# Patient Record
Sex: MALE | Race: WHITE | NOT HISPANIC OR LATINO | Employment: OTHER | URBAN - METROPOLITAN AREA
[De-identification: names, ages, dates, MRNs, and addresses within clinical notes are randomized per-mention and may not be internally consistent; named-entity substitution may affect disease eponyms.]

---

## 2017-07-27 ENCOUNTER — OFFICE VISIT (OUTPATIENT)
Dept: PODIATRY | Facility: CLINIC | Age: 82
End: 2017-07-27
Payer: MEDICARE

## 2017-07-27 VITALS — WEIGHT: 198.19 LBS | BODY MASS INDEX: 31.11 KG/M2 | HEIGHT: 67 IN

## 2017-07-27 DIAGNOSIS — B35.1 ONYCHOMYCOSIS DUE TO DERMATOPHYTE: Primary | ICD-10-CM

## 2017-07-27 DIAGNOSIS — M79.674 PAIN IN TOES OF BOTH FEET: ICD-10-CM

## 2017-07-27 DIAGNOSIS — M79.675 PAIN IN TOES OF BOTH FEET: ICD-10-CM

## 2017-07-27 PROCEDURE — 99203 OFFICE O/P NEW LOW 30 MIN: CPT | Mod: S$GLB,,, | Performed by: PODIATRIST

## 2017-07-27 PROCEDURE — 1159F MED LIST DOCD IN RCRD: CPT | Mod: S$GLB,,, | Performed by: PODIATRIST

## 2017-07-27 PROCEDURE — 1125F AMNT PAIN NOTED PAIN PRSNT: CPT | Mod: S$GLB,,, | Performed by: PODIATRIST

## 2017-07-27 PROCEDURE — 99999 PR PBB SHADOW E&M-EST. PATIENT-LVL III: CPT | Mod: PBBFAC,,, | Performed by: PODIATRIST

## 2017-07-27 PROCEDURE — 17999 UNLISTD PX SKN MUC MEMB SUBQ: CPT | Mod: CSM,,, | Performed by: PODIATRIST

## 2017-07-27 PROCEDURE — 1157F ADVNC CARE PLAN IN RCRD: CPT | Mod: S$GLB,,, | Performed by: PODIATRIST

## 2017-07-27 RX ORDER — CICLOPIROX 80 MG/ML
SOLUTION TOPICAL NIGHTLY
Qty: 6.6 ML | Refills: 11 | Status: SHIPPED | OUTPATIENT
Start: 2017-07-27 | End: 2019-08-16

## 2017-07-27 RX ORDER — ATORVASTATIN CALCIUM 40 MG/1
40 TABLET, FILM COATED ORAL NIGHTLY
COMMUNITY
Start: 2017-05-22 | End: 2021-02-01 | Stop reason: SDUPTHER

## 2017-07-27 RX ORDER — BUDESONIDE AND FORMOTEROL FUMARATE DIHYDRATE 160; 4.5 UG/1; UG/1
2 AEROSOL RESPIRATORY (INHALATION) EVERY 12 HOURS
COMMUNITY
Start: 2017-07-12

## 2017-07-27 NOTE — PROGRESS NOTES
Subjective:      Patient ID: Frank Wyatt is a 82 y.o. male.    Chief Complaint: Nail Care    Long thick discolored misshapen painful toenails all ten toes.  Gradual onset, worsening over past several weeks, aggravated by increased weight bearing, shoe gear, pressure.  No previous medical treatment.  No self care.  Denies trauma and surgery all toes.      Review of Systems   Constitution: Negative for chills, diaphoresis, fever, malaise/fatigue and night sweats.   Cardiovascular: Negative for claudication, cyanosis, leg swelling and syncope.   Skin: Positive for nail changes. Negative for color change, dry skin, rash, suspicious lesions and unusual hair distribution.   Musculoskeletal: Negative for falls, joint pain, joint swelling, muscle cramps, muscle weakness and stiffness.   Gastrointestinal: Negative for constipation, diarrhea, nausea and vomiting.   Neurological: Negative for brief paralysis, disturbances in coordination, focal weakness, numbness, paresthesias, sensory change and tremors.           Objective:      Physical Exam   Constitutional: He is oriented to person, place, and time. He appears well-developed and well-nourished. He is cooperative.   Oriented to time, place, and person.   Cardiovascular:   Pulses:       Dorsalis pedis pulses are 1+ on the right side, and 1+ on the left side.        Posterior tibial pulses are 1+ on the right side, and 1+ on the left side.   Capillary fill time 3-5 seconds.  All toes warm to touch.      Negative lower extremity edema bilateral.    Negative elevational pallor and dependent rubor bilateral.     Musculoskeletal:   Normal angle, base, station of gait. Decreased stride length, early heel off, moderately propulsive toe off bilateral.    All ten toes without clubbing, cyanosis, or signs of ischemia.      No pain to palpation bilateral lower extremities.      Range of motion, stability, muscle strength, and muscle tone are age and health appropriate normal  bilateral feet and legs.       Lymphadenopathy:   Negative lymphadenopathy bilateral popliteal fossa and tarsal tunnel.     Neurological: He is alert and oriented to person, place, and time. He has normal strength. He is not disoriented. He displays no atrophy and no tremor. No sensory deficit. He exhibits normal muscle tone.   Reflex Scores:       Patellar reflexes are 2+ on the right side and 2+ on the left side.       Achilles reflexes are 2+ on the right side and 2+ on the left side.  Negative tinel sign to percussion sural, superficial peroneal, deep peroneal, saphenous, and posterior tibial nerves right and left ankles and feet.     Skin: Skin is warm, dry and intact. No abrasion, no bruising, no burn, no ecchymosis, no laceration, no lesion, no petechiae and no rash noted. He is not diaphoretic. No cyanosis or erythema. No pallor. Nails show no clubbing.   Skin is normal age and health appropriate color, turgor, texture, and temperature bilateral lower extremities without ulceration, hyperpigmentation, discoloration, masses nodules or cords palpated.  No ecchymosis, erythema, edema, or cardinal signs of infection bilateral lower extremities.      Toenails 1st, 2nd, 3rd, 4th, 5th  bilateral are hypertrophic thickened 2-3 mm, dystrophic, discolored tanish brown with tan, gray crumbly subungual debris.  Tender to distal nail plate pressure, without periungual skin abnormality of each.               Assessment:       Encounter Diagnoses   Name Primary?    Onychomycosis due to dermatophyte Yes    Pain in toes of both feet          Plan:       Frank TOMAS was seen today for nail care.    Diagnoses and all orders for this visit:    Onychomycosis due to dermatophyte    Pain in toes of both feet    Other orders  -     ciclopirox (PENLAC) 8 % Soln; Apply topically nightly.      I counseled the patient on his conditions, their implications and medical management.        - Shoe inspection. Patient instructed on proper  foot hygeine. We discussed wearing proper shoe gear, daily foot inspections, never walking without protective shoe gear, never putting sharp instruments to feet, routine podiatric visits as needed.      Discussed conservative treatment with shoes of adequate dimensions, material, and style to alleviate symptoms and delay or prevent surgical intervention.      Non covered foot care:    - With patient's permission, nails were aggressively reduced and debrided x 10 to their soft tissue attachment mechanically and with electric , removing all offending nail and debris. Patient relates relief following the procedure. He will continue to monitor the areas daily, inspect his feet, wear protective shoe gear when ambulatory, moisturizer to maintain skin integrity and follow in this office in approximately 2-3 months, sooner p.r.n.          Return if symptoms worsen or fail to improve.

## 2019-03-06 ENCOUNTER — OFFICE VISIT (OUTPATIENT)
Dept: SURGERY | Facility: CLINIC | Age: 84
End: 2019-03-06
Payer: MEDICARE

## 2019-03-06 VITALS — HEIGHT: 67 IN | WEIGHT: 197 LBS | BODY MASS INDEX: 30.92 KG/M2

## 2019-03-06 DIAGNOSIS — K40.90 RIGHT INGUINAL HERNIA: Primary | ICD-10-CM

## 2019-03-06 DIAGNOSIS — K43.2 INCISIONAL HERNIA OF ANTERIOR ABDOMINAL WALL WITHOUT OBSTRUCTION OR GANGRENE: ICD-10-CM

## 2019-03-06 PROCEDURE — 1101F PT FALLS ASSESS-DOCD LE1/YR: CPT | Mod: ,,, | Performed by: SURGERY

## 2019-03-06 PROCEDURE — 99203 OFFICE O/P NEW LOW 30 MIN: CPT | Mod: ,,, | Performed by: SURGERY

## 2019-03-06 PROCEDURE — 1101F PR PT FALLS ASSESS DOC 0-1 FALLS W/OUT INJ PAST YR: ICD-10-PCS | Mod: ,,, | Performed by: SURGERY

## 2019-03-06 PROCEDURE — 99203 PR OFFICE/OUTPT VISIT, NEW, LEVL III, 30-44 MIN: ICD-10-PCS | Mod: ,,, | Performed by: SURGERY

## 2019-03-06 NOTE — PROGRESS NOTES
Subjective:       Patient ID: Frank Wyatt is a 84 y.o. male.    Chief Complaint: Inguinal Hernia (Rt Inguinal hernia and abdominal hernia (? incisional) - Dr. Cardenas)      HPI:  Patient is 84-year-old male referred to the office with a large right inguinal hernia and mid abdominal incisional hernia. Patient first noticed the right inguinal hernia about a year or 2 ago. It has become more uncomfortable. He presented to primary care and to his urologist. He was found to have evidence of epididymitis on testicular ultrasound. CT scan was done that revealed a large right inguinal hernia as well. Patient has no obstructive symptoms. He has no history of right inguinal hernia repair.     Patient also has had multiple abdominal surgeries. He had partial colectomy with colostomy followed by colostomy reversal several months later in 2004. He had laparoscopic cholecystectomy in 2014. He had a resultant incisional hernia at the umbilicus. The hernia was repaired laparoscopically with complication of bowel injury. Exploratory laparotomy with bowel repair and removal of the mesh was performed soon after. His incisional hernia returned about a year post procedure. He has a large bulge in the midabdomen. He states he's not had any discomfort or obstructive issues with the hernia. Patient also has history of coronary artery disease and had CABG in 1997. Reports a normal stress test one year ago.    Past Medical History:   Diagnosis Date    Arthritis     Coronary artery disease     Full dentures     [place] (hard of hearing)     left ear    Hypertension     Kidney stones     Meniere's disease     Wears glasses      Past Surgical History:   Procedure Laterality Date    APPENDECTOMY      CARDIAC SURGERY  1997    CABG 5 vessel    CHOLECYSTECTOMY  2013    CHOLECYSTECTOMY, LAPAROSCOPIC N/A 1/7/2014    [facility]    COLON SURGERY      Colon resection with colostomy; colostomy  reversal. 2004    CORONARY ARTERY BYPASS GRAFT  1996    5-bypass     EXPLORATORY-LAPAROTOMY N/A 8/20/2015    [facility]    EXPLORATORY-LAPAROTOMY N/A 1/7/2014    [facility] OR    EYE SURGERY Right 2014    Cataract    EYE SURGERY Left 2017    Cataract    HERNIA REPAIR  2014    Dr. Bailon - had to have mesh removed    LITHOTRIPSY      nephrostomy tube      REPAIR-HERNIA-INCISIONAL N/A 8/17/2015    [facility] OR    RESECTION - SMALL BOWEL N/A 8/20/2015    [facility] OR    ROTATOR CUFF REPAIR  2005    right shoulder     Review of patient's allergies indicates:   Allergen Reactions    Bactrim [sulfamethoxazole-trimethoprim] Hives     itched    Keflex [cephalexin] Rash    Morphine Rash     Patient had morphine and keflex at the same time, developed a rash, not sure which one caused it, or if it was a combination of the two meds.     Medication List with Changes/Refills   Current Medications    ALBUTEROL 90 MCG/ACTUATION INHALER    Inhale 2 puffs into the lungs every 4 (four) hours as needed for Wheezing or Shortness of Breath.    ATORVASTATIN (LIPITOR) 40 MG TABLET        BETAHISTINE HCL (BETAHISTINE, BULK, MISC)    24 mg by Misc.(Non-Drug; Combo Route) route.    CICLOPIROX (PENLAC) 8 % SOLN    Apply topically nightly.    FLUTICASONE (FLOVENT HFA) 44 MCG/ACTUATION INHALER    Inhale 1 puff into the lungs 2 (two) times daily.    FUROSEMIDE (LASIX) 40 MG TABLET    Take 1 tablet (40 mg total) by mouth once daily.    LEVOTHYROXINE (SYNTHROID) 25 MCG TABLET    Take 1 tablet (25 mcg total) by mouth before breakfast.    METOPROLOL TARTRATE (LOPRESSOR) 25 MG TABLET    25 mg 2 (two) times daily.     PREDNISONE (DELTASONE) 20 MG TABLET    Take 2 tablets (40 mg total) by mouth once daily.    SYMBICORT 160-4.5 MCG/ACTUATION HFAA        TRAMADOL (ULTRAM) 50 MG TABLET    Take 1 tablet (50 mg total) by  mouth every 6 (six) hours as needed for Pain.     Family History   Problem Relation Age of Onset    Heart disease Mother     Hypertension Mother     Hypertension Sister     Heart disease Brother     Hypertension Brother     Cancer Daughter     Diabetes Brother     Heart disease Brother     Hypertension Brother     Hypertension Sister     Heart disease Sister      Social History     Socioeconomic History    Marital status:      Spouse name: None    Number of children: None    Years of education: None    Highest education level: None   Social Needs    Financial resource strain: None    Food insecurity - worry: None    Food insecurity - inability: None    Transportation needs - medical: None    Transportation needs - non-medical: None   Occupational History    None   Tobacco Use    Smoking status: Former Smoker     Packs/day: 1.00     Years: 25.00     Pack years: 25.00     Last attempt to quit: 1972     Years since quittin.1    Smokeless tobacco: Never Used   Substance and Sexual Activity    Alcohol use: No    Drug use: No    Sexual activity: None   Other Topics Concern    None   Social History Narrative    None         Review of Systems   Constitutional: Negative for appetite change, chills, fever and unexpected weight change.   HENT: Negative for hearing loss, rhinorrhea, sore throat and voice change.    Eyes: Negative for photophobia and visual disturbance.   Respiratory: Negative for cough, choking and shortness of breath.    Cardiovascular: Negative for chest pain, palpitations and leg swelling.   Gastrointestinal: Negative for abdominal pain, blood in stool, constipation, diarrhea, nausea and vomiting.   Endocrine: Negative for cold intolerance, heat intolerance, polydipsia and polyuria.   Musculoskeletal: Negative for arthralgias, back pain, joint swelling and neck stiffness.   Skin: Negative for color change, pallor and rash.   Neurological: Negative for dizziness,  seizures, syncope and headaches.   Hematological: Negative for adenopathy. Does not bruise/bleed easily.   Psychiatric/Behavioral: Negative for agitation, behavioral problems and confusion.       Objective:      Physical Exam   Constitutional: He appears well-developed and well-nourished.  Non-toxic appearance. No distress.   HENT:   Head: Normocephalic and atraumatic. Head is without abrasion and without laceration.   Right Ear: External ear normal.   Left Ear: External ear normal.   Nose: Nose normal.   Mouth/Throat: Oropharynx is clear and moist.   Eyes: EOM are normal. Pupils are equal, round, and reactive to light.   Neck: Trachea normal. Neck supple. No tracheal deviation and normal range of motion present.   Cardiovascular: Normal rate and regular rhythm.   Pulmonary/Chest: Effort normal. No accessory muscle usage. No tachypnea. No respiratory distress.   Abdominal: Soft. Normal appearance and bowel sounds are normal. He exhibits no distension and no mass. There is no tenderness. There is no rigidity and no guarding. A hernia is present. Hernia confirmed positive in the ventral area and confirmed positive in the right inguinal area.       Patient has reducible periumbilical incisional hernia. Defect feels to be about 4 cm in diameter    Patient has large right inguinal hernia with scrotal component   Lymphadenopathy:        Right: No inguinal adenopathy present.        Left: No inguinal adenopathy present.   Neurological: He is alert. Coordination and gait normal.   Skin: Skin is warm and intact.   Psychiatric: He has a normal mood and affect. His speech is normal and behavior is normal.       Assessment/Plan:   Frank TOMAS was seen today for inguinal hernia.    Diagnoses and all orders for this visit:    Right inguinal hernia  -     Ambulatory Referral to External Surgery  -     CBC auto differential; Future  -     Basic metabolic panel; Future  -     X-Ray Chest PA And Lateral; Future  -     SCHEDULED EKG  12-LEAD (to [place]); Future    Incisional hernia of anterior abdominal wall without obstruction or gangrene      Patient has large right inguinal hernia as well as midabdominal incisional hernia. We discussed repair of both hernias as well as repairing them potentially at the same time. In my opinion repairing hernias at separate procedures is the best option. Patient is elderly, has good functional status but is somewhat frail. I worry that having both procedures done at the same time would result in a difficult recovery. He has had multiple abdominal operations. Adhesive disease may be dense. He has history of bowel injury with previous laparoscopic hernia repair. This procedure may be lengthy. For these reasons I have recommended repair of the hernias at separate times. The ventral hernia as been present since 2015 and causal discomfort. The right inguinal hernia is large and symptomatic. We will plan for right inguinal hernia repair in the upcoming weeks. After he is recovered from right inguinal hernia we will discuss repair of ventral hernia. He'll be sent for cardiac clearance. We will tentatively schedule surgery for March 25 pending cardiac clearance    Planned procedure: Right inguinal hernia repair    Clindamycin 900 mg IV on call to OR    NPO past midnight    [place] cloth scrub per protocol    SCDs Bilateral Lower Extremities    I discussed the proposed procedures the the patient including risks, benefits, indications, alternatives and special concerns.  The patient appears to understand and agrees to go ahead with surgery.  I have made no promises, warranties or verbal agreements beyond what was discussed above.

## 2019-03-21 LAB
BASOPHILS NFR BLD: 0.1 K/UL (ref 0–0.2)
BASOPHILS NFR BLD: 0.7 %
BUN SERPL-MCNC: 28 MG/DL (ref 8–20)
CALCIUM SERPL-MCNC: 9.8 MG/DL (ref 7.7–10.4)
CHLORIDE: 98 MMOL/L (ref 98–110)
CO2 SERPL-SCNC: 31.1 MMOL/L (ref 22.8–31.6)
CREATININE: 1.15 MG/DL (ref 0.6–1.4)
EOSINOPHIL NFR BLD: 0.6 K/UL (ref 0–0.7)
EOSINOPHIL NFR BLD: 6 %
ERYTHROCYTE [DISTWIDTH] IN BLOOD BY AUTOMATED COUNT: 13.4 % (ref 11.7–14.9)
GLUCOSE: 112 MG/DL (ref 70–99)
GRAN #: 7.4 K/UL (ref 1.4–6.5)
GRAN%: 70.4 %
HCT VFR BLD AUTO: 47 % (ref 39–55)
HGB BLD-MCNC: 15.5 G/DL (ref 14–16)
IMMATURE GRANS (ABS): 0.1 K/UL (ref 0–1)
IMMATURE GRANULOCYTES: 0.7 %
LYMPH #: 1.5 K/UL (ref 1.2–3.4)
LYMPH%: 14.4 %
MCH RBC QN AUTO: 30.5 PG (ref 25–35)
MCHC RBC AUTO-ENTMCNC: 33 G/DL (ref 31–36)
MCV RBC AUTO: 92.3 FL (ref 80–100)
MONO #: 0.8 K/UL (ref 0.1–0.6)
MONO%: 7.8 %
NUCLEATED RBCS: 0 %
PLATELET # BLD AUTO: 111 K/UL (ref 140–440)
PMV BLD AUTO: 10.1 FL (ref 8.8–12.7)
POTASSIUM SERPL-SCNC: 4.8 MMOL/L (ref 3.5–5)
RBC # BLD AUTO: 5.09 M/UL (ref 4.3–5.9)
SODIUM: 135 MMOL/L (ref 134–144)
WBC # BLD AUTO: 10.4 K/UL (ref 5–10)

## 2019-03-28 ENCOUNTER — TELEPHONE (OUTPATIENT)
Dept: SURGERY | Facility: CLINIC | Age: 84
End: 2019-03-28

## 2019-03-28 NOTE — TELEPHONE ENCOUNTER
Pt daughter Argelia calling stating pt is doing well post operatively, however he is having to take pain meds every 4 hours exactly. Last night was the first time he was able to make it the full 4 hours without having breakthrough pain. He will be out of pain meds on Saturday and she is concerned about what he can do for pain after that. She states incision looks great. It is not warm to touch, red or inflamed. Pt does not have fever. I notified her I will d/w Dr. Abraham and call back.

## 2019-04-01 ENCOUNTER — TELEPHONE (OUTPATIENT)
Dept: SURGERY | Facility: CLINIC | Age: 84
End: 2019-04-01

## 2019-04-09 ENCOUNTER — TELEPHONE (OUTPATIENT)
Dept: SURGERY | Facility: CLINIC | Age: 84
End: 2019-04-09

## 2019-04-09 NOTE — TELEPHONE ENCOUNTER
Kay RN at [facility] calling stating she saw pt today to eval wound. She said discharge coming from it is thick & slimy. Currently they have orders to do wet to dry dressing changes daily with saline water, but after seeing it she feels packing with silver alginate on M,W,F would work better and help tissue heal faster. After speaking with Dr. Abraham, I informed Kay that it was ok to proceed with that.

## 2019-04-12 RX ORDER — LINEZOLID 600 MG/1
600 TABLET, FILM COATED ORAL EVERY 12 HOURS
Qty: 20 TABLET | Refills: 0 | Status: SHIPPED | OUTPATIENT
Start: 2019-04-12 | End: 2019-04-22

## 2019-04-12 NOTE — PROGRESS NOTES
Culture returned MRSA resistant to Cipro, clinda, tetracycline.  It is sensitive to bactrim but he is highly allergic.  Will prescribe Linezolid

## 2019-04-17 ENCOUNTER — OFFICE VISIT (OUTPATIENT)
Dept: SURGERY | Facility: CLINIC | Age: 84
End: 2019-04-17
Payer: MEDICARE

## 2019-04-17 VITALS
TEMPERATURE: 99 F | HEART RATE: 108 BPM | HEIGHT: 67 IN | WEIGHT: 184 LBS | BODY MASS INDEX: 28.88 KG/M2 | SYSTOLIC BLOOD PRESSURE: 133 MMHG | DIASTOLIC BLOOD PRESSURE: 68 MMHG

## 2019-04-17 DIAGNOSIS — T81.41XD INFECTION OF SUPERFICIAL INCISIONAL SURGICAL SITE AFTER PROCEDURE, SUBSEQUENT ENCOUNTER: Primary | ICD-10-CM

## 2019-04-17 PROCEDURE — 99024 POSTOP FOLLOW-UP VISIT: CPT | Mod: ,,, | Performed by: SURGERY

## 2019-04-17 PROCEDURE — 99024 PR POST-OP FOLLOW-UP VISIT: ICD-10-PCS | Mod: ,,, | Performed by: SURGERY

## 2019-04-17 NOTE — LETTER
April 19, 2019      Dipak Sanchez MD  [address] [facility]  [address]  [place] [address]           [facility]  [address]  [place] [address]  Phone: [phone]  Fax: [phone]          Patient: Frank Wyatt   MR Number: [id number]   YOB: 1934   Date of Visit: 4/17/2019       Dear Dr. Dipak Sanchez:    Thank you for referring Frank Wyatt to me for evaluation. Attached you will find relevant portions of my assessment and plan of care.    If you have questions, please do not hesitate to call me. I look forward to following Frank Wyatt along with you.    Sincerely,    Samir Abraham III, MD    Enclosure  CC:  No Recipients    If you would like to receive this communication electronically, please contact [email or web address] or [phone] to request more information on [facility] Link access.    For providers and/or their staff who would like to refer a patient to [facility], please contact us through our one-stop-shop provider referral line, [facility], at [phone].    If you feel you have received this communication in error or would no longer like to receive these types of communications, please e-mail [email or web address]

## 2019-04-19 NOTE — PROGRESS NOTES
Subjective:       Patient ID: Frank Wyatt is a 84 y.o. male.    Chief Complaint: Post-op Evaluation (FU DOS 3/25/19 RIH repair w/ mesh )      HPI:  Patient was is s/p RIH repair with mesh.  H presented two weeks later with an abscess at the incision site.  It was drained.  Cultures returned MRSA resistant also to Cipro and clinda.  He is allergic to bactrim and so he was started on Linezolid.  He has been feeling better.  He has home health coming to the house for wound care.  It is being packed with a sliver impregnated product.  He has been afebrile.  He reports drainage is more serous now.      Review of Systems    Objective:      Physical Exam   Constitutional: He is oriented to person, place, and time. He is cooperative. No distress.   Abdominal: Soft. He exhibits no distension. There is no tenderness. There is no rigidity, no rebound and no guarding.       Neurological: He is oriented to person, place, and time.   Skin:   Incision open at the lateral aspect.  It is draining serous fluid. The skin surrounding the open aspect has small rim of erythema. The rest of the skin has no cellulitis.         Assessment/Plan:   Infection of superficial incisional surgical site after procedure, subsequent encounter      Continue current wound care with packing daily.  Continue Linezolid until completion.  RTC one week.    Follow up in about 1 week (around 4/24/2019).

## 2019-04-24 ENCOUNTER — OFFICE VISIT (OUTPATIENT)
Dept: SURGERY | Facility: CLINIC | Age: 84
End: 2019-04-24
Payer: MEDICARE

## 2019-04-24 VITALS
TEMPERATURE: 99 F | DIASTOLIC BLOOD PRESSURE: 63 MMHG | HEART RATE: 112 BPM | HEIGHT: 67 IN | SYSTOLIC BLOOD PRESSURE: 112 MMHG | WEIGHT: 182 LBS | BODY MASS INDEX: 28.56 KG/M2

## 2019-04-24 DIAGNOSIS — T81.41XD INFECTION OF SUPERFICIAL INCISIONAL SURGICAL SITE AFTER PROCEDURE, SUBSEQUENT ENCOUNTER: Primary | ICD-10-CM

## 2019-04-24 DIAGNOSIS — K40.90 RIGHT INGUINAL HERNIA: ICD-10-CM

## 2019-04-24 PROCEDURE — 99024 POSTOP FOLLOW-UP VISIT: CPT | Mod: ,,, | Performed by: SURGERY

## 2019-04-24 PROCEDURE — 99024 PR POST-OP FOLLOW-UP VISIT: ICD-10-PCS | Mod: ,,, | Performed by: SURGERY

## 2019-05-01 ENCOUNTER — OFFICE VISIT (OUTPATIENT)
Dept: SURGERY | Facility: CLINIC | Age: 84
End: 2019-05-01
Payer: MEDICARE

## 2019-05-01 VITALS
HEART RATE: 66 BPM | TEMPERATURE: 98 F | HEIGHT: 67 IN | WEIGHT: 185 LBS | BODY MASS INDEX: 29.03 KG/M2 | DIASTOLIC BLOOD PRESSURE: 71 MMHG | SYSTOLIC BLOOD PRESSURE: 138 MMHG

## 2019-05-01 DIAGNOSIS — T81.41XD INFECTION OF SUPERFICIAL INCISIONAL SURGICAL SITE AFTER PROCEDURE, SUBSEQUENT ENCOUNTER: Primary | ICD-10-CM

## 2019-05-01 DIAGNOSIS — K40.90 RIGHT INGUINAL HERNIA: ICD-10-CM

## 2019-05-01 PROCEDURE — 99024 POSTOP FOLLOW-UP VISIT: CPT | Mod: ,,, | Performed by: SURGERY

## 2019-05-01 PROCEDURE — 99024 PR POST-OP FOLLOW-UP VISIT: ICD-10-PCS | Mod: ,,, | Performed by: SURGERY

## 2019-05-01 RX ORDER — LEVOTHYROXINE SODIUM 25 UG/1
25 TABLET ORAL DAILY
Refills: 1 | COMMUNITY
Start: 2019-04-05

## 2019-05-01 RX ORDER — NITROFURANTOIN 25; 75 MG/1; MG/1
CAPSULE ORAL
Refills: 0 | Status: ON HOLD | COMMUNITY
Start: 2019-04-26 | End: 2019-08-15

## 2019-05-01 RX ORDER — FUROSEMIDE 20 MG/1
10 TABLET ORAL DAILY
Refills: 1 | Status: ON HOLD | COMMUNITY
Start: 2019-04-21 | End: 2023-03-29 | Stop reason: HOSPADM

## 2019-05-01 NOTE — LETTER
May 6, 2019      Dipak Sanchez MD  [address] [facility]  [address]  [place] [address]           [facility]  [address]  [place] [address]  Phone: [phone]  Fax: [phone]          Patient: Frank Wyatt   MR Number: [id number]   YOB: 1934   Date of Visit: 5/1/2019       Dear Dr. Dipak Sanchez:    Thank you for referring Frank Wyatt to me for evaluation. Attached you will find relevant portions of my assessment and plan of care.    If you have questions, please do not hesitate to call me. I look forward to following Frank Wyatt along with you.    Sincerely,    Samir Abraham III, MD    Enclosure  CC:  No Recipients    If you would like to receive this communication electronically, please contact [email or web address] or [phone] to request more information on [facility] Link access.    For providers and/or their staff who would like to refer a patient to [facility], please contact us through our one-stop-shop provider referral line, [facility], at [phone].    If you feel you have received this communication in error or would no longer like to receive these types of communications, please e-mail [email or web address]

## 2019-05-06 NOTE — PROGRESS NOTES
Subjective:       Patient ID: Frank Wyatt is a 84 y.o. male.    Chief Complaint: Post-op Evaluation (FU DOS 3/25/19 RIH repair w/ mesh )      HPI:  Patient is s/p RIH repair with mesh. He had surgery site abscess two weeks later.  I and D performed.  He has been packing the wound with a sliver impregnated product with home health.  He is currently on Linezolid with a lot of improvement in drainage and erythema.  He returns to the office for wound check  He has been feeling better. Tenderness is much improved.  He has been afebrile.  He reports drainage is serous.        Review of Systems    Objective:      Physical Exam   Constitutional: He is oriented to person, place, and time. He is cooperative. No distress.   Pulmonary/Chest: Effort normal.   Abdominal: Soft. He exhibits no distension. There is no tenderness. There is no rigidity, no rebound and no guarding.       Neurological: He is alert and oriented to person, place, and time.   Skin:   Incision open at the lateral aspect.  It is draining serous fluid. The skin surrounding the open aspect has small rim of erythema. The rest of the skin has no cellulitis.  Wound bed is beefy red        Assessment/Plan:   Infection of superficial incisional surgical site after procedure, subsequent encounter    Right inguinal hernia      Continue current wound care.  RTC one week for wound check

## 2019-05-06 NOTE — PROGRESS NOTES
Subjective:       Patient ID: Frank Wyatt is a 84 y.o. male.    Chief Complaint: Post-op Evaluation (FU DOS 3/25/19 RIH repair w/ mesh)      HPI:  Patient is s/p RIH repair with mesh. He had surgery site abscess two weeks later.  I and D performed.  He has been packing the wound with a sliver impregnated product with home health.  He has completed Linezolid. He returns to the office for wound check  He has been feeling better. Tenderness is much improved.  He has been afebrile.  He reports drainage is serous.           Review of Systems    Objective:      Physical Exam   Constitutional: He is oriented to person, place, and time. He is cooperative. No distress.   Pulmonary/Chest: Effort normal.   Abdominal: Soft. He exhibits no distension. There is no tenderness. There is no rigidity, no rebound and no guarding. No hernia.       Neurological: He is alert and oriented to person, place, and time.   Skin:   Incision open at the lateral aspect.  It is draining serous fluid. No cellulitis.  Wound bed is beefy red        Assessment/Plan:   Infection of superficial incisional surgical site after procedure, subsequent encounter    Right inguinal hernia      Healing well.  Continue current wound care.  RTC one week

## 2019-05-08 ENCOUNTER — OFFICE VISIT (OUTPATIENT)
Dept: SURGERY | Facility: CLINIC | Age: 84
End: 2019-05-08
Payer: MEDICARE

## 2019-05-08 VITALS
BODY MASS INDEX: 29.03 KG/M2 | SYSTOLIC BLOOD PRESSURE: 131 MMHG | DIASTOLIC BLOOD PRESSURE: 72 MMHG | HEIGHT: 67 IN | WEIGHT: 185 LBS

## 2019-05-08 DIAGNOSIS — T81.41XD INFECTION OF SUPERFICIAL INCISIONAL SURGICAL SITE AFTER PROCEDURE, SUBSEQUENT ENCOUNTER: Primary | ICD-10-CM

## 2019-05-08 PROCEDURE — 99024 PR POST-OP FOLLOW-UP VISIT: ICD-10-PCS | Mod: ,,, | Performed by: SURGERY

## 2019-05-08 PROCEDURE — 99024 POSTOP FOLLOW-UP VISIT: CPT | Mod: ,,, | Performed by: SURGERY

## 2019-05-14 NOTE — PROGRESS NOTES
Subjective:       Patient ID: Frank Wyatt is a 84 y.o. male.    Chief Complaint: Post-op Evaluation (FU DOS 3/25/19 RIH repair w/ mesh)      HPI:  Patient is s/p RIH repair with mesh. He had surgery site abscess two weeks later.  I and D performed.  He has been packing the wound with a sliver impregnated product with home health.  He has completed Linezolid. He returns to the office for wound check. He states he continues to feel better. Tenderness is resolved. He has been afebrile.  He reports drainage is serous      Review of Systems    Objective:      Physical Exam   Constitutional: He is oriented to person, place, and time. He is cooperative. No distress.   Pulmonary/Chest: Effort normal.   Abdominal: Soft. He exhibits no distension. There is no tenderness. There is no rigidity, no rebound and no guarding. No hernia.       Neurological: He is alert and oriented to person, place, and time.   Skin:   Incision open at the lateral aspect.  It is draining serous fluid. No cellulitis.  Wound bed is beefy red. Depth is much more shallow than last visit and is about 1cm.        Assessment/Plan:   Infection of superficial incisional surgical site after procedure, subsequent encounter      Wound is healing well.  It continues to improve.  Continue current wound care.  RTC one week   Follow up in about 1 week (around 5/15/2019).

## 2019-08-13 ENCOUNTER — HOSPITAL ENCOUNTER (OUTPATIENT)
Dept: PREADMISSION TESTING | Facility: HOSPITAL | Age: 84
Discharge: HOME | End: 2019-08-13
Attending: SPECIALIST
Payer: MEDICARE

## 2019-08-13 VITALS
TEMPERATURE: 98 F | SYSTOLIC BLOOD PRESSURE: 178 MMHG | DIASTOLIC BLOOD PRESSURE: 79 MMHG | RESPIRATION RATE: 18 BRPM | HEART RATE: 63 BPM | BODY MASS INDEX: 30.47 KG/M2 | HEIGHT: 67 IN | OXYGEN SATURATION: 95 % | WEIGHT: 194.13 LBS

## 2019-08-13 DIAGNOSIS — Z01.818 PRE-OP TESTING: Primary | ICD-10-CM

## 2019-08-13 RX ORDER — TAMSULOSIN HYDROCHLORIDE 0.4 MG/1
0.4 CAPSULE ORAL DAILY
COMMUNITY

## 2019-08-13 NOTE — DISCHARGE INSTRUCTIONS
To confirm, Your doctor has instructed you that surgery is scheduled for: Thursday 8/15/19    Pre-Op will call the afternoon prior to surgery between 4:00 and 6:00 PM with the final arrival time.      Please report to [facility] on [address] the morning of surgery.     PLEASE NOTE:  The surgery schedule has many variables which may affect the time of your surgery case.  Family members should be available if your surgery time changes.  Plan to be here the day of your procedure between 4-6 hours.    MEDICATIONS:  TAKE ONLY THESE MEDICATIONS WITH A SMALL SIP OF WATER THE MORNING OF YOUR PROCEDURE: METOPROLOL-LEVOTHYROXINE-INHALERS      DO NOT TAKE THESE MEDICATIONS 5-7 DAYS PRIOR to your procedure or per your surgeon's request: ASPIRIN, ALEVE, ADVIL, IBUPROFEN, FISH OIL VITAMIN E, HERBALS  (May take Tylenol)    ONLY if you are prescribed any types of blood thinners such as:  Aspirin, Coumadin, Plavix, Pradaxa, Xarelto, Aggrenox, Effient, Eliquis, Savasya, Brilinta, or any other, ask your surgeon whether you should stop taking them and how long before surgery you should stop.  You may also need to verify with the prescribing physician if it is ok to stop your medication.      INSTRUCTIONS IMPORTANT!!  · Do not eat or drink anything between midnight and the time of your procedure- this includes gum, mints, and candy.  · ONLY if you are diabetic, check your sugar in the morning before your procedure.  · Do not smoke, vape or drink alcoholic beverages 24 hours prior to your procedure.  · Shower the night before AND the morning of your procedure with a Chlorhexidine wash such as Hibiclens or Dial antibacterial soap from the neck down.  Do not get it on your face or in your eyes.  You may use your own shampoo and face wash. This helps your skin to be as bacteria free as possible.    · If you wear contact lenses, dentures, hearing aids or glasses, bring a container to put them in during surgery and give to a family  member for safe keeping.  Please leave all jewelry, piercing's and valuables at home.   · DO NOT remove hair from the surgery site.  Do not shave the incision site unless you are given specific instructions to do so.    · ONLY if you have been diagnosed with sleep apnea please bring your C-PAP machine.  · ONLY if you wear home oxygen please bring your portable oxygen tank the day of your procedure.   · ONLY for patients requiring bowel prep, written instructions will be given by your doctor's office.  · ONLY if you have a neuro stimulator, please bring the controller with you the morning of surgery  · ONLY if a type and screen test is needed before surgery, please return:  · If your doctor has scheduled you for an overnight stay, bring a small overnight bag with any personal items you need.  · Make arrangements in advance for transportation home by a responsible adult.  · You must make arrangements for transportation, TAXI'S, UBER'S OR LYFTS ARE NOT ALLOWED.          If you have any questions about these instructions, call Pre-Op Admit  Nursing at [phone] or the Pre-Op Day Surgery Unit at [phone].

## 2019-08-15 ENCOUNTER — HOSPITAL ENCOUNTER (OUTPATIENT)
Facility: HOSPITAL | Age: 84
Discharge: HOME | End: 2019-08-15
Attending: SPECIALIST | Admitting: SPECIALIST
Payer: MEDICARE

## 2019-08-15 ENCOUNTER — ANESTHESIA EVENT (OUTPATIENT)
Dept: SURGERY | Facility: HOSPITAL | Age: 84
End: 2019-08-15
Payer: MEDICARE

## 2019-08-15 ENCOUNTER — ANESTHESIA (OUTPATIENT)
Dept: SURGERY | Facility: HOSPITAL | Age: 84
End: 2019-08-15
Payer: MEDICARE

## 2019-08-15 VITALS
RESPIRATION RATE: 16 BRPM | OXYGEN SATURATION: 94 % | HEART RATE: 86 BPM | DIASTOLIC BLOOD PRESSURE: 81 MMHG | TEMPERATURE: 98 F | SYSTOLIC BLOOD PRESSURE: 170 MMHG

## 2019-08-15 DIAGNOSIS — N20.0 RENAL STONE: Primary | ICD-10-CM

## 2019-08-15 DIAGNOSIS — Z01.818 PRE-OP TESTING: ICD-10-CM

## 2019-08-15 PROCEDURE — 27202105 HC BIS BILATERAL SENSOR: Performed by: ANESTHESIOLOGY

## 2019-08-15 PROCEDURE — 27000671 HC TUBING MICROBORE EXT: Performed by: ANESTHESIOLOGY

## 2019-08-15 PROCEDURE — 27000654 HC CATH IV JELCO: Performed by: ANESTHESIOLOGY

## 2019-08-15 PROCEDURE — C2617 STENT, NON-COR, TEM W/O DEL: HCPCS | Performed by: SPECIALIST

## 2019-08-15 PROCEDURE — 63600175 PHARM REV CODE 636 W HCPCS: Performed by: SPECIALIST

## 2019-08-15 PROCEDURE — 27201107 HC STYLET, STANDARD: Performed by: ANESTHESIOLOGY

## 2019-08-15 PROCEDURE — 37000009 HC ANESTHESIA EA ADD 15 MINS: Performed by: SPECIALIST

## 2019-08-15 PROCEDURE — C1769 GUIDE WIRE: HCPCS | Performed by: SPECIALIST

## 2019-08-15 PROCEDURE — 27201423 OPTIME MED/SURG SUP & DEVICES STERILE SUPPLY: Performed by: SPECIALIST

## 2019-08-15 PROCEDURE — 63600175 PHARM REV CODE 636 W HCPCS: Performed by: NURSE ANESTHETIST, CERTIFIED REGISTERED

## 2019-08-15 PROCEDURE — 25000003 PHARM REV CODE 250: Performed by: SPECIALIST

## 2019-08-15 PROCEDURE — 27000673 HC TUBING BLOOD Y: Performed by: ANESTHESIOLOGY

## 2019-08-15 PROCEDURE — 37000008 HC ANESTHESIA 1ST 15 MINUTES: Performed by: SPECIALIST

## 2019-08-15 PROCEDURE — 36000707: Performed by: SPECIALIST

## 2019-08-15 PROCEDURE — 36000706: Performed by: SPECIALIST

## 2019-08-15 PROCEDURE — 25000003 PHARM REV CODE 250: Performed by: NURSE ANESTHETIST, CERTIFIED REGISTERED

## 2019-08-15 PROCEDURE — 71000033 HC RECOVERY, INTIAL HOUR: Performed by: SPECIALIST

## 2019-08-15 PROCEDURE — 71000015 HC POSTOP RECOV 1ST HR: Performed by: SPECIALIST

## 2019-08-15 DEVICE — STENT URETERAL FIRM 4.8FX26 ASCERTA: Type: IMPLANTABLE DEVICE | Site: URETER | Status: FUNCTIONAL

## 2019-08-15 RX ORDER — ONDANSETRON 2 MG/ML
INJECTION INTRAMUSCULAR; INTRAVENOUS
Status: DISCONTINUED | OUTPATIENT
Start: 2019-08-15 | End: 2019-08-15

## 2019-08-15 RX ORDER — CIPROFLOXACIN 500 MG/1
500 TABLET ORAL 2 TIMES DAILY
Qty: 18 TABLET | Refills: 0
Start: 2019-08-15 | End: 2019-08-16

## 2019-08-15 RX ORDER — HYDROCODONE BITARTRATE AND ACETAMINOPHEN 5; 325 MG/1; MG/1
1 TABLET ORAL EVERY 4 HOURS PRN
Qty: 12 TABLET | Refills: 0
Start: 2019-08-15 | End: 2019-10-09 | Stop reason: ALTCHOICE

## 2019-08-15 RX ORDER — ROCURONIUM BROMIDE 10 MG/ML
INJECTION, SOLUTION INTRAVENOUS
Status: DISCONTINUED | OUTPATIENT
Start: 2019-08-15 | End: 2019-08-15

## 2019-08-15 RX ORDER — MEPERIDINE HYDROCHLORIDE 50 MG/ML
12.5 INJECTION INTRAMUSCULAR; INTRAVENOUS; SUBCUTANEOUS EVERY 10 MIN PRN
Status: DISCONTINUED | OUTPATIENT
Start: 2019-08-15 | End: 2019-08-15 | Stop reason: HOSPADM

## 2019-08-15 RX ORDER — HYDROMORPHONE HYDROCHLORIDE 1 MG/ML
0.2 INJECTION, SOLUTION INTRAMUSCULAR; INTRAVENOUS; SUBCUTANEOUS
Status: DISCONTINUED | OUTPATIENT
Start: 2019-08-15 | End: 2019-08-15 | Stop reason: HOSPADM

## 2019-08-15 RX ORDER — DEXAMETHASONE SODIUM PHOSPHATE 4 MG/ML
INJECTION, SOLUTION INTRA-ARTICULAR; INTRALESIONAL; INTRAMUSCULAR; INTRAVENOUS; SOFT TISSUE
Status: DISCONTINUED | OUTPATIENT
Start: 2019-08-15 | End: 2019-08-15

## 2019-08-15 RX ORDER — FENTANYL CITRATE 50 UG/ML
INJECTION, SOLUTION INTRAMUSCULAR; INTRAVENOUS
Status: DISCONTINUED | OUTPATIENT
Start: 2019-08-15 | End: 2019-08-15

## 2019-08-15 RX ORDER — LIDOCAINE HYDROCHLORIDE 20 MG/ML
JELLY TOPICAL
Status: DISCONTINUED | OUTPATIENT
Start: 2019-08-15 | End: 2019-08-15 | Stop reason: HOSPADM

## 2019-08-15 RX ORDER — OXYCODONE HYDROCHLORIDE 5 MG/1
5 TABLET ORAL
Status: DISCONTINUED | OUTPATIENT
Start: 2019-08-15 | End: 2019-08-15 | Stop reason: HOSPADM

## 2019-08-15 RX ORDER — SODIUM CHLORIDE 0.9 % (FLUSH) 0.9 %
10 SYRINGE (ML) INJECTION
Status: DISCONTINUED | OUTPATIENT
Start: 2019-08-15 | End: 2019-08-15 | Stop reason: HOSPADM

## 2019-08-15 RX ORDER — LIDOCAINE HYDROCHLORIDE 10 MG/ML
INJECTION, SOLUTION EPIDURAL; INFILTRATION; INTRACAUDAL; PERINEURAL
Status: DISCONTINUED | OUTPATIENT
Start: 2019-08-15 | End: 2019-08-15

## 2019-08-15 RX ORDER — EPHEDRINE SULFATE 50 MG/ML
INJECTION, SOLUTION INTRAVENOUS
Status: DISCONTINUED | OUTPATIENT
Start: 2019-08-15 | End: 2019-08-15

## 2019-08-15 RX ORDER — LEVOFLOXACIN 5 MG/ML
500 INJECTION, SOLUTION INTRAVENOUS ONCE
Status: COMPLETED | OUTPATIENT
Start: 2019-08-15 | End: 2019-08-15

## 2019-08-15 RX ORDER — DIPHENHYDRAMINE HYDROCHLORIDE 50 MG/ML
25 INJECTION INTRAMUSCULAR; INTRAVENOUS EVERY 6 HOURS PRN
Status: DISCONTINUED | OUTPATIENT
Start: 2019-08-15 | End: 2019-08-15 | Stop reason: HOSPADM

## 2019-08-15 RX ORDER — SUCCINYLCHOLINE CHLORIDE 20 MG/ML
INJECTION INTRAMUSCULAR; INTRAVENOUS
Status: DISCONTINUED | OUTPATIENT
Start: 2019-08-15 | End: 2019-08-15

## 2019-08-15 RX ORDER — PROPOFOL 10 MG/ML
VIAL (ML) INTRAVENOUS
Status: DISCONTINUED | OUTPATIENT
Start: 2019-08-15 | End: 2019-08-15

## 2019-08-15 RX ORDER — LIDOCAINE HYDROCHLORIDE 40 MG/ML
SOLUTION TOPICAL
Status: DISCONTINUED | OUTPATIENT
Start: 2019-08-15 | End: 2019-08-15

## 2019-08-15 RX ORDER — SODIUM CHLORIDE, SODIUM LACTATE, POTASSIUM CHLORIDE, CALCIUM CHLORIDE 600; 310; 30; 20 MG/100ML; MG/100ML; MG/100ML; MG/100ML
INJECTION, SOLUTION INTRAVENOUS CONTINUOUS PRN
Status: DISCONTINUED | OUTPATIENT
Start: 2019-08-15 | End: 2019-08-15

## 2019-08-15 RX ORDER — ONDANSETRON 2 MG/ML
4 INJECTION INTRAMUSCULAR; INTRAVENOUS DAILY PRN
Status: DISCONTINUED | OUTPATIENT
Start: 2019-08-15 | End: 2019-08-15 | Stop reason: HOSPADM

## 2019-08-15 RX ADMIN — LIDOCAINE HYDROCHLORIDE 3 MG: 40 SOLUTION TOPICAL at 09:08

## 2019-08-15 RX ADMIN — EPHEDRINE SULFATE 10 MG: 50 INJECTION, SOLUTION INTRAVENOUS at 10:08

## 2019-08-15 RX ADMIN — DEXAMETHASONE SODIUM PHOSPHATE 4 MG: 4 INJECTION, SOLUTION INTRAMUSCULAR; INTRAVENOUS at 09:08

## 2019-08-15 RX ADMIN — FENTANYL CITRATE 50 MCG: 50 INJECTION INTRAMUSCULAR; INTRAVENOUS at 09:08

## 2019-08-15 RX ADMIN — LIDOCAINE HYDROCHLORIDE 50 MG: 10 INJECTION, SOLUTION EPIDURAL; INFILTRATION; INTRACAUDAL; PERINEURAL at 09:08

## 2019-08-15 RX ADMIN — SODIUM CHLORIDE, SODIUM LACTATE, POTASSIUM CHLORIDE, AND CALCIUM CHLORIDE: .6; .31; .03; .02 INJECTION, SOLUTION INTRAVENOUS at 10:08

## 2019-08-15 RX ADMIN — EPHEDRINE SULFATE 10 MG: 50 INJECTION, SOLUTION INTRAVENOUS at 09:08

## 2019-08-15 RX ADMIN — LEVOFLOXACIN 500 MG: 500 INJECTION, SOLUTION INTRAVENOUS at 09:08

## 2019-08-15 RX ADMIN — PROPOFOL 100 MG: 10 INJECTION, EMULSION INTRAVENOUS at 09:08

## 2019-08-15 RX ADMIN — SUCCINYLCHOLINE CHLORIDE 140 MG: 20 INJECTION, SOLUTION INTRAMUSCULAR; INTRAVENOUS at 09:08

## 2019-08-15 RX ADMIN — ONDANSETRON 4 MG: 2 INJECTION INTRAMUSCULAR; INTRAVENOUS at 09:08

## 2019-08-15 RX ADMIN — SODIUM CHLORIDE, SODIUM LACTATE, POTASSIUM CHLORIDE, AND CALCIUM CHLORIDE: .6; .31; .03; .02 INJECTION, SOLUTION INTRAVENOUS at 09:08

## 2019-08-15 NOTE — DISCHARGE INSTRUCTIONS
For the next 24 hours, no alcohol, no driving, no signing legal documents, no shower because of the sedation that you've had.

## 2019-08-15 NOTE — ANESTHESIA POSTPROCEDURE EVALUATION
Anesthesia Post Evaluation    Patient: Frank Wyatt    Procedure(s) Performed: Procedure(s) (LRB):  LITHOTRIPSY, ESWL (Left)  CYSTOSCOPY (N/A)  STENT, URETERAL (Left)    Final Anesthesia Type: general  Patient location during evaluation: PACU  Patient participation: Yes- Able to Participate  Level of consciousness: awake and alert and awake  Post-procedure vital signs: reviewed and stable  Pain management: adequate  Airway patency: patent  PONV status at discharge: No PONV  Anesthetic complications: no      Cardiovascular status: blood pressure returned to baseline and hemodynamically stable  Respiratory status: unassisted, spontaneous ventilation and room air  Hydration status: euvolemic  Follow-up not needed.          Vitals Value Taken Time   /73 8/15/2019 11:00 AM   Temp 36.6 °C (97.8 °F) 8/15/2019 11:00 AM   Pulse 74 8/15/2019 11:05 AM   Resp 18 8/15/2019 11:05 AM   SpO2 95 % 8/15/2019 11:05 AM   Vitals shown include unvalidated device data.      Event Time     Out of Recovery 10:25:00          Pain/Rufino Score: Rufino Score: 10 (8/15/2019 11:00 AM)

## 2019-08-15 NOTE — OP NOTE
Operative Note         SUMMARY     Surgery Date:  8/15/2019     Pre-op Diagnosis:   Left renal stone    Post-op Diagnosis:  Same    Procedure:  Left ESWL with cystoscopic stent placement    Anesthesia: General    Description of Procedure:   After consents were obtained the patient was taken to the operating room  Placed in a supine position  Anesthesia is given    Patient is placed in a frog-leg position and the penis was prepped and draped  We introduced a cystoscope into the bladder  With identify the left ureteral orifice  I wires placed into the left ureter  We float a stent into the left kidney  Telescope was removed  Dornier lithotripter is brought into the room  Patient is properly positioned on the lithotripsy probe  The stone is  left staghorn 28       mm  We began at a KV of 16 and gradually increased to 25    3000       Shocks were delivered      The procedure was done without any complication  After the procedure the patient is brought to the recovery room in satisfactory condition      Findings/Key Components:    Case went well with no complication      Estimated Blood Loss:      none

## 2019-08-15 NOTE — TRANSFER OF CARE
Anesthesia Transfer of Care Note    Patient: Frank Wyatt    Procedure(s) Performed: Procedure(s) (LRB):  LITHOTRIPSY, ESWL (Left)  CYSTOSCOPY (N/A)  STENT, URETERAL (Left)    Patient location: PACU    Anesthesia Type: general    Transport from OR: Transported from OR on room air with adequate spontaneous ventilation    Post pain: adequate analgesia    Post assessment: no apparent anesthetic complications    Post vital signs: stable    Level of consciousness: awake and alert    Nausea/Vomiting: no nausea/vomiting    Complications: none    Transfer of care protocol was followed      Last vitals:   Visit Vitals  BP (!) 167/77 (BP Location: Left arm, Patient Position: Lying)   Pulse 67   Temp 36.8 °C (98.2 °F) (Oral)   Resp 16   SpO2 97%

## 2019-08-15 NOTE — H&P
Pending sale to [facility]  History & Physical    SUBJECTIVE:     Chief Complaint/Reason for Admission:     History of Present Illness:  Patient is a 84 y.o. male presents with a left staghorn calculus and recurrent urinary tract infections  This stone is 28 mm  After discussing different options with family, plan to do lithotripsy today  Ill put in a stent in as well    PTA Medications   Medication Sig    atorvastatin (LIPITOR) 40 MG tablet     BETAHISTINE HCL (BETAHISTINE, BULK, MISC) 24 mg by Misc.(Non-Drug; Combo Route) route.    ciclopirox (PENLAC) 8 % Soln Apply topically nightly.    furosemide (LASIX) 20 MG tablet     levothyroxine (SYNTHROID) 25 MCG tablet     metoprolol tartrate (LOPRESSOR) 25 MG tablet 25 mg once daily.     SYMBICORT 160-4.5 mcg/actuation HFAA     tamsulosin (FLOMAX) 0.4 mg Cap Take 0.4 mg by mouth once daily.    albuterol 90 mcg/actuation inhaler Inhale 2 puffs into the lungs every 4 (four) hours as needed for Wheezing or Shortness of Breath.    fluticasone (FLOVENT HFA) 44 mcg/actuation inhaler Inhale 1 puff into the lungs 2 (two) times daily.    predniSONE (DELTASONE) 20 MG tablet Take 2 tablets (40 mg total) by mouth once daily.    tramadol (ULTRAM) 50 mg tablet Take 1 tablet (50 mg total) by mouth every 6 (six) hours as needed for Pain.       Review of patient's allergies indicates:   Allergen Reactions    Bactrim [sulfamethoxazole-trimethoprim] Hives     itched    Keflex [cephalexin] Rash    Morphine Rash     Patient had morphine and keflex at the same time, developed a rash, not sure which one caused it, or if it was a combination of the two meds.       Past Medical History:   Diagnosis Date    Arthritis     Coronary artery disease     Full dentures     Kashia (hard of hearing)     left ear    Hypertension     Kidney stones     Meniere's disease     Skin writing     dermatiographism    Wears glasses      Past Surgical History:   Procedure Laterality Date     APPENDECTOMY      CARDIAC SURGERY      CABG 5 vessel    CHOLECYSTECTOMY      CHOLECYSTECTOMY, LAPAROSCOPIC N/A 2014    [facility] OR    COLON SURGERY      Colon resection with colostomy; colostomy reversal.     CORONARY ARTERY BYPASS GRAFT      5-bypass     EXPLORATORY-LAPAROTOMY N/A 2015    [facility] at [facility] OR    EXPLORATORY-LAPAROTOMY N/A 2014    [facility] at [facility] OR    EYE SURGERY Right     Cataract    EYE SURGERY Left     Cataract    HERNIA REPAIR      Dr. Bailon - had to have mesh removed    INGUINAL HERNIA REPAIR Right 2019        LITHOTRIPSY      nephrostomy tube      PERCUTANEOUS NEPHROSTOMY      REPAIR-HERNIA-INCISIONAL N/A 2015    [facility] OR    RESECTION - SMALL BOWEL N/A 2015    [facility] at [facility] OR    ROTATOR CUFF REPAIR      right shoulder     Family History   Problem Relation Age of Onset    Heart disease Mother     Hypertension Mother     Hypertension Sister     Heart disease Brother     Hypertension Brother     Cancer Daughter     Diabetes Brother     Heart disease Brother     Hypertension Brother     Hypertension Sister     Heart disease Sister      Social History     Tobacco Use    Smoking status: Former Smoker     Packs/day: 1.00     Years: 25.00     Pack years: 25.00     Last attempt to quit: 1972     Years since quittin.6    Smokeless tobacco: Former User     Quit date: 8/15/1972   Substance Use Topics    Alcohol use: No    Drug use: No        Review of Systems:      OBJECTIVE:     Vital Signs (Most Recent):  Temp: 98.2 °F (36.8 °C) (08/15/19 0733)  Pulse: 67 (08/15/19 0733)  Resp: 16 (08/15/19 0733)  BP: (!) 167/77 (08/15/19 0733)  SpO2: 97 % (08/15/19 0733)    Inpatient Medications:  Current Facility-Administered Medications   Medication Dose Route  Frequency Provider Last Rate Last Dose    diphenhydrAMINE injection 25 mg  25 mg Intravenous Q6H PRN Mario Bruce MD        HYDROmorphone injection 0.2 mg  0.2 mg Intravenous Q3 Min PRN Mario Bruce MD        lidocaine HCl 2% urojet    PRN Dipak Sanchez MD   20 mL at 08/15/19 0949    meperidine injection 12.5 mg  12.5 mg Intravenous Q10 Min PRN Mario Bruce MD        ondansetron injection 4 mg  4 mg Intravenous Daily PRN Mario Bruce MD        oxyCODONE immediate release tablet 5 mg  5 mg Oral Q3H PRN Mario Bruce MD        promethazine (PHENERGAN) 6.25 mg in dextrose 5 % 50 mL IVPB  6.25 mg Intravenous Q10 Min PRN Mario Brcue MD        sodium chloride 0.9% flush 10 mL  10 mL Intravenous PRN Mario Bruce MD         Facility-Administered Medications Ordered in Other Encounters   Medication Dose Route Frequency Provider Last Rate Last Dose    dexamethasone injection   Intravenous PRN Gera Childers, CRNA   4 mg at 08/15/19 0945    ePHEDrine sulfate    PRN Gera Childers, CRNA   10 mg at 08/15/19 0958    fentaNYL injection   Intravenous PRN Gera Childers, CRNA   50 mcg at 08/15/19 0935    lactated ringers infusion   Intravenous Continuous PRN Gera Childers CRNA        lidocaine (PF) 10 mg/ml (1%) injection   Intravenous PRN Gera Childers, CRNA   50 mg at 08/15/19 0935    lidocaine HCL 4% topical solution   Endotracheal PRN Gera Childers, CRNA   3 mg at 08/15/19 0937    ondansetron injection   Intravenous PRN Gera Lowe, CRNA   4 mg at 08/15/19 0936    propofol (DIPRIVAN) 10 mg/mL infusion   Intravenous PRN Gera Lowe, CRNA   100 mg at 08/15/19 0935    succinylcholine injection   Intravenous PRN Gera Childers, CRNA   140 mg at 08/15/19 0935          ASSESSMENT/PLAN:       Left renal stone/ lithotripsy with cysto stent

## 2019-08-15 NOTE — DISCHARGE SUMMARY
Patient comes in for outpatient lithotripsy  Procedures done today    Procedure is done w no complication    After a brief stay in recovery, patient is discharged in good condition    Meds given:  Cipro and Lortab    F/u office:  2 weeks with jesu ALFONSO

## 2019-08-15 NOTE — ANESTHESIA PREPROCEDURE EVALUATION
08/15/2019  Frank Wyatt is a 84 y.o., male.    Patient Active Problem List   Diagnosis    Incisional hernia, without obstruction or gangrene    Protein calorie malnutrition    Acute respiratory failure    Dyspnea    Renal insufficiency     Lab Results   Component Value Date    WBC 9.41 08/13/2019    HGB 15.8 08/13/2019    HCT 46.5 08/13/2019    MCV 91 08/13/2019     (L) 08/13/2019     BMP  Lab Results   Component Value Date     08/13/2019    K 4.4 08/13/2019     08/13/2019    CO2 28 08/13/2019    BUN 25 (H) 08/13/2019    CREATININE 1.0 08/13/2019    CALCIUM 9.5 08/13/2019    ANIONGAP 9 08/13/2019    ESTGFRAFRICA >60.0 08/13/2019    EGFRNONAA >60.0 08/13/2019     CXR 3/21/19     IMPRESSION:  No acute cardiac or pulmonary process.      Pre-op Assessment    I have reviewed the Patient Summary Reports.    I have reviewed the Nursing Notes.   I have reviewed the Medications.     Review of Systems  Anesthesia Hx:  History of prior surgery of interest to airway management or planning: (most recent - hernia repair 3/25/19) Denies Family Hx of Anesthesia complications.   Denies Personal Hx of Anesthesia complications.   Social:  Former Smoker    EENT/Dental:   Ears General/Symptom(s) Hx of Meniere's disease   Cardiovascular:   Hypertension CAD asymptomatic CABG/stent     Pulmonary:   Shortness of breath (reported, pt denies)    Renal/:   Chronic Renal Disease, CRI renal calculi    Musculoskeletal:   Arthritis     Endocrine:   Hypothyroidism (on synthroid, taken today)        Physical Exam  General:  Well nourished    Airway/Jaw/Neck:  Airway Findings: Mouth Opening: Normal Tongue: Normal  General Airway Assessment: Adult  Mallampati: II  TM Distance: Normal, at least 6 cm  Jaw/Neck Findings:  Neck ROM: Normal ROM      Dental:  Dental Findings:   Chest/Lungs:  Chest/Lungs Findings:  Clear to auscultation, Normal Respiratory Rate     Heart/Vascular:  Heart Findings: Rate: Normal  Rhythm: Regular Rhythm  Sounds: Normal        Mental Status:  Mental Status Findings:  Alert and Oriented         Anesthesia Plan  Type of Anesthesia, risks & benefits discussed:  Anesthesia Type:  general  Patient's Preference:   Intra-op Monitoring Plan: standard ASA monitors  Intra-op Monitoring Plan Comments:   Post Op Pain Control Plan:   Post Op Pain Control Plan Comments:   Induction:   IV  Beta Blocker:  Patient is on a Beta-Blocker and has received one dose within the past 24 hours (No further documentation required).       Informed Consent: Patient understands risks and agrees with Anesthesia plan.  Questions answered. Anesthesia consent signed with patient.  ASA Score: 3     Day of Surgery Review of History & Physical:  There are no significant changes.      Anesthesia Plan Notes: General, LMA okay.

## 2019-08-15 NOTE — PLAN OF CARE
Awake brooks eq strength no co pain states penis is burning slightly but refuses pain meds, voided 250 bloody urine

## 2019-08-16 ENCOUNTER — HOSPITAL ENCOUNTER (EMERGENCY)
Facility: HOSPITAL | Age: 84
Discharge: HOME | End: 2019-08-16
Attending: EMERGENCY MEDICINE | Admitting: INTERNAL MEDICINE
Payer: MEDICARE

## 2019-08-16 VITALS
RESPIRATION RATE: 16 BRPM | TEMPERATURE: 99 F | DIASTOLIC BLOOD PRESSURE: 69 MMHG | WEIGHT: 194 LBS | OXYGEN SATURATION: 94 % | HEIGHT: 67 IN | SYSTOLIC BLOOD PRESSURE: 148 MMHG | HEART RATE: 84 BPM | BODY MASS INDEX: 30.45 KG/M2

## 2019-08-16 DIAGNOSIS — I44.7 LEFT BUNDLE BRANCH BLOCK: ICD-10-CM

## 2019-08-16 DIAGNOSIS — G89.18 OTHER ACUTE POSTPROCEDURAL PAIN: Primary | ICD-10-CM

## 2019-08-16 DIAGNOSIS — R06.2 WHEEZING: ICD-10-CM

## 2019-08-16 DIAGNOSIS — R50.9 FEVER: ICD-10-CM

## 2019-08-16 LAB
ALBUMIN SERPL BCP-MCNC: 3.5 G/DL (ref 3.5–5.2)
ALP SERPL-CCNC: 90 U/L (ref 55–135)
ALT SERPL W/O P-5'-P-CCNC: 34 U/L (ref 10–44)
ANION GAP SERPL CALC-SCNC: 10 MMOL/L (ref 8–16)
AST SERPL-CCNC: 29 U/L (ref 10–40)
BACTERIA #/AREA URNS HPF: NEGATIVE /HPF
BASOPHILS # BLD AUTO: 0.03 K/UL (ref 0–0.2)
BASOPHILS NFR BLD: 0.4 % (ref 0–1.9)
BILIRUB SERPL-MCNC: 1.3 MG/DL (ref 0.1–1)
BILIRUB UR QL STRIP: NEGATIVE
BILIRUB UR QL STRIP: NEGATIVE
BUN SERPL-MCNC: 25 MG/DL (ref 8–23)
CALCIUM SERPL-MCNC: 9.2 MG/DL (ref 8.7–10.5)
CHLORIDE SERPL-SCNC: 98 MMOL/L (ref 95–110)
CLARITY UR: ABNORMAL
CLARITY UR: CLEAR
CO2 SERPL-SCNC: 28 MMOL/L (ref 23–29)
COLOR UR: YELLOW
COLOR UR: YELLOW
CREAT SERPL-MCNC: 1.1 MG/DL (ref 0.5–1.4)
DIFFERENTIAL METHOD: ABNORMAL
EOSINOPHIL # BLD AUTO: 0.1 K/UL (ref 0–0.5)
EOSINOPHIL NFR BLD: 1.8 % (ref 0–8)
ERYTHROCYTE [DISTWIDTH] IN BLOOD BY AUTOMATED COUNT: 14 % (ref 11.5–14.5)
EST. GFR  (AFRICAN AMERICAN): >60 ML/MIN/1.73 M^2
EST. GFR  (NON AFRICAN AMERICAN): >60 ML/MIN/1.73 M^2
GLUCOSE SERPL-MCNC: 161 MG/DL (ref 70–110)
GLUCOSE UR QL STRIP: NEGATIVE
GLUCOSE UR QL STRIP: NEGATIVE
HCT VFR BLD AUTO: 41.2 % (ref 40–54)
HGB BLD-MCNC: 13.5 G/DL (ref 14–18)
HGB UR QL STRIP: ABNORMAL
HGB UR QL STRIP: NEGATIVE
HYALINE CASTS #/AREA URNS LPF: 1 /LPF
IMM GRANULOCYTES # BLD AUTO: 0.03 K/UL (ref 0–0.04)
IMM GRANULOCYTES NFR BLD AUTO: 0.4 % (ref 0–0.5)
INFLUENZA A, MOLECULAR: NEGATIVE
INFLUENZA B, MOLECULAR: NEGATIVE
INR PPP: 1.1
KETONES UR QL STRIP: NEGATIVE
KETONES UR QL STRIP: NEGATIVE
LACTATE SERPL-SCNC: 1.1 MMOL/L (ref 0.5–1.9)
LEUKOCYTE ESTERASE UR QL STRIP: ABNORMAL
LEUKOCYTE ESTERASE UR QL STRIP: NEGATIVE
LYMPHOCYTES # BLD AUTO: 0.8 K/UL (ref 1–4.8)
LYMPHOCYTES NFR BLD: 11.3 % (ref 18–48)
MCH RBC QN AUTO: 29.7 PG (ref 27–31)
MCHC RBC AUTO-ENTMCNC: 32.8 G/DL (ref 32–36)
MCV RBC AUTO: 91 FL (ref 82–98)
MICROSCOPIC COMMENT: ABNORMAL
MONOCYTES # BLD AUTO: 0.7 K/UL (ref 0.3–1)
MONOCYTES NFR BLD: 9.3 % (ref 4–15)
NEUTROPHILS # BLD AUTO: 5.7 K/UL (ref 1.8–7.7)
NEUTROPHILS NFR BLD: 76.8 % (ref 38–73)
NITRITE UR QL STRIP: NEGATIVE
NITRITE UR QL STRIP: NEGATIVE
NRBC BLD-RTO: 0 /100 WBC
PH UR STRIP: 6 [PH] (ref 5–8)
PH UR STRIP: 6 [PH] (ref 5–8)
PLATELET # BLD AUTO: 78 K/UL (ref 150–350)
PMV BLD AUTO: 10.2 FL (ref 9.2–12.9)
POTASSIUM SERPL-SCNC: 4 MMOL/L (ref 3.5–5.1)
PROT SERPL-MCNC: 6.7 G/DL (ref 6–8.4)
PROT UR QL STRIP: ABNORMAL
PROT UR QL STRIP: ABNORMAL
PROTHROMBIN TIME: 14.1 SEC (ref 11.7–14)
RBC # BLD AUTO: 4.54 M/UL (ref 4.6–6.2)
RBC #/AREA URNS HPF: >100 /HPF (ref 0–4)
SODIUM SERPL-SCNC: 136 MMOL/L (ref 136–145)
SP GR UR STRIP: 1 (ref 1–1.03)
SP GR UR STRIP: >1.03 (ref 1–1.03)
SPECIMEN SOURCE: NORMAL
SQUAMOUS #/AREA URNS HPF: 1 /HPF
URN SPEC COLLECT METH UR: ABNORMAL
URN SPEC COLLECT METH UR: ABNORMAL
UROBILINOGEN UR STRIP-ACNC: ABNORMAL EU/DL
UROBILINOGEN UR STRIP-ACNC: NEGATIVE EU/DL
WBC # BLD AUTO: 7.42 K/UL (ref 3.9–12.7)
WBC #/AREA URNS HPF: >100 /HPF (ref 0–5)

## 2019-08-16 PROCEDURE — 87040 BLOOD CULTURE FOR BACTERIA: CPT | Mod: 59

## 2019-08-16 PROCEDURE — 87502 INFLUENZA DNA AMP PROBE: CPT

## 2019-08-16 PROCEDURE — 96366 THER/PROPH/DIAG IV INF ADDON: CPT

## 2019-08-16 PROCEDURE — 63600175 PHARM REV CODE 636 W HCPCS: Performed by: EMERGENCY MEDICINE

## 2019-08-16 PROCEDURE — 94640 AIRWAY INHALATION TREATMENT: CPT

## 2019-08-16 PROCEDURE — 81001 URINALYSIS AUTO W/SCOPE: CPT

## 2019-08-16 PROCEDURE — 96361 HYDRATE IV INFUSION ADD-ON: CPT

## 2019-08-16 PROCEDURE — 85025 COMPLETE CBC W/AUTO DIFF WBC: CPT

## 2019-08-16 PROCEDURE — 25000242 PHARM REV CODE 250 ALT 637 W/ HCPCS: Mod: GZ | Performed by: EMERGENCY MEDICINE

## 2019-08-16 PROCEDURE — 96365 THER/PROPH/DIAG IV INF INIT: CPT

## 2019-08-16 PROCEDURE — 85610 PROTHROMBIN TIME: CPT

## 2019-08-16 PROCEDURE — 99285 EMERGENCY DEPT VISIT HI MDM: CPT | Mod: 25

## 2019-08-16 PROCEDURE — 81003 URINALYSIS AUTO W/O SCOPE: CPT | Mod: 59

## 2019-08-16 PROCEDURE — 83605 ASSAY OF LACTIC ACID: CPT

## 2019-08-16 PROCEDURE — 93005 ELECTROCARDIOGRAM TRACING: CPT

## 2019-08-16 PROCEDURE — 80053 COMPREHEN METABOLIC PANEL: CPT

## 2019-08-16 RX ORDER — IPRATROPIUM BROMIDE AND ALBUTEROL SULFATE 2.5; .5 MG/3ML; MG/3ML
3 SOLUTION RESPIRATORY (INHALATION)
Status: COMPLETED | OUTPATIENT
Start: 2019-08-16 | End: 2019-08-16

## 2019-08-16 RX ORDER — LEVOFLOXACIN 5 MG/ML
750 INJECTION, SOLUTION INTRAVENOUS
Status: COMPLETED | OUTPATIENT
Start: 2019-08-16 | End: 2019-08-16

## 2019-08-16 RX ORDER — LEVOFLOXACIN 750 MG/1
750 TABLET ORAL DAILY
Qty: 7 TABLET | Refills: 0 | Status: SHIPPED | OUTPATIENT
Start: 2019-08-16 | End: 2019-08-23

## 2019-08-16 RX ORDER — SODIUM CHLORIDE, SODIUM LACTATE, POTASSIUM CHLORIDE, CALCIUM CHLORIDE 600; 310; 30; 20 MG/100ML; MG/100ML; MG/100ML; MG/100ML
1000 INJECTION, SOLUTION INTRAVENOUS
Status: COMPLETED | OUTPATIENT
Start: 2019-08-16 | End: 2019-08-16

## 2019-08-16 RX ADMIN — LEVOFLOXACIN 750 MG: 5 INJECTION, SOLUTION INTRAVENOUS at 08:08

## 2019-08-16 RX ADMIN — IPRATROPIUM BROMIDE AND ALBUTEROL SULFATE 3 ML: .5; 3 SOLUTION RESPIRATORY (INHALATION) at 08:08

## 2019-08-16 RX ADMIN — SODIUM CHLORIDE, SODIUM LACTATE, POTASSIUM CHLORIDE, AND CALCIUM CHLORIDE 1000 ML: .6; .31; .03; .02 INJECTION, SOLUTION INTRAVENOUS at 06:08

## 2019-08-17 NOTE — DISCHARGE INSTRUCTIONS
Stop taking ciprofloxacin and start taking levofloxacin. Follow up closely with Dr Sanchez for urine culture results and for re-evaluation. Additionally, a left bundle branch block was identified on your ECG tonight. Prior ECG's at this facility do not show this finding. Call Dr Kim for follow up of this new finding.

## 2019-08-17 NOTE — ED PROVIDER NOTES
Encounter Date: 8/16/2019       History     Chief Complaint   Patient presents with    Fever     Liptotripsy of 30 MM Stone yesterday     85 yo man with h/o nephrolithiasis, HTN, Meniere's disease, CAD presents to the ED with left flank pain and fever and chills. The patient had lithotripsy yesterday for left staghorn calculus with ureteral stent placement. This morning, the patient developed left flank pain with subjective fevers and chills. He took a pain pill with minimal relief of symptoms, so came to the ED. No vomiting or diarrhea. Tolerating PO well. No chest pain or SOB. No other infectious sx.        Review of patient's allergies indicates:   Allergen Reactions    Bactrim [sulfamethoxazole-trimethoprim] Hives     itched    Keflex [cephalexin] Rash    Morphine Rash     Patient had morphine and keflex at the same time, developed a rash, not sure which one caused it, or if it was a combination of the two meds.     Past Medical History:   Diagnosis Date    Arthritis     Coronary artery disease     Full dentures     [place] (hard of hearing)     left ear    Hypertension     Kidney stones     Meniere's disease     Skin writing     dermatiographism    Wears glasses      Past Surgical History:   Procedure Laterality Date    APPENDECTOMY      CARDIAC SURGERY  1997    CABG 5 vessel    CHOLECYSTECTOMY  2013    CHOLECYSTECTOMY, LAPAROSCOPIC N/A 1/7/2014    [facility] at [facility] OR    COLON SURGERY      Colon resection with colostomy; colostomy reversal. 2004    CORONARY ARTERY BYPASS GRAFT  1996    5-bypass     CYSTOSCOPY N/A 8/15/2019    [facility]    EXPLORATORY-LAPAROTOMY N/A 8/20/2015    [facility] at [facility]    EXPLORATORY-LAPAROTOMY N/A 1/7/2014    [facility] at [facility]    EYE SURGERY Right 2014    Cataract    EYE SURGERY Left 2017    Cataract    HERNIA REPAIR  2014    Dr. Bailon - had to have  mesh removed    INGUINAL HERNIA REPAIR Right 2019        LITHOTRIPSY      LITHOTRIPSY, ESWL Left 8/15/2019    [facility]    nephrostomy tube      PERCUTANEOUS NEPHROSTOMY      REPAIR-HERNIA-INCISIONAL N/A 2015    [facility]    RESECTION - SMALL BOWEL N/A 2015    [facility]    ROTATOR CUFF REPAIR  2005    right shoulder    STENT, URETERAL Left 8/15/2019    [facility] OR     Family History   Problem Relation Age of Onset    Heart disease Mother     Hypertension Mother     Hypertension Sister     Heart disease Brother     Hypertension Brother     Cancer Daughter     Diabetes Brother     Heart disease Brother     Hypertension Brother     Hypertension Sister     Heart disease Sister      Social History     Tobacco Use    Smoking status: Former Smoker     Packs/day: 1.00     Years: 25.00     Pack years: 25.00     Last attempt to quit: 1972     Years since quittin.6    Smokeless tobacco: Former User     Quit date: 8/15/1972   Substance Use Topics    Alcohol use: No    Drug use: No     Review of Systems   Constitutional: Positive for chills and fever. Negative for diaphoresis and fatigue.   HENT: Negative for congestion.    Eyes: Negative for visual disturbance.   Respiratory: Negative for cough, shortness of breath, wheezing and stridor.    Cardiovascular: Negative for chest pain.   Gastrointestinal: Negative for abdominal pain, diarrhea, nausea and vomiting.   Genitourinary: Positive for flank pain. Negative for decreased urine volume, dysuria and hematuria.   Musculoskeletal: Negative for back pain.   Skin: Negative for pallor.   Neurological: Negative for weakness, light-headedness, numbness and headaches.   Psychiatric/Behavioral: Negative for confusion.   All other systems reviewed and are negative.      Physical Exam      Initial Vitals [08/16/19 1711]   BP Pulse Resp Temp SpO2   (!) 144/67 102 18 99.9 °F (37.7 °C) (!) 93 %      MAP       --         Physical Exam    Nursing note and vitals reviewed.  Constitutional: He appears well-developed and well-nourished. No distress.   HENT:   Head: Normocephalic and atraumatic.   Eyes: EOM are normal.   Neck: Normal range of motion. Neck supple.   Cardiovascular: Normal rate, regular rhythm, normal heart sounds and intact distal pulses.   Pulmonary/Chest: No respiratory distress. He has wheezes (mild exp wheezes). He has no rhonchi. He has no rales.   Abdominal: Soft. He exhibits no distension. There is no tenderness. There is no rebound and no guarding.   Genitourinary:   Genitourinary Comments: Mild L cva tend   Musculoskeletal: Normal range of motion. He exhibits no edema.   Neurological: He is alert and oriented to person, place, and time. No cranial nerve deficit. GCS score is 15. GCS eye subscore is 4. GCS verbal subscore is 5. GCS motor subscore is 6.   Skin: Skin is warm and dry. Capillary refill takes less than 2 seconds.   Psychiatric: He has a normal mood and affect.         ED Course   Procedures  Labs Reviewed   URINALYSIS - Abnormal; Notable for the following components:       Result Value    Specific Gravity, UA >1.030 (*)     Protein, UA Trace (*)     Urobilinogen, UA 2.0-3.0 (*)     All other components within normal limits    Narrative:     Collection Type->Urine, Clean Catch   URINALYSIS - Abnormal; Notable for the following components:    Appearance, UA Hazy (*)     Protein, UA 1+ (*)     Occult Blood UA 3+ (*)     Leukocytes, UA 3+ (*)     All other components within normal limits    Narrative:     Collection Type->Urine, Clean Catch   CBC W/ AUTO DIFFERENTIAL - Abnormal; Notable for the following components:    RBC 4.54 (*)     Hemoglobin 13.5 (*)     Platelets 78 (*)     Lymph # 0.8 (*)     Gran% 76.8 (*)     Lymph% 11.3 (*)     All other components within normal  limits   COMPREHENSIVE METABOLIC PANEL - Abnormal; Notable for the following components:    Glucose 161 (*)     BUN, Bld 25 (*)     Total Bilirubin 1.3 (*)     All other components within normal limits   PROTIME-INR - Abnormal; Notable for the following components:    PT 14.1 (*)     All other components within normal limits   URINALYSIS MICROSCOPIC - Abnormal; Notable for the following components:    RBC, UA >100 (*)     WBC, UA >100 (*)     All other components within normal limits    Narrative:     Collection Type->Urine, Clean Catch   CULTURE, URINE   CULTURE, BLOOD   CULTURE, BLOOD   LACTIC ACID, PLASMA   INFLUENZA A AND B ANTIGEN          Imaging Results          X-Ray Chest AP Portable (Final result)  Result time 08/16/19 19:26:06    Final result by Constance Perez MD (08/16/19 19:26:06)                 Narrative:    PROCEDURE:   XR CHEST AP PORTABLE  dated  8/16/2019 7:11 PM    CLINICAL HISTORY:   Male 84 years of age.   Sepsis    TECHNIQUE: AP view of the chest obtained portably at 7:11 PM.    PREVIOUS STUDIES:  None Available    FINDINGS:    There are median sternotomy wires. The heart is not enlarged. There is no pulmonary edema, pneumonia, pleural effusion or pneumothorax. Lung volumes are moderate. There is mild left basilar atelectasis.    IMPRESSION:    No acute findings.    Electronically Signed by Constance Perez on 8/16/2019 8:13 PM                            X-Rays:   Independently Interpreted Readings:   Chest X-Ray: No acute abnormalities.   Abdomen:   Abdomen and Pelvis without Contrast - Left terminal ureteric calc with mild hydroureteroneph with J stent ini place     Medical Decision Making:   ED Management:  84M with L flank pain and subj fever s/p lithotripsy and stent placement. Ddx includes hydro/obstructive uropathy, UTI, sepsis, obstruction, biliary disease, colitis, diverticular disease, PNA. Patient's workup remarkable for normal WBC, thrombocytopenia (slightly worse than baseline),  "normal renal function/lytes, normal lactic. UA with blood, leuks, but no bacteria. CXR clear. ECG nonischemic. CT with multiple chronic findings (hernia/adhesions/DJD), but alos with terminal ureteral stone with double J stent iin place as well as mild hydro. I discussed the case with Dr Sanchez who performed the procedure. He states the patietn is having normal postoperative course and can follow up in clinic. Patient feels much better after duoneb, abx and LR bolus. Did not give any analgesics or antipyretcs and vitals have remained normal. I did offer admission given age and comorbidities, but patient woud like to go home. Family member at bedside agrees with discharge. Will discharge with abx in abundance of caution. Ucs sent. Close f/u with Uro. Detailed return prcautions discussed.                   ED Course as of Aug 16 2017   Fri Aug 16, 2019   1810 84-year-old male presents to the emergency department status post outpatient lithotripsy yesterday with  family member reports patient had a subjective low-grade fever this morning he was given a Norco want to sleep she reports that upon awakening noted to be flushed took his temperature at 4:15 p.m. and was noted to be 101.2 and was instructed to come to the emergency department patient's family member reports patient was given another Norco for his "fever"( 325 mg )     [MP]      ED Course User Index  [MP] CHIVO Chaves     Clinical Impression:       ICD-10-CM ICD-9-CM   1. Other acute postprocedural pain G89.18 338.18   2. Fever R50.9 780.60   3. Wheezing R06.2 786.07   4. Left bundle branch block I44.7 426.3                                Caroline Amaya MD  08/20/19 0608    "

## 2019-08-21 LAB
BACTERIA BLD CULT: NORMAL
BACTERIA BLD CULT: NORMAL

## 2019-08-22 DIAGNOSIS — N20.0 URIC ACID NEPHROLITHIASIS: Primary | ICD-10-CM

## 2019-08-26 ENCOUNTER — HOSPITAL ENCOUNTER (OUTPATIENT)
Dept: RADIOLOGY | Facility: HOSPITAL | Age: 84
Discharge: HOME | End: 2019-08-26
Attending: SPECIALIST
Payer: MEDICARE

## 2019-08-26 DIAGNOSIS — N20.0 URIC ACID NEPHROLITHIASIS: ICD-10-CM

## 2019-08-26 PROCEDURE — 74018 RADEX ABDOMEN 1 VIEW: CPT | Mod: TC,PO

## 2019-09-12 ENCOUNTER — HOSPITAL ENCOUNTER (OUTPATIENT)
Dept: PREADMISSION TESTING | Facility: HOSPITAL | Age: 84
Discharge: HOME | End: 2019-09-12
Attending: SPECIALIST
Payer: MEDICARE

## 2019-09-12 VITALS
DIASTOLIC BLOOD PRESSURE: 77 MMHG | TEMPERATURE: 98 F | HEART RATE: 68 BPM | RESPIRATION RATE: 18 BRPM | OXYGEN SATURATION: 95 % | BODY MASS INDEX: 30.85 KG/M2 | SYSTOLIC BLOOD PRESSURE: 166 MMHG | WEIGHT: 196.56 LBS | HEIGHT: 67 IN

## 2019-09-12 DIAGNOSIS — N20.0 RENAL STONE: Primary | ICD-10-CM

## 2019-09-12 RX ORDER — LEVOFLOXACIN 5 MG/ML
500 INJECTION, SOLUTION INTRAVENOUS
Status: CANCELLED | OUTPATIENT
Start: 2019-09-19

## 2019-09-12 NOTE — DISCHARGE INSTRUCTIONS
To confirm, Your doctor has instructed you that surgery is scheduled for: September 19, 2019    Please report to [facility] on [address] the morning of surgery. Will call September 18, 2019    Pre-Op will call the afternoon prior to surgery between 4:00 and 6:00 PM with the final arrival time.      PLEASE NOTE:  The surgery schedule has many variables which may affect the time of your surgery case.  Family members should be available if your surgery time changes.  Plan to be here the day of your procedure between 4-6 hours.    MEDICATIONS:  TAKE ONLY THESE MEDICATIONS WITH A SMALL SIP OF WATER THE MORNING OF YOUR PROCEDURE:  METOPROLOL/LEVOTHYROXINE/EYE DROPS      DO NOT TAKE THESE MEDICATIONS 5-7 DAYS PRIOR to your procedure or per your surgeon's request: ASPIRIN, ALEVE, ADVIL, IBUPROFEN, FISH OIL VITAMIN E, HERBALS  (May take Tylenol)    ONLY if you are prescribed any types of blood thinners such as:  Aspirin, Coumadin, Plavix, Pradaxa, Xarelto, Aggrenox, Effient, Eliquis, Savasya, Brilinta, or any other, ask your surgeon whether you should stop taking them and how long before surgery you should stop.  You may also need to verify with the prescribing physician if it is ok to stop your medication.      INSTRUCTIONS IMPORTANT!!  · Do not eat or drink anything between midnight and the time of your procedure- this includes gum, mints, and candy.  · Shower the night before AND the morning of your procedure with a Chlorhexidine wash such as Hibiclens or Dial antibacterial soap from the neck down.  Do not get it on your face or in your eyes.  You may use your own shampoo and face wash. This helps your skin to be as bacteria free as possible.    · If you wear contact lenses, dentures, hearing aids or glasses, bring a container to put them in during surgery and give to a family member for safe keeping.  Please leave all jewelry, piercing's and valuables at home.   · DO NOT remove hair from the surgery site.  Do  not shave the incision site unless you are given specific instructions to do so.    · Make arrangements in advance for transportation home by a responsible adult.  · You must make arrangements for transportation, TAXI'S, UBER'S OR LYFTS ARE NOT ALLOWED.          If you have any questions about these instructions, call Pre-Op Admit  Nursing at [phone] or the Pre-Op Day Surgery Unit at [phone].

## 2019-09-19 ENCOUNTER — ANESTHESIA EVENT (OUTPATIENT)
Dept: SURGERY | Facility: HOSPITAL | Age: 84
End: 2019-09-19
Payer: MEDICARE

## 2019-09-19 ENCOUNTER — ANESTHESIA (OUTPATIENT)
Dept: SURGERY | Facility: HOSPITAL | Age: 84
End: 2019-09-19
Payer: MEDICARE

## 2019-09-19 ENCOUNTER — HOSPITAL ENCOUNTER (OUTPATIENT)
Facility: HOSPITAL | Age: 84
Discharge: HOME | End: 2019-09-19
Attending: SPECIALIST | Admitting: SPECIALIST
Payer: MEDICARE

## 2019-09-19 VITALS
HEIGHT: 67 IN | SYSTOLIC BLOOD PRESSURE: 157 MMHG | TEMPERATURE: 98 F | WEIGHT: 196.56 LBS | OXYGEN SATURATION: 98 % | DIASTOLIC BLOOD PRESSURE: 71 MMHG | RESPIRATION RATE: 18 BRPM | BODY MASS INDEX: 30.85 KG/M2 | HEART RATE: 69 BPM

## 2019-09-19 DIAGNOSIS — N20.0 RENAL STONE: Primary | ICD-10-CM

## 2019-09-19 PROCEDURE — 63600175 PHARM REV CODE 636 W HCPCS: Performed by: NURSE ANESTHETIST, CERTIFIED REGISTERED

## 2019-09-19 PROCEDURE — 25000003 PHARM REV CODE 250: Performed by: NURSE ANESTHETIST, CERTIFIED REGISTERED

## 2019-09-19 PROCEDURE — 71000039 HC RECOVERY, EACH ADD'L HOUR: Performed by: SPECIALIST

## 2019-09-19 PROCEDURE — 37000009 HC ANESTHESIA EA ADD 15 MINS: Performed by: SPECIALIST

## 2019-09-19 PROCEDURE — 71000016 HC POSTOP RECOV ADDL HR: Performed by: SPECIALIST

## 2019-09-19 PROCEDURE — 27000673 HC TUBING BLOOD Y: Performed by: ANESTHESIOLOGY

## 2019-09-19 PROCEDURE — 36000705 HC OR TIME LEV I EA ADD 15 MIN: Performed by: SPECIALIST

## 2019-09-19 PROCEDURE — 27201107 HC STYLET, STANDARD: Performed by: ANESTHESIOLOGY

## 2019-09-19 PROCEDURE — 71000015 HC POSTOP RECOV 1ST HR: Performed by: SPECIALIST

## 2019-09-19 PROCEDURE — 37000008 HC ANESTHESIA 1ST 15 MINUTES: Performed by: SPECIALIST

## 2019-09-19 PROCEDURE — 71000033 HC RECOVERY, INTIAL HOUR: Performed by: SPECIALIST

## 2019-09-19 PROCEDURE — 36000704 HC OR TIME LEV I 1ST 15 MIN: Performed by: SPECIALIST

## 2019-09-19 PROCEDURE — 63600175 PHARM REV CODE 636 W HCPCS: Performed by: STUDENT IN AN ORGANIZED HEALTH CARE EDUCATION/TRAINING PROGRAM

## 2019-09-19 RX ORDER — ONDANSETRON 2 MG/ML
4 INJECTION INTRAMUSCULAR; INTRAVENOUS DAILY PRN
Status: DISCONTINUED | OUTPATIENT
Start: 2019-09-19 | End: 2019-09-19 | Stop reason: HOSPADM

## 2019-09-19 RX ORDER — LEVOFLOXACIN 5 MG/ML
500 INJECTION, SOLUTION INTRAVENOUS
Status: DISCONTINUED | OUTPATIENT
Start: 2019-09-19 | End: 2019-09-19 | Stop reason: HOSPADM

## 2019-09-19 RX ORDER — HYDROMORPHONE HYDROCHLORIDE 1 MG/ML
0.2 INJECTION, SOLUTION INTRAMUSCULAR; INTRAVENOUS; SUBCUTANEOUS EVERY 5 MIN PRN
Status: DISCONTINUED | OUTPATIENT
Start: 2019-09-19 | End: 2019-09-19 | Stop reason: HOSPADM

## 2019-09-19 RX ORDER — LIDOCAINE HYDROCHLORIDE 20 MG/ML
INJECTION, SOLUTION EPIDURAL; INFILTRATION; INTRACAUDAL; PERINEURAL
Status: DISCONTINUED | OUTPATIENT
Start: 2019-09-19 | End: 2019-09-19

## 2019-09-19 RX ORDER — SUCCINYLCHOLINE CHLORIDE 20 MG/ML
INJECTION INTRAMUSCULAR; INTRAVENOUS
Status: DISCONTINUED | OUTPATIENT
Start: 2019-09-19 | End: 2019-09-19

## 2019-09-19 RX ORDER — EPHEDRINE SULFATE 50 MG/ML
INJECTION, SOLUTION INTRAVENOUS
Status: DISCONTINUED | OUTPATIENT
Start: 2019-09-19 | End: 2019-09-19

## 2019-09-19 RX ORDER — CIPROFLOXACIN 500 MG/1
500 TABLET ORAL 2 TIMES DAILY
Qty: 12 TABLET | Refills: 0
Start: 2019-09-19 | End: 2019-10-09 | Stop reason: ALTCHOICE

## 2019-09-19 RX ORDER — HYDROCODONE BITARTRATE AND ACETAMINOPHEN 5; 325 MG/1; MG/1
1 TABLET ORAL EVERY 6 HOURS PRN
Qty: 12 TABLET | Refills: 0
Start: 2019-09-19 | End: 2019-10-09 | Stop reason: ALTCHOICE

## 2019-09-19 RX ORDER — FENTANYL CITRATE 50 UG/ML
INJECTION, SOLUTION INTRAMUSCULAR; INTRAVENOUS
Status: DISCONTINUED | OUTPATIENT
Start: 2019-09-19 | End: 2019-09-19

## 2019-09-19 RX ORDER — OXYCODONE HYDROCHLORIDE 5 MG/1
5 TABLET ORAL
Status: DISCONTINUED | OUTPATIENT
Start: 2019-09-19 | End: 2019-09-19 | Stop reason: HOSPADM

## 2019-09-19 RX ORDER — ROCURONIUM BROMIDE 10 MG/ML
INJECTION, SOLUTION INTRAVENOUS
Status: DISCONTINUED | OUTPATIENT
Start: 2019-09-19 | End: 2019-09-19

## 2019-09-19 RX ORDER — HYDROCODONE BITARTRATE AND ACETAMINOPHEN 5; 325 MG/1; MG/1
1 TABLET ORAL EVERY 4 HOURS PRN
Status: DISCONTINUED | OUTPATIENT
Start: 2019-09-19 | End: 2019-09-19 | Stop reason: HOSPADM

## 2019-09-19 RX ORDER — PROPOFOL 10 MG/ML
VIAL (ML) INTRAVENOUS
Status: DISCONTINUED | OUTPATIENT
Start: 2019-09-19 | End: 2019-09-19

## 2019-09-19 RX ORDER — SODIUM CHLORIDE, SODIUM LACTATE, POTASSIUM CHLORIDE, CALCIUM CHLORIDE 600; 310; 30; 20 MG/100ML; MG/100ML; MG/100ML; MG/100ML
INJECTION, SOLUTION INTRAVENOUS CONTINUOUS PRN
Status: DISCONTINUED | OUTPATIENT
Start: 2019-09-19 | End: 2019-09-19

## 2019-09-19 RX ORDER — DIPHENHYDRAMINE HYDROCHLORIDE 50 MG/ML
25 INJECTION INTRAMUSCULAR; INTRAVENOUS EVERY 6 HOURS PRN
Status: DISCONTINUED | OUTPATIENT
Start: 2019-09-19 | End: 2019-09-19 | Stop reason: HOSPADM

## 2019-09-19 RX ADMIN — SODIUM CHLORIDE, SODIUM LACTATE, POTASSIUM CHLORIDE, AND CALCIUM CHLORIDE: .6; .31; .03; .02 INJECTION, SOLUTION INTRAVENOUS at 10:09

## 2019-09-19 RX ADMIN — SODIUM CHLORIDE, SODIUM LACTATE, POTASSIUM CHLORIDE, AND CALCIUM CHLORIDE: .6; .31; .03; .02 INJECTION, SOLUTION INTRAVENOUS at 09:09

## 2019-09-19 RX ADMIN — PROPOFOL 70 MG: 10 INJECTION, EMULSION INTRAVENOUS at 09:09

## 2019-09-19 RX ADMIN — ROCURONIUM BROMIDE 5 MG: 10 INJECTION, SOLUTION INTRAVENOUS at 09:09

## 2019-09-19 RX ADMIN — EPHEDRINE SULFATE 10 MG: 50 INJECTION, SOLUTION INTRAVENOUS at 10:09

## 2019-09-19 RX ADMIN — ONDANSETRON 4 MG: 2 INJECTION INTRAMUSCULAR; INTRAVENOUS at 09:09

## 2019-09-19 RX ADMIN — SUCCINYLCHOLINE CHLORIDE 100 MG: 20 INJECTION, SOLUTION INTRAMUSCULAR; INTRAVENOUS at 09:09

## 2019-09-19 RX ADMIN — LIDOCAINE HYDROCHLORIDE 100 MG: 20 INJECTION, SOLUTION EPIDURAL; INFILTRATION; INTRACAUDAL; PERINEURAL at 09:09

## 2019-09-19 RX ADMIN — PROPOFOL 80 MG: 10 INJECTION, EMULSION INTRAVENOUS at 09:09

## 2019-09-19 RX ADMIN — FENTANYL CITRATE 50 MCG: 50 INJECTION INTRAMUSCULAR; INTRAVENOUS at 09:09

## 2019-09-19 NOTE — H&P
[facility]  History & Physical    SUBJECTIVE:     Chief Complaint/Reason for Admission:     History of Present Illness:  Patient is a 84 y.o. male presents with left renal staghorn calculus  Was treated with lithotripsy once already  Planned for repeat treatment today    PTA Medications   Medication Sig    atorvastatin (LIPITOR) 40 MG tablet Take 40 mg by mouth every evening.     dextran 70-hypromellose (ARTIFICIAL TEARS,NPHE92-EKHCE,) 0.1-0.3 % Drop Apply 1 drop to eye 2 (two) times daily.    furosemide (LASIX) 20 MG tablet Take 20 mg by mouth every morning.     levothyroxine (SYNTHROID) 25 MCG tablet Take 25 mcg by mouth before breakfast.     metoprolol tartrate (LOPRESSOR) 25 MG tablet Take 25 mg by mouth 2 (two) times daily.     SYMBICORT 160-4.5 mcg/actuation HFAA Inhale 2 puffs into the lungs every 12 (twelve) hours.     tamsulosin (FLOMAX) 0.4 mg Cap Take 0.4 mg by mouth every morning.     HYDROcodone-acetaminophen (NORCO) 5-325 mg per tablet Take 1 tablet by mouth every 4 (four) hours as needed for Pain.       Review of patient's allergies indicates:   Allergen Reactions    Bactrim [sulfamethoxazole-trimethoprim] Hives     itched    Keflex [cephalexin] Rash    Morphine Rash     Patient had morphine and keflex at the same time, developed a rash, not sure which one caused it, or if it was a combination of the two meds.       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     CORONARY ARTERY BYPASS GRAFT      5-bypass     CYSTOSCOPY N/A 8/15/2019    [facility]    EXPLORATORY-LAPAROTOMY N/A 2015    [facility]    EXPLORATORY-LAPAROTOMY N/A 2014    [facility]    EYE SURGERY Right     Cataract    EYE SURGERY Left     Cataract    HERNIA REPAIR      Dr. Bailon - had to have mesh removed    INGUINAL HERNIA REPAIR Right 2019        LITHOTRIPSY      LITHOTRIPSY, ESWL Left 8/15/2019    [facility] at [facility] OR    nephrostomy tube      PERCUTANEOUS NEPHROSTOMY      REPAIR-HERNIA-INCISIONAL N/A 2015    [facility]    RESECTION - SMALL BOWEL N/A 2015    [facility] OR    ROTATOR CUFF REPAIR      right shoulder    STENT, URETERAL Left 8/15/2019    [facility] by [facility] at [facility] OR     Family History   Problem Relation Age of Onset    Heart disease Mother     Hypertension Mother     Hypertension Sister     Heart disease Brother     Hypertension Brother     Cancer Daughter     Diabetes Brother     Heart disease Brother     Hypertension Brother     Hypertension Sister     Heart disease Sister      Social History     Tobacco Use    Smoking status: Former Smoker     Packs/day: 1.00     Years: 25.00     Pack years: 25.00     Last attempt to quit: 1972     Years since quittin.7    Smokeless tobacco: Former User     Quit date: 8/15/1972   Substance Use Topics    Alcohol use: No    Drug use: No        Review of Systems:      OBJECTIVE:     Vital Signs (Most Recent):  Temp: 98.5 °F (36.9 °C) (19)  Pulse: 70 (19)  Resp: 18 (19)  BP: (!) 171/80 (19)  SpO2: 97 % (19)    Inpatient Medications:  Current  Facility-Administered Medications   Medication Dose Route Frequency Provider Last Rate Last Dose    diphenhydrAMINE injection 25 mg  25 mg Intravenous Q6H PRN Mario Bruce MD        HYDROmorphone injection 0.2 mg  0.2 mg Intravenous Q5 Min PRN Mario Bruce MD        HYDROmorphone injection 0.2 mg  0.2 mg Intravenous Q5 Min PRN Mario Bruce MD        levoFLOXacin 500 mg/100 mL IVPB 500 mg  500 mg Intravenous Q24H Dipak Sanchez MD        ondansetron injection 4 mg  4 mg Intravenous Daily PRN Mario Bruce MD        oxyCODONE immediate release tablet 5 mg  5 mg Oral Q3H PRN Mario Bruce MD        promethazine (PHENERGAN) 6.25 mg in dextrose 5 % 50 mL IVPB  6.25 mg Intravenous Q10 Min PRN Mario Bruce MD              ASSESSMENT/PLAN:       Left renal stone/ staghorn      Plan for repeat outpatient lithotripsy

## 2019-09-19 NOTE — DISCHARGE SUMMARY
Patient comes in for outpatient lithotripsy for a staghorn calculus    Procedure is done w no complication    After a brief stay in recovery, patient is discharged in good condition    Meds given:  Lortab and Cipro    F/u office:  2 weeks with jesu ALFONSO

## 2019-09-19 NOTE — PLAN OF CARE
Transported to ASU room 1556 in stable condition.  Denies pain.  Voided 200ml prior to transport of bloody urine. Report to THI Smith

## 2019-09-19 NOTE — ANESTHESIA PREPROCEDURE EVALUATION
09/19/2019  Frank Wyatt is a 84 y.o., male.    Patient Active Problem List   Diagnosis    Incisional hernia, without obstruction or gangrene    Protein calorie malnutrition    Acute respiratory failure    Dyspnea    Renal insufficiency    Renal stone     Past Surgical History:   Procedure Laterality Date    APPENDECTOMY      CARDIAC SURGERY  1997    CABG 5 vessel    CHOLECYSTECTOMY  2013    CHOLECYSTECTOMY, LAPAROSCOPIC N/A 1/7/2014    [facility] OR    COLON SURGERY      Colon resection with colostomy; colostomy reversal. 2004    CORONARY ARTERY BYPASS GRAFT  1996    5-bypass     CYSTOSCOPY N/A 8/15/2019    [facility]    EXPLORATORY-LAPAROTOMY N/A 8/20/2015    [facility]    EXPLORATORY-LAPAROTOMY N/A 1/7/2014    [facility] at [facility] OR    EYE SURGERY Right 2014    Cataract    EYE SURGERY Left 2017    Cataract    HERNIA REPAIR  2014    Dr. Bailon - had to have mesh removed    INGUINAL HERNIA REPAIR Right 03/25/2019        LITHOTRIPSY      LITHOTRIPSY, ESWL Left 8/15/2019    [facility] at [facility] [place]    nephrostomy tube      PERCUTANEOUS NEPHROSTOMY      REPAIR-HERNIA-INCISIONAL N/A 8/17/2015    [facility] at [facility]    RESECTION - SMALL BOWEL N/A 8/20/2015    [facility] at [facility] OR    ROTATOR CUFF REPAIR  2005    right shoulder    STENT, URETERAL Left 8/15/2019    [facility] by [facility] at [facility] [place]       Social History     Socioeconomic History    Marital status:      Spouse name: Not on file    Number of children: Not on file    Years of education: Not on file    Highest education level: Not on file   Occupational History    Not on file   Social Needs     Financial resource strain: Not on file    Food insecurity:     Worry: Not on file     Inability: Not on file    Transportation needs:     Medical: Not on file     Non-medical: Not on file   Tobacco Use    Smoking status: Former Smoker     Packs/day: 1.00     Years: 25.00     Pack years: 25.00     Last attempt to quit: 1972     Years since quittin.7    Smokeless tobacco: Former User     Quit date: 8/15/1972   Substance and Sexual Activity    Alcohol use: No    Drug use: No    Sexual activity: Not on file   Lifestyle    Physical activity:     Days per week: Not on file     Minutes per session: Not on file    Stress: Not on file   Relationships    Social connections:     Talks on phone: Not on file     Gets together: Not on file     Attends Spiritism service: Not on file     Active member of club or organization: Not on file     Attends meetings of clubs or organizations: Not on file     Relationship status: Not on file   Other Topics Concern    Not on file   Social History Narrative    Not on file       Lab Results   Component Value Date    WBC 8.97 2019    HGB 15.3 2019    HCT 47.0 2019    MCV 92 2019     (L) 2019     BMP  Lab Results   Component Value Date     2019    K 4.6 2019     2019    CO2 29 2019    BUN 28 (H) 2019    CREATININE 1.0 2019    CALCIUM 9.7 2019    ANIONGAP 6 (L) 2019    ESTGFRAFRICA >60.0 2019    EGFRNONAA >60.0 2019         IMPRESSION:  No acute cardiac or pulmonary process.      Pre-op Assessment    I have reviewed the Patient Summary Reports.     I have reviewed the Nursing Notes.   I have reviewed the Medications.     Review of Systems  Anesthesia Hx:  History of prior surgery of interest to airway management or planning: (most recent - hernia repair 3/25/19) Denies Family Hx of Anesthesia complications.   Denies Personal Hx of Anesthesia complications.    Social:  Former Smoker    EENT/Dental:   Ears General/Symptom(s) Hx of Meniere's disease   Cardiovascular:   Hypertension CAD asymptomatic CABG/stent (5v 1996)     Pulmonary:   Shortness of breath (reported, pt denies)    Renal/:   Chronic Renal Disease, CRI renal calculi    Musculoskeletal:   Arthritis     Endocrine:   Hypothyroidism (on synthroid, taken today)        Physical Exam  General:  Well nourished    Airway/Jaw/Neck:  Airway Findings: Mouth Opening: Normal Tongue: Normal  General Airway Assessment: Adult  Mallampati: II  TM Distance: Normal, at least 6 cm  Jaw/Neck Findings:  Neck ROM: Normal ROM      Dental:  Dental Findings: Lower Dentures, Upper Dentures   Chest/Lungs:  Chest/Lungs Findings: Clear to auscultation, Normal Respiratory Rate     Heart/Vascular:  Heart Findings: Rate: Normal  Rhythm: Regular Rhythm  Sounds: Normal        Mental Status:  Mental Status Findings:  Alert and Oriented         Anesthesia Plan  Type of Anesthesia, risks & benefits discussed:  Anesthesia Type:  general  Patient's Preference:   Intra-op Monitoring Plan: standard ASA monitors  Intra-op Monitoring Plan Comments:   Post Op Pain Control Plan:   Post Op Pain Control Plan Comments:   Induction:   IV  Beta Blocker:  Patient is on a Beta-Blocker and has received one dose within the past 24 hours (No further documentation required).       Informed Consent: Patient understands risks and agrees with Anesthesia plan.  Questions answered. Anesthesia consent signed with patient.  ASA Score: 3     Day of Surgery Review of History & Physical:  There are no significant changes.  H&P update referred to the surgeon.     Anesthesia Plan Notes: General, LMA okay.

## 2019-09-19 NOTE — TRANSFER OF CARE
"Anesthesia Transfer of Care Note    Patient: Frank Wyatt    Procedure(s) Performed: Procedure(s) (LRB):  LITHOTRIPSY, ESWL (Left)    Patient location: PACU    Anesthesia Type: general    Transport from OR: Transported from OR on room air with adequate spontaneous ventilation    Post pain: adequate analgesia    Post assessment: no apparent anesthetic complications    Post vital signs: stable    Level of consciousness: awake, alert and oriented    Nausea/Vomiting: no nausea/vomiting    Complications: none    Transfer of care protocol was followed      Last vitals:   Visit Vitals  BP (!) 145/66 (BP Location: Left arm, Patient Position: Lying)   Pulse 72   Temp 36.2 °C (97.2 °F) (Skin)   Resp 14   Ht 5' 7" (1.702 m)   Wt 89.1 kg (196 lb 8.6 oz)   SpO2 98%   BMI 30.78 kg/m²     "

## 2019-09-19 NOTE — ANESTHESIA POSTPROCEDURE EVALUATION
Anesthesia Post Evaluation    Patient: Frank Wyatt    Procedure(s) Performed: Procedure(s) (LRB):  LITHOTRIPSY, ESWL (Left)    Final Anesthesia Type: general  Patient location during evaluation: PACU  Patient participation: Yes- Able to Participate  Level of consciousness: awake and alert and oriented  Post-procedure vital signs: reviewed and stable  Pain management: adequate  Airway patency: patent  PONV status at discharge: No PONV  Anesthetic complications: no      Cardiovascular status: blood pressure returned to baseline and hemodynamically stable  Respiratory status: spontaneous ventilation, unassisted and room air  Hydration status: euvolemic  Follow-up not needed.          Vitals Value Taken Time   /74 9/19/2019 11:31 AM   Temp 36.4 °C (97.5 °F) 9/19/2019 11:30 AM   Pulse 67 9/19/2019 11:37 AM   Resp 18 9/19/2019 11:37 AM   SpO2 96 % 9/19/2019 11:37 AM   Vitals shown include unvalidated device data.      No case tracking events are documented in the log.      Pain/Rufino Score: Rufino Score: 10 (9/19/2019 11:30 AM)

## 2019-09-19 NOTE — DISCHARGE INSTRUCTIONS
POST SURGICAL INSTRUCTIONS:    NEXT 24 HOURS:      NO DRIVING OR OPERATING MACHINERY  NO SIGNING OF LEGAL DOCUMENTS  NO SHOWER

## 2019-09-19 NOTE — OP NOTE
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  We began at a KV of 16 and gradually increased to 25      3000     Shocks were delivered      The procedure was done without any complication  After the procedure the patient is brought to the recovery room in satisfactory condition      Findings/Key Components:          Estimated Blood Loss:      0

## 2019-09-19 NOTE — PLAN OF CARE
Arrived to PACU s/p left ESWL.  Has redness to left flank from procedure.  Pt Ramona, denies pain.

## 2019-09-24 ENCOUNTER — HOSPITAL ENCOUNTER (OUTPATIENT)
Dept: RADIOLOGY | Facility: HOSPITAL | Age: 84
Discharge: HOME | End: 2019-09-24
Attending: SPECIALIST
Payer: MEDICARE

## 2019-09-24 DIAGNOSIS — N20.1 CALCULUS OF URETER: Primary | ICD-10-CM

## 2019-09-24 DIAGNOSIS — N20.1 CALCULUS OF URETER: ICD-10-CM

## 2019-09-24 PROCEDURE — 74018 RADEX ABDOMEN 1 VIEW: CPT | Mod: TC,PO

## 2019-10-09 ENCOUNTER — HOSPITAL ENCOUNTER (INPATIENT)
Facility: HOSPITAL | Age: 84
LOS: 4 days | Discharge: HOME | DRG: 389 | End: 2019-10-13
Attending: EMERGENCY MEDICINE | Admitting: FAMILY MEDICINE
Payer: MEDICARE

## 2019-10-09 DIAGNOSIS — R07.9 CHEST PAIN: ICD-10-CM

## 2019-10-09 DIAGNOSIS — K56.609 SMALL BOWEL OBSTRUCTION: ICD-10-CM

## 2019-10-09 DIAGNOSIS — R00.0 TACHYCARDIA: ICD-10-CM

## 2019-10-09 DIAGNOSIS — R11.2 NAUSEA AND VOMITING, INTRACTABILITY OF VOMITING NOT SPECIFIED, UNSPECIFIED VOMITING TYPE: Primary | ICD-10-CM

## 2019-10-09 DIAGNOSIS — R10.9 ABDOMINAL PAIN: ICD-10-CM

## 2019-10-09 DIAGNOSIS — R10.9 ABDOMINAL PAIN, UNSPECIFIED ABDOMINAL LOCATION: ICD-10-CM

## 2019-10-09 PROBLEM — N39.0 UTI (URINARY TRACT INFECTION): Status: ACTIVE | Noted: 2019-10-09

## 2019-10-09 LAB
ALBUMIN SERPL BCP-MCNC: 4.3 G/DL (ref 3.5–5.2)
ALP SERPL-CCNC: 102 U/L (ref 55–135)
ALT SERPL W/O P-5'-P-CCNC: 27 U/L (ref 10–44)
ANION GAP SERPL CALC-SCNC: 14 MMOL/L (ref 8–16)
AST SERPL-CCNC: 26 U/L (ref 10–40)
BACTERIA #/AREA URNS HPF: ABNORMAL /HPF
BASOPHILS # BLD AUTO: 0.02 K/UL (ref 0–0.2)
BASOPHILS NFR BLD: 0.2 % (ref 0–1.9)
BILIRUB SERPL-MCNC: 1.3 MG/DL (ref 0.1–1)
BILIRUB UR QL STRIP: NEGATIVE
BUN SERPL-MCNC: 26 MG/DL (ref 8–23)
CALCIUM SERPL-MCNC: 10.2 MG/DL (ref 8.7–10.5)
CAOX CRY URNS QL MICRO: ABNORMAL
CHLORIDE SERPL-SCNC: 91 MMOL/L (ref 95–110)
CLARITY UR: ABNORMAL
CO2 SERPL-SCNC: 30 MMOL/L (ref 23–29)
COLOR UR: YELLOW
CREAT SERPL-MCNC: 1.1 MG/DL (ref 0.5–1.4)
DIFFERENTIAL METHOD: ABNORMAL
EOSINOPHIL # BLD AUTO: 0 K/UL (ref 0–0.5)
EOSINOPHIL NFR BLD: 0 % (ref 0–8)
ERYTHROCYTE [DISTWIDTH] IN BLOOD BY AUTOMATED COUNT: 13.4 % (ref 11.5–14.5)
EST. GFR  (AFRICAN AMERICAN): >60 ML/MIN/1.73 M^2
EST. GFR  (NON AFRICAN AMERICAN): >60 ML/MIN/1.73 M^2
GLUCOSE SERPL-MCNC: 261 MG/DL (ref 70–110)
GLUCOSE UR QL STRIP: ABNORMAL
HCT VFR BLD AUTO: 47.1 % (ref 40–54)
HGB BLD-MCNC: 16.1 G/DL (ref 14–18)
HGB UR QL STRIP: ABNORMAL
HYALINE CASTS #/AREA URNS LPF: 6 /LPF
IMM GRANULOCYTES # BLD AUTO: 0.06 K/UL (ref 0–0.04)
IMM GRANULOCYTES NFR BLD AUTO: 0.5 % (ref 0–0.5)
KETONES UR QL STRIP: NEGATIVE
LEUKOCYTE ESTERASE UR QL STRIP: ABNORMAL
LIPASE SERPL-CCNC: 34 U/L (ref 4–60)
LYMPHOCYTES # BLD AUTO: 0.4 K/UL (ref 1–4.8)
LYMPHOCYTES NFR BLD: 2.9 % (ref 18–48)
MAGNESIUM SERPL-MCNC: 1.7 MG/DL (ref 1.6–2.6)
MCH RBC QN AUTO: 30.1 PG (ref 27–31)
MCHC RBC AUTO-ENTMCNC: 34.2 G/DL (ref 32–36)
MCV RBC AUTO: 88 FL (ref 82–98)
MICROSCOPIC COMMENT: ABNORMAL
MONOCYTES # BLD AUTO: 0.2 K/UL (ref 0.3–1)
MONOCYTES NFR BLD: 1.8 % (ref 4–15)
NEUTROPHILS # BLD AUTO: 11.6 K/UL (ref 1.8–7.7)
NEUTROPHILS NFR BLD: 94.6 % (ref 38–73)
NITRITE UR QL STRIP: POSITIVE
NRBC BLD-RTO: 0 /100 WBC
PH UR STRIP: 8 [PH] (ref 5–8)
PLATELET # BLD AUTO: 132 K/UL (ref 150–350)
PMV BLD AUTO: 9.6 FL (ref 9.2–12.9)
POTASSIUM SERPL-SCNC: 4.1 MMOL/L (ref 3.5–5.1)
PROT SERPL-MCNC: 7.9 G/DL (ref 6–8.4)
PROT UR QL STRIP: ABNORMAL
RBC # BLD AUTO: 5.35 M/UL (ref 4.6–6.2)
RBC #/AREA URNS HPF: 1 /HPF (ref 0–4)
SODIUM SERPL-SCNC: 135 MMOL/L (ref 136–145)
SP GR UR STRIP: 1.01 (ref 1–1.03)
SQUAMOUS #/AREA URNS HPF: 16 /HPF
T4 FREE SERPL-MCNC: 1.2 NG/DL (ref 0.71–1.51)
TRI-PHOS CRY URNS QL MICRO: ABNORMAL
TSH SERPL DL<=0.005 MIU/L-ACNC: 3.49 UIU/ML (ref 0.34–5.6)
URN SPEC COLLECT METH UR: ABNORMAL
UROBILINOGEN UR STRIP-ACNC: NEGATIVE EU/DL
WBC # BLD AUTO: 12.22 K/UL (ref 3.9–12.7)
WBC #/AREA URNS HPF: >100 /HPF (ref 0–5)
YEAST URNS QL MICRO: ABNORMAL

## 2019-10-09 PROCEDURE — 90471 IMMUNIZATION ADMIN: CPT | Performed by: FAMILY MEDICINE

## 2019-10-09 PROCEDURE — 87077 CULTURE AEROBIC IDENTIFY: CPT

## 2019-10-09 PROCEDURE — 96375 TX/PRO/DX INJ NEW DRUG ADDON: CPT

## 2019-10-09 PROCEDURE — 87186 SC STD MICRODIL/AGAR DIL: CPT

## 2019-10-09 PROCEDURE — 63600175 PHARM REV CODE 636 W HCPCS

## 2019-10-09 PROCEDURE — 43752 NASAL/OROGASTRIC W/TUBE PLMT: CPT

## 2019-10-09 PROCEDURE — 96376 TX/PRO/DX INJ SAME DRUG ADON: CPT

## 2019-10-09 PROCEDURE — 80053 COMPREHEN METABOLIC PANEL: CPT

## 2019-10-09 PROCEDURE — 84443 ASSAY THYROID STIM HORMONE: CPT

## 2019-10-09 PROCEDURE — 63600175 PHARM REV CODE 636 W HCPCS: Performed by: NURSE PRACTITIONER

## 2019-10-09 PROCEDURE — 36415 COLL VENOUS BLD VENIPUNCTURE: CPT

## 2019-10-09 PROCEDURE — 81001 URINALYSIS AUTO W/SCOPE: CPT

## 2019-10-09 PROCEDURE — 87086 URINE CULTURE/COLONY COUNT: CPT

## 2019-10-09 PROCEDURE — 63600175 PHARM REV CODE 636 W HCPCS: Performed by: EMERGENCY MEDICINE

## 2019-10-09 PROCEDURE — 84439 ASSAY OF FREE THYROXINE: CPT

## 2019-10-09 PROCEDURE — 90662 IIV NO PRSV INCREASED AG IM: CPT | Performed by: FAMILY MEDICINE

## 2019-10-09 PROCEDURE — 96374 THER/PROPH/DIAG INJ IV PUSH: CPT

## 2019-10-09 PROCEDURE — 93005 ELECTROCARDIOGRAM TRACING: CPT

## 2019-10-09 PROCEDURE — 99285 EMERGENCY DEPT VISIT HI MDM: CPT | Mod: 25

## 2019-10-09 PROCEDURE — 85025 COMPLETE CBC W/AUTO DIFF WBC: CPT

## 2019-10-09 PROCEDURE — 83036 HEMOGLOBIN GLYCOSYLATED A1C: CPT

## 2019-10-09 PROCEDURE — 99900035 HC TECH TIME PER 15 MIN (STAT)

## 2019-10-09 PROCEDURE — 87040 BLOOD CULTURE FOR BACTERIA: CPT

## 2019-10-09 PROCEDURE — 83735 ASSAY OF MAGNESIUM: CPT

## 2019-10-09 PROCEDURE — 83690 ASSAY OF LIPASE: CPT

## 2019-10-09 PROCEDURE — G0008 ADMIN INFLUENZA VIRUS VAC: HCPCS | Performed by: FAMILY MEDICINE

## 2019-10-09 PROCEDURE — 21400001 HC TELEMETRY ROOM

## 2019-10-09 PROCEDURE — 63600175 PHARM REV CODE 636 W HCPCS: Performed by: FAMILY MEDICINE

## 2019-10-09 RX ORDER — ONDANSETRON 2 MG/ML
4 INJECTION INTRAMUSCULAR; INTRAVENOUS
Status: DISCONTINUED | OUTPATIENT
Start: 2019-10-09 | End: 2019-10-09

## 2019-10-09 RX ORDER — HYDROMORPHONE HYDROCHLORIDE 1 MG/ML
0.5 INJECTION, SOLUTION INTRAMUSCULAR; INTRAVENOUS; SUBCUTANEOUS EVERY 6 HOURS PRN
Status: DISCONTINUED | OUTPATIENT
Start: 2019-10-09 | End: 2019-10-13 | Stop reason: HOSPADM

## 2019-10-09 RX ORDER — HYDROMORPHONE HYDROCHLORIDE 1 MG/ML
0.5 INJECTION, SOLUTION INTRAMUSCULAR; INTRAVENOUS; SUBCUTANEOUS
Status: COMPLETED | OUTPATIENT
Start: 2019-10-09 | End: 2019-10-09

## 2019-10-09 RX ORDER — ONDANSETRON 2 MG/ML
4 INJECTION INTRAMUSCULAR; INTRAVENOUS
Status: COMPLETED | OUTPATIENT
Start: 2019-10-09 | End: 2019-10-09

## 2019-10-09 RX ORDER — ALBUTEROL SULFATE 0.83 MG/ML
2.5 SOLUTION RESPIRATORY (INHALATION) EVERY 4 HOURS PRN
Status: DISCONTINUED | OUTPATIENT
Start: 2019-10-09 | End: 2019-10-13 | Stop reason: HOSPADM

## 2019-10-09 RX ORDER — INSULIN ASPART 100 [IU]/ML
0-5 INJECTION, SOLUTION INTRAVENOUS; SUBCUTANEOUS EVERY 6 HOURS PRN
Status: DISCONTINUED | OUTPATIENT
Start: 2019-10-09 | End: 2019-10-13 | Stop reason: HOSPADM

## 2019-10-09 RX ORDER — SODIUM CHLORIDE, SODIUM LACTATE, POTASSIUM CHLORIDE, CALCIUM CHLORIDE 600; 310; 30; 20 MG/100ML; MG/100ML; MG/100ML; MG/100ML
1000 INJECTION, SOLUTION INTRAVENOUS
Status: COMPLETED | OUTPATIENT
Start: 2019-10-09 | End: 2019-10-09

## 2019-10-09 RX ORDER — FLUTICASONE FUROATE AND VILANTEROL 100; 25 UG/1; UG/1
1 POWDER RESPIRATORY (INHALATION) DAILY
Status: DISCONTINUED | OUTPATIENT
Start: 2019-10-10 | End: 2019-10-13 | Stop reason: HOSPADM

## 2019-10-09 RX ORDER — LEVOFLOXACIN 5 MG/ML
500 INJECTION, SOLUTION INTRAVENOUS
Status: COMPLETED | OUTPATIENT
Start: 2019-10-09 | End: 2019-10-09

## 2019-10-09 RX ORDER — ONDANSETRON 2 MG/ML
8 INJECTION INTRAMUSCULAR; INTRAVENOUS
Status: DISCONTINUED | OUTPATIENT
Start: 2019-10-09 | End: 2019-10-09

## 2019-10-09 RX ORDER — SODIUM CHLORIDE 0.9 % (FLUSH) 0.9 %
10 SYRINGE (ML) INJECTION
Status: DISCONTINUED | OUTPATIENT
Start: 2019-10-09 | End: 2019-10-13 | Stop reason: HOSPADM

## 2019-10-09 RX ORDER — MAGNESIUM SULFATE HEPTAHYDRATE 40 MG/ML
2 INJECTION, SOLUTION INTRAVENOUS ONCE
Status: COMPLETED | OUTPATIENT
Start: 2019-10-09 | End: 2019-10-09

## 2019-10-09 RX ORDER — SODIUM CHLORIDE, SODIUM LACTATE, POTASSIUM CHLORIDE, CALCIUM CHLORIDE 600; 310; 30; 20 MG/100ML; MG/100ML; MG/100ML; MG/100ML
INJECTION, SOLUTION INTRAVENOUS CONTINUOUS
Status: DISCONTINUED | OUTPATIENT
Start: 2019-10-09 | End: 2019-10-13

## 2019-10-09 RX ORDER — METOPROLOL TARTRATE 1 MG/ML
5 INJECTION, SOLUTION INTRAVENOUS EVERY 8 HOURS
Status: DISCONTINUED | OUTPATIENT
Start: 2019-10-09 | End: 2019-10-13

## 2019-10-09 RX ORDER — GLUCAGON 1 MG
1 KIT INJECTION
Status: DISCONTINUED | OUTPATIENT
Start: 2019-10-09 | End: 2019-10-13 | Stop reason: HOSPADM

## 2019-10-09 RX ORDER — ONDANSETRON 2 MG/ML
4 INJECTION INTRAMUSCULAR; INTRAVENOUS EVERY 8 HOURS PRN
Status: DISCONTINUED | OUTPATIENT
Start: 2019-10-09 | End: 2019-10-13 | Stop reason: HOSPADM

## 2019-10-09 RX ADMIN — INFLUENZA A VIRUS A/MICHIGAN/45/2015 X-275 (H1N1) ANTIGEN (FORMALDEHYDE INACTIVATED), INFLUENZA A VIRUS A/SINGAPORE/INFIMH-16-0019/2016 IVR-186 (H3N2) ANTIGEN (FORMALDEHYDE INACTIVATED), AND INFLUENZA B VIRUS B/MARYLAND/15/2016 BX-69A (A B/COLORADO/6/2017-LIKE VIRUS) ANTIGEN (FORMALDEHYDE INACTIVATED) 0.5 ML: 60; 60; 60 INJECTION, SUSPENSION INTRAMUSCULAR at 07:10

## 2019-10-09 RX ADMIN — ONDANSETRON 4 MG: 2 INJECTION INTRAMUSCULAR; INTRAVENOUS at 11:10

## 2019-10-09 RX ADMIN — HYDROMORPHONE HYDROCHLORIDE 0.5 MG: 1 INJECTION, SOLUTION INTRAMUSCULAR; INTRAVENOUS; SUBCUTANEOUS at 01:10

## 2019-10-09 RX ADMIN — SODIUM CHLORIDE, SODIUM LACTATE, POTASSIUM CHLORIDE, AND CALCIUM CHLORIDE 1000 ML: .6; .31; .03; .02 INJECTION, SOLUTION INTRAVENOUS at 03:10

## 2019-10-09 RX ADMIN — SODIUM CHLORIDE, SODIUM LACTATE, POTASSIUM CHLORIDE, AND CALCIUM CHLORIDE: .6; .31; .03; .02 INJECTION, SOLUTION INTRAVENOUS at 08:10

## 2019-10-09 RX ADMIN — LEVOFLOXACIN 500 MG: 500 INJECTION, SOLUTION INTRAVENOUS at 03:10

## 2019-10-09 RX ADMIN — MAGNESIUM SULFATE 2 G: 2 INJECTION INTRAVENOUS at 08:10

## 2019-10-09 RX ADMIN — HYDROMORPHONE HYDROCHLORIDE 0.5 MG: 1 INJECTION, SOLUTION INTRAMUSCULAR; INTRAVENOUS; SUBCUTANEOUS at 11:10

## 2019-10-09 RX ADMIN — PIPERACILLIN SODIUM AND TAZOBACTAM SODIUM 3.38 G: 3; .375 INJECTION, POWDER, LYOPHILIZED, FOR SOLUTION INTRAVENOUS at 08:10

## 2019-10-09 NOTE — SUBJECTIVE & OBJECTIVE
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 2004    CORONARY ARTERY BYPASS GRAFT  1996    5-bypass     CYSTOSCOPY N/A 8/15/2019    Procedure: CYSTOSCOPY;  Surgeon: Dipak Sanchez MD;  Location: [facility] OR;  Service: Urology;  Laterality: N/A;    EXTRACORPOREAL SHOCK WAVE LITHOTRIPSY Left 8/15/2019    Procedure: LITHOTRIPSY, ESWL;  Surgeon: Dipak Sanchez MD;  Location: [facility] OR;  Service: Urology;  Laterality: Left;    EXTRACORPOREAL SHOCK WAVE LITHOTRIPSY Left 9/19/2019    Procedure: LITHOTRIPSY, ESWL;  Surgeon: Dipak Sanchez MD;  Location: [facility] OR;  Service: Urology;  Laterality: Left;    EYE SURGERY Right 2014    Cataract    EYE SURGERY Left 2017    Cataract    HERNIA REPAIR  2014    Dr. Bailon - had to have mesh removed    INGUINAL HERNIA REPAIR Right 03/25/2019        LITHOTRIPSY      nephrostomy tube      PERCUTANEOUS NEPHROSTOMY      ROTATOR CUFF REPAIR  2005    right shoulder       Review of patient's allergies indicates:   Allergen Reactions    Bactrim [sulfamethoxazole-trimethoprim] Hives     itched    Keflex [cephalexin] Rash    Morphine Rash     Patient had morphine and keflex at the same time, developed a rash, not sure which one caused it, or if it was a combination of the two meds.       No current facility-administered medications on file prior to encounter.      Current Outpatient Medications on File Prior to Encounter   Medication Sig    atorvastatin (LIPITOR) 40 MG tablet Take  40 mg by mouth every evening.     dextran 70-hypromellose (ARTIFICIAL TEARS,CEEF44-TPERH,) 0.1-0.3 % Drop Apply 1 drop to eye 2 (two) times daily.    furosemide (LASIX) 20 MG tablet Take 10 mg by mouth every morning.     levothyroxine (SYNTHROID) 25 MCG tablet Take 25 mcg by mouth once daily.     metoprolol tartrate (LOPRESSOR) 25 MG tablet Take 25 mg by mouth 2 (two) times daily.     SYMBICORT 160-4.5 mcg/actuation HFAA Inhale 2 puffs into the lungs every 12 (twelve) hours.     tamsulosin (FLOMAX) 0.4 mg Cap Take 0.4 mg by mouth once daily.     [DISCONTINUED] ciprofloxacin HCl (CIPRO) 500 MG tablet Take 1 tablet (500 mg total) by mouth 2 (two) times daily.    [DISCONTINUED] HYDROcodone-acetaminophen (NORCO) 5-325 mg per tablet Take 1 tablet by mouth every 4 (four) hours as needed for Pain.    [DISCONTINUED] HYDROcodone-acetaminophen (NORCO) 5-325 mg per tablet Take 1 tablet by mouth every 6 (six) hours as needed for Pain.     Family History     Problem Relation (Age of Onset)    Cancer Daughter    Diabetes Brother    Heart disease Mother, Brother, Brother, Sister    Hypertension Mother, Sister, Brother, Brother, Sister        Tobacco Use    Smoking status: Former Smoker     Packs/day: 1.00     Years: 25.00     Pack years: 25.00     Last attempt to quit: 1972     Years since quittin.7    Smokeless tobacco: Former User     Quit date: 8/15/1972   Substance and Sexual Activity    Alcohol use: No    Drug use: No    Sexual activity: Not on file     Review of Systems   Constitutional: Negative for chills, diaphoresis, fatigue and fever.   HENT: Negative for congestion, ear pain, sore throat and trouble swallowing.    Eyes: Negative for pain, discharge and visual disturbance.   Respiratory: Negative for cough, chest tightness, shortness of breath and wheezing.    Cardiovascular: Negative for chest pain, palpitations and leg swelling.   Gastrointestinal: Positive for abdominal distention, abdominal  pain, nausea and vomiting. Negative for blood in stool, constipation and diarrhea.   Endocrine: Negative for polydipsia, polyphagia and polyuria.   Genitourinary: Positive for frequency and urgency. Negative for dysuria and flank pain.   Musculoskeletal: Negative for back pain, joint swelling, neck pain and neck stiffness.   Skin: Negative for rash and wound.   Allergic/Immunologic: Negative for immunocompromised state.   Neurological: Negative for dizziness, syncope, speech difficulty, weakness, light-headedness, numbness and headaches.   Hematological: Negative for adenopathy.   Psychiatric/Behavioral: Negative for confusion and suicidal ideas. The patient is not nervous/anxious.    All other systems reviewed and are negative.    Objective:     Vital Signs (Most Recent):  Temp: 98.4 °F (36.9 °C) (10/09/19 1041)  Pulse: (!) 128 (10/09/19 1430)  Resp: 16 (10/09/19 1430)  BP: 119/69 (10/09/19 1430)  SpO2: (!) 92 % (10/09/19 1430) Vital Signs (24h Range):  Temp:  [98.4 °F (36.9 °C)] 98.4 °F (36.9 °C)  Pulse:  [113-128] 128  Resp:  [16-24] 16  SpO2:  [89 %-96 %] 92 %  BP: (119-177)/(61-84) 119/69     Weight: 88.9 kg (196 lb)  Body mass index is 30.7 kg/m².    Physical Exam   Constitutional: He is oriented to person, place, and time. He appears well-developed and well-nourished.   HENT:   Head: Normocephalic and atraumatic.   Eyes: Pupils are equal, round, and reactive to light. Conjunctivae and EOM are normal.   Neck: Normal range of motion. Neck supple.   Cardiovascular: Normal rate, regular rhythm, normal heart sounds and intact distal pulses.   Pulmonary/Chest: Effort normal and breath sounds normal.   Abdominal: Soft.   Hypoactive BS in bilat upper quadrants, no bowel sounds in bilat lower quadrants   Musculoskeletal: Normal range of motion.   Neurological: He is alert and oriented to person, place, and time.   Skin: Skin is warm and dry. Capillary refill takes less than 2 seconds.   Psychiatric: He has a normal  mood and affect. His behavior is normal. Judgment and thought content normal.   Nursing note and vitals reviewed.        CRANIAL NERVES     CN III, IV, VI   Pupils are equal, round, and reactive to light.  Extraocular motions are normal.        Significant Labs:   CBC:   Recent Labs   Lab 10/09/19  1131   WBC 12.22   HGB 16.1   HCT 47.1   *     CMP:   Recent Labs   Lab 10/09/19  1131   *   K 4.1   CL 91*   CO2 30*   *   BUN 26*   CREATININE 1.1   CALCIUM 10.2   PROT 7.9   ALBUMIN 4.3   BILITOT 1.3*   ALKPHOS 102   AST 26   ALT 27   ANIONGAP 14   EGFRNONAA >60.0       Significant Imaging: I have reviewed all pertinent imaging results/findings within the past 24 hours.     X-ray Abdomen Ap 1 View (kub)    Result Date: 10/9/2019  EXAMINATION: XR ABDOMEN AP 1 VIEW CLINICAL HISTORY: Abdominal pain COMPARISON: September 2019 FINDINGS: Supine view the abdomen is compared to September 24, 2019. There is abnormal mild gaseous distention of small-bowel loops in the left mid abdomen.  Please see CT abdomen report.  No mass, suspicious calcification, or free air is identified. Left ureteral stent is present.  The proximal pigtail is slightly coiled, overlying the left renal hilum.  The distal pigtail is un uncoiled and positioned near the left ureterovesicular junction. Multilevel lumbar degenerative disc disease is noted.     1. Abnormal bowel gas pattern suspicious for partial mechanical small bowel obstruction.  Please see CT abdomen report. 2. Left ureteral stent with positioning as noted above. Electronically signed by: Shahzad Garzon MD Date:    10/09/2019 Time:    13:11        Ct Renal Stone Study Abd Pelvis Wo    Result Date: 10/9/2019  EXAMINATION: CT RENAL STONE STUDY ABD PELVIS WO CLINICAL HISTORY: Abdominal pain, unspecified;. TECHNIQUE: CMS MANDATED QUALITY DATA - CT RADIATION - 436 All CT scans at this facility utilize dose modulation, iterative reconstruction, and/or weight based dosing  when appropriate to reduce radiation dose to as low as reasonably achievable. Thin section axial images were obtained, without intravenous contrast. The lack of intravenous contrast limits assessment of solid organs and vascular structures. COMPARISON: CT abdomen and pelvis, August 2019 FINDINGS: The lung bases are unremarkable. Subphrenic hypodensity adjacent to the dome of the liver is unchanged when compared to multiple studies dating back to 2016, therefore considered benign.  The liver is otherwise normal in appearance.  The gallbladder is surgically absent.  The spleen, pancreas, and adrenal glands are within normal limits.  There is a 12 mm nonobstructing lower pole right renal calculus, and midpole right renal cysts, unchanged.  On the left, ureteral stent is noted.  The distal pigtail is positioned at the level of the distal ureter, similar to the previous CT from August 16.  There is no significant left-sided hydronephrosis. The abdominal aorta is densely calcified without aneurysm.  There is moderate distention and fluid filling of the stomach.  There is abnormal mild distention of small-bowel loops in the left upper quadrant and left mid abdomen, with decompressed distal small bowel loops.  Findings are compatible with partial mechanical small bowel obstruction.  While the transition point is difficult to identify, it is probably at or to the left of midline in the mid lower abdomen.  There is a right paramidline abdominal wall hernia containing a short segment of small bowel, however this does not appear to reflect to the point of obstruction. Images of the pelvis demonstrate prostate enlargement (maximal transverse diameter 5.8 cm).  The urinary bladder is unremarkable.  There is no pelvic free fluid or lymphadenopathy.     1. CT findings compatible with partial mechanical small bowel obstruction.  While the point of transition is difficult to define, it is likely at or to the left of midline in the  mid to lower abdomen. 2. While there is a right paramidline abdominal wall hernia containing a short segment of small bowel, this does not appear to reflect the point of obstruction. 3. Left ureteral stent is positioned with its proximal pigtail at the renal pelvis, and its distal pigtail at the distal left ureter just above the ureterovesicular junction.  This positioning is similar to a prior study from August 2019. 4. Additional stable findings as above. Electronically signed by: Shahzad Garzon MD Date:    10/09/2019 Time:    13:03    EKG: Sinus tachycardia  Left bundle branch block  Abnormal ECG  When compared with ECG of 16-AUG-2019 19:13,  No significant change was found  Confirmed by Frank Alvarado MD ([id number]) on 10/9/2019 11:17:11 AM

## 2019-10-09 NOTE — ED PROVIDER NOTES
Encounter Date: 10/9/2019       History     Chief Complaint   Patient presents with    Nausea     onset last pm.     Vomiting    Abdominal Pain     84-year-old male presents emergency room complaining of sharp abdominal pain that began at 2:00 a.m. this morning.  The patient states he had nausea and this subsequently had vomiting x4. He does complain of primarily midline abdominal pain.  His last bowel movement was presumably yesterday.  The patient has had a history of kidney stones and has a stent currently in his left ureter.  He has had several abdominal surgeries and has a large midline scar.  He has additionally had a ventral hernia repairs well as a right inguinal hernia repair.  No history any fever or chills. No hematuria dysuria.  No complaint of any flank pain. No dietary changes.        Review of patient's allergies indicates:   Allergen Reactions    Bactrim [sulfamethoxazole-trimethoprim] Hives     itched    Keflex [cephalexin] Rash    Morphine Rash     Patient had morphine and keflex at the same time, developed a rash, not sure which one caused it, or if it was a combination of the two meds.     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 2004    CORONARY ARTERY BYPASS GRAFT  1996    5-bypass     CYSTOSCOPY N/A 8/15/2019    Procedure: CYSTOSCOPY;  Surgeon: Dipak Sanchez MD;  Location: [facility];  Service: Urology;  Laterality: N/A;    EXTRACORPOREAL SHOCK WAVE LITHOTRIPSY Left 8/15/2019    Procedure:  LITHOTRIPSY, ESWL;  Surgeon: Dipak Sanchez MD;  Location: [facility] OR;  Service: Urology;  Laterality: Left;    EXTRACORPOREAL SHOCK WAVE LITHOTRIPSY Left 2019    Procedure: LITHOTRIPSY, ESWL;  Surgeon: Dipak Sanchez MD;  Location: [facility] OR;  Service: Urology;  Laterality: Left;    EYE SURGERY Right     Cataract    EYE SURGERY Left     Cataract    HERNIA REPAIR      Dr. Bailon - had to have mesh removed    INGUINAL HERNIA REPAIR Right 2019        LITHOTRIPSY      nephrostomy tube      PERCUTANEOUS NEPHROSTOMY      ROTATOR CUFF REPAIR  2005    right shoulder     Family History   Problem Relation Age of Onset    Heart disease Mother     Hypertension Mother     Hypertension Sister     Heart disease Brother     Hypertension Brother     Cancer Daughter     Diabetes Brother     Heart disease Brother     Hypertension Brother     Hypertension Sister     Heart disease Sister      Social History     Tobacco Use    Smoking status: Former Smoker     Packs/day: 1.00     Years: 25.00     Pack years: 25.00     Last attempt to quit: 1972     Years since quittin.7    Smokeless tobacco: Former User     Quit date: 8/15/1972   Substance Use Topics    Alcohol use: No    Drug use: No     Review of Systems   Constitutional: Negative for activity change, chills, diaphoresis and fever.   HENT: Negative.  Negative for congestion, ear pain, rhinorrhea, sinus pain and sore throat.    Eyes: Negative.  Negative for pain.   Respiratory: Negative.  Negative for cough and shortness of breath.    Cardiovascular: Negative.  Negative for chest pain.   Gastrointestinal: Positive for abdominal pain, nausea and vomiting. Negative for constipation and diarrhea.   Genitourinary: Negative for dysuria, flank pain, frequency and hematuria.   Musculoskeletal: Negative for back pain.   Skin: Negative for pallor, rash and wound.   Neurological: Negative for syncope, weakness and headaches.    All other systems reviewed and are negative.      Physical Exam     Initial Vitals [10/09/19 1041]   BP Pulse Resp Temp SpO2   (!) 177/84 (!) 115 20 98.4 °F (36.9 °C) 96 %      MAP       --         Physical Exam    Constitutional: He appears well-developed and well-nourished. He is not diaphoretic. No distress.   HENT:   Head: Normocephalic and atraumatic.   Right Ear: External ear normal.   Left Ear: External ear normal.   Nose: Nose normal.   Mouth/Throat: Oropharynx is clear and moist.   Eyes: Conjunctivae and EOM are normal. Pupils are equal, round, and reactive to light. Right eye exhibits no discharge. Left eye exhibits no discharge. No scleral icterus.   Neck: Normal range of motion. Neck supple. No tracheal deviation present. No JVD present.   Cardiovascular: Normal rate, regular rhythm, normal heart sounds and intact distal pulses. Exam reveals no gallop.    No murmur heard.  Pulmonary/Chest: Breath sounds normal. No respiratory distress. He has no wheezes. He has no rhonchi. He has no rales. He exhibits no tenderness.   Abdominal: Soft. Bowel sounds are normal. He exhibits no distension and no mass. There is tenderness. There is no rebound and no guarding.   Ventral scar.  Midline tenderness to direct palpation.   Musculoskeletal: Normal range of motion. He exhibits no edema or tenderness.   Lymphadenopathy:     He has no cervical adenopathy.   Neurological: He is alert and oriented to person, place, and time. He has normal strength and normal reflexes. No cranial nerve deficit or sensory deficit. GCS score is 15. GCS eye subscore is 4. GCS verbal subscore is 5. GCS motor subscore is 6.   Skin: Skin is warm and dry. Capillary refill takes less than 2 seconds. No rash noted. No erythema. No pallor.   Psychiatric: He has a normal mood and affect. His behavior is normal. Judgment and thought content normal.         ED Course   Procedures  Labs Reviewed   CBC W/ AUTO DIFFERENTIAL - Abnormal; Notable for the  following components:       Result Value    Platelets 132 (*)     Gran # (ANC) 11.6 (*)     Immature Grans (Abs) 0.06 (*)     Lymph # 0.4 (*)     Mono # 0.2 (*)     Gran% 94.6 (*)     Lymph% 2.9 (*)     Mono% 1.8 (*)     All other components within normal limits    Narrative:     For upper or mid abdominal pain.   COMPREHENSIVE METABOLIC PANEL - Abnormal; Notable for the following components:    Sodium 135 (*)     Chloride 91 (*)     CO2 30 (*)     Glucose 261 (*)     All other components within normal limits    Narrative:     For upper or mid abdominal pain.   URINALYSIS, REFLEX TO URINE CULTURE   LIPASE        ECG Results          EKG 12-lead (Final result)  Result time 10/09/19 11:17:19    Final result by Interface, Lab In [facility] (10/09/19 11:17:19)                 Narrative:    Test Reason : R10.9,    Vent. Rate : 112 BPM     Atrial Rate : 112 BPM     P-R Int : 120 ms          QRS Dur : 132 ms      QT Int : 364 ms       P-R-T Axes : 000 056 171 degrees     QTc Int : 496 ms    Sinus tachycardia  Left bundle branch block  Abnormal ECG  When compared with ECG of 16-AUG-2019 19:13,  No significant change was found  Confirmed by Frank Alvarado MD ([id number]) on 10/9/2019 11:17:11 AM    Referred By: [email or web address]   SELF           Confirmed By:Frank Alvarado MD                            Imaging Results    None                       Attending Attestation:             Attending ED Notes:   Is a 4-year-old male who had presented with complaints of abdominal pain with nausea and vomiting, had a CT scan which showed evidence of a partial mechanical small bowel obstruction with a transition zone suspected to be just left of midline in the mid to lower abdomen.  Labs showed a white count of 12.2.  Urinalysis suggested urinary tract infection with greater than 100 WBCs per high-power field is nitrite positive with moderate bacteria.  A urine culture.  The patient be started on Levaquin in light of a cephalosporin allergy.  The  chemistries are remarkable for sodium 135 and a blood sugar of 261 with a BUN of 26.  Hospital Medicine is being consulted as well as Urology, Dr. Sanchez and the patient will be admitted.  An NG tube is also being placed.             Clinical Impression:       ICD-10-CM ICD-9-CM   1. Nausea and vomiting, intractability of vomiting not specified, unspecified vomiting type R11.2 787.01   2. Abdominal pain R10.9 789.00   3. Abdominal pain, unspecified abdominal location R10.9 789.00   4. Small bowel obstruction K56.609 560.9                                Michael Lacy Jr., MD  10/09/19 [id number]

## 2019-10-09 NOTE — HPI
Mr. Wyatt presents today with complaints of abd pain. It is severe. It is associated with N/V, urinary frequency, and urinary urgency. He denies measured fever, chills, cough, dysuria, dizziness, or LOC. He lives at home alone and called his daughter last night and told her he felt bad. He had eaten some pork that was sitting out, so his daughter initially thought this was the culprit and made him an appt with his PCP, but the pain got progressively worse, so they came to the ED instead. He cannot remember his last BM, but knows it wasn't yesterday. He cannot remember the last time he passed flatus. He had a recent ureteral stent placed for kidney stones and had plans to have it out in the office today, but came to the ED and cancelled that appt. He has a history of CAD s/p 5v CAB, HTN, arthritis, multiple abd surgeries, and kidney stones.

## 2019-10-10 LAB
ANION GAP SERPL CALC-SCNC: 14 MMOL/L (ref 8–16)
BASOPHILS # BLD AUTO: 0.03 K/UL (ref 0–0.2)
BASOPHILS NFR BLD: 0.5 % (ref 0–1.9)
BUN SERPL-MCNC: 31 MG/DL (ref 8–23)
CALCIUM SERPL-MCNC: 9.8 MG/DL (ref 8.7–10.5)
CHLORIDE SERPL-SCNC: 91 MMOL/L (ref 95–110)
CO2 SERPL-SCNC: 34 MMOL/L (ref 23–29)
CREAT SERPL-MCNC: 1.3 MG/DL (ref 0.5–1.4)
DIFFERENTIAL METHOD: ABNORMAL
EOSINOPHIL # BLD AUTO: 0.2 K/UL (ref 0–0.5)
EOSINOPHIL NFR BLD: 3.9 % (ref 0–8)
ERYTHROCYTE [DISTWIDTH] IN BLOOD BY AUTOMATED COUNT: 14 % (ref 11.5–14.5)
EST. GFR  (AFRICAN AMERICAN): 57.9 ML/MIN/1.73 M^2
EST. GFR  (NON AFRICAN AMERICAN): 50.1 ML/MIN/1.73 M^2
ESTIMATED AVG GLUCOSE: 146 MG/DL (ref 68–131)
GLUCOSE SERPL-MCNC: 115 MG/DL (ref 70–110)
GLUCOSE SERPL-MCNC: 131 MG/DL (ref 70–110)
GLUCOSE SERPL-MCNC: 137 MG/DL (ref 70–110)
GLUCOSE SERPL-MCNC: 154 MG/DL (ref 70–110)
GLUCOSE SERPL-MCNC: 157 MG/DL (ref 70–110)
HBA1C MFR BLD HPLC: 6.7 % (ref 4.5–6.2)
HCT VFR BLD AUTO: 45 % (ref 40–54)
HGB BLD-MCNC: 15.2 G/DL (ref 14–18)
IMM GRANULOCYTES # BLD AUTO: 0.01 K/UL (ref 0–0.04)
IMM GRANULOCYTES NFR BLD AUTO: 0.2 % (ref 0–0.5)
LYMPHOCYTES # BLD AUTO: 0.9 K/UL (ref 1–4.8)
LYMPHOCYTES NFR BLD: 15.4 % (ref 18–48)
MAGNESIUM SERPL-MCNC: 2.2 MG/DL (ref 1.6–2.6)
MCH RBC QN AUTO: 30.3 PG (ref 27–31)
MCHC RBC AUTO-ENTMCNC: 33.8 G/DL (ref 32–36)
MCV RBC AUTO: 90 FL (ref 82–98)
MONOCYTES # BLD AUTO: 0.9 K/UL (ref 0.3–1)
MONOCYTES NFR BLD: 16.6 % (ref 4–15)
NEUTROPHILS # BLD AUTO: 3.6 K/UL (ref 1.8–7.7)
NEUTROPHILS NFR BLD: 63.4 % (ref 38–73)
NRBC BLD-RTO: 0 /100 WBC
PLATELET # BLD AUTO: 107 K/UL (ref 150–350)
PMV BLD AUTO: 9.9 FL (ref 9.2–12.9)
POTASSIUM SERPL-SCNC: 3.9 MMOL/L (ref 3.5–5.1)
RBC # BLD AUTO: 5.02 M/UL (ref 4.6–6.2)
SODIUM SERPL-SCNC: 139 MMOL/L (ref 136–145)
WBC # BLD AUTO: 5.6 K/UL (ref 3.9–12.7)

## 2019-10-10 PROCEDURE — 83735 ASSAY OF MAGNESIUM: CPT

## 2019-10-10 PROCEDURE — 25000003 PHARM REV CODE 250: Performed by: NURSE PRACTITIONER

## 2019-10-10 PROCEDURE — 21400001 HC TELEMETRY ROOM

## 2019-10-10 PROCEDURE — 25000242 PHARM REV CODE 250 ALT 637 W/ HCPCS: Performed by: NURSE PRACTITIONER

## 2019-10-10 PROCEDURE — 94761 N-INVAS EAR/PLS OXIMETRY MLT: CPT

## 2019-10-10 PROCEDURE — 82962 GLUCOSE BLOOD TEST: CPT

## 2019-10-10 PROCEDURE — 63600175 PHARM REV CODE 636 W HCPCS: Performed by: NURSE PRACTITIONER

## 2019-10-10 PROCEDURE — 85025 COMPLETE CBC W/AUTO DIFF WBC: CPT

## 2019-10-10 PROCEDURE — 80048 BASIC METABOLIC PNL TOTAL CA: CPT

## 2019-10-10 PROCEDURE — 94640 AIRWAY INHALATION TREATMENT: CPT

## 2019-10-10 PROCEDURE — 99900035 HC TECH TIME PER 15 MIN (STAT)

## 2019-10-10 PROCEDURE — 36415 COLL VENOUS BLD VENIPUNCTURE: CPT

## 2019-10-10 RX ADMIN — PIPERACILLIN SODIUM AND TAZOBACTAM SODIUM 3.38 G: 3; .375 INJECTION, POWDER, LYOPHILIZED, FOR SOLUTION INTRAVENOUS at 07:10

## 2019-10-10 RX ADMIN — METOPROLOL TARTRATE 5 MG: 1 INJECTION, SOLUTION INTRAVENOUS at 12:10

## 2019-10-10 RX ADMIN — PIPERACILLIN SODIUM AND TAZOBACTAM SODIUM 3.38 G: 3; .375 INJECTION, POWDER, LYOPHILIZED, FOR SOLUTION INTRAVENOUS at 10:10

## 2019-10-10 RX ADMIN — METOPROLOL TARTRATE 5 MG: 1 INJECTION, SOLUTION INTRAVENOUS at 03:10

## 2019-10-10 RX ADMIN — FLUTICASONE FUROATE AND VILANTEROL TRIFENATATE 1 PUFF: 100; 25 POWDER RESPIRATORY (INHALATION) at 11:10

## 2019-10-10 RX ADMIN — SODIUM CHLORIDE, SODIUM LACTATE, POTASSIUM CHLORIDE, AND CALCIUM CHLORIDE: .6; .31; .03; .02 INJECTION, SOLUTION INTRAVENOUS at 10:10

## 2019-10-10 NOTE — CARE UPDATE
10/10/19 1101   Patient Assessment/Suction   Level of Consciousness (AVPU) alert   Respiratory Effort Normal;Unlabored   Expansion/Accessory Muscles/Retractions no use of accessory muscles   All Lung Fields Breath Sounds clear   PRE-TX-O2   O2 Device (Oxygen Therapy) room air   SpO2 (!) 94 %   Pulse Oximetry Type Intermittent   $ Pulse Oximetry - Multiple Charge Pulse Oximetry - Multiple   Pulse 85   Resp 16   Inhaler   $ Inhaler Charges MDI (Metered Dose [place]ahler) Treatment   Daily Review of Necessity (Inhaler) completed   Respiratory Treatment Status (Inhaler) given;mouth rinsed post treatment   Treatment Route (Inhaler) mouthpiece   Patient Position (Inhaler) semi-Rivera's   Post Treatment Assessment (Inhaler) breath sounds unchanged   Signs of Intolerance (Inhaler) none

## 2019-10-10 NOTE — PLAN OF CARE
10/09/19 2029   Patient Assessment/Suction   Level of Consciousness (AVPU) alert   Respiratory Effort Normal;Unlabored   Expansion/Accessory Muscles/Retractions no use of accessory muscles   All Lung Fields Breath Sounds clear   PRE-TX-O2   SpO2 (!) 92 %   Pulse Oximetry Type Intermittent   Pulse (!) 111   Resp 16   Inhaler   $ Inhaler Charges PRN treatment not required   Daily Review of Necessity (Inhaler) completed

## 2019-10-10 NOTE — PROGRESS NOTES
[facility] Medicine  Progress Note    DOS:10/10/2019  Time: 9:45am    Patient Name: Frank Wyatt  MRN: [id number]  Patient Class: IP- Inpatient   Admission Date: 10/9/2019  Length of Stay: 1 days  Attending Physician: Edda Curry MD  Primary Care Provider: Td Begum MD        Subjective:     Principal Problem:Small bowel obstruction        HPI:  Mr. Wyatt presents today with complaints of abd pain. It is severe. It is associated with N/V, urinary frequency, and urinary urgency. He denies measured fever, chills, cough, dysuria, dizziness, or LOC. He lives at home alone and called his daughter last night and told her he felt bad. He had eaten some pork that was sitting out, so his daughter initially thought this was the culprit and made him an appt with his PCP, but the pain got progressively worse, so they came to the ED instead. He cannot remember his last BM, but knows it wasn't yesterday. He cannot remember the last time he passed flatus. He had a recent ureteral stent placed for kidney stones and had plans to have it out in the office today, but came to the ED and cancelled that appt. He has a history of CAD s/p 5v CAB, HTN, arthritis, multiple abd surgeries, and kidney stones.    Overview/Hospital Course:  10/10:  Patient responded very well with NG tube and has significant NG output.  Feels better.  Denies any new symptoms.     Interval History:  Feels better.  NG has lot of output.  No new complaints.  NPO    Review of Systems   Constitutional: Negative for chills, diaphoresis, fatigue and fever.   HENT: Negative for congestion, ear pain, sore throat and trouble swallowing.    Eyes: Negative for pain, discharge and visual disturbance.   Respiratory: Negative for cough, chest tightness, shortness of breath and wheezing.    Cardiovascular: Negative for chest pain, palpitations and leg swelling.   Gastrointestinal: Positive for abdominal distention, abdominal pain,  nausea and vomiting. Negative for blood in stool, constipation and diarrhea.   Endocrine: Negative for polydipsia, polyphagia and polyuria.   Genitourinary: Positive for frequency and urgency. Negative for dysuria and flank pain.   Musculoskeletal: Negative for back pain, joint swelling, neck pain and neck stiffness.   Skin: Negative for rash and wound.   Allergic/Immunologic: Negative for immunocompromised state.   Neurological: Negative for dizziness, syncope, speech difficulty, weakness, light-headedness, numbness and headaches.   Hematological: Negative for adenopathy.   Psychiatric/Behavioral: Negative for confusion and suicidal ideas. The patient is not nervous/anxious.    All other systems reviewed and are negative.    Objective:     Vital Signs (Most Recent):  Temp: 98.9 °F (37.2 °C) (10/10/19 1210)  Pulse: 87 (10/10/19 1210)  Resp: 20 (10/10/19 1210)  BP: 131/65 (10/10/19 1210)  SpO2: (!) 93 % (10/10/19 1210) Vital Signs (24h Range):  Temp:  [98.2 °F (36.8 °C)-98.9 °F (37.2 °C)] 98.9 °F (37.2 °C)  Pulse:  [] 87  Resp:  [15-33] 20  SpO2:  [91 %-96 %] 93 %  BP: (115-141)/(60-72) 131/65     Weight: 84.1 kg (185 lb 6.5 oz)  Body mass index is 29.04 kg/m².    Intake/Output Summary (Last 24 hours) at 10/10/2019 1456  Last data filed at 10/10/2019 1218  Gross per 24 hour   Intake 475 ml   Output 1650 ml   Net -1175 ml      Physical Exam   Constitutional: He is oriented to person, place, and time. He appears well-developed and well-nourished.   HENT:   Head: Normocephalic and atraumatic.   Mouth/Throat: Oropharynx is clear and moist.   Eyes: Pupils are equal, round, and reactive to light. Conjunctivae and EOM are normal.   Neck: Normal range of motion. Neck supple. No JVD present.   Cardiovascular: Normal rate, regular rhythm, normal heart sounds and intact distal pulses. Exam reveals no gallop.   No murmur heard.  Pulmonary/Chest: Effort normal and breath sounds normal. No respiratory distress. He has no  wheezes.   Abdominal: Soft. Bowel sounds are normal. He exhibits no distension. There is no rebound and no guarding.   Hypoactive BS in bilat upper quadrants, no bowel sounds in bilat lower quadrants   Musculoskeletal: Normal range of motion. He exhibits no edema.   Lymphadenopathy:     He has no cervical adenopathy.   Neurological: He is alert and oriented to person, place, and time. No cranial nerve deficit.   Skin: Skin is warm and dry. Capillary refill takes less than 2 seconds. No rash noted.   Psychiatric: He has a normal mood and affect. His behavior is normal. Judgment and thought content normal.   Nursing note and vitals reviewed.      Significant Labs: All pertinent labs within the past 24 hours have been reviewed.    Significant Imaging: I have reviewed all pertinent imaging results/findings within the past 24 hours.      Assessment/Plan:      * Small bowel obstruction  Consult Dr. Negron-has numerous GI surgeries in the past   NGT to LIWS  IV fluids  KUB in AM   Strict NPO  Responding well with conservative measures        UTI (urinary tract infection)  Culture urine/blood  Continue antibiotics  Consult Dr. Sanchez for possible stent removal-of note patient has positive UA    Sinus tachycardia  Has not been able to hold down metorprolol  Mildly dehydrated with UTI, and in pain   Metoprolol IV q8h hold for HR <50 or SBP <100  Continuous IV fluids      VTE Risk Mitigation (From admission, onward)         Ordered     IP VTE HIGH RISK PATIENT  Once      10/09/19 1753     Place sequential compression device  Until discontinued      10/09/19 1753                      Edda Curry MD  Department of Hospital Medicine   [facility]

## 2019-10-10 NOTE — HOSPITAL COURSE
10/10:  Patient responded very well with NG tube and has significant NG output.  Feels better.  Denies any new symptoms.   10/11:  Feeling better, passed gas at least once.  Walked around nursing station with PT, copious amount of output from NG  10/12:  NG was clamped early morning.  Patient is tolerating clear liquids.  Abdominal x-ray looks much better.  I was told by nursing staff the patient is passing gas and had 1 bowel movement.  No new complaints.   10/13:  Patient improved remarkably last 24 hr with multiple bowel movements, passing gas.  NG tube was removed yesterday.  Denies any with nausea, vomiting or abdominal pain. Tolerated regular cardiac diet in the morning.     To briefly summarize his stay patient was admitted with small-bowel obstruction which responded well with conservative measures including NG tube.  He was also found to have urinary tract infection in the background of left ureteric stent.  He was discharged home on Augmentin based on culture report.  He was advised to follow up with Urology outpatient who can remove the stent sometime next week.  Patient is very high risk for recurrence of SBO considering his prior surgical history.      Review of Systems   Constitutional: Negative for chills, diaphoresis, fatigue and fever.   HENT: Negative for congestion, ear pain, sore throat and trouble swallowing.    Eyes: Negative for pain, discharge and visual disturbance.   Respiratory: Negative for cough, chest tightness, shortness of breath and wheezing.    Cardiovascular: Negative for chest pain, palpitations and leg swelling.   Gastrointestinal: Negative for blood in stool, constipation and diarrhea.   Endocrine: Negative for polydipsia, polyphagia and polyuria.   Genitourinary: Negative for dysuria and flank pain.   Musculoskeletal: Negative for back pain, joint swelling, neck pain and neck stiffness.   Skin: Negative for rash and wound.   Allergic/Immunologic: Negative for immunocompromised  state.   Neurological: Negative for dizziness, syncope, speech difficulty, weakness, light-headedness, numbness and headaches.   Hematological: Negative for adenopathy.   Psychiatric/Behavioral: Negative for confusion and suicidal ideas. The patient is not nervous/anxious.    All other systems reviewed and are negative.      Physical Exam   Constitutional: He is oriented to person, place, and time. He appears well-developed and well-nourished.   HENT:   Head: Normocephalic and atraumatic.   Mouth/Throat: Oropharynx is clear and moist.   Eyes: Pupils are equal, round, and reactive to light. Conjunctivae and EOM are normal.   Neck: Normal range of motion. Neck supple. No JVD present.   Cardiovascular: Normal rate, regular rhythm, normal heart sounds and intact distal pulses. Exam reveals no gallop.   No murmur heard.  Pulmonary/Chest: Effort normal and breath sounds normal. No respiratory distress. He has no wheezes.   Abdominal: Soft. Bowel sounds are normal. He exhibits no distension. There is no rebound and no guarding.   Improving bowel sounds in all quadrants   Musculoskeletal: Normal range of motion. He exhibits no edema.   Lymphadenopathy:  He has no cervical adenopathy.   Neurological: He is alert and oriented to person, place, and time. No cranial nerve deficit.   Skin: Skin is warm and dry. Capillary refill takes less than 2 seconds. No rash noted.   Psychiatric: He has a normal mood and affect. His behavior is normal. Judgment and thought content normal.   Nursing note and vitals reviewed.

## 2019-10-10 NOTE — SUBJECTIVE & OBJECTIVE
Interval History:  Feels better.  NG has lot of output.  No new complaints.  NPO    Review of Systems   Constitutional: Negative for chills, diaphoresis, fatigue and fever.   HENT: Negative for congestion, ear pain, sore throat and trouble swallowing.    Eyes: Negative for pain, discharge and visual disturbance.   Respiratory: Negative for cough, chest tightness, shortness of breath and wheezing.    Cardiovascular: Negative for chest pain, palpitations and leg swelling.   Gastrointestinal: Positive for abdominal distention, abdominal pain, nausea and vomiting. Negative for blood in stool, constipation and diarrhea.   Endocrine: Negative for polydipsia, polyphagia and polyuria.   Genitourinary: Positive for frequency and urgency. Negative for dysuria and flank pain.   Musculoskeletal: Negative for back pain, joint swelling, neck pain and neck stiffness.   Skin: Negative for rash and wound.   Allergic/Immunologic: Negative for immunocompromised state.   Neurological: Negative for dizziness, syncope, speech difficulty, weakness, light-headedness, numbness and headaches.   Hematological: Negative for adenopathy.   Psychiatric/Behavioral: Negative for confusion and suicidal ideas. The patient is not nervous/anxious.    All other systems reviewed and are negative.    Objective:     Vital Signs (Most Recent):  Temp: 98.9 °F (37.2 °C) (10/10/19 1210)  Pulse: 87 (10/10/19 1210)  Resp: 20 (10/10/19 1210)  BP: 131/65 (10/10/19 1210)  SpO2: (!) 93 % (10/10/19 1210) Vital Signs (24h Range):  Temp:  [98.2 °F (36.8 °C)-98.9 °F (37.2 °C)] 98.9 °F (37.2 °C)  Pulse:  [] 87  Resp:  [15-33] 20  SpO2:  [91 %-96 %] 93 %  BP: (115-141)/(60-72) 131/65     Weight: 84.1 kg (185 lb 6.5 oz)  Body mass index is 29.04 kg/m².    Intake/Output Summary (Last 24 hours) at 10/10/2019 1456  Last data filed at 10/10/2019 1218  Gross per 24 hour   Intake 475 ml   Output 1650 ml   Net -1175 ml      Physical Exam   Constitutional: He is oriented to  person, place, and time. He appears well-developed and well-nourished.   HENT:   Head: Normocephalic and atraumatic.   Mouth/Throat: Oropharynx is clear and moist.   Eyes: Pupils are equal, round, and reactive to light. Conjunctivae and EOM are normal.   Neck: Normal range of motion. Neck supple. No JVD present.   Cardiovascular: Normal rate, regular rhythm, normal heart sounds and intact distal pulses. Exam reveals no gallop.   No murmur heard.  Pulmonary/Chest: Effort normal and breath sounds normal. No respiratory distress. He has no wheezes.   Abdominal: Soft. Bowel sounds are normal. He exhibits no distension. There is no rebound and no guarding.   Hypoactive BS in bilat upper quadrants, no bowel sounds in bilat lower quadrants   Musculoskeletal: Normal range of motion. He exhibits no edema.   Lymphadenopathy:     He has no cervical adenopathy.   Neurological: He is alert and oriented to person, place, and time. No cranial nerve deficit.   Skin: Skin is warm and dry. Capillary refill takes less than 2 seconds. No rash noted.   Psychiatric: He has a normal mood and affect. His behavior is normal. Judgment and thought content normal.   Nursing note and vitals reviewed.      Significant Labs: All pertinent labs within the past 24 hours have been reviewed.    Significant Imaging: I have reviewed all pertinent imaging results/findings within the past 24 hours.

## 2019-10-10 NOTE — CONSULTS
[facility]  General Surgery  Consult Note    Inpatient consult to General Surgery  Consult performed by: Samir Abraham III, MD  Consult ordered by: Caryl Guerrier NP        Subjective:     Chief Complaint/Reason for Admission:  Small bowel obstruction    History of Present Illness:  Patient is 84-year-old male admitted with a small-bowel obstruction. He presented with 2 day history of abdominal pain, distension, nausea and vomiting.  CT scan was performed that showed moderate distension of the stomach with abnormal mild distention of the small bowel loops in the left upper quadrant and left mid abdomen with decompressed distal small bowel loops consistent with a partial mechanical small bowel obstruction.  Transition point was not definitively identified but appeared to be just left of midline in the mid to lower abdomen. The ventral abdominal wall hernia was seen on CT and that was containing a short segment of small bowel but did not appear to reflect the point of obstruction. Patient was admitted and NG tube was placed. Patient experienced a lot of relief with NG tube placement.  He states he is currently pain and nausea free.  He has not passed flatus today.  He has never been admitted with a small-bowel obstruction in the past.  He has history of multiple abdominal surgeries. He had partial colectomy with colostomy followed by colostomy reversal several months later in 2004. He had laparoscopic cholecystectomy in 2014. He had a resultant incisional hernia at the umbilicus. The hernia was repaired laparoscopically with complication of bowel injury. Exploratory laparotomy with bowel repair and removal of the mesh was performed soon after. His incisional hernia returned about a year post procedure. He states he's not had any discomfort or obstructive issues with the hernia. Patient also has history of coronary artery disease and had CABG in 1997.  I performed right inguinal hernia earlier  this year.  There was complications of superficial skin infection that resolved with wound care and antibiotics.    No current facility-administered medications on file prior to encounter.      Current Outpatient Medications on File Prior to Encounter   Medication Sig    atorvastatin (LIPITOR) 40 MG tablet Take 40 mg by mouth every evening.     dextran 70-hypromellose (ARTIFICIAL TEARS,[id number],) 0.1-0.3 % Drop Apply 1 drop to eye 2 (two) times daily.    furosemide (LASIX) 20 MG tablet Take 10 mg by mouth every morning.     levothyroxine (SYNTHROID) 25 MCG tablet Take 25 mcg by mouth once daily.     metoprolol tartrate (LOPRESSOR) 25 MG tablet Take 25 mg by mouth 2 (two) times daily.     SYMBICORT 160-4.5 mcg/actuation HFAA Inhale 2 puffs into the lungs every 12 (twelve) hours.     tamsulosin (FLOMAX) 0.4 mg Cap Take 0.4 mg by mouth once daily.     [DISCONTINUED] ciprofloxacin HCl (CIPRO) 500 MG tablet Take 1 tablet (500 mg total) by mouth 2 (two) times daily.    [DISCONTINUED] HYDROcodone-acetaminophen (NORCO) 5-325 mg per tablet Take 1 tablet by mouth every 4 (four) hours as needed for Pain.    [DISCONTINUED] HYDROcodone-acetaminophen (NORCO) 5-325 mg per tablet Take 1 tablet by mouth every 6 (six) hours as needed for Pain.       Review of patient's allergies indicates:   Allergen Reactions    Bactrim [sulfamethoxazole-trimethoprim] Hives     itched    Keflex [cephalexin] Rash    Morphine Rash     Patient had morphine and keflex at the same time, developed a rash, not sure which one caused it, or if it was a combination of the two meds.       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     CORONARY ARTERY BYPASS GRAFT      5-bypass     CYSTOSCOPY N/A 8/15/2019    Procedure: CYSTOSCOPY;  Surgeon: Dipak Sanchez MD;  Location: [facility] OR;  Service: Urology;  Laterality: N/A;    EXTRACORPOREAL SHOCK WAVE LITHOTRIPSY Left 8/15/2019    Procedure: LITHOTRIPSY, ESWL;  Surgeon: Dipak Sanchez MD;  Location: [facility] OR;  Service: Urology;  Laterality: Left;    EXTRACORPOREAL SHOCK WAVE LITHOTRIPSY Left 2019    Procedure: LITHOTRIPSY, ESWL;  Surgeon: Dipak Sanchez MD;  Location: [facility] OR;  Service: Urology;  Laterality: Left;    EYE SURGERY Right     Cataract    EYE SURGERY Left     Cataract    HERNIA REPAIR      Dr. Bailon - had to have mesh removed    INGUINAL HERNIA REPAIR Right 2019        LITHOTRIPSY      nephrostomy tube      PERCUTANEOUS NEPHROSTOMY      ROTATOR CUFF REPAIR      right shoulder     Family History     Problem Relation (Age of Onset)    Cancer Daughter    Diabetes Brother    Heart disease Mother, Brother, Brother, Sister    Hypertension Mother, Sister, Brother, Brother, Sister        Tobacco Use    Smoking status: Former Smoker     Packs/day: 1.00     Years: 25.00     Pack years: 25.00     Last attempt to quit: 1972     Years since quittin.7    Smokeless tobacco: Former User     Quit date: 8/15/1972   Substance and Sexual Activity    Alcohol use: No    Drug use: No    Sexual activity: Not on file     Review of Systems   Constitutional: Negative for appetite change, chills, fever and unexpected weight change.   HENT: Negative for hearing loss, rhinorrhea, sore throat and voice change.    Eyes: Negative for photophobia and visual disturbance.   Respiratory: Negative for cough, choking and shortness of breath.    Cardiovascular:  Negative for chest pain, palpitations and leg swelling.   Gastrointestinal: Positive for abdominal distention and abdominal pain. Negative for blood in stool, constipation, diarrhea, nausea and vomiting.   Endocrine: Negative for cold intolerance, heat intolerance, polydipsia and polyuria.   Musculoskeletal: Negative for arthralgias, back pain, joint swelling and neck stiffness.   Skin: Negative for color change, pallor and rash.   Neurological: Negative for dizziness, seizures, syncope and headaches.   Hematological: Negative for adenopathy. Does not bruise/bleed easily.   Psychiatric/Behavioral: Negative for agitation, behavioral problems and confusion.     Objective:     Vital Signs (Most Recent):  Temp: 98.5 °F (36.9 °C) (10/09/19 1741)  Pulse: (!) 111 (10/09/19 2029)  Resp: 16 (10/09/19 2029)  BP: 139/67 (10/09/19 1741)  SpO2: (!) 92 % (10/09/19 2029) Vital Signs (24h Range):  Temp:  [98.4 °F (36.9 °C)-98.5 °F (36.9 °C)] 98.5 °F (36.9 °C)  Pulse:  [111-129] 111  Resp:  [15-33] 16  SpO2:  [89 %-96 %] 92 %  BP: (115-177)/(60-84) 139/67     Weight: 91.9 kg (202 lb 11.2 oz)  Body mass index is 31.75 kg/m².    No intake or output data in the 24 hours ending 10/09/19 2141    Physical Exam   Constitutional: He is oriented to person, place, and time. He appears well-developed and well-nourished.  Non-toxic appearance. No distress.   HENT:   Head: Normocephalic and atraumatic. Head is without abrasion and without laceration.   Right Ear: External ear normal.   Left Ear: External ear normal.   Nose: Nose normal.   Mouth/Throat: Oropharynx is clear and moist.   Eyes: Pupils are equal, round, and reactive to light. EOM are normal.   Neck: Trachea normal and phonation normal. Neck supple. No tracheal deviation and normal range of motion present.   Cardiovascular: Normal rate and regular rhythm.   Pulmonary/Chest: Effort normal. No accessory muscle usage. No tachypnea. No respiratory distress.   Abdominal: Soft. Normal  appearance and bowel sounds are normal. He exhibits distension. He exhibits no mass. There is no tenderness. There is no rigidity, no rebound and no guarding. A hernia is present.       Mild distention  Soft, Nontender   Mid abdominal ventral hernia is soft and reducible    Lymphadenopathy:        Right: No inguinal adenopathy present.        Left: No inguinal adenopathy present.   Neurological: He is alert and oriented to person, place, and time. Coordination and gait normal.   Skin: Skin is warm and intact.   Psychiatric: He has a normal mood and affect. His speech is normal and behavior is normal.       Significant Labs:  CBC:   Recent Labs   Lab 10/09/19  1131   WBC 12.22   RBC 5.35   HGB 16.1   HCT 47.1   *   MCV 88   MCH 30.1   MCHC 34.2     CMP:   Recent Labs   Lab 10/09/19  1131   *   CALCIUM 10.2   ALBUMIN 4.3   PROT 7.9   *   K 4.1   CO2 30*   CL 91*   BUN 26*   CREATININE 1.1   ALKPHOS 102   ALT 27   AST 26   BILITOT 1.3*       Significant Diagnostics:  I have reviewed all pertinent imaging results/findings within the past 24 hours.    Assessment/Plan:     Active Diagnoses:    Diagnosis Date Noted POA    PRINCIPAL PROBLEM:  Small bowel obstruction [K56.609] 10/09/2019 Yes    Sinus tachycardia [R00.0] 10/09/2019 Yes    UTI (urinary tract infection) [N39.0] 10/09/2019 Yes      Problems Resolved During this Admission:    Patient has been admitted.  NG tube has been placed and is working.  No indication for emergent surgery. Continue NG tube, NPO and IV fluids.  Will continue to follow clinically.    Thank you for your consult.     Samir Abraham III, MD  General Surgery  [facility]

## 2019-10-11 LAB
ANION GAP SERPL CALC-SCNC: 12 MMOL/L (ref 8–16)
BACTERIA UR CULT: ABNORMAL
BASOPHILS # BLD AUTO: 0.04 K/UL (ref 0–0.2)
BASOPHILS NFR BLD: 0.6 % (ref 0–1.9)
BUN SERPL-MCNC: 34 MG/DL (ref 8–23)
CALCIUM SERPL-MCNC: 9 MG/DL (ref 8.7–10.5)
CHLORIDE SERPL-SCNC: 95 MMOL/L (ref 95–110)
CO2 SERPL-SCNC: 35 MMOL/L (ref 23–29)
CREAT SERPL-MCNC: 1.4 MG/DL (ref 0.5–1.4)
DIFFERENTIAL METHOD: ABNORMAL
EOSINOPHIL # BLD AUTO: 0.4 K/UL (ref 0–0.5)
EOSINOPHIL NFR BLD: 5.8 % (ref 0–8)
ERYTHROCYTE [DISTWIDTH] IN BLOOD BY AUTOMATED COUNT: 14.2 % (ref 11.5–14.5)
EST. GFR  (AFRICAN AMERICAN): 53 ML/MIN/1.73 M^2
EST. GFR  (NON AFRICAN AMERICAN): 45.8 ML/MIN/1.73 M^2
GLUCOSE SERPL-MCNC: 103 MG/DL (ref 70–110)
GLUCOSE SERPL-MCNC: 104 MG/DL (ref 70–110)
GLUCOSE SERPL-MCNC: 118 MG/DL (ref 70–110)
GLUCOSE SERPL-MCNC: 132 MG/DL (ref 70–110)
GLUCOSE SERPL-MCNC: 145 MG/DL (ref 70–110)
GLUCOSE SERPL-MCNC: 150 MG/DL (ref 70–110)
HCT VFR BLD AUTO: 46 % (ref 40–54)
HGB BLD-MCNC: 15.3 G/DL (ref 14–18)
IMM GRANULOCYTES # BLD AUTO: 0.03 K/UL (ref 0–0.04)
IMM GRANULOCYTES NFR BLD AUTO: 0.5 % (ref 0–0.5)
LYMPHOCYTES # BLD AUTO: 1 K/UL (ref 1–4.8)
LYMPHOCYTES NFR BLD: 15.7 % (ref 18–48)
MAGNESIUM SERPL-MCNC: 2 MG/DL (ref 1.6–2.6)
MCH RBC QN AUTO: 30.4 PG (ref 27–31)
MCHC RBC AUTO-ENTMCNC: 33.3 G/DL (ref 32–36)
MCV RBC AUTO: 91 FL (ref 82–98)
MONOCYTES # BLD AUTO: 0.8 K/UL (ref 0.3–1)
MONOCYTES NFR BLD: 11.7 % (ref 4–15)
NEUTROPHILS # BLD AUTO: 4.3 K/UL (ref 1.8–7.7)
NEUTROPHILS NFR BLD: 65.7 % (ref 38–73)
NRBC BLD-RTO: 0 /100 WBC
PHOSPHATE SERPL-MCNC: 3.4 MG/DL (ref 2.7–4.5)
PLATELET # BLD AUTO: 100 K/UL (ref 150–350)
PMV BLD AUTO: 9.9 FL (ref 9.2–12.9)
POTASSIUM SERPL-SCNC: 3.2 MMOL/L (ref 3.5–5.1)
POTASSIUM SERPL-SCNC: 3.3 MMOL/L (ref 3.5–5.1)
RBC # BLD AUTO: 5.04 M/UL (ref 4.6–6.2)
SODIUM SERPL-SCNC: 142 MMOL/L (ref 136–145)
WBC # BLD AUTO: 6.56 K/UL (ref 3.9–12.7)

## 2019-10-11 PROCEDURE — 80048 BASIC METABOLIC PNL TOTAL CA: CPT

## 2019-10-11 PROCEDURE — 84132 ASSAY OF SERUM POTASSIUM: CPT

## 2019-10-11 PROCEDURE — 83735 ASSAY OF MAGNESIUM: CPT

## 2019-10-11 PROCEDURE — 94761 N-INVAS EAR/PLS OXIMETRY MLT: CPT

## 2019-10-11 PROCEDURE — 94640 AIRWAY INHALATION TREATMENT: CPT

## 2019-10-11 PROCEDURE — 63600175 PHARM REV CODE 636 W HCPCS

## 2019-10-11 PROCEDURE — 85025 COMPLETE CBC W/AUTO DIFF WBC: CPT

## 2019-10-11 PROCEDURE — 84100 ASSAY OF PHOSPHORUS: CPT

## 2019-10-11 PROCEDURE — 82962 GLUCOSE BLOOD TEST: CPT

## 2019-10-11 PROCEDURE — 25000003 PHARM REV CODE 250: Performed by: NURSE PRACTITIONER

## 2019-10-11 PROCEDURE — 36415 COLL VENOUS BLD VENIPUNCTURE: CPT

## 2019-10-11 PROCEDURE — 21400001 HC TELEMETRY ROOM

## 2019-10-11 PROCEDURE — 99900035 HC TECH TIME PER 15 MIN (STAT)

## 2019-10-11 PROCEDURE — 97116 GAIT TRAINING THERAPY: CPT

## 2019-10-11 PROCEDURE — 97161 PT EVAL LOW COMPLEX 20 MIN: CPT

## 2019-10-11 PROCEDURE — 63600175 PHARM REV CODE 636 W HCPCS: Performed by: NURSE PRACTITIONER

## 2019-10-11 RX ORDER — POTASSIUM CHLORIDE 7.45 MG/ML
40 INJECTION INTRAVENOUS ONCE
Status: COMPLETED | OUTPATIENT
Start: 2019-10-11 | End: 2019-10-11

## 2019-10-11 RX ORDER — POTASSIUM CHLORIDE 7.45 MG/ML
40 INJECTION INTRAVENOUS ONCE
Status: COMPLETED | OUTPATIENT
Start: 2019-10-11 | End: 2019-10-12

## 2019-10-11 RX ADMIN — PIPERACILLIN SODIUM AND TAZOBACTAM SODIUM 3.38 G: 3; .375 INJECTION, POWDER, LYOPHILIZED, FOR SOLUTION INTRAVENOUS at 11:10

## 2019-10-11 RX ADMIN — METOPROLOL TARTRATE 5 MG: 1 INJECTION, SOLUTION INTRAVENOUS at 09:10

## 2019-10-11 RX ADMIN — POTASSIUM CHLORIDE 40 MEQ: 7.46 INJECTION, SOLUTION INTRAVENOUS at 09:10

## 2019-10-11 RX ADMIN — PIPERACILLIN SODIUM AND TAZOBACTAM SODIUM 3.38 G: 3; .375 INJECTION, POWDER, LYOPHILIZED, FOR SOLUTION INTRAVENOUS at 03:10

## 2019-10-11 RX ADMIN — PIPERACILLIN SODIUM AND TAZOBACTAM SODIUM 3.38 G: 3; .375 INJECTION, POWDER, LYOPHILIZED, FOR SOLUTION INTRAVENOUS at 07:10

## 2019-10-11 RX ADMIN — METOPROLOL TARTRATE 5 MG: 1 INJECTION, SOLUTION INTRAVENOUS at 02:10

## 2019-10-11 RX ADMIN — FLUTICASONE FUROATE AND VILANTEROL TRIFENATATE 1 PUFF: 100; 25 POWDER RESPIRATORY (INHALATION) at 11:10

## 2019-10-11 RX ADMIN — METOPROLOL TARTRATE 5 MG: 1 INJECTION, SOLUTION INTRAVENOUS at 05:10

## 2019-10-11 NOTE — PT/OT/SLP EVAL
Physical Therapy Evaluation    Patient Name:  Frank Wyatt   MRN:  [id number]    Recommendations:     Discharge Recommendations:  home with home health   Discharge Equipment Recommendations: none   Barriers to discharge: None    Assessment:     Frank Wyatt is a 84 y.o. male admitted with a medical diagnosis of Small bowel obstruction.  He presents with the following impairments/functional limitations:  weakness, gait instability, impaired endurance, impaired balance, impaired functional mobilty. Pt ambulated 200' with RW and CGA. No loss of balance or dizziness with ambulation.    Rehab Prognosis: Good; patient would benefit from acute skilled PT services to address these deficits and reach maximum level of function.    Recent Surgery: * No surgery found *      Plan:     During this hospitalization, patient to be seen 6 x/week to address the identified rehab impairments via gait training, therapeutic activities, therapeutic exercises and progress toward the following goals:    · Plan of Care Expires:  11/11/19    Subjective     Chief Complaint: none  Patient/Family Comments/goals: home  Pain/Comfort:  · Pain Rating 1: 0/10    Patients cultural, spiritual, Christian conflicts given the current situation:      Living Environment:  Pt lives at home alone with no stairs.  Prior to admission, patients level of function was independent. He uses a RW after he has an abdominal surgery or when he walks long distances. Equipment used at home: walker, rolling, rollator.  DME owned (not currently used): none.  Upon discharge, patient will have assistance from daughter.    Objective:     Communicated with RN prior to session.  Patient found supine with NG tube, peripheral IV  upon PT entry to room.    General Precautions: Standard, NPO, fall   Orthopedic Precautions:N/A   Braces: N/A     Exams:  · Cognitive Exam:  Patient is oriented to Person, Place, Time and Situation  · RUE ROM: WFL except decreased shoulder  flexion  · LUE ROM: WFL except decreased shoulder flexion  · RLE Strength: WFL  · LLE Strength: WFL    Functional Mobility:  · Bed Mobility:     · Supine to Sit: contact guard assistance  · Transfers:     · Sit to Stand:  contact guard assistance with rolling walker  · Gait: 200' RW CGA  · Balance: good      Therapeutic Activities and Exercises:   pt educated on importance of OOB activity     AM-PAC 6 CLICK MOBILITY  Total Score:23     Patient left up in chair with call button in reach and RN notified to resume NG suction and IV.    GOALS:   Multidisciplinary Problems     Physical Therapy Goals        Problem: Physical Therapy Goal    Goal Priority Disciplines Outcome Goal Variances Interventions   Physical Therapy Goal     PT, PT/OT      Description:  Goals to be met by: D/C     Patient will increase functional independence with mobility by performin. Supine to sit with Stand-by Assistance  2. Sit to stand transfer with Supervision  3. Bed to chair transfer with Stand-by Assistance using Rolling Walker  4. Gait  x 300 feet with Supervision using Rolling Walker.                       History:     Past Medical History:   Diagnosis Date    Arthritis     Coronary artery disease     Dermatographism 2019    Full dentures     [place] (hard of hearing)     left ear    Hypertension     Kidney stones     Meniere's disease     MRSA infection 2019    Skin writing     dermatiographism    Wears glasses        Past Surgical History:   Procedure Laterality Date    APPENDECTOMY      CARDIAC SURGERY      CABG 5 vessel    CHOLECYSTECTOMY      COLON SURGERY      Colon resection with colostomy; colostomy reversal.     CORONARY ARTERY BYPASS GRAFT      5-bypass     CYSTOSCOPY N/A 8/15/2019    Procedure: CYSTOSCOPY;  Surgeon: Dipak Sanchez MD;  [facility];  Service: Urology;  Laterality: N/A;    EXTRACORPOREAL SHOCK WAVE LITHOTRIPSY Left 8/15/2019    Procedure: LITHOTRIPSY, ESWL;   Surgeon: Dipak Sanchez MD;  Location: [facility];  Service: Urology;  Laterality: Left;    EXTRACORPOREAL SHOCK WAVE LITHOTRIPSY Left 9/19/2019    Procedure: LITHOTRIPSY, ESWL;  Surgeon: Dipak Sanchez MD;  Location: [facility];  Service: Urology;  Laterality: Left;    EYE SURGERY Right 2014    Cataract    EYE SURGERY Left 2017    Cataract    HERNIA REPAIR  2014    Dr. Bailon - had to have mesh removed    INGUINAL HERNIA REPAIR Right 03/25/2019        LITHOTRIPSY      nephrostomy tube      PERCUTANEOUS NEPHROSTOMY      ROTATOR CUFF REPAIR  2005    right shoulder       Time Tracking:     PT Received On: 10/11/19  PT Start Time: 0932     PT Stop Time: 0950  PT Total Time (min): 18 min     Billable Minutes: Evaluation 8 and Gait Training 10      Alesha Kat, PT  10/11/2019

## 2019-10-11 NOTE — SUBJECTIVE & OBJECTIVE
Interval History:   Feels better, hemodynamically stable, passing some gas.  No new complaints    Review of Systems   Constitutional: Negative for chills, diaphoresis, fatigue and fever.   HENT: Negative for congestion, ear pain, sore throat and trouble swallowing.    Eyes: Negative for pain, discharge and visual disturbance.   Respiratory: Negative for cough, chest tightness, shortness of breath and wheezing.    Cardiovascular: Negative for chest pain, palpitations and leg swelling.   Gastrointestinal: Positive for abdominal distention and abdominal pain. Negative for blood in stool, constipation and diarrhea.   Endocrine: Negative for polydipsia, polyphagia and polyuria.   Genitourinary: Positive for frequency. Negative for dysuria and flank pain.   Musculoskeletal: Negative for back pain, joint swelling, neck pain and neck stiffness.   Skin: Negative for rash and wound.   Allergic/Immunologic: Negative for immunocompromised state.   Neurological: Negative for dizziness, syncope, speech difficulty, weakness, light-headedness, numbness and headaches.   Hematological: Negative for adenopathy.   Psychiatric/Behavioral: Negative for confusion and suicidal ideas. The patient is not nervous/anxious.    All other systems reviewed and are negative.    Objective:     Vital Signs (Most Recent):  Temp: 97.7 °F (36.5 °C) (10/11/19 1114)  Pulse: 93 (10/11/19 1134)  Resp: 18 (10/11/19 1134)  BP: (!) 140/67 (10/11/19 1114)  SpO2: (!) 94 % (10/11/19 1134) Vital Signs (24h Range):  Temp:  [97.6 °F (36.4 °C)-98.6 °F (37 °C)] 97.7 °F (36.5 °C)  Pulse:  [85-94] 93  Resp:  [18-20] 18  SpO2:  [90 %-96 %] 94 %  BP: (121-153)/(64-83) 140/67     Weight: 84.4 kg (186 lb)  Body mass index is 29.13 kg/m².    Intake/Output Summary (Last 24 hours) at 10/11/2019 1439  Last data filed at 10/11/2019 0800  Gross per 24 hour   Intake 1000 ml   Output 2380 ml   Net -1380 ml      Physical Exam   Constitutional: He is oriented to person, place, and  time. He appears well-developed and well-nourished.   HENT:   Head: Normocephalic and atraumatic.   Mouth/Throat: Oropharynx is clear and moist.   Eyes: Pupils are equal, round, and reactive to light. Conjunctivae and EOM are normal.   Neck: Normal range of motion. Neck supple. No JVD present.   Cardiovascular: Normal rate, regular rhythm, normal heart sounds and intact distal pulses. Exam reveals no gallop.   No murmur heard.  Pulmonary/Chest: Effort normal and breath sounds normal. No respiratory distress. He has no wheezes.   Abdominal: Soft. Bowel sounds are normal. He exhibits no distension. There is no rebound and no guarding.   Hypoactive BS in bilat upper quadrants, no bowel sounds in bilat lower quadrants  NG tube in place with copious output   Musculoskeletal: Normal range of motion. He exhibits no edema.   Lymphadenopathy:     He has no cervical adenopathy.   Neurological: He is alert and oriented to person, place, and time. No cranial nerve deficit.   Skin: Skin is warm and dry. Capillary refill takes less than 2 seconds. No rash noted.   Psychiatric: He has a normal mood and affect. His behavior is normal. Judgment and thought content normal.   Nursing note and vitals reviewed.      Significant Labs: All pertinent labs within the past 24 hours have been reviewed.    Significant Imaging: I have reviewed all pertinent imaging results/findings within the past 24 hours.

## 2019-10-11 NOTE — PLAN OF CARE
10/10/19 4644   Patient Assessment/Suction   Respiratory Effort Unlabored   All Lung Fields Breath Sounds clear   Rhythm/Pattern, Respiratory pattern regular   PRE-TX-O2   O2 Device (Oxygen Therapy) room air   SpO2 (!) 90 %   Pulse 94   Resp 20   Aerosol Therapy   $ Aerosol Therapy Charges PRN treatment not required

## 2019-10-11 NOTE — PROGRESS NOTES
[facility] hospital  General Surgery  Progress Note    Subjective:     Interval History:  Patient states he is feeling well today.  He states he still has some mild left-sided abdominal pain. He has no nausea.  He reports he has not passed flatus.  NG tube is in place and working.  Repeat abdominal x-ray shows continued mild dilatation of small bowel consistent with obstructive or ileus pattern.  Post-Op Info:  * No surgery found *          Medications:  Continuous Infusions:   lactated ringers 125 mL/hr at 10/10/19 1006     Scheduled Meds:   fluticasone furoate-vilanterol  1 puff Inhalation Daily    metoprolol  5 mg Intravenous Q8H    piperacillin-tazobactam (ZOSYN) IVPB  3.375 g Intravenous Q8H    sodium chloride 0.9%  500 mL Intravenous Once     PRN Meds:albuterol sulfate, dextrose 50%, glucagon (human recombinant), HYDROmorphone, insulin aspart U-100, ondansetron, promethazine (PHENERGAN) IVPB, sodium chloride 0.9%     Objective:     Vital Signs (Most Recent):  Temp: 98.1 °F (36.7 °C) (10/10/19 1859)  Pulse: 89 (10/10/19 1859)  Resp: 20 (10/10/19 1859)  BP: 133/64 (10/10/19 1859)  SpO2: (!) 91 % (10/10/19 1859) Vital Signs (24h Range):  Temp:  [98.1 °F (36.7 °C)-98.9 °F (37.2 °C)] 98.1 °F (36.7 °C)  Pulse:  [] 89  Resp:  [16-20] 20  SpO2:  [91 %-96 %] 91 %  BP: (131-141)/(64-72) 133/64       Intake/Output Summary (Last 24 hours) at 10/10/2019 2244  Last data filed at 10/10/2019 1826  Gross per 24 hour   Intake 1475 ml   Output 3550 ml   Net -2075 ml       Physical Exam   Constitutional: He is oriented to person, place, and time. Vital signs are normal. He is cooperative.  Non-toxic appearance. No distress.   Pulmonary/Chest: Effort normal. No tachypnea. No respiratory distress.   Abdominal: Soft. He exhibits no distension. There is tenderness in the left lower quadrant. There is no rigidity, no rebound and no guarding. A hernia is present. Hernia confirmed positive in the ventral area.   Mild  left lower quadrant abdominal pain without guarding or rebound.  Abdomen feels to be less distended than previous exam.  No peritoneal signs.   Neurological: He is alert and oriented to person, place, and time.       Significant Labs:  CBC:   Recent Labs   Lab 10/10/19  0429   WBC 5.60   RBC 5.02   HGB 15.2   HCT 45.0   *   MCV 90   MCH 30.3   MCHC 33.8     CMP:   Recent Labs   Lab 10/09/19  1131 10/10/19  0428   * 154*   CALCIUM 10.2 9.8   ALBUMIN 4.3  --    PROT 7.9  --    * 139   K 4.1 3.9   CO2 30* 34*   CL 91* 91*   BUN 26* 31*   CREATININE 1.1 1.3   ALKPHOS 102  --    ALT 27  --    AST 26  --    BILITOT 1.3*  --        Significant Diagnostics:  I have reviewed all pertinent imaging results/findings within the past 24 hours.    Assessment/Plan:     Active Diagnoses:    Diagnosis Date Noted POA    PRINCIPAL PROBLEM:  Small bowel obstruction [K56.609] 10/09/2019 Yes    Sinus tachycardia [R00.0] 10/09/2019 Yes    UTI (urinary tract infection) [N39.0] 10/09/2019 Yes      Problems Resolved During this Admission:     -patient feeling better but still not clinically resolved.  X-ray still shows evidence of small bowel dilatation.  Continue NG tube, NPO and IV fluids.  No indication for emergent surgery today.  I will continue to follow clinically.  Patient also found to have a UTI and is on antibiotics.  Preliminary cultures growing Proteus    Samir Abraham III, MD  General Surgery  [facility]

## 2019-10-11 NOTE — PROGRESS NOTES
[facility] Medicine  Progress Note  DOS:10/11/2019  Time: 08:49am    Patient Name: Frank Wyatt  MRN: [id number]  Patient Class: IP- Inpatient   Admission Date: 10/9/2019  Length of Stay: 2 days  Attending Physician: Edda Curry MD  Primary Care Provider: Td Begum MD        Subjective:     Principal Problem:Small bowel obstruction        HPI:  Mr. Wyatt presents today with complaints of abd pain. It is severe. It is associated with N/V, urinary frequency, and urinary urgency. He denies measured fever, chills, cough, dysuria, dizziness, or LOC. He lives at home alone and called his daughter last night and told her he felt bad. He had eaten some pork that was sitting out, so his daughter initially thought this was the culprit and made him an appt with his PCP, but the pain got progressively worse, so they came to the ED instead. He cannot remember his last BM, but knows it wasn't yesterday. He cannot remember the last time he passed flatus. He had a recent ureteral stent placed for kidney stones and had plans to have it out in the office today, but came to the ED and cancelled that appt. He has a history of CAD s/p 5v CAB, HTN, arthritis, multiple abd surgeries, and kidney stones.    Overview/Hospital Course:  10/10:  Patient responded very well with NG tube and has significant NG output.  Feels better.  Denies any new symptoms.   10/11:  Feeling better, passed gas at least once.  Walked around nursing station with PT, copious amount of output from NG    Interval History:   Feels better, hemodynamically stable, passing some gas.  No new complaints    Review of Systems   Constitutional: Negative for chills, diaphoresis, fatigue and fever.   HENT: Negative for congestion, ear pain, sore throat and trouble swallowing.    Eyes: Negative for pain, discharge and visual disturbance.   Respiratory: Negative for cough, chest tightness, shortness of breath and wheezing.     Cardiovascular: Negative for chest pain, palpitations and leg swelling.   Gastrointestinal: Positive for abdominal distention and abdominal pain. Negative for blood in stool, constipation and diarrhea.   Endocrine: Negative for polydipsia, polyphagia and polyuria.   Genitourinary: Positive for frequency. Negative for dysuria and flank pain.   Musculoskeletal: Negative for back pain, joint swelling, neck pain and neck stiffness.   Skin: Negative for rash and wound.   Allergic/Immunologic: Negative for immunocompromised state.   Neurological: Negative for dizziness, syncope, speech difficulty, weakness, light-headedness, numbness and headaches.   Hematological: Negative for adenopathy.   Psychiatric/Behavioral: Negative for confusion and suicidal ideas. The patient is not nervous/anxious.    All other systems reviewed and are negative.    Objective:     Vital Signs (Most Recent):  Temp: 97.7 °F (36.5 °C) (10/11/19 1114)  Pulse: 93 (10/11/19 1134)  Resp: 18 (10/11/19 1134)  BP: (!) 140/67 (10/11/19 1114)  SpO2: (!) 94 % (10/11/19 1134) Vital Signs (24h Range):  Temp:  [97.6 °F (36.4 °C)-98.6 °F (37 °C)] 97.7 °F (36.5 °C)  Pulse:  [85-94] 93  Resp:  [18-20] 18  SpO2:  [90 %-96 %] 94 %  BP: (121-153)/(64-83) 140/67     Weight: 84.4 kg (186 lb)  Body mass index is 29.13 kg/m².    Intake/Output Summary (Last 24 hours) at 10/11/2019 1439  Last data filed at 10/11/2019 0800  Gross per 24 hour   Intake 1000 ml   Output 2380 ml   Net -1380 ml      Physical Exam   Constitutional: He is oriented to person, place, and time. He appears well-developed and well-nourished.   HENT:   Head: Normocephalic and atraumatic.   Mouth/Throat: Oropharynx is clear and moist.   Eyes: Pupils are equal, round, and reactive to light. Conjunctivae and EOM are normal.   Neck: Normal range of motion. Neck supple. No JVD present.   Cardiovascular: Normal rate, regular rhythm, normal heart sounds and intact distal pulses. Exam reveals no gallop.   No  murmur heard.  Pulmonary/Chest: Effort normal and breath sounds normal. No respiratory distress. He has no wheezes.   Abdominal: Soft. Bowel sounds are normal. He exhibits no distension. There is no rebound and no guarding.   Hypoactive BS in bilat upper quadrants, no bowel sounds in bilat lower quadrants  NG tube in place with copious output   Musculoskeletal: Normal range of motion. He exhibits no edema.   Lymphadenopathy:     He has no cervical adenopathy.   Neurological: He is alert and oriented to person, place, and time. No cranial nerve deficit.   Skin: Skin is warm and dry. Capillary refill takes less than 2 seconds. No rash noted.   Psychiatric: He has a normal mood and affect. His behavior is normal. Judgment and thought content normal.   Nursing note and vitals reviewed.      Significant Labs: All pertinent labs within the past 24 hours have been reviewed.    Significant Imaging: I have reviewed all pertinent imaging results/findings within the past 24 hours.      Assessment/Plan:      * Small bowel obstruction  Consult Dr. Negron-has numerous GI surgeries in the past   NGT to LIWS-copious amount of output  IV fluids  KUB in AM   Strict NPO  Responding well with conservative measures        UTI (urinary tract infection)  Culture urine/blood  Continue antibiotics  Consult Dr. Sanchez for possible stent removal-as per my discussion no intervention is planned until current SBO is resolved  Continue antibiotics    Sinus tachycardia  Has not been able to hold down metorprolol  Mildly dehydrated with UTI, and in pain   Metoprolol IV q8h hold for HR <50 or SBP <100  Continuous IV fluids      VTE Risk Mitigation (From admission, onward)         Ordered     IP VTE HIGH RISK PATIENT  Once      10/09/19 1753     Place sequential compression device  Until discontinued      10/09/19 1753                      Edda Curry MD  Department of Hospital Medicine   [facility]

## 2019-10-11 NOTE — CONSULTS
ELECTROLYTE MANAGEMENT PROGRESS NOTE    Objective:  84 y.o., male, Actual Body Weight = 84.4 kg (186 lb)    Recent Labs     10/09/19  1131 10/10/19  0428 10/11/19  0423   K 4.1 3.9 3.2*   MG 1.7 2.2 2.0   CALCIUM 10.2 9.8 9.0   ALBUMIN 4.3  --   --    CREATININE 1.1 1.3 1.4     Diet NPO    IV Access:  Peripheral    Assessment:  Electrolyte Deficiency:  Hypokalemia (K+ < 4.0)    Patient type:  Acute Care (not in an ICU)    Plan:  Replace potassium with Potassium chloride 10 mEq IVPB x 4 doses.     Will recheck serum potassium one hour after infusing 40 mEq potassium to determine need for further supplementation.  Will continue to monitor electrolytes daily.    Thank you for allowing us to participate in this patient's care.     Cely Caba, PharmD 10/11/2019 9:15 AM  ID Clinical Pharmacist, Ext [phone] or [phone]

## 2019-10-12 LAB
ANION GAP SERPL CALC-SCNC: 14 MMOL/L (ref 8–16)
BASOPHILS # BLD AUTO: 0.04 K/UL (ref 0–0.2)
BASOPHILS NFR BLD: 0.5 % (ref 0–1.9)
BUN SERPL-MCNC: 30 MG/DL (ref 8–23)
CALCIUM SERPL-MCNC: 8.9 MG/DL (ref 8.7–10.5)
CHLORIDE SERPL-SCNC: 102 MMOL/L (ref 95–110)
CO2 SERPL-SCNC: 28 MMOL/L (ref 23–29)
CREAT SERPL-MCNC: 1.2 MG/DL (ref 0.5–1.4)
DIFFERENTIAL METHOD: ABNORMAL
EOSINOPHIL # BLD AUTO: 0.4 K/UL (ref 0–0.5)
EOSINOPHIL NFR BLD: 4.7 % (ref 0–8)
ERYTHROCYTE [DISTWIDTH] IN BLOOD BY AUTOMATED COUNT: 14 % (ref 11.5–14.5)
EST. GFR  (AFRICAN AMERICAN): >60 ML/MIN/1.73 M^2
EST. GFR  (NON AFRICAN AMERICAN): 55.2 ML/MIN/1.73 M^2
GLUCOSE SERPL-MCNC: 104 MG/DL (ref 70–110)
GLUCOSE SERPL-MCNC: 106 MG/DL (ref 70–110)
GLUCOSE SERPL-MCNC: 114 MG/DL (ref 70–110)
GLUCOSE SERPL-MCNC: 115 MG/DL (ref 70–110)
GLUCOSE SERPL-MCNC: 139 MG/DL (ref 70–110)
HCT VFR BLD AUTO: 47.8 % (ref 40–54)
HGB BLD-MCNC: 15.5 G/DL (ref 14–18)
IMM GRANULOCYTES # BLD AUTO: 0.05 K/UL (ref 0–0.04)
IMM GRANULOCYTES NFR BLD AUTO: 0.6 % (ref 0–0.5)
LYMPHOCYTES # BLD AUTO: 1.4 K/UL (ref 1–4.8)
LYMPHOCYTES NFR BLD: 15.9 % (ref 18–48)
MAGNESIUM SERPL-MCNC: 1.9 MG/DL (ref 1.6–2.6)
MCH RBC QN AUTO: 30.4 PG (ref 27–31)
MCHC RBC AUTO-ENTMCNC: 32.4 G/DL (ref 32–36)
MCV RBC AUTO: 94 FL (ref 82–98)
MONOCYTES # BLD AUTO: 0.6 K/UL (ref 0.3–1)
MONOCYTES NFR BLD: 7.3 % (ref 4–15)
NEUTROPHILS # BLD AUTO: 6 K/UL (ref 1.8–7.7)
NEUTROPHILS NFR BLD: 71 % (ref 38–73)
NRBC BLD-RTO: 0 /100 WBC
PLATELET # BLD AUTO: 107 K/UL (ref 150–350)
PMV BLD AUTO: 9.8 FL (ref 9.2–12.9)
POTASSIUM SERPL-SCNC: 3.7 MMOL/L (ref 3.5–5.1)
RBC # BLD AUTO: 5.1 M/UL (ref 4.6–6.2)
SODIUM SERPL-SCNC: 144 MMOL/L (ref 136–145)
WBC # BLD AUTO: 8.5 K/UL (ref 3.9–12.7)

## 2019-10-12 PROCEDURE — 63600175 PHARM REV CODE 636 W HCPCS

## 2019-10-12 PROCEDURE — 85025 COMPLETE CBC W/AUTO DIFF WBC: CPT

## 2019-10-12 PROCEDURE — 97116 GAIT TRAINING THERAPY: CPT

## 2019-10-12 PROCEDURE — 83735 ASSAY OF MAGNESIUM: CPT

## 2019-10-12 PROCEDURE — 21400001 HC TELEMETRY ROOM

## 2019-10-12 PROCEDURE — 25000003 PHARM REV CODE 250: Performed by: NURSE PRACTITIONER

## 2019-10-12 PROCEDURE — 36415 COLL VENOUS BLD VENIPUNCTURE: CPT

## 2019-10-12 PROCEDURE — 94640 AIRWAY INHALATION TREATMENT: CPT

## 2019-10-12 PROCEDURE — 25000003 PHARM REV CODE 250: Performed by: HOSPITALIST

## 2019-10-12 PROCEDURE — 99900035 HC TECH TIME PER 15 MIN (STAT)

## 2019-10-12 PROCEDURE — 80048 BASIC METABOLIC PNL TOTAL CA: CPT

## 2019-10-12 PROCEDURE — 63600175 PHARM REV CODE 636 W HCPCS: Performed by: NURSE PRACTITIONER

## 2019-10-12 PROCEDURE — 63600175 PHARM REV CODE 636 W HCPCS: Performed by: HOSPITALIST

## 2019-10-12 RX ORDER — POTASSIUM CHLORIDE 7.45 MG/ML
10 INJECTION INTRAVENOUS ONCE
Status: DISCONTINUED | OUTPATIENT
Start: 2019-10-12 | End: 2019-10-12

## 2019-10-12 RX ORDER — POTASSIUM CHLORIDE 20 MEQ/1
40 TABLET, EXTENDED RELEASE ORAL ONCE
Status: COMPLETED | OUTPATIENT
Start: 2019-10-12 | End: 2019-10-12

## 2019-10-12 RX ADMIN — PIPERACILLIN SODIUM AND TAZOBACTAM SODIUM 3.38 G: 3; .375 INJECTION, POWDER, LYOPHILIZED, FOR SOLUTION INTRAVENOUS at 10:10

## 2019-10-12 RX ADMIN — METOPROLOL TARTRATE 5 MG: 1 INJECTION, SOLUTION INTRAVENOUS at 06:10

## 2019-10-12 RX ADMIN — PIPERACILLIN SODIUM AND TAZOBACTAM SODIUM 3.38 G: 3; .375 INJECTION, POWDER, LYOPHILIZED, FOR SOLUTION INTRAVENOUS at 05:10

## 2019-10-12 RX ADMIN — SODIUM CHLORIDE, SODIUM LACTATE, POTASSIUM CHLORIDE, AND CALCIUM CHLORIDE: .6; .31; .03; .02 INJECTION, SOLUTION INTRAVENOUS at 11:10

## 2019-10-12 RX ADMIN — PIPERACILLIN SODIUM AND TAZOBACTAM SODIUM 3.38 G: 3; .375 INJECTION, POWDER, LYOPHILIZED, FOR SOLUTION INTRAVENOUS at 02:10

## 2019-10-12 RX ADMIN — METOPROLOL TARTRATE 5 MG: 1 INJECTION, SOLUTION INTRAVENOUS at 01:10

## 2019-10-12 RX ADMIN — POTASSIUM CHLORIDE 40 MEQ: 1500 TABLET, EXTENDED RELEASE ORAL at 01:10

## 2019-10-12 RX ADMIN — POTASSIUM CHLORIDE 40 MEQ: 7.46 INJECTION, SOLUTION INTRAVENOUS at 12:10

## 2019-10-12 RX ADMIN — FLUTICASONE FUROATE AND VILANTEROL TRIFENATATE 1 PUFF: 100; 25 POWDER RESPIRATORY (INHALATION) at 08:10

## 2019-10-12 RX ADMIN — METOPROLOL TARTRATE 5 MG: 1 INJECTION, SOLUTION INTRAVENOUS at 09:10

## 2019-10-12 NOTE — PROGRESS NOTES
Progress Note  Gen Surg    Admit Date: 10/9/2019  Attending: [facility] Day: 4    SUBJECTIVE:     Doing well having flatus, no n/v, tolerating cl already     OBJECTIVE:     Vital Signs (Most Recent)  Temp: 98.4 °F (36.9 °C) (10/12/19 1103)  Pulse: 73 (10/12/19 1103)  Resp: 18 (10/12/19 1103)  BP: (!) 152/70 (10/12/19 1103)  SpO2: (!) 94 % (10/12/19 1103)    Vital Signs Range (Last 24H):  Temp:  [97.6 °F (36.4 °C)-98.9 °F (37.2 °C)]   Pulse:  [61-90]   Resp:  [18-22]   BP: (150-175)/(68-77)   SpO2:  [93 %-96 %]     I & O (Last 24H):    Intake/Output Summary (Last 24 hours) at 10/12/2019 1219  Last data filed at 10/12/2019 1103  Gross per 24 hour   Intake 1342 ml   Output 1001 ml   Net 341 ml       Scheduled medications:   fluticasone furoate-vilanterol  1 puff Inhalation Daily    metoprolol  5 mg Intravenous Q8H    piperacillin-tazobactam (ZOSYN) IVPB  3.375 g Intravenous Q8H    potassium chloride  40 mEq Oral Once    sodium chloride 0.9%  500 mL Intravenous Once       Physical Exam:  General: no distress  Lungs:  clear to auscultation bilaterally and normal respiratory effort  Heart: regular rate and rhythm, S1, S2 normal, no murmur, rub or gallop  Abdomen: soft, non-tender non-distented; bowel sounds normal; no masses,  no organomegaly    Wound/Incision:  clean, dry, intact    Laboratory:  Labs within the past 24 hours have been reviewed.    ASSESSMENT/PLAN:     Ok to dc ngt as he is having gi function  sbo resolving,  Xray lagging but clinically resolving    Cl diet advance as tolerated    Delta Shaver MD

## 2019-10-12 NOTE — PLAN OF CARE
10/11/19 2030   Patient Assessment/Suction   Level of Consciousness (AVPU) alert   Respiratory Effort Unlabored   Expansion/Accessory Muscles/Retractions no use of accessory muscles   All Lung Fields Breath Sounds clear   Rhythm/Pattern, Respiratory unlabored   Cough Frequency no cough   PRE-TX-O2   O2 Device (Oxygen Therapy) room air   SpO2 (!) 93 %   Pulse 61   Resp 18   Aerosol Therapy   $ Aerosol Therapy Charges PRN treatment not required   Respiratory Treatment Status (SVN) PRN treatment not required

## 2019-10-12 NOTE — SUBJECTIVE & OBJECTIVE
Interval History:  NG tube is clamped.  Passing gas.  Had 1 bowel movement.  Tolerating clear liquids so far.     Review of Systems   Constitutional: Negative for chills, diaphoresis, fatigue and fever.   HENT: Negative for congestion, ear pain, sore throat and trouble swallowing.    Eyes: Negative for pain, discharge and visual disturbance.   Respiratory: Negative for cough, chest tightness, shortness of breath and wheezing.    Cardiovascular: Negative for chest pain, palpitations and leg swelling.   Gastrointestinal: Negative for blood in stool, constipation and diarrhea.   Endocrine: Negative for polydipsia, polyphagia and polyuria.   Genitourinary: Negative for dysuria and flank pain.   Musculoskeletal: Negative for back pain, joint swelling, neck pain and neck stiffness.   Skin: Negative for rash and wound.   Allergic/Immunologic: Negative for immunocompromised state.   Neurological: Negative for dizziness, syncope, speech difficulty, weakness, light-headedness, numbness and headaches.   Hematological: Negative for adenopathy.   Psychiatric/Behavioral: Negative for confusion and suicidal ideas. The patient is not nervous/anxious.    All other systems reviewed and are negative.    Objective:     Vital Signs (Most Recent):  Temp: 98.4 °F (36.9 °C) (10/12/19 1103)  Pulse: 73 (10/12/19 1103)  Resp: 18 (10/12/19 1103)  BP: (!) 152/70 (10/12/19 1103)  SpO2: (!) 94 % (10/12/19 1103) Vital Signs (24h Range):  Temp:  [97.6 °F (36.4 °C)-98.9 °F (37.2 °C)] 98.4 °F (36.9 °C)  Pulse:  [61-90] 73  Resp:  [18-22] 18  SpO2:  [93 %-96 %] 94 %  BP: (150-175)/(68-77) 152/70     Weight: 83.5 kg (184 lb 1.6 oz)  Body mass index is 28.83 kg/m².    Intake/Output Summary (Last 24 hours) at 10/12/2019 1209  Last data filed at 10/12/2019 1103  Gross per 24 hour   Intake 1342 ml   Output 1001 ml   Net 341 ml      Physical Exam   Constitutional: He is oriented to person, place, and time. He appears well-developed and well-nourished.    HENT:   Head: Normocephalic and atraumatic.   Mouth/Throat: Oropharynx is clear and moist.   Eyes: Pupils are equal, round, and reactive to light. Conjunctivae and EOM are normal.   Neck: Normal range of motion. Neck supple. No JVD present.   Cardiovascular: Normal rate, regular rhythm, normal heart sounds and intact distal pulses. Exam reveals no gallop.   No murmur heard.  Pulmonary/Chest: Effort normal and breath sounds normal. No respiratory distress. He has no wheezes.   Abdominal: Soft. Bowel sounds are normal. He exhibits no distension. There is no rebound and no guarding.   NG tube clamped  Improving bowel sounds in all quadrants   Musculoskeletal: Normal range of motion. He exhibits no edema.   Lymphadenopathy:     He has no cervical adenopathy.   Neurological: He is alert and oriented to person, place, and time. No cranial nerve deficit.   Skin: Skin is warm and dry. Capillary refill takes less than 2 seconds. No rash noted.   Psychiatric: He has a normal mood and affect. His behavior is normal. Judgment and thought content normal.   Nursing note and vitals reviewed.      Significant Labs: All pertinent labs within the past 24 hours have been reviewed.    Significant Imaging: I have reviewed all pertinent imaging results/findings within the past 24 hours.

## 2019-10-12 NOTE — PROGRESS NOTES
[facility] Medicine  Progress Note    DOS:10/12/2019  Time: 08:30am    Patient Name: Frank Wyatt  MRN: [id number]  Patient Class: IP- Inpatient   Admission Date: 10/9/2019  Length of Stay: 3 days  Attending Physician: Edda Curry MD  Primary Care Provider: Td Begum MD        Subjective:     Principal Problem:Small bowel obstruction        HPI:  Mr. Wyatt presents today with complaints of abd pain. It is severe. It is associated with N/V, urinary frequency, and urinary urgency. He denies measured fever, chills, cough, dysuria, dizziness, or LOC. He lives at home alone and called his daughter last night and told her he felt bad. He had eaten some pork that was sitting out, so his daughter initially thought this was the culprit and made him an appt with his PCP, but the pain got progressively worse, so they came to the ED instead. He cannot remember his last BM, but knows it wasn't yesterday. He cannot remember the last time he passed flatus. He had a recent ureteral stent placed for kidney stones and had plans to have it out in the office today, but came to the ED and cancelled that appt. He has a history of CAD s/p 5v CAB, HTN, arthritis, multiple abd surgeries, and kidney stones.    Overview/Hospital Course:  10/10:  Patient responded very well with NG tube and has significant NG output.  Feels better.  Denies any new symptoms.   10/11:  Feeling better, passed gas at least once.  Walked around nursing station with PT, copious amount of output from NG  10/12:  NG was clamped early morning.  Patient is tolerating clear liquids.  Abdominal x-ray looks much better.  I was told by nursing staff the patient is passing gas and had 1 bowel movement.  No new complaints.     Interval History:  NG tube is clamped.  Passing gas.  Had 1 bowel movement.  Tolerating clear liquids so far.     Review of Systems   Constitutional: Negative for chills, diaphoresis, fatigue and fever.    HENT: Negative for congestion, ear pain, sore throat and trouble swallowing.    Eyes: Negative for pain, discharge and visual disturbance.   Respiratory: Negative for cough, chest tightness, shortness of breath and wheezing.    Cardiovascular: Negative for chest pain, palpitations and leg swelling.   Gastrointestinal: Negative for blood in stool, constipation and diarrhea.   Endocrine: Negative for polydipsia, polyphagia and polyuria.   Genitourinary: Negative for dysuria and flank pain.   Musculoskeletal: Negative for back pain, joint swelling, neck pain and neck stiffness.   Skin: Negative for rash and wound.   Allergic/Immunologic: Negative for immunocompromised state.   Neurological: Negative for dizziness, syncope, speech difficulty, weakness, light-headedness, numbness and headaches.   Hematological: Negative for adenopathy.   Psychiatric/Behavioral: Negative for confusion and suicidal ideas. The patient is not nervous/anxious.    All other systems reviewed and are negative.    Objective:     Vital Signs (Most Recent):  Temp: 98.4 °F (36.9 °C) (10/12/19 1103)  Pulse: 73 (10/12/19 1103)  Resp: 18 (10/12/19 1103)  BP: (!) 152/70 (10/12/19 1103)  SpO2: (!) 94 % (10/12/19 1103) Vital Signs (24h Range):  Temp:  [97.6 °F (36.4 °C)-98.9 °F (37.2 °C)] 98.4 °F (36.9 °C)  Pulse:  [61-90] 73  Resp:  [18-22] 18  SpO2:  [93 %-96 %] 94 %  BP: (150-175)/(68-77) 152/70     Weight: 83.5 kg (184 lb 1.6 oz)  Body mass index is 28.83 kg/m².    Intake/Output Summary (Last 24 hours) at 10/12/2019 1209  Last data filed at 10/12/2019 1103  Gross per 24 hour   Intake 1342 ml   Output 1001 ml   Net 341 ml      Physical Exam   Constitutional: He is oriented to person, place, and time. He appears well-developed and well-nourished.   HENT:   Head: Normocephalic and atraumatic.   Mouth/Throat: Oropharynx is clear and moist.   Eyes: Pupils are equal, round, and reactive to light. Conjunctivae and EOM are normal.   Neck: Normal range of  motion. Neck supple. No JVD present.   Cardiovascular: Normal rate, regular rhythm, normal heart sounds and intact distal pulses. Exam reveals no gallop.   No murmur heard.  Pulmonary/Chest: Effort normal and breath sounds normal. No respiratory distress. He has no wheezes.   Abdominal: Soft. Bowel sounds are normal. He exhibits no distension. There is no rebound and no guarding.   NG tube clamped  Improving bowel sounds in all quadrants   Musculoskeletal: Normal range of motion. He exhibits no edema.   Lymphadenopathy:     He has no cervical adenopathy.   Neurological: He is alert and oriented to person, place, and time. No cranial nerve deficit.   Skin: Skin is warm and dry. Capillary refill takes less than 2 seconds. No rash noted.   Psychiatric: He has a normal mood and affect. His behavior is normal. Judgment and thought content normal.   Nursing note and vitals reviewed.      Significant Labs: All pertinent labs within the past 24 hours have been reviewed.    Significant Imaging: I have reviewed all pertinent imaging results/findings within the past 24 hours.      Assessment/Plan:      * Small bowel obstruction  Consult Dr. Negron-has numerous GI surgeries in the past   NGT to LIWS-significant improvement  NG is clamped  Passing gas and had 1 bowel movement  KUB has improved  Tolerating clear liquids  Diet can be advanced if surgery team approves    UTI (urinary tract infection)  Culture urine/blood  Continue antibiotics  Consult Dr. Sanchez for possible stent removal-as per my discussion no intervention is planned until current SBO is resolved  Continue antibiotics    Sinus tachycardia  Has not been able to hold down metorprolol  Mildly dehydrated with UTI, and in pain   Metoprolol IV q8h hold for HR <50 or SBP <100  Continuous IV fluids      VTE Risk Mitigation (From admission, onward)         Ordered     IP VTE HIGH RISK PATIENT  Once      10/09/19 1753     Place sequential compression device  Until  discontinued      10/09/19 1753                      Edda Curry MD  Department of Hospital Medicine   [facility]

## 2019-10-12 NOTE — NURSING
Pt w/ NG tube clamped, tolerated walking w/ PT in hallway well.  Pt then stated he had a BM. Pt w/out c/o at this time.

## 2019-10-12 NOTE — CARE UPDATE
10/12/19 0829   Patient Assessment/Suction   Level of Consciousness (AVPU) alert   Respiratory Effort Normal;Unlabored   Expansion/Accessory Muscles/Retractions no retractions;no use of accessory muscles   All Lung Fields Breath Sounds diminished   PRE-TX-O2   O2 Device (Oxygen Therapy) room air   SpO2 95 %   Pulse 85   Resp 20   Inhaler   $ Inhaler Charges MDI (Metered Dose Inahler) Treatment;Independent;Mouth rinsed post treatment   Daily Review of Necessity (Inhaler) completed   Respiratory Treatment Status (Inhaler) given;mouth rinsed post treatment;independent   Treatment Route (Inhaler) mouthpiece   Patient Position (Inhaler) HOB elevated   Post Treatment Assessment (Inhaler) breath sounds unchanged   Signs of Intolerance (Inhaler) none

## 2019-10-12 NOTE — PT/OT/SLP PROGRESS
Physical Therapy Treatment    Patient Name:  Frank Wyatt   MRN:  [id number]    Recommendations:     Discharge Recommendations:  home with home health   Discharge Equipment Recommendations: none   Barriers to discharge: None    Assessment:     Frank Wyatt is a 84 y.o. male admitted with a medical diagnosis of Small bowel obstruction.  He presents with the following impairments/functional limitations:  weakness, impaired endurance, impaired functional mobilty, gait instability, impaired balance . Patient readily agreeable to PT treatment this morning.  Gait distance improved to 500 feet today and patient tolerated it well.    Rehab Prognosis: Good; patient would benefit from acute skilled PT services to address these deficits and reach maximum level of function.    Recent Surgery: * No surgery found *      Plan:     During this hospitalization, patient to be seen 6 x/week to address the identified rehab impairments via gait training, therapeutic activities, therapeutic exercises and progress toward the following goals:    · Plan of Care Expires:  11/11/19    Subjective     Chief Complaint: none  Patient/Family Comments/goals: go home  Pain/Comfort:  ·        Objective:     Communicated with nurse Regi prior to session.  Patient found supine with peripheral IV upon PT entry to room.     General Precautions: Standard, fall   Orthopedic Precautions:N/A   Braces:       Functional Mobility:  · Bed Mobility:     · Supine to Sit: supervision  · Transfers:     · Sit to Stand:  contact guard assistance with rolling walker  · Gait: x 500 feet RW with CGA      AM-PAC 6 CLICK MOBILITY  Turning over in bed (including adjusting bedclothes, sheets and blankets)?: 3  Sitting down on and standing up from a chair with arms (e.g., wheelchair, bedside commode, etc.): 3  Moving from lying on back to sitting on the side of the bed?: 3  Moving to and from a bed to a chair (including a wheelchair)?: 3  Need to walk in  hospital room?: 3  Climbing 3-5 steps with a railing?: 3  Basic Mobility Total Score: 18       Therapeutic Activities and Exercises:   gait training x 500 feet with RW with CGA    Patient left in bathroom on commode  with call button in reach and daughter present..    GOALS:   Multidisciplinary Problems     Physical Therapy Goals        Problem: Physical Therapy Goal    Goal Priority Disciplines Outcome Goal Variances Interventions   Physical Therapy Goal     PT, PT/OT      Description:  Goals to be met by: D/C     Patient will increase functional independence with mobility by performin. Supine to sit with Stand-by Assistance  2. Sit to stand transfer with Supervision  3. Bed to chair transfer with Stand-by Assistance using Rolling Walker  4. Gait  x 300 feet with Supervision using Rolling Walker.                       Time Tracking:     PT Received On: 10/12/19  PT Start Time: 918     PT Stop Time: 930  PT Total Time (min): 12 min     Billable Minutes: Gait Training 12    Treatment Type: Treatment  PT/PTA: PT     PTA Visit Number: 0     Chris MeGilligan, PT  10/12/2019

## 2019-10-12 NOTE — NURSING
Spoke to Dr Curry regarding pt's current K+ level and current K+ orders.  New orders to D/C IV K+ orders and to give po K+

## 2019-10-12 NOTE — PROGRESS NOTES
Replaced by [facility]  General Surgery  Progress Note    Subjective:     Interval History: Patient states he started passing small amount of flatus today. He has no nausea. He reports no bloating. Xray showed gas throughout the small and large bowel. NGT out put has been about 800 cc today.     Post-Op Info:  * No surgery found *          Medications:  Continuous Infusions:   lactated ringers 125 mL/hr at 10/10/19 1006     Scheduled Meds:   fluticasone furoate-vilanterol  1 puff Inhalation Daily    metoprolol  5 mg Intravenous Q8H    piperacillin-tazobactam (ZOSYN) IVPB  3.375 g Intravenous Q8H    sodium chloride 0.9%  500 mL Intravenous Once     PRN Meds:albuterol sulfate, dextrose 50%, glucagon (human recombinant), HYDROmorphone, insulin aspart U-100, ondansetron, promethazine (PHENERGAN) IVPB, sodium chloride 0.9%     Objective:     Vital Signs (Most Recent):  Temp: 98.9 °F (37.2 °C) (10/11/19 1927)  Pulse: 72 (10/11/19 1927)  Resp: 18 (10/11/19 1927)  BP: (!) 169/68 (10/11/19 1927)  SpO2: 95 % (10/11/19 1927) Vital Signs (24h Range):  Temp:  [97.6 °F (36.4 °C)-98.9 °F (37.2 °C)] 98.9 °F (37.2 °C)  Pulse:  [72-94] 72  Resp:  [18-20] 18  SpO2:  [90 %-96 %] 95 %  BP: (121-169)/(65-83) 169/68       Intake/Output Summary (Last 24 hours) at 10/11/2019 1950  Last data filed at 10/11/2019 0800  Gross per 24 hour   Intake --   Output 480 ml   Net -480 ml       Physical Exam   Constitutional: He is oriented to person, place, and time. Vital signs are normal. He is cooperative.  Non-toxic appearance. No distress.   Pulmonary/Chest: Effort normal. No tachypnea. No respiratory distress.   Abdominal: Soft. He exhibits no distension. There is tenderness in the left lower quadrant. There is no rigidity, no rebound and no guarding. A hernia is present. Hernia confirmed positive in the ventral area.   Mild left lower quadrant abdominal pain without guarding or rebound.  Abdomen is non distended.  No peritoneal signs.    Neurological: He is alert and oriented to person, place, and time.       Significant Labs:  CBC:   Recent Labs   Lab 10/11/19  0423   WBC 6.56   RBC 5.04   HGB 15.3   HCT 46.0   *   MCV 91   MCH 30.4   MCHC 33.3     CMP:   Recent Labs   Lab 10/11/19  0423   *   CALCIUM 9.0      K 3.2*   CO2 35*   CL 95   BUN 34*   CREATININE 1.4       Significant Diagnostics:  I have reviewed all pertinent imaging results/findings within the past 24 hours.    Assessment/Plan:     Active Diagnoses:    Diagnosis Date Noted POA    PRINCIPAL PROBLEM:  Small bowel obstruction [K56.609] 10/09/2019 Yes    Sinus tachycardia [R00.0] 10/09/2019 Yes    UTI (urinary tract infection) [N39.0] 10/09/2019 Yes      Problems Resolved During this Admission:     Patient appears to be improved. Will plan to clamp tube in the AM and start clears. Encouraged ambulation. K replaced.     Samir Abraham III, MD  General Surgery  [facility]

## 2019-10-12 NOTE — PLAN OF CARE
Vital signs, cardiac and lab monitoring. IV hydration and IV antibiotics. CXR in am. Possible discharge in am . MD consult in am. Increase activity as tolerated. Bed alarm on. Watch for falls. Watch for sign and symptoms of bleeding. Npo.  Accuchecks per order.Breathing treatments and adjust oxygen as needed. Strict I & O. Daily weights.

## 2019-10-12 NOTE — PROGRESS NOTES
ELECTROLYTE MANAGEMENT PROGRESS NOTE    Objective:  84 y.o., male, Actual Body Weight = 83.5 kg (184 lb 1.6 oz)    Recent Labs     10/09/19  1131 10/10/19  0428 10/11/19  0420 10/11/19  0423 10/11/19  1957 10/12/19  0444   K 4.1 3.9  --  3.2* 3.3* 3.7   MG 1.7 2.2  --  2.0  --  1.9   PHOS  --   --  3.4  --   --   --    CALCIUM 10.2 9.8  --  9.0  --  8.9   ALBUMIN 4.3  --   --   --   --   --    CREATININE 1.1 1.3  --  1.4  --  1.2     Patient received Potassium chloride 40 mEq IVPB on 10/11/19 starting at 09:24.  Patient also received Potassium chloride 40 mEq IVPB on 10/11/19 starting at 22:30.    Diet clear liquid [facility]    IV Access:  Peripheral    Assessment:  Electrolyte Deficiency:  Hypokalemia (K+ < 4.0), improved    Patient type:  Acute Care (not in an ICU)    Plan:  Replace potassium with Potassium chloride 10 mEq IVPB x 1 dose.     Will continue to monitor electrolytes daily.    Cely Caba PharmD 10/12/2019 8:52 AM  ID Clinical Pharmacist, Ext [phone] or [phone]

## 2019-10-13 VITALS
HEART RATE: 74 BPM | SYSTOLIC BLOOD PRESSURE: 173 MMHG | BODY MASS INDEX: 29.1 KG/M2 | OXYGEN SATURATION: 98 % | HEIGHT: 67 IN | DIASTOLIC BLOOD PRESSURE: 77 MMHG | TEMPERATURE: 98 F | WEIGHT: 185.38 LBS | RESPIRATION RATE: 20 BRPM

## 2019-10-13 PROBLEM — K56.609 SMALL BOWEL OBSTRUCTION: Status: RESOLVED | Noted: 2019-10-09 | Resolved: 2019-10-13

## 2019-10-13 LAB
ANION GAP SERPL CALC-SCNC: 9 MMOL/L (ref 8–16)
BASOPHILS # BLD AUTO: 0.05 K/UL (ref 0–0.2)
BASOPHILS NFR BLD: 0.6 % (ref 0–1.9)
BUN SERPL-MCNC: 24 MG/DL (ref 8–23)
CALCIUM SERPL-MCNC: 8.4 MG/DL (ref 8.7–10.5)
CHLORIDE SERPL-SCNC: 108 MMOL/L (ref 95–110)
CO2 SERPL-SCNC: 25 MMOL/L (ref 23–29)
CREAT SERPL-MCNC: 1 MG/DL (ref 0.5–1.4)
DIFFERENTIAL METHOD: ABNORMAL
EOSINOPHIL # BLD AUTO: 0.8 K/UL (ref 0–0.5)
EOSINOPHIL NFR BLD: 8.4 % (ref 0–8)
ERYTHROCYTE [DISTWIDTH] IN BLOOD BY AUTOMATED COUNT: 13.9 % (ref 11.5–14.5)
EST. GFR  (AFRICAN AMERICAN): >60 ML/MIN/1.73 M^2
EST. GFR  (NON AFRICAN AMERICAN): >60 ML/MIN/1.73 M^2
GLUCOSE SERPL-MCNC: 112 MG/DL (ref 70–110)
GLUCOSE SERPL-MCNC: 130 MG/DL (ref 70–110)
HCT VFR BLD AUTO: 45.2 % (ref 40–54)
HGB BLD-MCNC: 14.5 G/DL (ref 14–18)
IMM GRANULOCYTES # BLD AUTO: 0.04 K/UL (ref 0–0.04)
IMM GRANULOCYTES NFR BLD AUTO: 0.4 % (ref 0–0.5)
LYMPHOCYTES # BLD AUTO: 1.6 K/UL (ref 1–4.8)
LYMPHOCYTES NFR BLD: 17.5 % (ref 18–48)
MAGNESIUM SERPL-MCNC: 1.8 MG/DL (ref 1.6–2.6)
MCH RBC QN AUTO: 30.3 PG (ref 27–31)
MCHC RBC AUTO-ENTMCNC: 32.1 G/DL (ref 32–36)
MCV RBC AUTO: 94 FL (ref 82–98)
MONOCYTES # BLD AUTO: 0.7 K/UL (ref 0.3–1)
MONOCYTES NFR BLD: 7.7 % (ref 4–15)
NEUTROPHILS # BLD AUTO: 5.9 K/UL (ref 1.8–7.7)
NEUTROPHILS NFR BLD: 65.4 % (ref 38–73)
NRBC BLD-RTO: 0 /100 WBC
PLATELET # BLD AUTO: 103 K/UL (ref 150–350)
PMV BLD AUTO: 9.8 FL (ref 9.2–12.9)
POTASSIUM SERPL-SCNC: 4.5 MMOL/L (ref 3.5–5.1)
RBC # BLD AUTO: 4.79 M/UL (ref 4.6–6.2)
SODIUM SERPL-SCNC: 142 MMOL/L (ref 136–145)
WBC # BLD AUTO: 9.04 K/UL (ref 3.9–12.7)

## 2019-10-13 PROCEDURE — 94640 AIRWAY INHALATION TREATMENT: CPT

## 2019-10-13 PROCEDURE — 85025 COMPLETE CBC W/AUTO DIFF WBC: CPT

## 2019-10-13 PROCEDURE — 83735 ASSAY OF MAGNESIUM: CPT

## 2019-10-13 PROCEDURE — 25000003 PHARM REV CODE 250: Performed by: NURSE PRACTITIONER

## 2019-10-13 PROCEDURE — 63600175 PHARM REV CODE 636 W HCPCS: Performed by: NURSE PRACTITIONER

## 2019-10-13 PROCEDURE — 99900035 HC TECH TIME PER 15 MIN (STAT)

## 2019-10-13 PROCEDURE — 94761 N-INVAS EAR/PLS OXIMETRY MLT: CPT

## 2019-10-13 PROCEDURE — 36415 COLL VENOUS BLD VENIPUNCTURE: CPT

## 2019-10-13 PROCEDURE — 80048 BASIC METABOLIC PNL TOTAL CA: CPT

## 2019-10-13 PROCEDURE — 25000003 PHARM REV CODE 250: Performed by: HOSPITALIST

## 2019-10-13 RX ORDER — TAMSULOSIN HYDROCHLORIDE 0.4 MG/1
0.4 CAPSULE ORAL DAILY
Status: DISCONTINUED | OUTPATIENT
Start: 2019-10-13 | End: 2019-10-13 | Stop reason: HOSPADM

## 2019-10-13 RX ORDER — CARBOXYMETHYLCELLULOSE SODIUM 5 MG/ML
1 SOLUTION/ DROPS OPHTHALMIC 2 TIMES DAILY
Status: DISCONTINUED | OUTPATIENT
Start: 2019-10-13 | End: 2019-10-13 | Stop reason: HOSPADM

## 2019-10-13 RX ORDER — LEVOTHYROXINE SODIUM 25 UG/1
25 TABLET ORAL
Status: DISCONTINUED | OUTPATIENT
Start: 2019-10-14 | End: 2019-10-13 | Stop reason: HOSPADM

## 2019-10-13 RX ORDER — AMOXICILLIN AND CLAVULANATE POTASSIUM 875; 125 MG/1; MG/1
1 TABLET, FILM COATED ORAL EVERY 12 HOURS
Qty: 12 TABLET | Refills: 0 | Status: SHIPPED | OUTPATIENT
Start: 2019-10-13 | End: 2019-10-19

## 2019-10-13 RX ORDER — ATORVASTATIN CALCIUM 40 MG/1
40 TABLET, FILM COATED ORAL NIGHTLY
Status: DISCONTINUED | OUTPATIENT
Start: 2019-10-13 | End: 2019-10-13 | Stop reason: HOSPADM

## 2019-10-13 RX ORDER — AMOXICILLIN AND CLAVULANATE POTASSIUM 875; 125 MG/1; MG/1
1 TABLET, FILM COATED ORAL EVERY 12 HOURS
Status: DISCONTINUED | OUTPATIENT
Start: 2019-10-13 | End: 2019-10-13 | Stop reason: HOSPADM

## 2019-10-13 RX ORDER — LANOLIN ALCOHOL/MO/W.PET/CERES
400 CREAM (GRAM) TOPICAL EVERY 4 HOURS
Status: DISCONTINUED | OUTPATIENT
Start: 2019-10-13 | End: 2019-10-13 | Stop reason: HOSPADM

## 2019-10-13 RX ORDER — METOPROLOL TARTRATE 25 MG/1
25 TABLET, FILM COATED ORAL 2 TIMES DAILY
Status: DISCONTINUED | OUTPATIENT
Start: 2019-10-13 | End: 2019-10-13 | Stop reason: HOSPADM

## 2019-10-13 RX ADMIN — CARBOXYMETHYLCELLULOSE SODIUM 1 DROP: 5 SOLUTION/ DROPS OPHTHALMIC at 09:10

## 2019-10-13 RX ADMIN — METOPROLOL TARTRATE 5 MG: 1 INJECTION, SOLUTION INTRAVENOUS at 06:10

## 2019-10-13 RX ADMIN — TAMSULOSIN HYDROCHLORIDE 0.4 MG: 0.4 CAPSULE ORAL at 09:10

## 2019-10-13 RX ADMIN — METOPROLOL TARTRATE 25 MG: 25 TABLET ORAL at 09:10

## 2019-10-13 RX ADMIN — PIPERACILLIN SODIUM AND TAZOBACTAM SODIUM 3.38 G: 3; .375 INJECTION, POWDER, LYOPHILIZED, FOR SOLUTION INTRAVENOUS at 03:10

## 2019-10-13 RX ADMIN — AMOXICILLIN AND CLAVULANATE POTASSIUM 1 TABLET: 875; 125 TABLET, FILM COATED ORAL at 09:10

## 2019-10-13 NOTE — PLAN OF CARE
10/12/19 2030   Patient Assessment/Suction   Level of Consciousness (AVPU) alert   Respiratory Effort Unlabored   Expansion/Accessory Muscles/Retractions no use of accessory muscles   All Lung Fields Breath Sounds clear   Rhythm/Pattern, Respiratory unlabored   Cough Frequency infrequent   Cough Type dry   PRE-TX-O2   O2 Device (Oxygen Therapy) room air   SpO2 (!) 93 %   Pulse 77   Resp 18   Aerosol Therapy   $ Aerosol Therapy Charges PRN treatment not required   Respiratory Treatment Status (SVN) PRN treatment not required

## 2019-10-13 NOTE — CARE UPDATE
10/13/19 0739   Patient Assessment/Suction   Level of Consciousness (AVPU) alert   Respiratory Effort Normal;Unlabored   Expansion/Accessory Muscles/Retractions no use of accessory muscles   All Lung Fields Breath Sounds diminished   PRE-TX-O2   O2 Device (Oxygen Therapy) room air   SpO2 96 %   $ Pulse Oximetry - Multiple Charge Pulse Oximetry - Multiple   Pulse 65   Resp 20   Inhaler   $ Inhaler Charges MDI (Metered Dose Inahler) Treatment;Independent;Mouth rinsed post treatment   Daily Review of Necessity (Inhaler) completed   Respiratory Treatment Status (Inhaler) given;mouth rinsed post treatment   Treatment Route (Inhaler) mouthpiece   Patient Position (Inhaler) HOB elevated   Post Treatment Assessment (Inhaler) breath sounds unchanged   Signs of Intolerance (Inhaler) none

## 2019-10-13 NOTE — PLAN OF CARE
10/13/19 1157   Discharge Reassessment   Assessment Type Discharge Planning Reassessment   Anticipated Discharge Disposition Home   Pt to be discharged to home with no needs.

## 2019-10-13 NOTE — DISCHARGE SUMMARY
[facility] Medicine  Discharge Summary    DOS:10/13/2019  Time: 0917am      Patient Name: Frank Wyatt  MRN: [id number]  Admission Date: 10/9/2019  Hospital Length of Stay: 4 days  Discharge Date and Time:  10/13/2019 1:34 PM  Attending Physician: No att. providers found   Discharging Provider: Edda Curry MD  Primary Care Provider: Td Begum MD      HPI:   Mr. Wyatt presents today with complaints of abd pain. It is severe. It is associated with N/V, urinary frequency, and urinary urgency. He denies measured fever, chills, cough, dysuria, dizziness, or LOC. He lives at home alone and called his daughter last night and told her he felt bad. He had eaten some pork that was sitting out, so his daughter initially thought this was the culprit and made him an appt with his PCP, but the pain got progressively worse, so they came to the ED instead. He cannot remember his last BM, but knows it wasn't yesterday. He cannot remember the last time he passed flatus. He had a recent ureteral stent placed for kidney stones and had plans to have it out in the office today, but came to the ED and cancelled that appt. He has a history of CAD s/p 5v CAB, HTN, arthritis, multiple abd surgeries, and kidney stones.    * No surgery found *      Hospital Course:   10/10:  Patient responded very well with NG tube and has significant NG output.  Feels better.  Denies any new symptoms.   10/11:  Feeling better, passed gas at least once.  Walked around nursing station with PT, copious amount of output from NG  10/12:  NG was clamped early morning.  Patient is tolerating clear liquids.  Abdominal x-ray looks much better.  I was told by nursing staff the patient is passing gas and had 1 bowel movement.  No new complaints.   10/13:  Patient improved remarkably last 24 hr with multiple bowel movements, passing gas.  NG tube was removed yesterday.  Denies any with nausea, vomiting or abdominal pain.  Tolerated regular cardiac diet in the morning.     To briefly summarize his stay patient was admitted with small-bowel obstruction which responded well with conservative measures including NG tube.  He was also found to have urinary tract infection in the background of left ureteric stent.  He was discharged home on Augmentin based on culture report.  He was advised to follow up with Urology outpatient who can remove the stent sometime next week.  Patient is very high risk for recurrence of SBO considering his prior surgical history.      Review of Systems   Constitutional: Negative for chills, diaphoresis, fatigue and fever.   HENT: Negative for congestion, ear pain, sore throat and trouble swallowing.    Eyes: Negative for pain, discharge and visual disturbance.   Respiratory: Negative for cough, chest tightness, shortness of breath and wheezing.    Cardiovascular: Negative for chest pain, palpitations and leg swelling.   Gastrointestinal: Negative for blood in stool, constipation and diarrhea.   Endocrine: Negative for polydipsia, polyphagia and polyuria.   Genitourinary: Negative for dysuria and flank pain.   Musculoskeletal: Negative for back pain, joint swelling, neck pain and neck stiffness.   Skin: Negative for rash and wound.   Allergic/Immunologic: Negative for immunocompromised state.   Neurological: Negative for dizziness, syncope, speech difficulty, weakness, light-headedness, numbness and headaches.   Hematological: Negative for adenopathy.   Psychiatric/Behavioral: Negative for confusion and suicidal ideas. The patient is not nervous/anxious.    All other systems reviewed and are negative.      Physical Exam   Constitutional: He is oriented to person, place, and time. He appears well-developed and well-nourished.   HENT:   Head: Normocephalic and atraumatic.   Mouth/Throat: Oropharynx is clear and moist.   Eyes: Pupils are equal, round, and reactive to light. Conjunctivae and EOM are normal.   Neck:  Normal range of motion. Neck supple. No JVD present.   Cardiovascular: Normal rate, regular rhythm, normal heart sounds and intact distal pulses. Exam reveals no gallop.   No murmur heard.  Pulmonary/Chest: Effort normal and breath sounds normal. No respiratory distress. He has no wheezes.   Abdominal: Soft. Bowel sounds are normal. He exhibits no distension. There is no rebound and no guarding.   Improving bowel sounds in all quadrants   Musculoskeletal: Normal range of motion. He exhibits no edema.   Lymphadenopathy:  He has no cervical adenopathy.   Neurological: He is alert and oriented to person, place, and time. No cranial nerve deficit.   Skin: Skin is warm and dry. Capillary refill takes less than 2 seconds. No rash noted.   Psychiatric: He has a normal mood and affect. His behavior is normal. Judgment and thought content normal.   Nursing note and vitals reviewed.     Consults:   Consults (From admission, onward)        Status Ordering Provider     Inpatient consult to General Surgery  Once     Provider:  Samir Abraham III, MD    Completed JUAN SARMIENTO     Inpatient consult to Hospitalist  Once     Provider:  Edda Curry MD    Acknowledged EDDA CURRY     Inpatient consult to Urology  Once     Provider:  Dipak Sanchez MD    Acknowledged JUAN SARMIENTO          No new Assessment & Plan notes have been filed under this hospital service since the last note was generated.  Service: Hospital Medicine    Final Active Diagnoses:    Diagnosis Date Noted POA    UTI (urinary tract infection) [N39.0] 10/09/2019 Yes    Sinus tachycardia [R00.0] 10/09/2019 Yes      Problems Resolved During this Admission:    Diagnosis Date Noted Date Resolved POA    PRINCIPAL PROBLEM:  Small bowel obstruction [K56.609] 10/09/2019 10/13/2019 Yes       Discharged Condition: good    Disposition: Home or Self Care    Follow Up:  Follow-up Information     Samir Abraham III, MD In 1 week.    Specialties:   General Surgery, Surgery  Contact information:  [address]  [address]  [place] [address]  [phone]             Dipak Sanchez MD In 1 week.    Specialty:  Urology  Contact information:  [address]  [address]  [place] [address]  [phone]             Td Begum MD In 2 weeks.    Specialty:  Internal Medicine  Contact information:  [address]  [place] [address]  [phone]             Samir Abraham III, MD In 1 week.    Specialties:  General Surgery, Surgery  Contact information:  [address]  [address]  [place] [address]  [phone]                 Patient Instructions:      Diet Cardiac   Order Comments: Advanced diet slowly over the next 2-3 days     Activity as tolerated       Significant Diagnostic Studies: Microbiology:   Urine Culture    Lab Results   Component Value Date    LABURIN PROTEUS MIRABILIS  > 100,000 cfu/ml   (A) 10/09/2019       Pending Diagnostic Studies:     None         Medications:  Reconciled Home Medications:      Medication List      START taking these medications    amoxicillin-clavulanate 875-125mg 875-125 mg per tablet  Commonly known as:  AUGMENTIN  Take 1 tablet by mouth every 12 (twelve) hours. for 6 days        CONTINUE taking these medications    ARTIFICIAL TEARS(VIMR58-RVDKZ) 0.1-0.3 % Drop  Generic drug:  dextran 70-hypromellose  Apply 1 drop to eye 2 (two) times daily.     atorvastatin 40 MG tablet  Commonly known as:  LIPITOR  Take 40 mg by mouth every evening.     furosemide 20 MG tablet  Commonly known as:  LASIX  Take 10 mg by mouth every morning.     levothyroxine 25 MCG tablet  Commonly known as:  SYNTHROID  Take 25 mcg by mouth once daily.     metoprolol tartrate 25 MG tablet  Commonly known as:  LOPRESSOR  Take 25 mg by mouth 2 (two) times daily.     SYMBICORT 160-4.5 mcg/actuation Hfaa  Generic drug:  budesonide-formoterol 160-4.5 mcg  Inhale 2 puffs into the lungs every 12 (twelve) hours.     tamsulosin 0.4 mg  Cap  Commonly known as:  FLOMAX  Take 0.4 mg by mouth once daily.            Indwelling Lines/Drains at time of discharge:   Lines/Drains/Airways     None                 Time spent on the discharge of patient: 29 minutes  Patient was seen and examined on the date of discharge and determined to be suitable for discharge.         Edda Curry MD  Department of Hospital Medicine  [facility]

## 2019-10-13 NOTE — PROGRESS NOTES
ELECTROLYTE MANAGEMENT PROGRESS NOTE    Objective:  84 y.o., male, Actual Body Weight = 84.1 kg (185 lb 6.1 oz)    Lab Results   Component Value Date    K 4.5 10/13/2019    MG 1.8 10/13/2019    PHOS 3.4 10/11/2019    CREATININE 1.0 10/13/2019     Diet:  Cardiac    Assessment:  Electrolyte Deficiency:  Hypomagnesemia (Mg < 1.9)    Patient type:  Acute Care (not in an ICU)    Plan:  Replace magnesium with Magnesium oxide 400mg PO every 4 hours x 2 doses.    Will continue to monitor electrolytes daily.    Cely Caba PharmD 10/13/2019 10:54 AM  ID Clinical Pharmacist, Ext [phone] or [phone]

## 2019-10-13 NOTE — PLAN OF CARE
10/13/19 1157   Final Note   Assessment Type Final Discharge Note   Anticipated Discharge Disposition Home

## 2019-10-14 LAB — BACTERIA BLD CULT: NORMAL

## 2019-10-18 ENCOUNTER — OFFICE VISIT (OUTPATIENT)
Dept: SURGERY | Facility: CLINIC | Age: 84
End: 2019-10-18
Payer: MEDICARE

## 2019-10-18 VITALS
HEIGHT: 67 IN | BODY MASS INDEX: 29.03 KG/M2 | TEMPERATURE: 98 F | DIASTOLIC BLOOD PRESSURE: 75 MMHG | SYSTOLIC BLOOD PRESSURE: 150 MMHG | WEIGHT: 185 LBS | HEART RATE: 77 BPM

## 2019-10-18 DIAGNOSIS — K56.609 SBO (SMALL BOWEL OBSTRUCTION): Primary | ICD-10-CM

## 2019-10-18 PROCEDURE — 99213 OFFICE O/P EST LOW 20 MIN: CPT | Mod: S$GLB,,, | Performed by: SURGERY

## 2019-10-18 PROCEDURE — 99213 PR OFFICE/OUTPT VISIT, EST, LEVL III, 20-29 MIN: ICD-10-PCS | Mod: S$GLB,,, | Performed by: SURGERY

## 2019-10-18 NOTE — PROGRESS NOTES
Subjective:       Patient ID: Frank Wyatt is a 85 y.o. male.    Chief Complaint: Follow-up (Hospital f/u visit )      HPI:  Patient is 85-year-old male who had recent admission for small-bowel obstruction. He was admitted on October 9th discharged on October 12.  On admission he had NG tube placed. He had resolution of the bowel obstruction with non operative management.  He was discharged on October 12.  Since that time he has been feeling well. He has been tolerating diet and passing flatus.  He has no new complaints today.  He reports no abdominal pain, nausea or vomiting.    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 2004    CORONARY ARTERY BYPASS GRAFT  1996    5-bypass     CYSTOSCOPY N/A 8/15/2019    Procedure: CYSTOSCOPY;  Surgeon: Dipak Sanchez MD;  Location: [facility] OR;  Service: Urology;  Laterality: N/A;    EXTRACORPOREAL SHOCK WAVE LITHOTRIPSY Left 8/15/2019    Procedure: LITHOTRIPSY, ESWL;  Surgeon: Dipak Sanchez MD;  Location: [facility] OR;  Service: Urology;  Laterality: Left;    EXTRACORPOREAL SHOCK WAVE LITHOTRIPSY Left 9/19/2019    Procedure: LITHOTRIPSY, ESWL;  Surgeon: Dipak Sanchez MD;  Location: [facility] OR;  Service: Urology;  Laterality: Left;    EYE SURGERY Right 2014    Cataract    EYE SURGERY Left 2017    Cataract    HERNIA REPAIR  2014    Dr. Bailon - had to have mesh removed    INGUINAL HERNIA REPAIR Right 03/25/2019        LITHOTRIPSY      nephrostomy tube       PERCUTANEOUS NEPHROSTOMY      ROTATOR CUFF REPAIR  2005    right shoulder     Review of patient's allergies indicates:   Allergen Reactions    Bactrim [sulfamethoxazole-trimethoprim] Hives     itched    Keflex [cephalexin] Rash    Morphine Rash     Patient had morphine and keflex at the same time, developed a rash, not sure which one caused it, or if it was a combination of the two meds.     Medication List with Changes/Refills   Current Medications    AMOXICILLIN-CLAVULANATE 875-125MG (AUGMENTIN) 875-125 MG PER TABLET    Take 1 tablet by mouth every 12 (twelve) hours. for 6 days    ATORVASTATIN (LIPITOR) 40 MG TABLET    Take 40 mg by mouth every evening.     DEXTRAN 70-HYPROMELLOSE (ARTIFICIAL TEARS,[id number],) 0.1-0.3 % DROP    Apply 1 drop to eye 2 (two) times daily.    FUROSEMIDE (LASIX) 20 MG TABLET    Take 10 mg by mouth every morning.     LEVOTHYROXINE (SYNTHROID) 25 MCG TABLET    Take 25 mcg by mouth once daily.     METOPROLOL TARTRATE (LOPRESSOR) 25 MG TABLET    Take 25 mg by mouth 2 (two) times daily.     SYMBICORT 160-4.5 MCG/ACTUATION HFAA    Inhale 2 puffs into the lungs every 12 (twelve) hours.     TAMSULOSIN (FLOMAX) 0.4 MG CAP    Take 0.4 mg by mouth once daily.      Family History   Problem Relation Age of Onset    Heart disease Mother     Hypertension Mother     Hypertension Sister     Heart disease Brother     Hypertension Brother     Cancer Daughter     Diabetes Brother     Heart disease Brother     Hypertension Brother     Hypertension Sister     Heart disease Sister      Social History     Socioeconomic History    Marital status:      Spouse name: Not on file    Number of children: Not on file    Years of education: Not on file    Highest education level: Not on file   Occupational History    Not on file   Social Needs    Financial resource strain: Not on file    Food insecurity:     Worry: Not on file     Inability: Not on file    Transportation needs:      Medical: Not on file     Non-medical: Not on file   Tobacco Use    Smoking status: Former Smoker     Packs/day: 1.00     Years: 25.00     Pack years: 25.00     Last attempt to quit: 1972     Years since quittin.8    Smokeless tobacco: Former User     Quit date: 8/15/1972   Substance and Sexual Activity    Alcohol use: No    Drug use: No    Sexual activity: Not on file   Lifestyle    Physical activity:     Days per week: Not on file     Minutes per session: Not on file    Stress: Not on file   Relationships    Social connections:     Talks on phone: Not on file     Gets together: Not on file     Attends Congregation service: Not on file     Active member of club or organization: Not on file     Attends meetings of clubs or organizations: Not on file     Relationship status: Not on file   Other Topics Concern    Not on file   Social History Narrative    Not on file         Review of Systems    Objective:      Physical Exam   Constitutional: He is oriented to person, place, and time. He appears well-developed and well-nourished.  Non-toxic appearance. No distress.   HENT:   Head: Normocephalic and atraumatic. Head is without abrasion and without laceration.   Right Ear: External ear normal.   Left Ear: External ear normal.   Nose: Nose normal.   Mouth/Throat: Oropharynx is clear and moist.   Eyes: Pupils are equal, round, and reactive to light. EOM are normal.   Neck: Trachea normal. Neck supple. No tracheal deviation and normal range of motion present.   Cardiovascular: Normal rate and regular rhythm.   Pulmonary/Chest: Effort normal. No accessory muscle usage. No tachypnea. No respiratory distress.   Abdominal: Soft. Normal appearance and bowel sounds are normal. He exhibits no distension and no mass. There is no tenderness. There is no rigidity and no guarding. A hernia is present. Hernia confirmed positive in the ventral area. Hernia confirmed negative in the right inguinal area.       Patient has  reducible periumbilical incisional hernia. Defect feels to be about 4 cm in diameter.  It is soft and reducible  The right inguinal hernia incision is well-healed without evidence of infection.   Lymphadenopathy: No inguinal adenopathy noted on the right or left side.   Neurological: He is alert and oriented to person, place, and time. Coordination and gait normal.   Skin: Skin is warm and intact.   Psychiatric: He has a normal mood and affect. His speech is normal and behavior is normal.       Assessment/Plan:   SBO (small bowel obstruction)      Patient doing well after discharge.  That was his 1st admission with bowel obstruction.  He has had multiple abdominal surgeries.  He will follow up p.r.n..  He was instructed to return to the ER if experiencing similar symptoms.    Follow up if symptoms worsen or fail to improve.

## 2019-10-30 ENCOUNTER — HOSPITAL ENCOUNTER (OUTPATIENT)
Dept: PREADMISSION TESTING | Facility: HOSPITAL | Age: 84
Discharge: HOME | End: 2019-10-30
Attending: SPECIALIST
Payer: MEDICARE

## 2019-10-30 VITALS
DIASTOLIC BLOOD PRESSURE: 72 MMHG | WEIGHT: 195.75 LBS | OXYGEN SATURATION: 96 % | HEIGHT: 68 IN | HEART RATE: 77 BPM | RESPIRATION RATE: 20 BRPM | BODY MASS INDEX: 29.67 KG/M2 | TEMPERATURE: 99 F | SYSTOLIC BLOOD PRESSURE: 174 MMHG

## 2019-10-30 DIAGNOSIS — N28.89 OTHER SPECIFIED DISORDERS OF KIDNEY AND URETER: ICD-10-CM

## 2019-10-30 DIAGNOSIS — Z01.818 PREOP TESTING: Primary | ICD-10-CM

## 2019-10-30 RX ORDER — LEVOFLOXACIN 5 MG/ML
500 INJECTION, SOLUTION INTRAVENOUS
Status: CANCELLED | OUTPATIENT
Start: 2019-10-31

## 2019-10-30 NOTE — DISCHARGE INSTRUCTIONS
To confirm, Your doctor has instructed you that surgery is scheduled for:     Please report to [facility] on [address] the morning of surgery.     Pre-Op will call the afternoon prior to surgery between 4:00 and 6:00 PM with the final arrival time.      PLEASE NOTE:  The surgery schedule has many variables which may affect the time of your surgery case.  Family members should be available if your surgery time changes.  Plan to be here the day of your procedure between 4-6 hours.    MEDICATIONS:  TAKE ONLY THESE MEDICATIONS WITH A SMALL SIP OF WATER THE MORNING OF YOUR PROCEDURE:    SYNTHROID, LOPRESSOR, TAMSULOSIN, SYMBICORT      DO NOT TAKE THESE MEDICATIONS 5-7 DAYS PRIOR to your procedure or per your surgeon's request: ASPIRIN, ALEVE, ADVIL, IBUPROFEN, FISH OIL VITAMIN E, HERBALS  (May take Tylenol)    ONLY if you are prescribed any types of blood thinners such as:  Aspirin, Coumadin, Plavix, Pradaxa, Xarelto, Aggrenox, Effient, Eliquis, Savasya, Brilinta, or any other, ask your surgeon whether you should stop taking them and how long before surgery you should stop.  You may also need to verify with the prescribing physician if it is ok to stop your medication.      INSTRUCTIONS IMPORTANT!!  · Do not eat or drink anything between midnight and the time of your procedure- this includes gum, mints, and candy.  · ONLY if you are diabetic, check your sugar in the morning before your procedure.  · Do not smoke, vape or drink alcoholic beverages 24 hours prior to your procedure.  · Shower the night before AND the morning of your procedure with a Chlorhexidine wash such as Hibiclens or Dial antibacterial soap from the neck down.  Do not get it on your face or in your eyes.  You may use your own shampoo and face wash. This helps your skin to be as bacteria free as possible.    · If you wear contact lenses, dentures, hearing aids or glasses, bring a container to put them in during surgery and give to a family  member for safe keeping.  Please leave all jewelry, piercing's and valuables at home.   · DO NOT remove hair from the surgery site.  Do not shave the incision site unless you are given specific instructions to do so.    · ONLY if you have been diagnosed with sleep apnea please bring your C-PAP machine.  · ONLY if you wear home oxygen please bring your portable oxygen tank the day of your procedure.   · ONLY for patients requiring bowel prep, written instructions will be given by your doctor's office.  · ONLY if you have a neuro stimulator, please bring the controller with you the morning of surgery  · ONLY if a type and screen test is needed before surgery, please return:  · If your doctor has scheduled you for an overnight stay, bring a small overnight bag with any personal items you need.  · Make arrangements in advance for transportation home by a responsible adult.  · You must make arrangements for transportation, TAXI'S, UBER'S OR LYFTS ARE NOT ALLOWED.          If you have any questions about these instructions, call Pre-Op Admit  Nursing at [phone] or the Pre-Op Day Surgery Unit at [phone].

## 2019-10-31 ENCOUNTER — HOSPITAL ENCOUNTER (OUTPATIENT)
Dept: RADIOLOGY | Facility: HOSPITAL | Age: 84
Discharge: HOME | End: 2019-10-31
Attending: SPECIALIST
Payer: MEDICARE

## 2019-10-31 ENCOUNTER — ANESTHESIA EVENT (OUTPATIENT)
Dept: SURGERY | Facility: HOSPITAL | Age: 84
End: 2019-10-31
Payer: MEDICARE

## 2019-10-31 ENCOUNTER — ANESTHESIA (OUTPATIENT)
Dept: SURGERY | Facility: HOSPITAL | Age: 84
End: 2019-10-31
Payer: MEDICARE

## 2019-10-31 ENCOUNTER — HOSPITAL ENCOUNTER (OUTPATIENT)
Facility: HOSPITAL | Age: 84
Discharge: HOME | End: 2019-10-31
Attending: SPECIALIST | Admitting: SPECIALIST
Payer: MEDICARE

## 2019-10-31 VITALS
HEART RATE: 67 BPM | BODY MASS INDEX: 29.67 KG/M2 | OXYGEN SATURATION: 99 % | RESPIRATION RATE: 15 BRPM | HEIGHT: 68 IN | TEMPERATURE: 97 F | WEIGHT: 195.75 LBS | DIASTOLIC BLOOD PRESSURE: 70 MMHG | SYSTOLIC BLOOD PRESSURE: 154 MMHG

## 2019-10-31 DIAGNOSIS — Z01.818 PREOP TESTING: ICD-10-CM

## 2019-10-31 DIAGNOSIS — N28.89 OTHER SPECIFIED DISORDERS OF KIDNEY AND URETER: ICD-10-CM

## 2019-10-31 DIAGNOSIS — N20.0 KIDNEY STONE: ICD-10-CM

## 2019-10-31 DIAGNOSIS — N20.0 RENAL STONE: Primary | ICD-10-CM

## 2019-10-31 PROCEDURE — 27000654 HC CATH IV JELCO: Performed by: ANESTHESIOLOGY

## 2019-10-31 PROCEDURE — 37000008 HC ANESTHESIA 1ST 15 MINUTES: Performed by: SPECIALIST

## 2019-10-31 PROCEDURE — 25000003 PHARM REV CODE 250: Performed by: SPECIALIST

## 2019-10-31 PROCEDURE — 27000673 HC TUBING BLOOD Y: Performed by: ANESTHESIOLOGY

## 2019-10-31 PROCEDURE — 71000015 HC POSTOP RECOV 1ST HR: Performed by: SPECIALIST

## 2019-10-31 PROCEDURE — 27200651 HC AIRWAY, LMA: Performed by: ANESTHESIOLOGY

## 2019-10-31 PROCEDURE — 36000707: Performed by: SPECIALIST

## 2019-10-31 PROCEDURE — 37000009 HC ANESTHESIA EA ADD 15 MINS: Performed by: SPECIALIST

## 2019-10-31 PROCEDURE — C1769 GUIDE WIRE: HCPCS | Performed by: SPECIALIST

## 2019-10-31 PROCEDURE — 71000033 HC RECOVERY, INTIAL HOUR: Performed by: SPECIALIST

## 2019-10-31 PROCEDURE — 25000003 PHARM REV CODE 250: Performed by: STUDENT IN AN ORGANIZED HEALTH CARE EDUCATION/TRAINING PROGRAM

## 2019-10-31 PROCEDURE — 63600175 PHARM REV CODE 636 W HCPCS: Performed by: STUDENT IN AN ORGANIZED HEALTH CARE EDUCATION/TRAINING PROGRAM

## 2019-10-31 PROCEDURE — 25000003 PHARM REV CODE 250: Performed by: NURSE ANESTHETIST, CERTIFIED REGISTERED

## 2019-10-31 PROCEDURE — 36000706: Performed by: SPECIALIST

## 2019-10-31 PROCEDURE — 76000 FLUOROSCOPY <1 HR PHYS/QHP: CPT | Mod: XB

## 2019-10-31 PROCEDURE — 27201423 OPTIME MED/SURG SUP & DEVICES STERILE SUPPLY: Performed by: SPECIALIST

## 2019-10-31 PROCEDURE — 63600175 PHARM REV CODE 636 W HCPCS: Performed by: SPECIALIST

## 2019-10-31 PROCEDURE — 63600175 PHARM REV CODE 636 W HCPCS: Performed by: NURSE ANESTHETIST, CERTIFIED REGISTERED

## 2019-10-31 PROCEDURE — 27000284 HC CANNULA NASAL: Performed by: ANESTHESIOLOGY

## 2019-10-31 RX ORDER — SODIUM CHLORIDE, SODIUM LACTATE, POTASSIUM CHLORIDE, CALCIUM CHLORIDE 600; 310; 30; 20 MG/100ML; MG/100ML; MG/100ML; MG/100ML
INJECTION, SOLUTION INTRAVENOUS CONTINUOUS PRN
Status: DISCONTINUED | OUTPATIENT
Start: 2019-10-31 | End: 2019-10-31

## 2019-10-31 RX ORDER — SODIUM CHLORIDE 0.9 % (FLUSH) 0.9 %
10 SYRINGE (ML) INJECTION
Status: DISCONTINUED | OUTPATIENT
Start: 2019-10-31 | End: 2019-10-31 | Stop reason: HOSPADM

## 2019-10-31 RX ORDER — HYDROCODONE BITARTRATE AND ACETAMINOPHEN 5; 325 MG/1; MG/1
1 TABLET ORAL EVERY 4 HOURS PRN
Status: DISCONTINUED | OUTPATIENT
Start: 2019-10-31 | End: 2019-10-31 | Stop reason: HOSPADM

## 2019-10-31 RX ORDER — LEVOFLOXACIN 5 MG/ML
500 INJECTION, SOLUTION INTRAVENOUS
Status: DISCONTINUED | OUTPATIENT
Start: 2019-10-31 | End: 2019-10-31 | Stop reason: HOSPADM

## 2019-10-31 RX ORDER — DIPHENHYDRAMINE HYDROCHLORIDE 50 MG/ML
12.5 INJECTION INTRAMUSCULAR; INTRAVENOUS
Status: DISCONTINUED | OUTPATIENT
Start: 2019-10-31 | End: 2019-10-31 | Stop reason: HOSPADM

## 2019-10-31 RX ORDER — HYDROMORPHONE HYDROCHLORIDE 1 MG/ML
0.2 INJECTION, SOLUTION INTRAMUSCULAR; INTRAVENOUS; SUBCUTANEOUS
Status: DISCONTINUED | OUTPATIENT
Start: 2019-10-31 | End: 2019-10-31 | Stop reason: HOSPADM

## 2019-10-31 RX ORDER — LIDOCAINE HYDROCHLORIDE 20 MG/ML
INJECTION, SOLUTION EPIDURAL; INFILTRATION; INTRACAUDAL; PERINEURAL
Status: DISCONTINUED | OUTPATIENT
Start: 2019-10-31 | End: 2019-10-31

## 2019-10-31 RX ORDER — FENTANYL CITRATE 50 UG/ML
INJECTION, SOLUTION INTRAMUSCULAR; INTRAVENOUS
Status: DISCONTINUED | OUTPATIENT
Start: 2019-10-31 | End: 2019-10-31

## 2019-10-31 RX ORDER — ONDANSETRON 2 MG/ML
4 INJECTION INTRAMUSCULAR; INTRAVENOUS DAILY PRN
Status: DISCONTINUED | OUTPATIENT
Start: 2019-10-31 | End: 2019-10-31 | Stop reason: HOSPADM

## 2019-10-31 RX ORDER — CIPROFLOXACIN 250 MG/1
250 TABLET, FILM COATED ORAL 2 TIMES DAILY
Qty: 12 TABLET | Refills: 0 | Status: ON HOLD
Start: 2019-10-31 | End: 2023-03-29 | Stop reason: HOSPADM

## 2019-10-31 RX ORDER — ONDANSETRON 2 MG/ML
INJECTION INTRAMUSCULAR; INTRAVENOUS
Status: DISCONTINUED | OUTPATIENT
Start: 2019-10-31 | End: 2019-10-31

## 2019-10-31 RX ORDER — OXYCODONE HYDROCHLORIDE 5 MG/1
5 TABLET ORAL
Status: DISCONTINUED | OUTPATIENT
Start: 2019-10-31 | End: 2019-10-31 | Stop reason: HOSPADM

## 2019-10-31 RX ORDER — PROPOFOL 10 MG/ML
VIAL (ML) INTRAVENOUS
Status: DISCONTINUED | OUTPATIENT
Start: 2019-10-31 | End: 2019-10-31

## 2019-10-31 RX ORDER — LIDOCAINE HYDROCHLORIDE 20 MG/ML
JELLY TOPICAL
Status: DISCONTINUED | OUTPATIENT
Start: 2019-10-31 | End: 2019-10-31 | Stop reason: HOSPADM

## 2019-10-31 RX ORDER — TRAMADOL HYDROCHLORIDE 50 MG/1
50 TABLET ORAL EVERY 8 HOURS PRN
Qty: 12 TABLET | Refills: 0 | Status: ON HOLD
Start: 2019-10-31 | End: 2023-03-29 | Stop reason: HOSPADM

## 2019-10-31 RX ORDER — MEPERIDINE HYDROCHLORIDE 50 MG/ML
12.5 INJECTION INTRAMUSCULAR; INTRAVENOUS; SUBCUTANEOUS EVERY 10 MIN PRN
Status: DISCONTINUED | OUTPATIENT
Start: 2019-10-31 | End: 2019-10-31 | Stop reason: HOSPADM

## 2019-10-31 RX ADMIN — HYDROMORPHONE HYDROCHLORIDE 0.2 MG: 1 INJECTION, SOLUTION INTRAMUSCULAR; INTRAVENOUS; SUBCUTANEOUS at 09:10

## 2019-10-31 RX ADMIN — FENTANYL CITRATE 50 MCG: 50 INJECTION INTRAMUSCULAR; INTRAVENOUS at 08:10

## 2019-10-31 RX ADMIN — LEVOFLOXACIN 500 MG: 500 INJECTION, SOLUTION INTRAVENOUS at 08:10

## 2019-10-31 RX ADMIN — SODIUM CHLORIDE, SODIUM LACTATE, POTASSIUM CHLORIDE, AND CALCIUM CHLORIDE: .6; .31; .03; .02 INJECTION, SOLUTION INTRAVENOUS at 08:10

## 2019-10-31 RX ADMIN — OXYCODONE HYDROCHLORIDE 5 MG: 5 TABLET ORAL at 09:10

## 2019-10-31 RX ADMIN — PROPOFOL 140 MG: 10 INJECTION, EMULSION INTRAVENOUS at 08:10

## 2019-10-31 RX ADMIN — LIDOCAINE HYDROCHLORIDE 50 MG: 20 INJECTION, SOLUTION EPIDURAL; INFILTRATION; INTRACAUDAL; PERINEURAL at 08:10

## 2019-10-31 RX ADMIN — ONDANSETRON 4 MG: 2 INJECTION INTRAMUSCULAR; INTRAVENOUS at 08:10

## 2019-10-31 NOTE — ANESTHESIA PREPROCEDURE EVALUATION
10/31/2019  Frank Wyatt is a 85 y.o., male.    Patient Active Problem List   Diagnosis    Incisional hernia, without obstruction or gangrene    Protein calorie malnutrition    Dyspnea    Renal insufficiency    Renal stone    Sinus tachycardia    UTI (urinary tract infection)     Past Surgical History:   Procedure Laterality Date    APPENDECTOMY      CARDIAC SURGERY  1997    CABG 5 vessel    CHOLECYSTECTOMY  2013    COLON SURGERY      Colon resection with colostomy; colostomy reversal. 2004    CORONARY ARTERY BYPASS GRAFT  1996    5-bypass     CYSTOSCOPY N/A 8/15/2019    Procedure: CYSTOSCOPY;  Surgeon: Dipak Sanchez MD;  Location: [facility];  Service: Urology;  Laterality: N/A;    EXTRACORPOREAL SHOCK WAVE LITHOTRIPSY Left 8/15/2019    Procedure: LITHOTRIPSY, ESWL;  Surgeon: Dipak Sanchez MD;  Location: [facility] OR;  Service: Urology;  Laterality: Left;    EXTRACORPOREAL SHOCK WAVE LITHOTRIPSY Left 9/19/2019    Procedure: LITHOTRIPSY, ESWL;  Surgeon: Dipak Sanchez MD;  Location: [facility] OR;  Service: Urology;  Laterality: Left;    EYE SURGERY Right 2014    Cataract    EYE SURGERY Left 2017    Cataract    HERNIA REPAIR  2014    Dr. Bailon - had to have mesh removed    INGUINAL HERNIA REPAIR Right 03/25/2019        LITHOTRIPSY      nephrostomy tube      PERCUTANEOUS NEPHROSTOMY      ROTATOR CUFF REPAIR  2005    right shoulder       Social History     Socioeconomic History    Marital status:      Spouse name: Not on file    Number of children: Not on file    Years of education: Not on file    Highest education level: Not on file   Occupational History    Not on file   Social Needs    Financial resource strain: Not on file    Food insecurity:     Worry: Not on file     Inability: Not on file    Transportation needs:     Medical: Not on file      Non-medical: Not on file   Tobacco Use    Smoking status: Former Smoker     Packs/day: 1.00     Years: 25.00     Pack years: 25.00     Last attempt to quit: 1972     Years since quittin.8    Smokeless tobacco: Former User     Quit date: 8/15/1972   Substance and Sexual Activity    Alcohol use: No    Drug use: No    Sexual activity: Not on file   Lifestyle    Physical activity:     Days per week: Not on file     Minutes per session: Not on file    Stress: Not on file   Relationships    Social connections:     Talks on phone: Not on file     Gets together: Not on file     Attends Baptism service: Not on file     Active member of club or organization: Not on file     Attends meetings of clubs or organizations: Not on file     Relationship status: Not on file   Other Topics Concern    Not on file   Social History Narrative    Not on file       Lab Results   Component Value Date    WBC 9.04 10/13/2019    HGB 14.5 10/13/2019    HCT 45.2 10/13/2019    MCV 94 10/13/2019     (L) 10/13/2019     BMP  Lab Results   Component Value Date     10/13/2019    K 4.5 10/13/2019     10/13/2019    CO2 25 10/13/2019    BUN 24 (H) 10/13/2019    CREATININE 1.0 10/13/2019    CALCIUM 8.4 (L) 10/13/2019    ANIONGAP 9 10/13/2019    ESTGFRAFRICA >60.0 10/13/2019    EGFRNONAA >60.0 10/13/2019         IMPRESSION:  No acute cardiac or pulmonary process.      Anesthesia Evaluation    I have reviewed the Patient Summary Reports.    I have reviewed the Nursing Notes.   I have reviewed the Medications.     Review of Systems  Anesthesia Hx:  History of prior surgery of interest to airway management or planning: (most recent - hernia repair 3/25/19) Denies Family Hx of Anesthesia complications.   Denies Personal Hx of Anesthesia complications.   Social:  Former Smoker    EENT/Dental:   Ears General/Symptom(s) Hx of Meniere's disease   Cardiovascular:   Hypertension CAD asymptomatic CABG/stent (5v )      Pulmonary:   Shortness of breath (reported, pt denies)    Renal/:   Chronic Renal Disease, CRI renal calculi    Musculoskeletal:   Arthritis     Endocrine:   Hypothyroidism (on synthroid, taken today)        Physical Exam  General:  Well nourished    Airway/Jaw/Neck:  Airway Findings: Mouth Opening: Normal Tongue: Normal  General Airway Assessment: Adult  Mallampati: II  TM Distance: Normal, at least 6 cm  Jaw/Neck Findings:  Neck ROM: Normal ROM      Dental:  Dental Findings: Lower Dentures, Upper Dentures   Chest/Lungs:  Chest/Lungs Findings: Clear to auscultation, Normal Respiratory Rate     Heart/Vascular:  Heart Findings: Rate: Normal  Rhythm: Regular Rhythm  Sounds: Normal        Mental Status:  Mental Status Findings:  Alert and Oriented         Anesthesia Plan  Type of Anesthesia, risks & benefits discussed:  Anesthesia Type:  general  Patient's Preference:   Intra-op Monitoring Plan: standard ASA monitors  Intra-op Monitoring Plan Comments:   Post Op Pain Control Plan:   Post Op Pain Control Plan Comments:   Induction:   IV  Beta Blocker:  Patient is on a Beta-Blocker and has received one dose within the past 24 hours (No further documentation required).       Informed Consent: Patient understands risks and agrees with Anesthesia plan.  Questions answered. Anesthesia consent signed with patient.  ASA Score: 3     Day of Surgery Review of History & Physical:  There are no significant changes.  H&P update referred to the surgeon.     Anesthesia Plan Notes: General, LMA okay.        Ready For Surgery From Anesthesia Perspective.

## 2019-10-31 NOTE — OP NOTE
Operative Note         SUMMARY     Surgery Date:  10/31/2019     Assistant:     Indications:   85-year-old male with a ureteral stent  Here for removal      Pre-op Diagnosis:   Ureteral stent    Post-op Diagnosis:  Same    Procedure:   ureteroscopy with stent removed    Anesthesia: General    Description of Procedure:   Patient brought to cystoscopy suite.  Placed in the cystoscopy position.  Patient is prepped and draped.  Anesthesia is given.  We enter the urinary bladder with the 21 fr cystoscope.  Once inside the bladder we identified the ureter  We placed a wire  Into the left ureter  With inflow the ureteral scope to the stent  It is grasped with an alligator forceps  We then removed the stent  Telescope was removed and he is brought to recovery    Findings/Key Components:          Estimated Blood Loss:        None

## 2019-10-31 NOTE — H&P
[facility]  History & Physical    SUBJECTIVE:     Chief Complaint/Reason for Admission:     History of Present Illness:  Patient is a 85 y.o. male presents with history of lithotripsy  He has got a stent that his migrated proximally into the ureter  We going to need to do ureteroscopy    PTA Medications   Medication Sig    atorvastatin (LIPITOR) 40 MG tablet Take 40 mg by mouth every evening.     dextran 70-hypromellose (ARTIFICIAL TEARS,[id number],) 0.1-0.3 % Drop Apply 1 drop to eye 2 (two) times daily.    furosemide (LASIX) 20 MG tablet Take 10 mg by mouth daily as needed. Based on weight gain and edema    levothyroxine (SYNTHROID) 25 MCG tablet Take 25 mcg by mouth once daily.     metoprolol tartrate (LOPRESSOR) 25 MG tablet Take 25 mg by mouth 2 (two) times daily.     SYMBICORT 160-4.5 mcg/actuation HFAA Inhale 2 puffs into the lungs every 12 (twelve) hours.     tamsulosin (FLOMAX) 0.4 mg Cap Take 0.4 mg by mouth once daily.        Review of patient's allergies indicates:   Allergen Reactions    Bactrim [sulfamethoxazole-trimethoprim] Hives     itched    Keflex [cephalexin] Rash    Morphine Rash     Patient had morphine and keflex at the same time, developed a rash, not sure which one caused it, or if it was a combination of the two meds.       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     CORONARY ARTERY BYPASS GRAFT      5-bypass     CYSTOSCOPY N/A 8/15/2019    Procedure: CYSTOSCOPY;  Surgeon: Dipak Sanchez MD;  Location: [facility] OR;  Service: Urology;  Laterality: N/A;    EXTRACORPOREAL SHOCK WAVE LITHOTRIPSY Left 8/15/2019    Procedure: LITHOTRIPSY, ESWL;  Surgeon: Dipak Sanchez MD;  Location: [facility] OR;  Service: Urology;  Laterality: Left;    EXTRACORPOREAL SHOCK WAVE LITHOTRIPSY Left 2019    Procedure: LITHOTRIPSY, ESWL;  Surgeon: Dipak Sanchez MD;  Location: [facility] OR;  Service: Urology;  Laterality: Left;    EYE SURGERY Right     Cataract    EYE SURGERY Left     Cataract    HERNIA REPAIR      Dr. Bailon - had to have mesh removed    INGUINAL HERNIA REPAIR Right 2019        LITHOTRIPSY      nephrostomy tube      PERCUTANEOUS NEPHROSTOMY      ROTATOR CUFF REPAIR      right shoulder     Family History   Problem Relation Age of Onset    Heart disease Mother     Hypertension Mother     Hypertension Sister     Heart disease Brother     Hypertension Brother     Cancer Daughter     Diabetes Brother     Heart disease Brother     Hypertension Brother     Hypertension Sister     Heart disease Sister      Social History     Tobacco Use    Smoking status: Former Smoker     Packs/day: 1.00     Years: 25.00     Pack years: 25.00     Last attempt to quit: 1972     Years since quittin.8    Smokeless tobacco: Former User     Quit date: 8/15/1972   Substance Use Topics    Alcohol use: No    Drug use: No        Review of Systems:      OBJECTIVE:     Vital Signs (Most Recent):  Temp: 98.1 °F (36.7 °C) (10/31/19 0722)  Pulse: 71 (10/31/19 0722)  Resp: 18 (10/31/19 0722)  BP: (!) 158/80 (10/31/19 0722)  SpO2: 96 % (10/31/19 0722)    Inpatient Medications:  Current Facility-Administered Medications   Medication Dose Route Frequency Provider Last Rate Last Dose     diphenhydrAMINE injection 12.5 mg  12.5 mg Intravenous Q15 Min PRN Mario Bruce MD        HYDROmorphone injection 0.2 mg  0.2 mg Intravenous Q3 Min PRN Mario Bruce MD        levoFLOXacin 500 mg/100 mL IVPB 500 mg  500 mg Intravenous Q24H Dipak Sanchez MD        meperidine injection 12.5 mg  12.5 mg Intravenous Q10 Min PRN Mario Bruce MD        ondansetron injection 4 mg  4 mg Intravenous Daily PRN Mario Bruce MD        oxyCODONE immediate release tablet 5 mg  5 mg Oral Q3H PRN Mario Bruce MD        promethazine (PHENERGAN) 6.25 mg in dextrose 5 % 50 mL IVPB  6.25 mg Intravenous Q10 Min PRN Mario Bruce MD        sodium chloride 0.9% flush 10 mL  10 mL Intravenous PRN Mario Bruce MD              ASSESSMENT/PLAN:       Retained ureteral stent after lithotripsy      Ureteroscopy with stent removal

## 2019-10-31 NOTE — DISCHARGE SUMMARY
Patient comes in for ureteroscopic stent removal which is done  No problems    Procedure is done w no complication    After a brief stay in recovery, patient is discharged in good condition    Meds given:  Ultram    F/u office:  2 weeks

## 2019-10-31 NOTE — ANESTHESIA POSTPROCEDURE EVALUATION
Anesthesia Post Evaluation    Patient: Frank Wyatt    Procedure(s) Performed: Procedure(s) (LRB):  CYSTOSCOPY (N/A)  CYSTOSCOPY, WITH URETERAL STENT REMOVAL  URETEROSCOPY (Left)  URETEROSCOPY (Left)    Final Anesthesia Type: general  Patient location during evaluation: PACU  Patient participation: Yes- Able to Participate  Level of consciousness: awake and alert and oriented  Post-procedure vital signs: reviewed and stable  Pain management: adequate  Airway patency: patent  PONV status at discharge: No PONV  Anesthetic complications: no      Cardiovascular status: blood pressure returned to baseline and hemodynamically stable  Respiratory status: unassisted, spontaneous ventilation and room air  Hydration status: euvolemic  Follow-up not needed.          Vitals Value Taken Time   /75 10/31/2019 10:00 AM   Temp 36.4 °C (97.6 °F) 10/31/2019  9:45 AM   Pulse 62 10/31/2019 10:13 AM   Resp 12 10/31/2019 10:13 AM   SpO2 96 % 10/31/2019 10:13 AM   Vitals shown include unvalidated device data.      No case tracking events are documented in the log.      Pain/Rufino Score: Pain Rating Prior to Med Admin: 7 (10/31/2019  9:56 AM)  Rufino Score: 9 (10/31/2019  9:45 AM)

## 2019-10-31 NOTE — DISCHARGE INSTRUCTIONS
Cystoscopy    Cystoscopy is a procedure that lets your doctor look directly inside your urethra and bladder. It can be used to:  · Help diagnose a problem with your urethra, bladder, or kidneys.  · Take a sample (biopsy) of bladder or urethral tissue.  · Treat certain problems (such as removing kidney stones).  · Place a stent to bypass an obstruction.  · Take special X-rays of the kidneys.  Based on the findings, your doctor may recommend other tests or treatments.  What is a cystoscope?  A cystoscope is a telescope-like instrument that contains lenses and fiberoptics (small glass wires that make bright light). The cystoscope may be straight and rigid, or flexible to bend around curves in the urethra. The doctor may look directly into the cystoscope, or project the image onto a monitor.  Getting ready  · Ask your doctor if you should stop taking any medicines before the procedure.  · Ask whether you should avoid eating or drinking anything after midnight before the procedure.  · Follow any other instructions your doctor gives you.  Tell your doctor before the exam if you:  · Take any medicines, such as aspirin or blood thinners  · Have allergies to any medicines  · Are pregnant   The procedure  Cystoscopy is done in the doctors office, surgery center, or hospital. The doctor and a nurse are present during the procedure. It takes only a few minutes, longer if a biopsy, X-ray, or treatment needs to be done.  During the procedure:  · You lie on an exam table on your back, knees bent and legs apart. You are covered with a drape.  · Your urethra and the area around it are washed. Anesthetic jelly may be applied to numb the urethra. Other pain medicine is usually not needed. In some cases, you may be offered a mild sedative to help you relax. If a more extensive procedure is to be done, such as a biopsy or kidney stone removal, general anesthesia may be needed.  · The cystoscope is inserted. A sterile fluid is put  into the bladder to expand it. You may feel pressure from this fluid.  · When the procedure is done, the cystoscope is removed.  After the procedure  If you had a sedative, general anesthesia, or spinal anesthesia, you must have someone drive you home. Once youre home:  · Drink plenty of fluids.  · You may have burning or light bleeding when you urinate--this is normal.  · Medicines may be prescribed to ease any discomfort or prevent infection. Take these as directed.  · Call your doctor if you have heavy bleeding or blood clots, burning that lasts more than a day, a fever over 100°F  (38° C), or trouble urinating.  Date Last Reviewed: 1/1/2017 © 2000-2017 [employer]. [address] [place] [address], [place] [address]. All rights reserved. This information is not intended as a substitute for professional medical care. Always follow your healthcare professional's instructions.        Discharge Instructions: After Your Surgery  Youve just had surgery. During surgery, you were given medicine called anesthesia to keep you relaxed and free of pain. After surgery, you may have some pain or nausea. This is common. Here are some tips for feeling better and getting well after surgery.     Stay on schedule with your medicine.   Going home  Your healthcare provider will show you how to take care of yourself when you go home. He or she will also answer your questions. Have an adult family member or friend drive you home. For the first 24 hours after your surgery:  · Do not drive or use heavy equipment.  · Do not make important decisions or sign legal papers.  · Do not drink alcohol.  · Have someone stay with you, if needed. He or she can watch for problems and help keep you safe.  Be sure to go to all follow-up visits with your healthcare provider. And rest after your surgery for as long as your healthcare provider tells you to.  Coping with pain  If you have pain after surgery, pain medicine will help you feel better.  Take it as told, before pain becomes severe. Also, ask your healthcare provider or pharmacist about other ways to control pain. This might be with heat, ice, or relaxation. And follow any other instructions your surgeon or nurse gives you.  Tips for taking pain medicine  To get the best relief possible, remember these points:  · Pain medicines can upset your stomach. Taking them with a little food may help.  · Most pain relievers taken by mouth need at least 20 to 30 minutes to start to work.  · Taking medicine on a schedule can help you remember to take it. Try to time your medicine so that you can take it before starting an activity. This might be before you get dressed, go for a walk, or sit down for dinner.  · Constipation is a common side effect of pain medicines. Call your healthcare provider before taking any medicines such as laxatives or stool softeners to help ease constipation. Also ask if you should skip any foods. Drinking lots of fluids and eating foods such as fruits and vegetables that are high in fiber can also help. Remember, do not take laxatives unless your surgeon has prescribed them.  · Drinking alcohol and taking pain medicine can cause dizziness and slow your breathing. It can even be deadly. Do not drink alcohol while taking pain medicine.  · Pain medicine can make you react more slowly to things. Do not drive or run machinery while taking pain medicine.  Your healthcare provider may tell you to take acetaminophen to help ease your pain. Ask him or her how much you are supposed to take each day. Acetaminophen or other pain relievers may interact with your prescription medicines or other over-the-counter (OTC) medicines. Some prescription medicines have acetaminophen and other ingredients. Using both prescription and OTC acetaminophen for pain can cause you to overdose. Read the labels on your OTC medicines with care. This will help you to clearly know the list of ingredients, how much to  take, and any warnings. It may also help you not take too much acetaminophen. If you have questions or do not understand the information, ask your pharmacist or healthcare provider to explain it to you before you take the OTC medicine.  Managing nausea  Some people have an upset stomach after surgery. This is often because of anesthesia, pain, or pain medicine, or the stress of surgery. These tips will help you handle nausea and eat healthy foods as you get better. If you were on a special food plan before surgery, ask your healthcare provider if you should follow it while you get better. These tips may help:  · Do not push yourself to eat. Your body will tell you when to eat and how much.  · Start off with clear liquids and soup. They are easier to digest.  · Next try semi-solid foods, such as mashed potatoes, applesauce, and gelatin, as you feel ready.  · Slowly move to solid foods. Dont eat fatty, rich, or spicy foods at first.  · Do not force yourself to have 3 large meals a day. Instead eat smaller amounts more often.  · Take pain medicines with a small amount of solid food, such as crackers or toast, to avoid nausea.     Call your surgeon if  · You still have pain an hour after taking medicine. The medicine may not be strong enough.  · You feel too sleepy, dizzy, or groggy. The medicine may be too strong.  · You have side effects like nausea, vomiting, or skin changes, such as rash, itching, or hives.       If you have obstructive sleep apnea  You were given anesthesia medicine during surgery to keep you comfortable and free of pain. After surgery, you may have more apnea spells because of this medicine and other medicines you were given. The spells may last longer than usual.   At home:  · Keep using the continuous positive airway pressure (CPAP) device when you sleep. Unless your healthcare provider tells you not to, use it when you sleep, day or night. CPAP is a common device used to treat obstructive  sleep apnea.  · Talk with your provider before taking any pain medicine, muscle relaxants, or sedatives. Your provider will tell you about the possible dangers of taking these medicines.  Date Last Reviewed: 12/1/2016  © 0834-7039 [employer]. [address] [place] [address]. All rights reserved. This information is not intended as a substitute for professional medical care. Always follow your healthcare professional's instructions.

## 2019-10-31 NOTE — TRANSFER OF CARE
"Anesthesia Transfer of Care Note    Patient: Frank Wyatt    Procedure(s) Performed: Procedure(s) (LRB):  CYSTOSCOPY (N/A)  CYSTOSCOPY, WITH URETERAL STENT REMOVAL  URETEROSCOPY (Left)    Patient location: PACU    Anesthesia Type: general    Transport from OR: Transported from OR on room air with adequate spontaneous ventilation    Post pain: adequate analgesia    Post assessment: no apparent anesthetic complications    Post vital signs: stable    Level of consciousness: awake    Nausea/Vomiting: no nausea/vomiting    Complications: none    Transfer of care protocol was followed      Last vitals:   Visit Vitals  BP (!) 158/80 (BP Location: Left arm)   Pulse 71   Temp 36.7 °C (98.1 °F) (Oral)   Resp 18   Ht 5' 8" (1.727 m)   Wt 88.8 kg (195 lb 12.3 oz)   SpO2 96%   BMI 29.77 kg/m²     "

## 2019-11-12 DIAGNOSIS — N20.0 URIC ACID NEPHROLITHIASIS: Primary | ICD-10-CM

## 2019-12-12 ENCOUNTER — HOSPITAL ENCOUNTER (OUTPATIENT)
Dept: RADIOLOGY | Facility: HOSPITAL | Age: 84
Discharge: HOME | End: 2019-12-12
Attending: NURSE PRACTITIONER
Payer: MEDICARE

## 2019-12-12 DIAGNOSIS — R05.9 COUGH: ICD-10-CM

## 2019-12-12 DIAGNOSIS — R05.9 COUGH: Primary | ICD-10-CM

## 2019-12-12 PROCEDURE — 71046 X-RAY EXAM CHEST 2 VIEWS: CPT | Mod: TC,PO

## 2019-12-28 ENCOUNTER — CLINICAL SUPPORT (OUTPATIENT)
Dept: URGENT CARE | Facility: CLINIC | Age: 84
End: 2019-12-28
Payer: MEDICARE

## 2019-12-28 VITALS
WEIGHT: 196 LBS | RESPIRATION RATE: 18 BRPM | HEART RATE: 85 BPM | BODY MASS INDEX: 29.7 KG/M2 | DIASTOLIC BLOOD PRESSURE: 74 MMHG | TEMPERATURE: 98 F | OXYGEN SATURATION: 95 % | SYSTOLIC BLOOD PRESSURE: 128 MMHG | HEIGHT: 68 IN

## 2019-12-28 DIAGNOSIS — J40 BRONCHITIS: Primary | ICD-10-CM

## 2019-12-28 PROCEDURE — 99214 OFFICE O/P EST MOD 30 MIN: CPT | Mod: S$GLB,,, | Performed by: NURSE PRACTITIONER

## 2019-12-28 PROCEDURE — 99214 PR OFFICE/OUTPT VISIT, EST, LEVL IV, 30-39 MIN: ICD-10-PCS | Mod: S$GLB,,, | Performed by: NURSE PRACTITIONER

## 2019-12-28 RX ORDER — PREDNISONE 20 MG/1
40 TABLET ORAL DAILY
Qty: 10 TABLET | Refills: 0 | Status: SHIPPED | OUTPATIENT
Start: 2019-12-28 | End: 2020-01-02

## 2019-12-28 RX ORDER — ALBUTEROL SULFATE 90 UG/1
2 AEROSOL, METERED RESPIRATORY (INHALATION) EVERY 4 HOURS PRN
Qty: 1 INHALER | Refills: 0 | Status: SHIPPED | OUTPATIENT
Start: 2019-12-28

## 2019-12-28 RX ORDER — DOXYCYCLINE 100 MG/1
100 CAPSULE ORAL 2 TIMES DAILY
Qty: 14 CAPSULE | Refills: 0 | Status: SHIPPED | OUTPATIENT
Start: 2019-12-28 | End: 2020-01-04

## 2019-12-28 NOTE — PATIENT INSTRUCTIONS
Bronchitis with Wheezing (Viral or Bacterial: Adult)    Bronchitis is an infection of the air passages. It often occurs during a cold and is usually caused by a virus. Symptoms include cough with mucus (phlegm) and low-grade fever. This illness is contagious during the first few days and is spread through the air by coughing and sneezing, or by direct contact (touching the sick person and then touching your own eyes, nose, or mouth).  If there is a lot of inflammation, air flow is restricted. The air passages may also go into spasm, especially if you have asthma. This causes wheezing and difficulty breathing even in people who do not have asthma.  Bronchitis usually lasts 7 to 14 days. The wheezing should improve with treatment during the first week. An inhaler is often prescribed to relax the air passages and stop wheezing. Antibiotics will be prescribed if your doctor thinks there is also a secondary bacterial infection.  Home care  · If symptoms are severe, rest at home for the first 2 to 3 days. When you go back to your usual activities, don't let yourself get too tired.  · Do not smoke. Also avoid being exposed to secondhand smoke.  · You may use over-the-counter medicine to control fever or pain, unless another medicine was prescribed. Note: If you have chronic liver or kidney disease or have ever had a stomach ulcer or gastrointestinal bleeding, talk with your healthcare provider before using these medicines. Also talk to your provider if you are taking medicine to prevent blood clots.) Aspirin should never be given to anyone younger than 18 years of age who is ill with a viral infection or fever. It may cause severe liver or brain damage.  · Your appetite may be poor, so a light diet is fine. Avoid dehydration by drinking 6 to 8 glasses of fluids per day (such as water, soft drinks, sports drinks, juices, tea, or soup). Extra fluids will help loosen secretions in the nose and lungs.  · Over-the-counter  cough, cold, and sore-throat medicines will not shorten the length of the illness, but they may be helpful to reduce symptoms. (Note: Do not use decongestants if you have high blood pressure.)  · If you were given an inhaler, use it exactly as directed. If you need to use it more often than prescribed, your condition may be worsening. If this happens, contact your healthcare provider.  · If prescribed, finish all antibiotic medicine, even if you are feeling better after only a few days.  Follow-up care  Follow up with your healthcare provider, or as advised. If you had an X-ray or ECG (electrocardiogram), a specialist will review it. You will be notified of any new findings that may affect your care.  Note: If you are age 65 or older, or if you have a chronic lung disease or condition that affects your immune system, or you smoke, talk to your healthcare provider about having a pneumococcal vaccinations and a yearly influenza vaccination (flu shot).  When to seek medical advice  Call your healthcare provider right away if any of these occur:  · Fever of 100.4°F (38°C) or higher  · Coughing up increasing amounts of colored sputum  · Weakness, drowsiness, headache, facial pain, ear pain, or a stiff neck  Call 911, or get immediate medical care  Contact emergency services right away if any of these occur.  · Coughing up blood  · Worsening weakness, drowsiness, headache, or stiff neck  · Increased wheezing not helped with medication, shortness of breath, or pain with breathing  Date Last Reviewed: 9/13/2015  © 0355-4465 [employer]. [address] [place] [address], [place] [address]. All rights reserved. This information is not intended as a substitute for professional medical care. Always follow your healthcare professional's instructions.

## 2019-12-28 NOTE — PROGRESS NOTES
"Subjective:       Patient ID: Frank Wyatt is a 85 y.o. male.    Vitals:  height is 5' 8" (1.727 m) and weight is 88.9 kg (196 lb). His temperature is 98.2 °F (36.8 °C). His blood pressure is 128/74 and his pulse is 85. His respiration is 18.     Chief Complaint: Sinus Problem    On Tuesday woke up and lip was swollen on top and had low grade fever     Sinus Problem   The current episode started 1 to 4 weeks ago. Associated symptoms include congestion and coughing. Treatments tried: robitussin, mucinex        Constitution: Positive for fever.   HENT: Positive for congestion.    Respiratory: Positive for cough and sputum production. Negative for bloody sputum.    Gastrointestinal: Negative for nausea, vomiting and diarrhea.       Objective:      Physical Exam   Constitutional: He is oriented to person, place, and time. He appears well-developed and well-nourished.   HENT:   Mouth/Throat: Oropharynx is clear and moist.   Eyes: Conjunctivae are normal.   Neck: Normal range of motion.   Cardiovascular: Normal rate and regular rhythm.   Pulmonary/Chest:   R posterior lower with mild end expiratory wheezing    Musculoskeletal: Normal range of motion.   Neurological: He is alert and oriented to person, place, and time.   Psychiatric: He has a normal mood and affect. His behavior is normal.   Nursing note and vitals reviewed.        Assessment:       1. Bronchitis        Plan:         Bronchitis    Other orders  -     predniSONE (DELTASONE) 20 MG tablet; Take 2 tablets (40 mg total) by mouth once daily. for 5 days  Dispense: 10 tablet; Refill: 0  -     doxycycline (VIBRAMYCIN) 100 MG Cap; Take 1 capsule (100 mg total) by mouth 2 (two) times daily. for 7 days  Dispense: 14 capsule; Refill: 0  -     albuterol (PROVENTIL/VENTOLIN HFA) 90 mcg/actuation inhaler; Inhale 2 puffs into the lungs every 4 (four) hours as needed for Wheezing. Rescue  Dispense: 1 Inhaler; Refill: 0    approx 3 weeks of productive cough, prior " records reviewed and received xray without evidence of pneumonia. Pt has productive cough and + end expiratory wheezing. Will treat for bronchitis. PCP follow up advised.

## 2020-02-18 ENCOUNTER — HOSPITAL ENCOUNTER (OUTPATIENT)
Dept: RADIOLOGY | Facility: HOSPITAL | Age: 85
Discharge: HOME | End: 2020-02-18
Attending: SPECIALIST
Payer: MEDICARE

## 2020-02-18 DIAGNOSIS — N20.0 URIC ACID NEPHROLITHIASIS: ICD-10-CM

## 2020-02-18 PROCEDURE — 74018 RADEX ABDOMEN 1 VIEW: CPT | Mod: TC,PO

## 2020-02-18 PROCEDURE — 74176 CT ABD & PELVIS W/O CONTRAST: CPT | Mod: TC,PO

## 2020-03-10 DIAGNOSIS — N20.0 URIC ACID NEPHROLITHIASIS: Primary | ICD-10-CM

## 2020-03-10 DIAGNOSIS — N20.1 CALCULUS OF URETER: ICD-10-CM

## 2020-08-11 ENCOUNTER — CLINICAL SUPPORT (OUTPATIENT)
Dept: CARDIOLOGY | Facility: HOSPITAL | Age: 85
DRG: 871 | End: 2020-08-11
Attending: INTERNAL MEDICINE
Payer: MEDICARE

## 2020-08-11 ENCOUNTER — HOSPITAL ENCOUNTER (INPATIENT)
Facility: HOSPITAL | Age: 85
LOS: 1 days | Discharge: HOME | DRG: 871 | End: 2020-08-12
Attending: EMERGENCY MEDICINE | Admitting: INTERNAL MEDICINE
Payer: MEDICARE

## 2020-08-11 DIAGNOSIS — J44.1 COPD EXACERBATION: ICD-10-CM

## 2020-08-11 DIAGNOSIS — A41.9 SEVERE SEPSIS: ICD-10-CM

## 2020-08-11 DIAGNOSIS — I50.9 CHF (CONGESTIVE HEART FAILURE): ICD-10-CM

## 2020-08-11 DIAGNOSIS — J96.01 ACUTE RESPIRATORY FAILURE WITH HYPOXIA: ICD-10-CM

## 2020-08-11 DIAGNOSIS — R65.20 SEVERE SEPSIS: ICD-10-CM

## 2020-08-11 DIAGNOSIS — J18.9 ATYPICAL PNEUMONIA: ICD-10-CM

## 2020-08-11 DIAGNOSIS — R06.02 SOB (SHORTNESS OF BREATH): ICD-10-CM

## 2020-08-11 DIAGNOSIS — J18.9 PNEUMONIA OF BOTH LOWER LOBES DUE TO INFECTIOUS ORGANISM: Primary | ICD-10-CM

## 2020-08-11 DIAGNOSIS — N30.00 ACUTE CYSTITIS WITHOUT HEMATURIA: ICD-10-CM

## 2020-08-11 PROBLEM — R79.89 TROPONIN LEVEL ELEVATED: Status: ACTIVE | Noted: 2020-08-11

## 2020-08-11 LAB
ABO + RH BLD: NORMAL
ALBUMIN SERPL BCP-MCNC: 4.1 G/DL (ref 3.5–5.2)
ALLENS TEST: ABNORMAL
ALP SERPL-CCNC: 142 U/L (ref 55–135)
ALT SERPL W/O P-5'-P-CCNC: 80 U/L (ref 10–44)
ANION GAP SERPL CALC-SCNC: 10 MMOL/L (ref 8–16)
AORTIC ROOT ANNULUS: 3.28 CM
AORTIC VALVE CUSP SEPERATION: 0.78 CM
APTT PPP: 33 SEC (ref 23.6–33.3)
AST SERPL-CCNC: 49 U/L (ref 10–40)
AV INDEX (PROSTH): 0.46
AV MEAN GRADIENT: 15 MMHG
AV PEAK GRADIENT: 26 MMHG
AV VALVE AREA: 1.46 CM2
AV VELOCITY RATIO: 49.66
BACTERIA #/AREA URNS HPF: ABNORMAL /HPF
BASOPHILS # BLD AUTO: 0.04 K/UL (ref 0–0.2)
BASOPHILS NFR BLD: 0.4 % (ref 0–1.9)
BILIRUB SERPL-MCNC: 1.6 MG/DL (ref 0.1–1)
BILIRUB UR QL STRIP: NEGATIVE
BLD GP AB SCN CELLS X3 SERPL QL: NORMAL
BNP SERPL-MCNC: 345 PG/ML (ref 0–99)
BSA FOR ECHO PROCEDURE: 2.14 M2
BUN SERPL-MCNC: 28 MG/DL (ref 8–23)
CALCIUM SERPL-MCNC: 9 MG/DL (ref 8.7–10.5)
CHLORIDE SERPL-SCNC: 98 MMOL/L (ref 95–110)
CLARITY UR: CLEAR
CO2 SERPL-SCNC: 27 MMOL/L (ref 23–29)
COLOR UR: YELLOW
CREAT SERPL-MCNC: 1.4 MG/DL (ref 0.5–1.4)
CRP SERPL-MCNC: 8.58 MG/DL
CV ECHO LV RWT: 0.44 CM
D DIMER PPP IA.FEU-MCNC: 1.16 UG/ML FEU
DELSYS: ABNORMAL
DIFFERENTIAL METHOD: ABNORMAL
DOP CALC AO PEAK VEL: 2.54 M/S
DOP CALC AO VTI: 65.87 CM
DOP CALC LVOT AREA: 3.2 CM2
DOP CALC LVOT DIAMETER: 2.01 CM
DOP CALC LVOT PEAK VEL: 126.13 M/S
DOP CALC LVOT STROKE VOLUME: 96.22 CM3
DOP CALCLVOT PEAK VEL VTI: 30.34 CM
E WAVE DECELERATION TIME: 336.76 MSEC
E/A RATIO: 1.06
E/E' RATIO: 24.83 M/S
ECHO LV POSTERIOR WALL: 0.89 CM (ref 0.6–1.1)
EOSINOPHIL # BLD AUTO: 0.3 K/UL (ref 0–0.5)
EOSINOPHIL NFR BLD: 3.1 % (ref 0–8)
ERYTHROCYTE [DISTWIDTH] IN BLOOD BY AUTOMATED COUNT: 14.5 % (ref 11.5–14.5)
EST. GFR  (AFRICAN AMERICAN): 52.6 ML/MIN/1.73 M^2
EST. GFR  (NON AFRICAN AMERICAN): 45.5 ML/MIN/1.73 M^2
FERRITIN SERPL-MCNC: 83 NG/ML (ref 20–300)
FRACTIONAL SHORTENING: 29 % (ref 28–44)
GLUCOSE SERPL-MCNC: 221 MG/DL (ref 70–110)
GLUCOSE SERPL-MCNC: 281 MG/DL (ref 70–110)
GLUCOSE UR QL STRIP: ABNORMAL
HCO3 UR-SCNC: 24.7 MMOL/L (ref 24–28)
HCT VFR BLD AUTO: 50.5 % (ref 40–54)
HGB BLD-MCNC: 16.3 G/DL (ref 14–18)
HGB UR QL STRIP: ABNORMAL
HYALINE CASTS #/AREA URNS LPF: 35 /LPF
IMM GRANULOCYTES # BLD AUTO: 0.07 K/UL (ref 0–0.04)
IMM GRANULOCYTES NFR BLD AUTO: 0.6 % (ref 0–0.5)
INFLUENZA A, MOLECULAR: NEGATIVE
INFLUENZA B, MOLECULAR: NEGATIVE
INR PPP: 1.1
INTERVENTRICULAR SEPTUM: 0.88 CM (ref 0.6–1.1)
KETONES UR QL STRIP: NEGATIVE
LACTATE SERPL-SCNC: 2.1 MMOL/L (ref 0.5–1.9)
LACTATE SERPL-SCNC: 2.3 MMOL/L (ref 0.5–1.9)
LACTATE SERPL-SCNC: 3.3 MMOL/L (ref 0.5–1.9)
LACTATE SERPL-SCNC: 3.3 MMOL/L (ref 0.5–1.9)
LACTATE SERPL-SCNC: 3.5 MMOL/L (ref 0.5–1.9)
LDH SERPL L TO P-CCNC: 139 U/L (ref 110–260)
LEFT ATRIUM SIZE: 4.16 CM
LEFT INTERNAL DIMENSION IN SYSTOLE: 2.91 CM (ref 2.1–4)
LEFT VENTRICLE DIASTOLIC VOLUME INDEX: 56.23 ML/M2
LEFT VENTRICLE DIASTOLIC VOLUME: 117.46 ML
LEFT VENTRICLE MASS INDEX: 53 G/M2
LEFT VENTRICLE SYSTOLIC VOLUME INDEX: 33.7 ML/M2
LEFT VENTRICLE SYSTOLIC VOLUME: 70.32 ML
LEFT VENTRICULAR INTERNAL DIMENSION IN DIASTOLE: 4.07 CM (ref 3.5–6)
LEFT VENTRICULAR MASS: 110.22 G
LEUKOCYTE ESTERASE UR QL STRIP: ABNORMAL
LV LATERAL E/E' RATIO: 24.83 M/S
LV SEPTAL E/E' RATIO: 24.83 M/S
LYMPHOCYTES # BLD AUTO: 1.7 K/UL (ref 1–4.8)
LYMPHOCYTES NFR BLD: 15.7 % (ref 18–48)
MAGNESIUM SERPL-MCNC: 1.6 MG/DL (ref 1.6–2.6)
MCH RBC QN AUTO: 30.2 PG (ref 27–31)
MCHC RBC AUTO-ENTMCNC: 32.3 G/DL (ref 32–36)
MCV RBC AUTO: 94 FL (ref 82–98)
MICROSCOPIC COMMENT: ABNORMAL
MONOCYTES # BLD AUTO: 0.8 K/UL (ref 0.3–1)
MONOCYTES NFR BLD: 7 % (ref 4–15)
MV PEAK A VEL: 1.41 M/S
MV PEAK E VEL: 1.49 M/S
NEUTROPHILS # BLD AUTO: 8 K/UL (ref 1.8–7.7)
NEUTROPHILS NFR BLD: 73.2 % (ref 38–73)
NITRITE UR QL STRIP: NEGATIVE
NRBC BLD-RTO: 0 /100 WBC
PCO2 BLDA: 46 MMHG (ref 35–45)
PH SMN: 7.34 [PH] (ref 7.35–7.45)
PH UR STRIP: 6 [PH] (ref 5–8)
PHOSPHATE SERPL-MCNC: 4.6 MG/DL (ref 2.7–4.5)
PISA TR MAX VEL: 3.05 M/S
PLATELET # BLD AUTO: 93 K/UL (ref 150–350)
PMV BLD AUTO: 10.3 FL (ref 9.2–12.9)
PO2 BLDA: 118 MMHG (ref 80–100)
POC BE: -1 MMOL/L
POC SATURATED O2: 98 % (ref 95–100)
POC TCO2: 26 MMOL/L (ref 23–27)
POTASSIUM SERPL-SCNC: 4 MMOL/L (ref 3.5–5.1)
PROCALCITONIN SERPL IA-MCNC: 3.66 NG/ML (ref 0–0.5)
PROT SERPL-MCNC: 7.7 G/DL (ref 6–8.4)
PROT UR QL STRIP: ABNORMAL
PROTHROMBIN TIME: 14.1 SEC (ref 10.6–14.8)
PV PEAK VELOCITY: 97.78 CM/S
RA PRESSURE: 15 MMHG
RBC # BLD AUTO: 5.39 M/UL (ref 4.6–6.2)
RBC #/AREA URNS HPF: 2 /HPF (ref 0–4)
RIGHT VENTRICULAR END-DIASTOLIC DIMENSION: 309 CM
SAMPLE: ABNORMAL
SARS-COV-2 IGG SERPLBLD QL IA.RAPID: NEGATIVE
SARS-COV-2 RDRP RESP QL NAA+PROBE: NEGATIVE
SITE: ABNORMAL
SODIUM SERPL-SCNC: 135 MMOL/L (ref 136–145)
SP GR UR STRIP: 1.02 (ref 1–1.03)
SPECIMEN SOURCE: NORMAL
SQUAMOUS #/AREA URNS HPF: 0 /HPF
TDI LATERAL: 0.06 M/S
TDI SEPTAL: 0.06 M/S
TDI: 0.06 M/S
TR MAX PG: 37 MMHG
TROPONIN I SERPL DL<=0.01 NG/ML-MCNC: 0.06 NG/ML
TV REST PULMONARY ARTERY PRESSURE: 52 MMHG
URN SPEC COLLECT METH UR: ABNORMAL
UROBILINOGEN UR STRIP-ACNC: NEGATIVE EU/DL
WBC # BLD AUTO: 10.99 K/UL (ref 3.9–12.7)
WBC #/AREA URNS HPF: 84 /HPF (ref 0–5)

## 2020-08-11 PROCEDURE — 99900035 HC TECH TIME PER 15 MIN (STAT)

## 2020-08-11 PROCEDURE — 96365 THER/PROPH/DIAG IV INF INIT: CPT

## 2020-08-11 PROCEDURE — 99291 CRITICAL CARE FIRST HOUR: CPT | Mod: ,,, | Performed by: INTERNAL MEDICINE

## 2020-08-11 PROCEDURE — 99291 PR CRITICAL CARE, E/M 30-74 MINUTES: ICD-10-PCS | Mod: ,,, | Performed by: INTERNAL MEDICINE

## 2020-08-11 PROCEDURE — 99291 CRITICAL CARE FIRST HOUR: CPT

## 2020-08-11 PROCEDURE — 82803 BLOOD GASES ANY COMBINATION: CPT

## 2020-08-11 PROCEDURE — 83615 LACTATE (LD) (LDH) ENZYME: CPT

## 2020-08-11 PROCEDURE — 94640 AIRWAY INHALATION TREATMENT: CPT

## 2020-08-11 PROCEDURE — 63600175 PHARM REV CODE 636 W HCPCS: Performed by: NURSE PRACTITIONER

## 2020-08-11 PROCEDURE — 99900031 HC PATIENT EDUCATION (STAT)

## 2020-08-11 PROCEDURE — 94644 CONT INHLJ TX 1ST HOUR: CPT

## 2020-08-11 PROCEDURE — 87502 INFLUENZA DNA AMP PROBE: CPT

## 2020-08-11 PROCEDURE — 25000003 PHARM REV CODE 250

## 2020-08-11 PROCEDURE — 96374 THER/PROPH/DIAG INJ IV PUSH: CPT | Mod: 59

## 2020-08-11 PROCEDURE — 84145 PROCALCITONIN (PCT): CPT

## 2020-08-11 PROCEDURE — 85025 COMPLETE CBC W/AUTO DIFF WBC: CPT

## 2020-08-11 PROCEDURE — 85610 PROTHROMBIN TIME: CPT

## 2020-08-11 PROCEDURE — 99223 1ST HOSP IP/OBS HIGH 75: CPT | Mod: ,,, | Performed by: INTERNAL MEDICINE

## 2020-08-11 PROCEDURE — 87040 BLOOD CULTURE FOR BACTERIA: CPT | Mod: 59

## 2020-08-11 PROCEDURE — 83735 ASSAY OF MAGNESIUM: CPT

## 2020-08-11 PROCEDURE — 85730 THROMBOPLASTIN TIME PARTIAL: CPT

## 2020-08-11 PROCEDURE — 81001 URINALYSIS AUTO W/SCOPE: CPT

## 2020-08-11 PROCEDURE — 93306 TTE W/DOPPLER COMPLETE: CPT

## 2020-08-11 PROCEDURE — 12000002 HC ACUTE/MED SURGE SEMI-PRIVATE ROOM

## 2020-08-11 PROCEDURE — 80053 COMPREHEN METABOLIC PANEL: CPT

## 2020-08-11 PROCEDURE — 25000003 PHARM REV CODE 250: Performed by: EMERGENCY MEDICINE

## 2020-08-11 PROCEDURE — 25000242 PHARM REV CODE 250 ALT 637 W/ HCPCS: Performed by: INTERNAL MEDICINE

## 2020-08-11 PROCEDURE — 86769 SARS-COV-2 COVID-19 ANTIBODY: CPT

## 2020-08-11 PROCEDURE — 84484 ASSAY OF TROPONIN QUANT: CPT

## 2020-08-11 PROCEDURE — 25000003 PHARM REV CODE 250: Performed by: NURSE PRACTITIONER

## 2020-08-11 PROCEDURE — 82728 ASSAY OF FERRITIN: CPT

## 2020-08-11 PROCEDURE — 81003 URINALYSIS AUTO W/O SCOPE: CPT

## 2020-08-11 PROCEDURE — 84145 PROCALCITONIN (PCT): CPT | Mod: 91

## 2020-08-11 PROCEDURE — 83880 ASSAY OF NATRIURETIC PEPTIDE: CPT

## 2020-08-11 PROCEDURE — 86140 C-REACTIVE PROTEIN: CPT

## 2020-08-11 PROCEDURE — 25000242 PHARM REV CODE 250 ALT 637 W/ HCPCS: Performed by: EMERGENCY MEDICINE

## 2020-08-11 PROCEDURE — 83605 ASSAY OF LACTIC ACID: CPT | Mod: 91

## 2020-08-11 PROCEDURE — 93005 ELECTROCARDIOGRAM TRACING: CPT | Performed by: INTERNAL MEDICINE

## 2020-08-11 PROCEDURE — 87086 URINE CULTURE/COLONY COUNT: CPT

## 2020-08-11 PROCEDURE — 99223 PR INITIAL HOSPITAL CARE,LEVL III: ICD-10-PCS | Mod: ,,, | Performed by: INTERNAL MEDICINE

## 2020-08-11 PROCEDURE — 36415 COLL VENOUS BLD VENIPUNCTURE: CPT

## 2020-08-11 PROCEDURE — U0002 COVID-19 LAB TEST NON-CDC: HCPCS

## 2020-08-11 PROCEDURE — 63600175 PHARM REV CODE 636 W HCPCS: Performed by: EMERGENCY MEDICINE

## 2020-08-11 PROCEDURE — 83605 ASSAY OF LACTIC ACID: CPT

## 2020-08-11 PROCEDURE — 36600 WITHDRAWAL OF ARTERIAL BLOOD: CPT

## 2020-08-11 PROCEDURE — 86850 RBC ANTIBODY SCREEN: CPT

## 2020-08-11 PROCEDURE — 27000221 HC OXYGEN, UP TO 24 HOURS

## 2020-08-11 PROCEDURE — 96375 TX/PRO/DX INJ NEW DRUG ADDON: CPT

## 2020-08-11 PROCEDURE — 84100 ASSAY OF PHOSPHORUS: CPT

## 2020-08-11 PROCEDURE — 63600175 PHARM REV CODE 636 W HCPCS: Performed by: INTERNAL MEDICINE

## 2020-08-11 PROCEDURE — 94761 N-INVAS EAR/PLS OXIMETRY MLT: CPT

## 2020-08-11 PROCEDURE — 85379 FIBRIN DEGRADATION QUANT: CPT

## 2020-08-11 RX ORDER — IPRATROPIUM BROMIDE 0.5 MG/2.5ML
1 SOLUTION RESPIRATORY (INHALATION)
Status: COMPLETED | OUTPATIENT
Start: 2020-08-11 | End: 2020-08-11

## 2020-08-11 RX ORDER — SODIUM CHLORIDE 0.9 % (FLUSH) 0.9 %
10 SYRINGE (ML) INJECTION
Status: DISCONTINUED | OUTPATIENT
Start: 2020-08-11 | End: 2020-08-12 | Stop reason: HOSPADM

## 2020-08-11 RX ORDER — METOPROLOL TARTRATE 25 MG/1
25 TABLET, FILM COATED ORAL 2 TIMES DAILY
Status: DISCONTINUED | OUTPATIENT
Start: 2020-08-11 | End: 2020-08-12 | Stop reason: HOSPADM

## 2020-08-11 RX ORDER — ALBUTEROL SULFATE 2.5 MG/.5ML
5 SOLUTION RESPIRATORY (INHALATION) ONCE
Status: DISCONTINUED | OUTPATIENT
Start: 2020-08-11 | End: 2020-08-11

## 2020-08-11 RX ORDER — ATORVASTATIN CALCIUM 40 MG/1
40 TABLET, FILM COATED ORAL NIGHTLY
Status: DISCONTINUED | OUTPATIENT
Start: 2020-08-11 | End: 2020-08-12 | Stop reason: HOSPADM

## 2020-08-11 RX ORDER — FLUTICASONE FUROATE AND VILANTEROL 100; 25 UG/1; UG/1
1 POWDER RESPIRATORY (INHALATION) DAILY
Status: DISCONTINUED | OUTPATIENT
Start: 2020-08-11 | End: 2020-08-12 | Stop reason: HOSPADM

## 2020-08-11 RX ORDER — MAGNESIUM SULFATE HEPTAHYDRATE 40 MG/ML
2 INJECTION, SOLUTION INTRAVENOUS ONCE
Status: COMPLETED | OUTPATIENT
Start: 2020-08-11 | End: 2020-08-11

## 2020-08-11 RX ORDER — INSULIN ASPART 100 [IU]/ML
0-5 INJECTION, SOLUTION INTRAVENOUS; SUBCUTANEOUS
Status: DISCONTINUED | OUTPATIENT
Start: 2020-08-11 | End: 2020-08-12 | Stop reason: HOSPADM

## 2020-08-11 RX ORDER — ALBUTEROL SULFATE 90 UG/1
2 AEROSOL, METERED RESPIRATORY (INHALATION) EVERY 8 HOURS
Status: DISCONTINUED | OUTPATIENT
Start: 2020-08-11 | End: 2020-08-11

## 2020-08-11 RX ORDER — ONDANSETRON 2 MG/ML
8 INJECTION INTRAMUSCULAR; INTRAVENOUS
Status: COMPLETED | OUTPATIENT
Start: 2020-08-11 | End: 2020-08-11

## 2020-08-11 RX ORDER — ALBUTEROL SULFATE 0.83 MG/ML
10 SOLUTION RESPIRATORY (INHALATION) CONTINUOUS
Status: DISCONTINUED | OUTPATIENT
Start: 2020-08-11 | End: 2020-08-11

## 2020-08-11 RX ORDER — ALBUTEROL SULFATE 90 UG/1
2 AEROSOL, METERED RESPIRATORY (INHALATION) EVERY 4 HOURS PRN
Status: DISCONTINUED | OUTPATIENT
Start: 2020-08-11 | End: 2020-08-11

## 2020-08-11 RX ORDER — ALBUTEROL SULFATE 90 UG/1
2 AEROSOL, METERED RESPIRATORY (INHALATION) EVERY 6 HOURS PRN
Status: DISCONTINUED | OUTPATIENT
Start: 2020-08-11 | End: 2020-08-12 | Stop reason: HOSPADM

## 2020-08-11 RX ORDER — GLUCAGON 1 MG
1 KIT INJECTION
Status: DISCONTINUED | OUTPATIENT
Start: 2020-08-11 | End: 2020-08-12 | Stop reason: HOSPADM

## 2020-08-11 RX ORDER — IBUPROFEN 200 MG
16 TABLET ORAL
Status: DISCONTINUED | OUTPATIENT
Start: 2020-08-11 | End: 2020-08-12 | Stop reason: HOSPADM

## 2020-08-11 RX ORDER — LEVOFLOXACIN 5 MG/ML
750 INJECTION, SOLUTION INTRAVENOUS
Status: DISCONTINUED | OUTPATIENT
Start: 2020-08-13 | End: 2020-08-11

## 2020-08-11 RX ORDER — LEVOTHYROXINE SODIUM 25 UG/1
25 TABLET ORAL DAILY
Status: DISCONTINUED | OUTPATIENT
Start: 2020-08-11 | End: 2020-08-12 | Stop reason: HOSPADM

## 2020-08-11 RX ORDER — LEVOFLOXACIN 5 MG/ML
750 INJECTION, SOLUTION INTRAVENOUS
Status: DISCONTINUED | OUTPATIENT
Start: 2020-08-11 | End: 2020-08-11

## 2020-08-11 RX ORDER — IPRATROPIUM BROMIDE AND ALBUTEROL SULFATE 2.5; .5 MG/3ML; MG/3ML
3 SOLUTION RESPIRATORY (INHALATION)
Status: DISCONTINUED | OUTPATIENT
Start: 2020-08-11 | End: 2020-08-11

## 2020-08-11 RX ORDER — ENOXAPARIN SODIUM 100 MG/ML
30 INJECTION SUBCUTANEOUS EVERY 24 HOURS
Status: DISCONTINUED | OUTPATIENT
Start: 2020-08-11 | End: 2020-08-12 | Stop reason: HOSPADM

## 2020-08-11 RX ORDER — TAMSULOSIN HYDROCHLORIDE 0.4 MG/1
0.4 CAPSULE ORAL DAILY
Status: DISCONTINUED | OUTPATIENT
Start: 2020-08-11 | End: 2020-08-12 | Stop reason: HOSPADM

## 2020-08-11 RX ORDER — MUPIROCIN 20 MG/G
OINTMENT TOPICAL 2 TIMES DAILY
Status: DISCONTINUED | OUTPATIENT
Start: 2020-08-11 | End: 2020-08-12 | Stop reason: HOSPADM

## 2020-08-11 RX ORDER — IBUPROFEN 200 MG
24 TABLET ORAL
Status: DISCONTINUED | OUTPATIENT
Start: 2020-08-11 | End: 2020-08-12 | Stop reason: HOSPADM

## 2020-08-11 RX ORDER — CHLORHEXIDINE GLUCONATE ORAL RINSE 1.2 MG/ML
15 SOLUTION DENTAL 2 TIMES DAILY
Status: DISCONTINUED | OUTPATIENT
Start: 2020-08-11 | End: 2020-08-12 | Stop reason: HOSPADM

## 2020-08-11 RX ORDER — METHYLPREDNISOLONE SOD SUCC 125 MG
125 VIAL (EA) INJECTION
Status: COMPLETED | OUTPATIENT
Start: 2020-08-11 | End: 2020-08-11

## 2020-08-11 RX ORDER — LEVOFLOXACIN 5 MG/ML
750 INJECTION, SOLUTION INTRAVENOUS
Status: COMPLETED | OUTPATIENT
Start: 2020-08-11 | End: 2020-08-11

## 2020-08-11 RX ORDER — ALBUTEROL SULFATE 0.83 MG/ML
10 SOLUTION RESPIRATORY (INHALATION) ONCE
Status: DISCONTINUED | OUTPATIENT
Start: 2020-08-11 | End: 2020-08-11

## 2020-08-11 RX ADMIN — ATORVASTATIN CALCIUM 40 MG: 40 TABLET, FILM COATED ORAL at 09:08

## 2020-08-11 RX ADMIN — MAGNESIUM SULFATE 2 G: 2 INJECTION INTRAVENOUS at 02:08

## 2020-08-11 RX ADMIN — ALBUTEROL SULFATE 10 MG: 2.5 SOLUTION RESPIRATORY (INHALATION) at 02:08

## 2020-08-11 RX ADMIN — LEVOFLOXACIN 750 MG: 750 INJECTION, SOLUTION INTRAVENOUS at 04:08

## 2020-08-11 RX ADMIN — INSULIN ASPART 1 UNITS: 100 INJECTION, SOLUTION INTRAVENOUS; SUBCUTANEOUS at 10:08

## 2020-08-11 RX ADMIN — IPRATROPIUM BROMIDE 1 MG: 0.5 SOLUTION RESPIRATORY (INHALATION) at 02:08

## 2020-08-11 RX ADMIN — ALBUTEROL SULFATE 2 PUFF: 90 AEROSOL, METERED RESPIRATORY (INHALATION) at 08:08

## 2020-08-11 RX ADMIN — SODIUM CHLORIDE, SODIUM LACTATE, POTASSIUM CHLORIDE, AND CALCIUM CHLORIDE 2667 ML: .6; .31; .03; .02 INJECTION, SOLUTION INTRAVENOUS at 06:08

## 2020-08-11 RX ADMIN — METOPROLOL TARTRATE 25 MG: 25 TABLET, FILM COATED ORAL at 09:08

## 2020-08-11 RX ADMIN — MUPIROCIN: 20 OINTMENT TOPICAL at 09:08

## 2020-08-11 RX ADMIN — METHYLPREDNISOLONE SODIUM SUCCINATE 125 MG: 125 INJECTION, POWDER, FOR SOLUTION INTRAMUSCULAR; INTRAVENOUS at 02:08

## 2020-08-11 RX ADMIN — ONDANSETRON 8 MG: 2 SOLUTION INTRAMUSCULAR; INTRAVENOUS at 02:08

## 2020-08-11 RX ADMIN — FLUTICASONE FUROATE AND VILANTEROL TRIFENATATE 1 PUFF: 100; 25 POWDER RESPIRATORY (INHALATION) at 11:08

## 2020-08-11 RX ADMIN — CHLORHEXIDINE GLUCONATE 15 ML: 1.2 RINSE ORAL at 08:08

## 2020-08-11 RX ADMIN — METOPROLOL TARTRATE 25 MG: 25 TABLET, FILM COATED ORAL at 08:08

## 2020-08-11 RX ADMIN — CHLORHEXIDINE GLUCONATE 15 ML: 1.2 RINSE ORAL at 09:08

## 2020-08-11 RX ADMIN — PIPERACILLIN SODIUM AND TAZOBACTAM SODIUM 4.5 G: 4; .5 INJECTION, POWDER, LYOPHILIZED, FOR SOLUTION INTRAVENOUS at 06:08

## 2020-08-11 RX ADMIN — TAMSULOSIN HYDROCHLORIDE 0.4 MG: 0.4 CAPSULE ORAL at 08:08

## 2020-08-11 RX ADMIN — LEVOTHYROXINE SODIUM 25 MCG: 25 TABLET ORAL at 08:08

## 2020-08-11 RX ADMIN — ACETAMINOPHEN 975 MG: 325 SUPPOSITORY RECTAL at 02:08

## 2020-08-11 RX ADMIN — ENOXAPARIN SODIUM 30 MG: 30 INJECTION SUBCUTANEOUS at 05:08

## 2020-08-11 RX ADMIN — CEFTRIAXONE 1 G: 1 INJECTION, SOLUTION INTRAVENOUS at 12:08

## 2020-08-11 NOTE — RESPIRATORY THERAPY
08/11/20 0209   Patient Assessment/Suction   Level of Consciousness (AVPU) alert   PRE-TX-O2   O2 Device (Oxygen Therapy) BiPAP   Oxygen Concentration (%) 50   Resp (!) 38   Ready to Wean/Extubation Screen   FIO2<=50 (chart decimal) 0.5   Preset CPAP/BiPAP Settings   Mode Of Delivery BiPAP S/T   $ Initial CPAP/BiPAP Setup? Yes   $ Is patient using? Yes   Ipap 16   EPAP (cm H2O) 10   Pressure Support (cm H2O) 6   Patient CPAP/BiPAP Settings   RR Total (Breaths/Min) 38

## 2020-08-11 NOTE — H&P
[facility] Medicine History & Physical Examination   Patient Name: Frank Wyatt  MRN: [id number]  Patient Class: Emergency   Admission Date: 8/11/2020  2:02 AM  Length of Stay: 0  Attending Physician:   Primary Care Provider: Td Begum MD  Face-to-Face encounter date: 08/11/2020  Code Status:DNR  MPOA:Daughter  Chief Complaint: Shortness of Breath        Patient information was obtained from patient, past medical records and ER records.   HISTORY OF PRESENT ILLNESS:   Frank Wyatt is a 85 y.o. 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 The patient presented to [facility] on 8/11/2020 with a primary complaint of Shortness of Breath  .   85 year old  male presents emergency room with a sudden onset of fever chills and shortness of breath with generalized weakness.      According to the patient's daughter the patient had been aggressive and attempting to protect himself for the from the COVID-19 infection.  He wore gloves and use hand  frequently and wears a mask  every time he went to Public.      Unfortunately last Friday he went to the [employer] and played on the slot machines.  Although he did use hand  and wore a face mask  he did not have glasses are goggles on.      According to the daughter for the past 2 days the patient had been having some mild fatigue and then yesterday he developed a temperature of 99.0° was complaint of headache shortness of breath and a cough.      A few hours later his temperature went to 102.8 and the family became concerned and brought him  to urgent care for further evaluation.      Was tested at the urgent care for COVID-19 virus and was negative.  Because the patient has chronic recurrent urinary tract infections that urgent care clinic recommended that he come to the emergency room for further evaluation.  In our emergency room the patient was found have a temperature of a 103.8°.  He was given acetaminophen for a couple hours later his that his temperature was still elevated at 102.9.      The patient is mildly confused.  But his daughter does answer questions about his health history.  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  Nonetheless he was tested again for the COVID-19 virus and was negative but his chest x-ray demonstrates a COVID-19 type pneumonia with the bilateral patchy infiltrates throughout the lung.    REVIEW OF SYSTEMS:   10 Point Review of System was performed and was found to be negative except for that mentioned already in the HPI and   Review of Systems (Negative unless checked off)  Review of Systems   Constitutional: Positive for chills, fever and malaise/fatigue.   HENT: Negative.    Respiratory: Positive for cough, sputum production and shortness of breath.    Cardiovascular: Positive for chest pain.   Gastrointestinal: Positive for diarrhea.   Genitourinary: Negative.    Musculoskeletal: Negative.    Skin: Negative.    Neurological: Positive for dizziness, weakness and headaches.   Endo/Heme/Allergies: Negative.    Psychiatric/Behavioral: Negative.            PAST MEDICAL HISTORY:     Past Medical History:   Diagnosis Date    Arthritis     COPD (chronic obstructive pulmonary disease)     WHEEZES    Coronary artery disease     Dermatographism 03/2019    Diverticulosis 2004    Full dentures     [place] (hard of hearing)     left ear    Hypertension     Kidney stones     Meniere's disease     MRSA infection 03/25/2019    Skin writing      dermatiographism    Small bowel obstruction due to adhesions 10/2019    Thyroid disease     Wears glasses        PAST SURGICAL HISTORY:     Past Surgical History:   Procedure Laterality Date    APPENDECTOMY      CARDIAC SURGERY  1997    CABG 5 vessel    CHOLECYSTECTOMY  2013    COLON SURGERY      Colon resection with colostomy; colostomy reversal. 2004    CORONARY ARTERY BYPASS GRAFT  1996    5-bypass     CYSTOSCOPY N/A 8/15/2019    Procedure: CYSTOSCOPY;  Surgeon: Dipak Sanchez MD;  Location: [facility] OR;  Service: Urology;  Laterality: N/A;    CYSTOSCOPY N/A 10/31/2019    Procedure: CYSTOSCOPY;  Surgeon: Dipak Sanchez MD;  Location: [facility] OR;  Service: Urology;  Laterality: N/A;    CYSTOSCOPY W/ URETERAL STENT REMOVAL Left 10/31/2019    Procedure: CYSTOSCOPY, WITH URETERAL STENT REMOVAL  URETEROSCOPY;  Surgeon: Dipak Sanchez MD;  Location: [facility] OR;  Service: Urology;  Laterality: Left;    EXTRACORPOREAL SHOCK WAVE LITHOTRIPSY Left 8/15/2019    Procedure: LITHOTRIPSY, ESWL;  Surgeon: Dipak Sanchez MD;  Location: [facility] OR;  Service: Urology;  Laterality: Left;    EXTRACORPOREAL SHOCK WAVE LITHOTRIPSY Left 9/19/2019    Procedure: LITHOTRIPSY, ESWL;  Surgeon: Dipak Sanchez MD;  Location: [facility] OR;  Service: Urology;  Laterality: Left;    EYE SURGERY Right 2014    Cataract    EYE SURGERY Left 2017    Cataract    HERNIA REPAIR  2014    Dr. Bailon - had to have mesh removed    INGUINAL HERNIA REPAIR Right 03/25/2019        LITHOTRIPSY      nephrostomy tube      PERCUTANEOUS NEPHROSTOMY      ROTATOR CUFF REPAIR  2005    right shoulder    URETEROSCOPY Left 10/31/2019    Procedure: URETEROSCOPY;  Surgeon: Dipak Sanchez MD;  Location: [facility];  Service: Urology;  Laterality: Left;       ALLERGIES:   Bactrim [sulfamethoxazole-trimethoprim], Keflex [cephalexin], and Morphine    FAMILY HISTORY:     Family History   Problem Relation Age of Onset    Heart disease Mother      Hypertension Mother     Hypertension Sister     Heart disease Brother     Hypertension Brother     Cancer Daughter     Diabetes Brother     Heart disease Brother     Hypertension Brother     Hypertension Sister     Heart disease Sister        SOCIAL HISTORY:     Social History     Tobacco Use    Smoking status: Former Smoker     Packs/day: 1.00     Years: 25.00     Pack years: 25.00     Quit date: 1972     Years since quittin.6    Smokeless tobacco: Former User     Quit date: 8/15/1972   Substance Use Topics    Alcohol use: No        Social History     Substance and Sexual Activity   Sexual Activity Not on file        HOME MEDICATIONS:     Prior to Admission medications    Medication Sig Start Date End Date Taking? Authorizing Provider   albuterol (PROVENTIL/VENTOLIN HFA) 90 mcg/actuation inhaler Inhale 2 puffs into the lungs every 4 (four) hours as needed for Wheezing. Rescue 19   Moses Harley NP   atorvastatin (LIPITOR) 40 MG tablet Take 40 mg by mouth every evening.  17   Historical Provider, MD   ciprofloxacin HCl (CIPRO) 250 MG tablet Take 1 tablet (250 mg total) by mouth 2 (two) times daily. 10/31/19   Dipak Sanchez MD   dextran 70-hypromellose (ARTIFICIAL TEARS,[id number],) 0.1-0.3 % Drop Apply 1 drop to eye 2 (two) times daily.    Historical Provider, MD   furosemide (LASIX) 20 MG tablet Take 10 mg by mouth daily as needed. Based on weight gain and edema 19   Historical Provider, MD   levothyroxine (SYNTHROID) 25 MCG tablet Take 25 mcg by mouth once daily.  19   Historical Provider, MD   metoprolol tartrate (LOPRESSOR) 25 MG tablet Take 25 mg by mouth 2 (two) times daily.  5/19/15   Historical Provider, MD   SYMBICORT 160-4.5 mcg/actuation HFAA Inhale 2 puffs into the lungs every 12 (twelve) hours.  17   Historical Provider, MD   tamsulosin (FLOMAX) 0.4 mg Cap Take 0.4 mg by mouth once daily.     Historical Provider, MD   traMADol (ULTRAM) 50  "mg tablet Take 1 tablet (50 mg total) by mouth every 8 (eight) hours as needed for Pain. 10/31/19   Dipak Sanchez MD         PHYSICAL EXAM:   BP (!) 153/65   Pulse (!) 129   Temp (!) 102.6 °F (39.2 °C) (Rectal)   Resp (!) 29   Ht 5' 8" (1.727 m)   Wt 88.9 kg (195 lb 15.8 oz)   SpO2 95%   BMI 29.80 kg/m²   Vitals Reviewed  General appearance: Well-developed, well-nourished male in moderate distress.  Skin: No Rash.   Neuro: Motor and sensory exams grossly intact. Good tone. Power in all 4 extremities 5/5.   HENT: Atraumatic head. Moist mucous membranes of oral cavity.  Eyes: Normal extraocular movements.   Neck: Supple. No evidence of lymphadenopathy. No thyroidomegaly.  Lungs:  Moderate tachypnea, coarse rhonchi throughout  Heart: Regular rate and rhythm. S1 and S2 present with + murmurs/gallop/rub. No pedal edema. No JVD present.   Abdomen: Soft, non-distended, non-tender. No rebound tenderness/guarding. No masses or organomegaly. Bowel sounds are normal. Bladder is not palpable.   Extremities: No cyanosis, clubbing,   + edema.  Psych/mental status: Alert and oriented. Cooperative. Responds appropriately to questions.   EMERGENCY DEPARTMENT LABS AND IMAGING:   Following labs were Reviewed   Recent Labs   Lab 08/11/20 0214   WBC 10.99   HGB 16.3   HCT 50.5   PLT 93*   CALCIUM 9.0   ALBUMIN 4.1   PROT 7.7   *   K 4.0   CO2 27   CL 98   BUN 28*   CREATININE 1.4   ALKPHOS 142*   ALT 80*   AST 49*   BILITOT 1.6*         BMP:   Recent Labs   Lab 08/11/20 0214   *   *   K 4.0   CL 98   CO2 27   BUN 28*   CREATININE 1.4   CALCIUM 9.0   MG 1.6   , CMP   Recent Labs   Lab 08/11/20 0214   *   K 4.0   CL 98   CO2 27   *   BUN 28*   CREATININE 1.4   CALCIUM 9.0   PROT 7.7   ALBUMIN 4.1   BILITOT 1.6*   ALKPHOS 142*   AST 49*   ALT 80*   ANIONGAP 10   ESTGFRAFRICA 52.6*   EGFRNONAA 45.5*   , CBC   Recent Labs   Lab 08/11/20  0214   WBC 10.99   HGB 16.3   HCT 50.5   PLT 93*   , INR "   Lab Results   Component Value Date    INR 1.1 08/11/2020    INR 1.0 09/12/2019    INR 1.1 08/16/2019   , Lipid Panel   Lab Results   Component Value Date    CHOL 156 09/14/2015    HDL 20 (L) 09/14/2015    LDLCALC 87.2 09/14/2015    TRIG 244 (H) 09/14/2015    CHOLHDL 12.8 (L) 09/14/2015   , Troponin   Recent Labs   Lab 08/11/20 0214   TROPONINI 0.055*   , A1C: No results for input(s): HGBA1C in the last 4320 hours. and All labs within the past 24 hours have been reviewed  Microbiology Results (last 7 days)     Procedure Component Value Units Date/Time    Blood culture x two cultures. Draw prior to antibiotics. [[id number]] Collected: 08/11/20 0214    Order Status: Sent Specimen: Blood from Peripheral, Antecubital, Left Updated: 08/11/20 0231    Blood culture x two cultures. Draw prior to antibiotics. [[id number]] Collected: 08/11/20 0220    Order Status: Sent Specimen: Blood from Peripheral, Antecubital, Left Updated: 08/11/20 0227        X-Ray Chest AP Portable    (Results Pending)       ASSESSMENT & PLAN:   Frank Wyatt is a 85 y.o. male admitted for    1. Atypical Pneumonia   - CoVID-19 type pattern with COVID 19 symptoms  -IV antibiotic with Zithryomax and Rocephin for now  - Neb Treatments  -Bipap  -Neb treatments    2. Shock  - IV hydration  -trend Lactic leve;  - ESR,CRP Procalactonin, Ferritin level  -IV antibitoic  - ID  Consult    3.  Acute on chronic renal insufficiency  - Most likely from infection and dehydration  - Continue IV hydration  - avoidance of Nephrotoxic medication    4. Hyperglycemia  - monitor   - check HGA1C    5. Transaminitis  -gentle IV hydration    6. Troponin elevation  - most likely from HANSA  - will trend  - get 2 D echo if not current and monitor    7.  Hyperlipidemia  -continue statin  -  High risk for clinical decline secondary to advanced age, multi lobe pneumonia, probable COVID-19 infection versus other type of viral pneumonia, need for IV medications and fluids and  multiple other comorbidities      Critical care time spent 58 min      DVT Prophylaxis: will be placed on Lovenox for DVT prophylaxis and will be advised to be as mobile as possible and sit in a chair as tolerated.   ________________________________________________________________________________    Discharge Planning and Disposition: No mobility needs. Ambulating well. Good social support system. Patient will be discharged in   Face-to-Face encounter date: 08/11/2020  Encounter included review of the medical records, interviewing and examining the patient face-to-face, discussion with family and other health care providers including emergency medicine physician, admission orders, interpreting lab/test results and formulating a plan of care.   Medical Decision Making during this encounter was  [_] Low Complexity  [_] Moderate Complexity  [x] High Complexity  _________________________________________________________________________________    INPATIENT LIST OF MEDICATIONS     Current Facility-Administered Medications:     albuterol nebulizer solution 10 mg, 10 mg, Nebulization, Continuous, Lucinda Swain MD, 10 mg at 08/11/20 0226    levoFLOXacin 750 mg/150 mL IVPB 750 mg, 750 mg, Intravenous, ED 1 Time, Lucinda Swain MD, Last Rate: 100 mL/hr at 08/11/20 0403, 750 mg at 08/11/20 0403    piperacillin-tazobactam 4.5 g in dextrose 5 % 100 mL IVPB (ready to mix system), 4.5 g, Intravenous, Q8H, Lucinda Swain MD    Current Outpatient Medications:     albuterol (PROVENTIL/VENTOLIN HFA) 90 mcg/actuation inhaler, Inhale 2 puffs into the lungs every 4 (four) hours as needed for Wheezing. Rescue, Disp: 1 Inhaler, Rfl: 0    atorvastatin (LIPITOR) 40 MG tablet, Take 40 mg by mouth every evening. , Disp: , Rfl:     ciprofloxacin HCl (CIPRO) 250 MG tablet, Take 1 tablet (250 mg total) by mouth 2 (two) times daily., Disp: 12 tablet, Rfl: 0    dextran 70-hypromellose (ARTIFICIAL TEARS,[id number],) 0.1-0.3 %  Drop, Apply 1 drop to eye 2 (two) times daily., Disp: , Rfl:     furosemide (LASIX) 20 MG tablet, Take 10 mg by mouth daily as needed. Based on weight gain and edema, Disp: , Rfl: 1    levothyroxine (SYNTHROID) 25 MCG tablet, Take 25 mcg by mouth once daily. , Disp: , Rfl: 1    metoprolol tartrate (LOPRESSOR) 25 MG tablet, Take 25 mg by mouth 2 (two) times daily. , Disp: , Rfl:     SYMBICORT 160-4.5 mcg/actuation HFAA, Inhale 2 puffs into the lungs every 12 (twelve) hours. , Disp: , Rfl:     tamsulosin (FLOMAX) 0.4 mg Cap, Take 0.4 mg by mouth once daily. , Disp: , Rfl:     traMADol (ULTRAM) 50 mg tablet, Take 1 tablet (50 mg total) by mouth every 8 (eight) hours as needed for Pain., Disp: 12 tablet, Rfl: 0      Scheduled Meds:   levoFLOXacin  750 mg Intravenous ED 1 Time    piperacillin-tazobactam (ZOSYN) IVPB  4.5 g Intravenous Q8H     Continuous Infusions:   albuterol       PRN Meds:.      Yovana Tse  [facility] Hospitalist NP  08/11/2020

## 2020-08-11 NOTE — PROGRESS NOTES
Pulmonary/Critical Care Consult      PATIENT NAME: Frank Wyatt  MRN: [id number]  TODAY'S DATE: 2020  9:38 AM  ADMIT DATE: 2020  AGE: 85 y.o. : 10/16/1934    CONSULT REQUESTED BY: Ayesha Chavez MD    REASON FOR CONSULT:   Atypical pneumonia, acute hypoxic respiratory failure, COPD    HPI:  Patient is an 85-year-old gentleman who developed fever, chills, and shortness of breath diffuse myalgias.  His temp was 102.8° he was brought to the emergency room.  Patient's daughter, from the [facility], stated that he had been sick for 2 days prior with low-grade temps and fatigue but when he spiked the temperature he was brought here.    Patient states he is feeling fine.  He is not short of breath.  He is not fatigued.  Nothing hurts.  He has no headache, he has no myalgias or arthralgias.     REVIEW OF SYSTEMS  GENERAL: Feeling Well.  EYES: Vision is good.  ENT:  He is hard of hearing.  HEART: No chest pain or palpitations.  LUNGS: No cough, sputum, or wheezing.  GI: No Nausea, vomiting, constipation, diarrhea, or reflux.  : No dysuria, hesitancy, or nocturia.  SKIN: No lesions or rashes.  MUSCULOSKELETAL: No joint pain or myalgias.  NEURO: No headaches or neuropathy.  LYMPH: No edema or adenopathy.  PSYCH: No anxiety or depression.  ENDO: No weight change.    ALLERGIES  Review of patient's allergies indicates:   Allergen Reactions    Bactrim [sulfamethoxazole-trimethoprim] Hives     itched    Keflex [cephalexin] Rash    Morphine Rash     Patient had morphine and keflex at the same time, developed a rash, not sure which one caused it, or if it was a combination of the two meds.       INPATIENT SCHEDULED MEDICATIONS   albuterol  2 puff Inhalation Q8H    atorvastatin  40 mg Oral QHS    chlorhexidine  15 mL Mouth/Throat BID    enoxaparin  30 mg Subcutaneous Q24H    [START ON 2020] levoFLOXacin  750 mg Intravenous Q48H    levothyroxine  25 mcg Oral Daily    metoprolol tartrate  25 mg Oral BID     mupirocin   Nasal BID    piperacillin-tazobactam (ZOSYN) IVPB  4.5 g Intravenous Q8H    tamsulosin  0.4 mg Oral Daily         MEDICAL AND SURGICAL HISTORY  Past Medical History:   Diagnosis Date    Arthritis     COPD (chronic obstructive pulmonary disease)     WHEEZES    Coronary artery disease     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 2004    CORONARY ARTERY BYPASS GRAFT  1996    5-bypass     CYSTOSCOPY N/A 8/15/2019    Procedure: CYSTOSCOPY;  Surgeon: Dipak Sanchez MD;  Location: [facility];  Service: Urology;  Laterality: N/A;    CYSTOSCOPY N/A 10/31/2019    Procedure: CYSTOSCOPY;  Surgeon: Dipak Sanchez MD;  Location: [facility];  Service: Urology;  Laterality: N/A;    CYSTOSCOPY W/ URETERAL STENT REMOVAL Left 10/31/2019    Procedure: CYSTOSCOPY, WITH URETERAL STENT REMOVAL  URETEROSCOPY;  Surgeon: Dipak Sanchez MD;  Location: [facility] OR;  Service: Urology;  Laterality: Left;    EXTRACORPOREAL SHOCK WAVE LITHOTRIPSY Left 8/15/2019    Procedure: LITHOTRIPSY, ESWL;  Surgeon: Dipak Sanchez MD;  Location: [facility] OR;  Service: Urology;  Laterality: Left;    EXTRACORPOREAL SHOCK WAVE LITHOTRIPSY Left 9/19/2019    Procedure: LITHOTRIPSY, ESWL;  Surgeon: Dipak Sanchez MD;  Location: [facility];  Service: Urology;  Laterality: Left;    EYE SURGERY Right 2014    Cataract    EYE SURGERY Left 2017    Cataract    HERNIA REPAIR  2014    Dr. Bailon - had to have mesh removed    INGUINAL HERNIA  REPAIR Right 2019        LITHOTRIPSY      nephrostomy tube      PERCUTANEOUS NEPHROSTOMY      ROTATOR CUFF REPAIR  2005    right shoulder    URETEROSCOPY Left 10/31/2019    Procedure: URETEROSCOPY;  Surgeon: Dipak Sanchez MD;  Location: [facility];  Service: Urology;  Laterality: Left;     The patient is  for the last 2 years.  He has a daughter in [place] who comes over on the weekends.  He has a daughter who lives in [facility] who comes for a few weeks at a time.  She is in town now.  ALCOHOL, TOBACCO AND DRUG USE  Social History     Tobacco Use   Smoking Status Former Smoker    Packs/day: 1.00    Years: 25.00    Pack years: 25.00    Quit date: 1972    Years since quittin.6   Smokeless Tobacco Former User    Quit date: 8/15/1972    Patient states he was a 2 pack-a-day smoker.  Social History     Substance and Sexual Activity   Alcohol Use No     Social History     Substance and Sexual Activity   Drug Use No       FAMILY HISTORY  Family History   Problem Relation Age of Onset    Heart disease Mother     Hypertension Mother     Hypertension Sister     Heart disease Brother     Hypertension Brother     Cancer Daughter     Diabetes Brother     Heart disease Brother     Hypertension Brother     Hypertension Sister     Heart disease Sister        VITAL SIGNS (MOST RECENT)  Temp: 98 °F (36.7 °C) (20 0840)  Pulse: 77 (20 0840)  Resp: 15 (20 0840)  BP: (!) 129/56 (20 0840)  SpO2: 98 % (20 0840)    INTAKE AND OUTPUT (LAST 24 HOURS):    Intake/Output Summary (Last 24 hours) at 2020 0938  Last data filed at 2020 0751  Gross per 24 hour   Intake 300 ml   Output 120 ml   Net 180 ml       WEIGHT  Wt Readings from Last 1 Encounters:   20 95.5 kg (210 lb 8.6 oz)       PHYSICAL EXAM  GENERAL: Older patient in no distress.  HEENT: Pupils equal and reactive. Extraocular movements intact. Nose intact. Pharynx moist.  NECK:  Supple.   HEART: Regular rate and rhythm. No murmur or gallop auscultated.  LUNGS: Clear to auscultation and percussion. Lung excursion symmetrical. No change in fremitus. No adventitial noises.  ABDOMEN: Bowel sounds present. Non-tender, no masses palpated.  : Normal anatomy.  EXTREMITIES: Normal muscle tone and joint movement, no cyanosis or clubbing.   LYMPHATICS: No adenopathy palpated, trace edema in his legs.  SKIN: Dry, intact, no lesions.   NEURO: Cranial nerves II-XII intact. Motor strength 5/5 bilaterally, upper and lower extremities.  PSYCH: Appropriate affect    CBC LAST (LAST 24 HOURS)  Recent Labs   Lab 08/11/20 0214   WBC 10.99   RBC 5.39   HGB 16.3   HCT 50.5   MCV 94   MCH 30.2   MCHC 32.3   RDW 14.5   PLT 93*   MPV 10.3   GRAN 73.2*  8.0*   LYMPH 15.7*  1.7   MONO 7.0  0.8   BASO 0.04   NRBC 0       CHEMISTRY LAST (LAST 24 HOURS)  Recent Labs   Lab 08/11/20 0214 08/11/20 0236   *  --    K 4.0  --    CL 98  --    CO2 27  --    ANIONGAP 10  --    BUN 28*  --    CREATININE 1.4  --    *  --    CALCIUM 9.0  --    PH  --  7.337*   MG 1.6  --    ALBUMIN 4.1  --    PROT 7.7  --    ALKPHOS 142*  --    ALT 80*  --    AST 49*  --    BILITOT 1.6*  --        COAGULATION LAST (LAST 24 HOURS)  Recent Labs   Lab 08/11/20 0214   LABPT 14.1   INR 1.1   APTT 33.0       CARDIAC PROFILE (LAST 24 HOURS)  Recent Labs   Lab 08/11/20 0214   *   TROPONINI 0.055*       LAST 7 DAYS MICROBIOLOGY   Microbiology Results (last 7 days)     Procedure Component Value Units Date/Time    Blood culture x two cultures. Draw prior to antibiotics. [[id number]] Collected: 08/11/20 0220    Order Status: Completed Specimen: Blood from Peripheral, Antecubital, Left Updated: 08/11/20 0917     Blood Culture, Routine No Growth to date    Narrative:      Aerobic and anaerobic    Blood culture x two cultures. Draw prior to antibiotics. [[id number]] Collected: 08/11/20 0214    Order Status: Completed Specimen: Blood from  Peripheral, Antecubital, Left Updated: 08/11/20 0917     Blood Culture, Routine No Growth to date    Narrative:      Aerobic and anaerobic    Urine culture [[id number]] Collected: 08/11/20 0755    Order Status: No result Specimen: Urine Updated: 08/11/20 0846    Culture, Respiratory with Gram Stain [[id number]]     Order Status: Sent Specimen: Respiratory from Sputum, Expectorated     Gram stain [[id number]]     Order Status: Sent Specimen: Sputum from Lung, Lingula           MOST RECENT IMAGING  X-Ray Chest AP Portable  8/11/20    There are median sternotomy wires and mediastinal surgical clips.  Aortic arch is calcified. Cardiac mediastinal contours are stable. The  heart is not enlarged. There are bilateral interstitial infiltrates  are not present previously. Groundglass is within the lung bases.  Costophrenic angles are blunted. There is bilateral shoulder  osteoarthrosis.      CURRENT VISIT EKG  Results for orders placed or performed during the hospital encounter of 08/11/20   EKG 12-lead    Narrative    Test Reason : R06.02,    Vent. Rate : 111 BPM     Atrial Rate : 111 BPM     P-R Int : 184 ms          QRS Dur : 140 ms      QT Int : 336 ms       P-R-T Axes : 032 038 225 degrees     QTc Int : 456 ms    Sinus tachycardia  Left bundle branch block  Abnormal ECG  When compared with ECG of 11-AUG-2020 02:03,  No significant change was found    Referred By: [email or web address]   SELF           Confirmed By:        ECHOCARDIOGRAM RESULTS  No results found for this or any previous visit.  Dr. Kim is his cardiologist  Oxygen Concentration (%):  [40-50] 40       LAST ARTERIAL BLOOD GAS  ABG  Recent Labs   Lab 08/11/20  0236   PH 7.337*   PO2 118*   PCO2 46.0*   HCO3 24.7   BE -1       IMPRESSION AND PLAN  Severe sepsis with septic shock presentation, now resolved and lactic acidosis persisting  Febrile illness, T-max 103.3  No evidence of pneumonia at this time  Some degree of CHF as evidence by his elevated BNP and p.r.n.  Lasix order, but no echo in the system for the last 5 years  COPD  Thrombocytopenia  Hyperglycemia  Transaminitis  Hyponatremia  History of uric acid nephrolithiasis and frequent UTIs, was on Cipro on admission        Extreme caution with the patient in the COVID ICU given the fact that he likely does not have COVID  Continue broad-spectrum antibiotics  Reach out to Dr. Kim to find out more of his cardiac status  Awaiting procalcitonin, ferritin, CRP, LD, and D-dimer  Repeat BNP in a.m.  Follow-up on hepatic status, may need an ultrasound, patient is s/p cholecystectomy in the past  Start Breo as we do not have Symbicort  Change albuterol to p.r.n.  Discontinue oxygen    Critical care time spent reviewing the chart, examining the patient, reviewing the labs, reviewing the radiological findings, discussing care with nursing, physicians, and respiratory and creating the note and  has been greater than 35 minutes

## 2020-08-11 NOTE — RESPIRATORY THERAPY
08/11/20 0227   Patient Assessment/Suction   Level of Consciousness (AVPU) alert   Respiratory Effort Severe;Gasping;Grunting;Labored;Mouth breathing;Short of breath   Expansion/Accessory Muscles/Retractions abdominal muscle use;accessory muscle use   All Lung Fields Breath Sounds diminished;wheezes, expiratory   Rhythm/Pattern, Respiratory grunting;paradoxical movement;prolonged expiratory phase;shortness of breath   PRE-TX-O2   O2 Device (Oxygen Therapy) Non Rebreather Mask   $ Is the patient on Low Flow Oxygen? Yes   SpO2 99 %   Pulse (!) 114   Resp (!) 38   Aerosol Therapy   $ Aerosol Therapy Charges Aerosol Treatment;Initial Continuous   Daily Review of Necessity (SVN) completed   Respiratory Treatment Status (SVN) continuous;given   Treatment Route (SVN) mask;oxygen   Patient Position (SVN) Rivera's   Post Treatment Assessment (SVN) increased aeration;wheezing decreased   Signs of Intolerance (SVN) none   Breath Sounds Post-Respiratory Treatment   Throughout All Fields Post-Treatment All Fields   Throughout All Fields Post-Treatment aeration increased;crackles;diminished;wheezes, expiratory   Post-treatment Heart Rate (beats/min) 120   Post-treatment Resp Rate (breaths/min) 32   Education   $ Education BiPAP;Bronchodilator;Other (see comment);1 hour  (ABG, cont neb tx)   Respiratory Evaluation   $ Care Plan Tech Time 1 hour   Pt int placed on bipap,16mins later pt had vomited in mask. bipap removed cont neb started with 100% o2, abg to follow

## 2020-08-11 NOTE — CONSULTS
Consult Note  Infectious Disease    Reason for Consult:  Pneumonia, covid suspect    HPI: Frank Wyatt is an 85 y.o. male brought to the emergency room yesterday secondary to fever, headache, weakness and shortness of breath.  He required emergency application oxygen supplementation, including BiPAP.  He does have a history of COPD.  He has been protecting himself very carefully from COVID but last Friday, 5 days ago he did go to the [employer] though he did wear a face mask in use hand .  Temperature at home maximum was 102.8 and in the .8..  He was evaluated at an urgent care and was negative but referred here.  Chest x-ray demonstrated bilateral patchy infiltrates worrisome for COVID-19.  He was admitted to the isolation ICU, and broad-spectrum antibiotics were begun (Zosyn and Levaquin were administered).  White blood cells were 10,900, platelets 93, total bilirubin 1.6, , lactic acid 3.5.  Last echocardiogram 2015.  Troponin was borderline at 0.055.  He was given Solu-Medrol 125 in the emergency room and his temperature has declined.  COVID-19 testing here as well as flu swab were negative.  Urinalysis had 84 white blood cells and he does have a history of recurring urinary tract infections and has had numerous cystoscopies.  Urine culture was not collected or set up until 755 this morning, undoubtedly after antibiotics had begun.    He has been on macrobid(7/30), For somewhere less than 10 days. He had UTI symptoms and he was improving when the above symptoms began he denies increase in urinary symptoms prior to th is admit. He likens the chills to an episode when he had sepsis after lithotripsy.   7/29 UA with culture >100 of Proteus mirabilis  He also had diarrhea the night after eating at the [employer] buffet 5 days ago.     Review of patient's allergies indicates:   Allergen Reactions    Bactrim [sulfamethoxazole-trimethoprim] Hives     itched    Keflex [cephalexin] Rash     Morphine Rash     Patient had morphine and keflex at the same time, developed a rash, not sure which one caused it, or if it was a combination of the two meds.     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 2004    CORONARY ARTERY BYPASS GRAFT  1996    5-bypass     CYSTOSCOPY N/A 8/15/2019    Procedure: CYSTOSCOPY;  Surgeon: Dipak Sanchez MD;  Location: [facility];  Service: Urology;  Laterality: N/A;    CYSTOSCOPY N/A 10/31/2019    Procedure: CYSTOSCOPY;  Surgeon: Dipak Sanchez MD;  Location: [facility] OR;  Service: Urology;  Laterality: N/A;    CYSTOSCOPY W/ URETERAL STENT REMOVAL Left 10/31/2019    Procedure: CYSTOSCOPY, WITH URETERAL STENT REMOVAL  URETEROSCOPY;  Surgeon: Dipak Sanchez MD;  Location: [facility] OR;  Service: Urology;  Laterality: Left;    EXTRACORPOREAL SHOCK WAVE LITHOTRIPSY Left 8/15/2019    Procedure: LITHOTRIPSY, ESWL;  Surgeon: Dipak Sanchez MD;  Location: [facility] OR;  Service: Urology;  Laterality: Left;    EXTRACORPOREAL SHOCK WAVE LITHOTRIPSY Left 9/19/2019    Procedure: LITHOTRIPSY, ESWL;  Surgeon: Dipak Sacnhez MD;  Location: [facility];  Service: Urology;  Laterality: Left;    EYE SURGERY Right 2014    Cataract    EYE SURGERY Left 2017    Cataract    HERNIA REPAIR  2014    Dr. Bailon - had  to have mesh removed    INGUINAL HERNIA REPAIR Right 2019        LITHOTRIPSY      nephrostomy tube      PERCUTANEOUS NEPHROSTOMY      ROTATOR CUFF REPAIR  2005    right shoulder    URETEROSCOPY Left 10/31/2019    Procedure: URETEROSCOPY;  Surgeon: Dipak Sanchez MD;  [facility];  Service: Urology;  Laterality: Left;     Social History     Socioeconomic History    Marital status:      Spouse name: Not on file    Number of children: Not on file    Years of education: Not on file    Highest education level: Not on file   Occupational History    Not on file   Social Needs    Financial resource strain: Not on file    Food insecurity     Worry: Not on file     Inability: Not on file    Transportation needs     Medical: Not on file     Non-medical: Not on file   Tobacco Use    Smoking status: Former Smoker     Packs/day: 1.00     Years: 25.00     Pack years: 25.00     Quit date: 1972     Years since quittin.6    Smokeless tobacco: Former User     Quit date: 8/15/1972   Substance and Sexual Activity    Alcohol use: No    Drug use: No    Sexual activity: Not on file   Lifestyle    Physical activity     Days per week: Not on file     Minutes per session: Not on file    Stress: Not on file   Relationships    Social connections     Talks on phone: Not on file     Gets together: Not on file     Attends Confucianism service: Not on file     Active member of club or organization: Not on file     Attends meetings of clubs or organizations: Not on file     Relationship status: Not on file   Other Topics Concern    Not on file   Social History Narrative    Not on file     Family History   Problem Relation Age of Onset    Heart disease Mother     Hypertension Mother     Hypertension Sister     Heart disease Brother     Hypertension Brother     Cancer Daughter     Diabetes Brother     Heart disease Brother     Hypertension Brother     Hypertension Sister      Heart disease Sister        Pertinent medications noted:     Review of Systems:    chills, fever, sweats yesterday  No change in vision, loss of vision or diplopia  No sinus congestion, or facial pain  No pain in mouth or throat. No problems with teeth, gums.  No chest pain, palpitations, syncope    cough, without sputum production, with shortness of breath, dyspnea on exertion yesterday, with out  pleurisy, hemoptysis  No nausea, vomiting, blood in stool, or focal abd pain,  No dysphagia, odynophagia or aspiration events  No dysuria, but does have prostatism,    No swelling of joints, redness of joints, injuries, or new focal pain  No unusual headaches, dizziness  falls  No anxiety, depression, substance abuse, sleep disturbance  No diabetes,   No bleeding, lymphadenopathy, anemia, malignancy, unusual bruising  No new rashes, lesions, or wounds   No recent/prior steroids  Outdoor activities: none, but did go to [employer] 5 days ago  Travel:   Implants:   Antibiotic History: nitrofurantoin prior to admit    EXAM & DIAGNOSTICS REVIEWED:   Vitals:     Temp:  [98 °F (36.7 °C)-103.3 °F (39.6 °C)]   Temp: 98 °F (36.7 °C) (08/11/20 0840)  Pulse: 77 (08/11/20 0840)  Resp: 15 (08/11/20 0840)  BP: (!) 129/56 (08/11/20 0840)  SpO2: 98 % (08/11/20 0840)    Intake/Output Summary (Last 24 hours) at 8/11/2020 1016  Last data filed at 8/11/2020 0751  Gross per 24 hour   Intake 300 ml   Output 120 ml   Net 180 ml       General:  In NAD. Alert and attentive, cooperative, comfortable  Eyes:  Anicteric, PERRL, EOMI  ENT:  No ulcers, exudates, thrush, nares patent, edentulous  Neck:  supple, no masses or adenopathy appreciated  Lungs: Clear, coarse, no consolidation, rales, wheezes, rub  Heart:  RRR, no gallop/murmur/rub noted  Abd:  Soft, NT, ND, normal BS, no masses or organomegaly appreciated.  :  Voids  urine clear, no flank tenderness  Musc:  Joints without effusion, swelling, erythema, synovitis, muscle wasting.   Skin:  No  rashes. No palmar or plantar lesions. No subungual petechiae  Wound:   Neuro:  Alert, attentive, speech fluent, face symmetric, moves all extremities, no focal weakness. Ambulatory  Psych:  Calm, cooperative  Lymphatic:     No cervical, supraclavicular,   nodes  Extrem: No edema, erythema, phlebitis, cellulitis, warm and well perfused  VAD:  peripheral    Isolation:  COVID but will be discontinued    Lines/Tubes/Drains:    General Labs reviewed:  Recent Labs   Lab 08/11/20 0214   WBC 10.99   HGB 16.3   HCT 50.5   PLT 93*       Recent Labs   Lab 08/11/20 0214   *   K 4.0   CL 98   CO2 27   BUN 28*   CREATININE 1.4   CALCIUM 9.0   PROT 7.7   BILITOT 1.6*   ALKPHOS 142*   ALT 80*   AST 49*           Micro:  7/24 urine culture with proteus resist to nitrofurantoin, cipro and cephalexin.    Microbiology Results (last 7 days)     Procedure Component Value Units Date/Time    Blood culture x two cultures. Draw prior to antibiotics. [[id number]] Collected: 08/11/20 0220    Order Status: Completed Specimen: Blood from Peripheral, Antecubital, Left Updated: 08/11/20 0917     Blood Culture, Routine No Growth to date    Narrative:      Aerobic and anaerobic    Blood culture x two cultures. Draw prior to antibiotics. [[id number]] Collected: 08/11/20 0214    Order Status: Completed Specimen: Blood from Peripheral, Antecubital, Left Updated: 08/11/20 0917     Blood Culture, Routine No Growth to date    Narrative:      Aerobic and anaerobic    Urine culture [[id number]] Collected: 08/11/20 0755    Order Status: No result Specimen: Urine Updated: 08/11/20 0846    Culture, Respiratory with Gram Stain [[id number]]     Order Status: Sent Specimen: Respiratory from Sputum, Expectorated     Gram stain [[id number]]     Order Status: Sent Specimen: Sputum from Lung, Lingula         Imaging Reviewed:   CXR   Bilateral infiltrates are likely pulmonary edema of congestive heart  failure. There are small pleural effusions. Status post  CABG.      Cardiology:    IMPRESSION & PLAN   1.  Recent UTI, Proteus, resistant to the drugs that he was on, based on culture. Still has pyuria  2. Respiratory failure, resolved, likely from sepsis  3. History of recurrent UTIs, multiple kidney stones and procedures.  4. Hearing loss    I do not believe he has COVID      Recommendations:  Ceftriaxone ( I have obtained consent to try this despite a history of rash to keflex. Additionally he tolerated the zosyn)  No objection to removing him from isolation and out of ICU    D/w Dr. Workman    Medical Decision Making during this encounter was  [_] Low Complexity  [_] Moderate Complexity  [ x ] High Complexity

## 2020-08-11 NOTE — RESPIRATORY THERAPY
This note also relates to the following rows which could not be included:  SpO2 - Cannot attach notes to unvalidated device data  Pulse - Cannot attach notes to unvalidated device data       08/11/20 0554   Patient Assessment/Suction   Level of Consciousness (AVPU) alert   Respiratory Effort Unlabored   Expansion/Accessory Muscles/Retractions expansion symmetric;no retractions;no use of accessory muscles   All Lung Fields Breath Sounds diminished   PRE-TX-O2   O2 Device (Oxygen Therapy) nasal cannula   Flow (L/min) 2   Pulse Oximetry Type Continuous   Resp 20   Respiratory Interventions   Cough And Deep Breathing done with encouragement   Preset CPAP/BiPAP Settings   Mode Of Delivery BiPAP S/T;Standby   Education   $ Education BiPAP;Bronchodilator;Other (see comment);15 min  (pox,o2,deep diaphragmatic breathing exercises.)   Respiratory Evaluation   $ Care Plan Tech Time 15 min   Evaluation For   (care plan)   Pt resting well. Placed Bipap on s/b, 2 lpm nc on pt. Instructed diaphragmatic breathing exercises with pt and family.

## 2020-08-11 NOTE — PROGRESS NOTES
Pharmacist Renal Dose Adjustment Note    Frank Wyatt is a 85 y.o. male being treated with the medication Levofloxacin    Patient Data:    Vital Signs (Most Recent):  Temp: (!) 102.6 °F (39.2 °C) (08/11/20 0405)  Pulse: 99 (08/11/20 0500)  Resp: 20 (08/11/20 0500)  BP: (!) 111/55 (08/11/20 0500)  SpO2: 97 % (08/11/20 0500)   Vital Signs (72h Range):  Temp:  [98.6 °F (37 °C)-103.3 °F (39.6 °C)]   Pulse:  []   Resp:  [20-38]   BP: (111-202)/()   SpO2:  [74 %-99 %]      Recent Labs   Lab 08/11/20 0214   CREATININE 1.4     Serum creatinine: 1.4 mg/dL 08/11/20 0214  Estimated creatinine clearance: 41.8 mL/min    Medication:Levofloxacin dose: 750 mg frequency Q24H will be changed to medication:Levofloxacin dose:750 mg frequency:Q48H    Pharmacist's Name: Shiva Adan  Pharmacist's Extension: [phone]

## 2020-08-11 NOTE — PLAN OF CARE
08/11/20 0832   Patient Assessment/Suction   Level of Consciousness (AVPU) alert   All Lung Fields Breath Sounds clear   PRE-TX-O2   O2 Device (Oxygen Therapy) nasal cannula   $ Is the patient on Low Flow Oxygen? Yes   Flow (L/min) 2   SpO2 98 %   Pulse Oximetry Type Continuous   $ Pulse Oximetry - Multiple Charge Pulse Oximetry - Multiple   Pulse 77   Resp 16   Inhaler   $ Inhaler Charges MDI (Metered Dose Inahler) Treatment   Daily Review of Necessity (Inhaler) completed   Respiratory Treatment Status (Inhaler) given   Treatment Route (Inhaler) mouthpiece;spacer/holding chamber   Patient Position (Inhaler) semi-Rivera's   Post Treatment Assessment (Inhaler) breath sounds unchanged   Signs of Intolerance (Inhaler) none   Breath Sounds Post-Respiratory Treatment   Post-treatment Heart Rate (beats/min) 76   Post-treatment Resp Rate (breaths/min) 15   Respiratory Evaluation   $ Care Plan Tech Time 15 min

## 2020-08-11 NOTE — PLAN OF CARE
PT IS NOT COVID AND SHOULD MOVE OUT OF ICU. PT'S DTG GEORGETTE FROM [facility] AND WILL BE HERE UNTIL PT IS HOME AND ABLE TO CARE FOR SELF. COULD NEED HOME HEALTH BUT WILL LET PT/OT ASSESS. CM TO FOLLOW UNTIL DISCHARGE FROM HOSPITAL.   08/11/20 1342   Discharge Assessment   Assessment Type Discharge Planning Assessment   Confirmed/corrected address and phone number on facesheet? Yes   Assessment information obtained from? Other  (DTG GEORGETTE)   Communicated expected length of stay with patient/caregiver no   Prior to hospitilization cognitive status: Alert/Oriented   Prior to hospitalization functional status: Independent   Current cognitive status: Alert/Oriented   Current Functional Status: Needs Assistance   Lives With alone   Able to Return to Prior Arrangements yes   Is patient able to care for self after discharge? Unable to determine at this time (comments)   Who are your caregiver(s) and their phone number(s)? GEORGETTE ROCHA -[phone]  LEXUS SHOLA -[phone]   Patient's perception of discharge disposition home or selfcare   Readmission Within the Last 30 Days no previous admission in last 30 days   Patient currently being followed by outpatient case management? No   Patient currently receives any other outside agency services? No   Equipment Currently Used at Home walker, rolling;rollator   Part D Coverage PHN   Do you have any problems affording any of your prescribed medications? No   Is the patient taking medications as prescribed? yes   Does the patient have transportation home? Yes   Transportation Anticipated family or friend will provide   Dialysis Name and Scheduled days N/A   Does the patient receive services at the [facility]? No   Discharge Plan A Home   Discharge Plan B Home Health   DME Needed Upon Discharge  other (see comments)  (PT/OT ASSESS FOR DME)   Patient/Family in Agreement with Plan yes

## 2020-08-11 NOTE — ED PROVIDER NOTES
Encounter Date: 8/11/2020       History     Chief Complaint   Patient presents with    Shortness of Breath     85-year-old male with history COPD, prior tobacco use with 25 pack-year history, coronary artery disease, hypertension, hypothyroidism who presents to the ER by EMS in acute respiratory distress patient was hypoxic at 74 percent on arrival.  Tachypneic in the 30s.  Unable to answer questions due to his distress.  He came with paperwork from an urgent care today which she had reportedly had a fever the past few days chills sweats cough and shortness of breath.  He had a negative COVID at the urgent care.  He was not accompanied by any family        Review of patient's allergies indicates:   Allergen Reactions    Bactrim [sulfamethoxazole-trimethoprim] Hives     itched    Keflex [cephalexin] Rash    Morphine Rash     Patient had morphine and keflex at the same time, developed a rash, not sure which one caused it, or if it was a combination of the two meds.     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 2004    CORONARY ARTERY BYPASS GRAFT  1996    5-bypass     CYSTOSCOPY N/A 8/15/2019    Procedure: CYSTOSCOPY;  Surgeon: Dipak Sanchez MD;  Location[facility];  Service: Urology;  Laterality: N/A;    CYSTOSCOPY N/A 10/31/2019    Procedure: CYSTOSCOPY;  Surgeon:  Dipak Sanchez MD;  Location: [facility];  Service: Urology;  Laterality: N/A;    CYSTOSCOPY W/ URETERAL STENT REMOVAL Left 10/31/2019    Procedure: CYSTOSCOPY, WITH URETERAL STENT REMOVAL  URETEROSCOPY;  Surgeon: Dipak Sanchez MD;  Location: [facility];  Service: Urology;  Laterality: Left;    EXTRACORPOREAL SHOCK WAVE LITHOTRIPSY Left 8/15/2019    Procedure: LITHOTRIPSY, ESWL;  Surgeon: Dipak Sanchez MD;  Location: [facility];  Service: Urology;  Laterality: Left;    EXTRACORPOREAL SHOCK WAVE LITHOTRIPSY Left 2019    Procedure: LITHOTRIPSY, ESWL;  Surgeon: Dipak Sanchez MD;  Location: [facility];  Service: Urology;  Laterality: Left;    EYE SURGERY Right     Cataract    EYE SURGERY Left 2017    Cataract    HERNIA REPAIR      Dr. Bailon - had to have mesh removed    INGUINAL HERNIA REPAIR Right 2019        LITHOTRIPSY      nephrostomy tube      PERCUTANEOUS NEPHROSTOMY      ROTATOR CUFF REPAIR      right shoulder    URETEROSCOPY Left 10/31/2019    Procedure: URETEROSCOPY;  Surgeon: Dipak Sanchez MD;  Location: [facility];  Service: Urology;  Laterality: Left;     Family History   Problem Relation Age of Onset    Heart disease Mother     Hypertension Mother     Hypertension Sister     Heart disease Brother     Hypertension Brother     Cancer Daughter     Diabetes Brother     Heart disease Brother     Hypertension Brother     Hypertension Sister     Heart disease Sister      Social History     Tobacco Use    Smoking status: Former Smoker     Packs/day: 1.00     Years: 25.00     Pack years: 25.00     Quit date: 1972     Years since quittin.6    Smokeless tobacco: Former User     Quit date: 8/15/1972   Substance Use Topics    Alcohol use: No    Drug use: No     Review of Systems   Unable to perform ROS: Severe respiratory distress       Physical Exam     Initial Vitals   BP Pulse Resp Temp SpO2   20 0215 20 0203 20 0203  08/11/20 0203 08/11/20 0203   (!) 152/111 (!) 129 (!) 36 98.6 °F (37 °C) (!) 74 %      MAP       --                Physical Exam    Nursing note and vitals reviewed.  Constitutional: He appears well-developed and well-nourished. He is diaphoretic. He appears distressed.   Temp a 103° rectally   HENT:   Head: Normocephalic and atraumatic.   Right Ear: External ear normal.   Left Ear: External ear normal.   Nose: Nose normal.   Mouth/Throat: Oropharynx is clear and moist.   Eyes: Conjunctivae and EOM are normal. Pupils are equal, round, and reactive to light.   Neck: Normal range of motion. Neck supple. No tracheal deviation present.   Cardiovascular: Regular rhythm, normal heart sounds and intact distal pulses. Exam reveals no gallop and no friction rub.    No murmur heard.    /111   Pulmonary/Chest: No stridor. He is in respiratory distress. He has wheezes. He has no rhonchi. He has no rales. He exhibits no tenderness.   Very decreased breath sounds throughout minimal inspiratory an x-ray wheezes heard in upper lungs.  Sat 73% on room air patient was immediately placed on non-rebreather and BiPAP at 10 / 5 sats improved to 94% on BiPAP  Patient was tachypneic in the 40s unable to speak due to his shortness of breath.   Abdominal: Soft. Bowel sounds are normal. He exhibits no distension and no mass. There is no abdominal tenderness. There is no rebound and no guarding.   Musculoskeletal: Normal range of motion. No tenderness or edema.   Neurological: He is alert and oriented to person, place, and time. He has normal strength. No cranial nerve deficit or sensory deficit.   Skin: Skin is warm. No rash noted. No erythema. No pallor.   Psychiatric: He has a normal mood and affect. His behavior is normal. Judgment and thought content normal.         ED Course   Procedures  Labs Reviewed   CBC W/ AUTO DIFFERENTIAL - Abnormal; Notable for the following components:       Result Value    Platelets 93 (*)      Immature Granulocytes 0.6 (*)     Gran # (ANC) 8.0 (*)     Immature Grans (Abs) 0.07 (*)     Gran% 73.2 (*)     Lymph% 15.7 (*)     All other components within normal limits   COMPREHENSIVE METABOLIC PANEL - Abnormal; Notable for the following components:    Sodium 135 (*)     Glucose 221 (*)     BUN, Bld 28 (*)     Total Bilirubin 1.6 (*)     Alkaline Phosphatase 142 (*)     AST 49 (*)     ALT 80 (*)     eGFR if  52.6 (*)     eGFR if non  45.5 (*)     All other components within normal limits   LACTIC ACID, PLASMA - Abnormal; Notable for the following components:    Lactate (Lactic Acid) 3.5 (*)     All other components within normal limits    Narrative:       Lactid acid critical result(s) called and verbal readback obtained   from Dionne Mckinney RN ER by MS1 08/11/2020 03:37   PHOSPHORUS - Abnormal; Notable for the following components:    Phosphorus 4.6 (*)     All other components within normal limits   B-TYPE NATRIURETIC PEPTIDE - Abnormal; Notable for the following components:     (*)     All other components within normal limits   TROPONIN I - Abnormal; Notable for the following components:    Troponin I 0.055 (*)     All other components within normal limits    Narrative:     Troponin critical result(s) called and verbal readback obtained from   Dionne Mckinney RN ER by MS1 08/11/2020 03:37   ISTAT PROCEDURE - Abnormal; Notable for the following components:    POC PH 7.337 (*)     POC PCO2 46.0 (*)     POC PO2 118 (*)     All other components within normal limits   CULTURE, RESPIRATORY   GRAM STAIN   INFLUENZA A AND B ANTIGEN    Narrative:     Specimen Source->Nasopharyngeal Swab   MAGNESIUM   PROTIME-INR   SARS-COV-2 RNA AMPLIFICATION, QUAL   APTT   PROCALCITONIN   POCT GLUCOSE MONITORING CONTINUOUS     EKG Readings: (Independently Interpreted)   Initial Reading: No STEMI. Previous EKG: Compared with most recent EKG Rhythm: Sinus Tachycardia. Heart Rate: 111.  Ectopy: No Ectopy. Conduction: LBBB.   Left bundle-branch block is old and was present on October 9, 2019.  Patient has no sgarbossa criteria     ECG Results          EKG 12-lead (In process)  Result time 08/11/20 05:06:14    In process by Interface, Lab In [facility] (08/11/20 05:06:14)                 Narrative:    Test Reason : R06.02,    Vent. Rate : 111 BPM     Atrial Rate : 111 BPM     P-R Int : 184 ms          QRS Dur : 140 ms      QT Int : 336 ms       P-R-T Axes : 032 038 225 degrees     QTc Int : 456 ms    Sinus tachycardia  Left bundle branch block  Abnormal ECG  When compared with ECG of 11-AUG-2020 02:03,  No significant change was found    Referred By: [email or web address]   SELF           Confirmed By:                             Imaging Results          X-Ray Chest AP Portable (Final result)  Result time 08/11/20 03:02:56    Final result by Constance Perez MD (08/11/20 03:02:56)                 Narrative:    PROCEDURE:   XR CHEST AP PORTABLE  dated  8/11/2020 3:02 AM    CLINICAL HISTORY:   Male 85 years of age.   Sepsis    TECHNIQUE: AP view of the chest obtained portably at 3:02 AM.    PREVIOUS STUDIES:  December 12, 2019    FINDINGS:    There are median sternotomy wires and mediastinal surgical clips.  Aortic arch is calcified. Cardiac mediastinal contours are stable. The  heart is not enlarged. There are bilateral interstitial infiltrates  are not present previously. Groundglass is within the lung bases.  Costophrenic angles are blunted. There is bilateral shoulder  osteoarthrosis.    IMPRESSION:    Bilateral infiltrates are likely pulmonary edema of congestive heart  failure. There are small pleural effusions. Status post CABG.    Electronically Signed by Constance Perez on 8/11/2020 6:38 AM                            X-Rays:   Independently Interpreted Readings:   Chest X-Ray: Bilateral interstitial infiltrates more pair in bilateral lower lobes.  Concerning for     Medical Decision Making:   History:   I  obtained history from: EMS provider and someone other than patient.  Old Medical Records: I decided to obtain old medical records.  Old Records Summarized: records from clinic visits.       <> Summary of Records: COVID negative today in urgent care  Independently Interpreted Test(s):   I have ordered and independently interpreted X-rays - see prior notes.  I have ordered and independently interpreted EKG Reading(s) - see prior notes  Clinical Tests:   Lab Tests: Ordered and Reviewed  The following lab test(s) were unremarkable: PTT and PT       <> Summary of Lab: Trop 0.055    PH 7.337 pCO2 42 PO2 118  Lactate 3.5  Platelets 45782  Sodium 135 BUN 28 total bili 1.6 alk-phos 142 AST 49 ALT 80 glucose 221  Flu negative    COVID negative    Urine is 2+ proteins 2+ glucose 1+ blood 3+ leukocytes 84 white blood cells rare bacteria 35 hyaline cast, no squamous cells from clean-catch  Radiological Study: Ordered and Reviewed  Medical Tests: Ordered and Reviewed  ED Management:  85-year-old male with history COPD, prior tobacco use with 25 pack-year history, coronary artery disease, hypertension, hypothyroidism who presents to the ER by EMS in acute respiratory distress patient was hypoxic at 74 % on arrival patient was unable to speak due to his respiratory distress.  He was placed immediately on a non-rebreather and then very quickly on to BiPAP this improved his sats to 94%.  With his history of COPD and very decreased breath sounds he was given Solu-Medrol 125 mg IV, 2 g of magnesium, 10 mg albuterol and 1 mg of Atrovent.  Breath sounds did improve patient appeared more comfortable was able to answer questions.  ABG revealed a pH of 7.34 with pCO2 of 42 and a PO2 118.  He was kept on BiPAP but his FiO2 was  was decreased to 40% chest x-ray revealed bilateral infiltrates concerning for atypical pneumonia.  COVID was negative earlier today was repeated here was negative.  Lactate was elevated 3.5 white count was  normal.  Patient also had a mild transaminitis and elevated bilirubin but had no abdominal pain.  Flu swab was also negative.  He was treated with Zosyn 4.5 g IV and Levaquin 750 mg IV for the pneumonia.  IV fluids were held due to concern for COVID.  Patient was admitted to [facility]- ICU  on BiPAP.  Lucinda Swain M.D.  6:31 AM 8/12/2020    Other:   I have discussed this case with another health care provider.              Attending Attestation:         Attending Critical Care:   Critical Care Times:   Direct Patient Care (initial evaluation, reassessments, and time considering the case)................................................................10 minutes.   Additional History from reviewing old medical records or taking additional history from the family, EMS, PCP, etc.......................5 minutes.   Ordering, Reviewing, and Interpreting Diagnostic Studies...............................................................................................................5 minutes.   Documentation..................................................................................................................................................................................10 minutes.   Consultation with other Physicians. .................................................................................................................................................5 minutes.   Consultation with the patient's family directly relating to the patient's condition, care, and DNR status (when patient unable)......5 minutes.   ==============================================================  · Total Critical Care Time - exclusive of procedural time: 40 minutes.  ==============================================================  Critical care was necessary to treat or prevent imminent or life-threatening deterioration of the following conditions: sepsis, respiratory failure and COPD exacerbation.   The following  critical care procedures were done by me (see procedure notes): airway management.   Critical care was time spent personally by me on the following activities: obtaining history from patient or relative, examination of patient, review of old charts, ordering lab, x-rays, and/or EKG, development of treatment plan with patient or relative, ordering and performing treatments and interventions, evaluation of patient's response to treatment, discussion with consultants, interpretation of cardiac measurements and re-evaluation of patient's conition.   Critical Care Condition: critical                             Clinical Impression:       ICD-10-CM ICD-9-CM   1. Pneumonia of both lower lobes due to infectious organism  J18.1 483.8   2. SOB (shortness of breath)  R06.02 786.05   3. COPD exacerbation  J44.1 491.21   4. Severe sepsis  A41.9 038.9    R65.20 995.92   5. Acute respiratory failure with hypoxia  J96.01 518.81   6. Atypical pneumonia  J18.9 486   7. CHF (congestive heart failure)  I50.9 428.0             ED Disposition Condition    Admit                             Lucinda Swain MD  08/12/20 0632

## 2020-08-11 NOTE — PROGRESS NOTES
"[facility] Progress Note  Patient Name: Frank Wyatt MRN: [id number]   Patient Class: IP- Inpatient  Length of Stay: 0   Admission Date: 8/11/2020  2:02 AM Attending Physician: Ayesha Chavez MD   Primary Care Provider: Td Begum MD Face-to-Face encounter date: 08/11/2020   Chief Complaint: Shortness of Breath    Assessment & Plan:   Frank Wyatt is a 85 y.o. male admitted for      Assessment  Severe sepsis with septic shock now resolved, pneumonia ruled out. UTI - resistant to drugs he was on.   Respiratory failure resolved  Febrile illness  Some degree of CHF as evidence by his elevated BNP and p.r.n. Lasix order, but no echo in the system for the last 5 years  Thrombocytopenia  Hyperglycemia  Transaminitis  Hyponatremia  History of uric acid nephrolithiasis and frequent UTIs, was on Cipro on admission  Elevated troponin  Elevated Lactic acid  Hyperbilirubinemia  COVID 19 negative  Hard of hearing  Debility  Obesity  Hyperlipidemia  COPD  Hypothyroidism      Plan  Transfer him out of ICU  Treated with Ceftriaxone  De-escalated antibiotics based on Infectious Disease recommendation  Ultrasound abdomen ordered to rule out any abdominal pathology  Echo ordered, BNP elevated  PT/OT consultation      Core measures:  - Code status: DNR  - DVT PPx: Lovenox  - lines/ tubes: PIV, Rt IJ central line or Castros cath       Hospital Course     08/11/2020: admitted for respiratory failure and sepsis due to UTI.       Discharge Planning:   PT/OT consulted    Subjective:    Interval History: Patient is doing fairly well. Breathing and cough is better. Respiratory failure has resolved. Denies any pain including chest pain or abdominal pain.     Review of Systems All other Review of Systems were found to be negative expect for that mentioned already in HPI.   Objective:   Physical Exam  BP (!) 128/59   Pulse 69   Temp 98.3 °F (36.8 °C) (Oral)   Resp 17   Ht 5' 8" (1.727 " m)   Wt 95.5 kg (210 lb 8.6 oz)   SpO2 (!) 92%   BMI 32.01 kg/m²   Vitals reviewed.    Constitutional: No distress.   HENT: Atraumatic.   Cardiovascular: Normal rate, regular rhythm and normal heart sounds.   Pulmonary/Chest: Effort normal. Clear to auscultation bilaterally. No wheezes.   Abdominal: Soft. Bowel sounds are normal. Exhibits no distension and no mass. No tenderness  Neurological: Alert.   Skin: Skin is warm and dry.     Following labs were Reviewed   Recent Labs   Lab 08/11/20 0214   WBC 10.99   HGB 16.3   HCT 50.5   PLT 93*   CALCIUM 9.0   ALBUMIN 4.1   PROT 7.7   *   K 4.0   CO2 27   CL 98   BUN 28*   CREATININE 1.4   ALKPHOS 142*   ALT 80*   AST 49*   BILITOT 1.6*     No results found for: POCTGLUCOSE     All labs within the past 24 hours have been reviewed  Microbiology Results (last 7 days)     Procedure Component Value Units Date/Time    Blood culture x two cultures. Draw prior to antibiotics. [[id number]] Collected: 08/11/20 0220    Order Status: Completed Specimen: Blood from Peripheral, Antecubital, Left Updated: 08/11/20 0917     Blood Culture, Routine No Growth to date    Narrative:      Aerobic and anaerobic    Blood culture x two cultures. Draw prior to antibiotics. [[id number]] Collected: 08/11/20 0214    Order Status: Completed Specimen: Blood from Peripheral, Antecubital, Left Updated: 08/11/20 0917     Blood Culture, Routine No Growth to date    Narrative:      Aerobic and anaerobic    Urine culture [[id number]] Collected: 08/11/20 0755    Order Status: No result Specimen: Urine Updated: 08/11/20 0846    Culture, Respiratory with Gram Stain [[id number]]     Order Status: Sent Specimen: Respiratory from Sputum, Expectorated     Gram stain [[id number]]     Order Status: Sent Specimen: Sputum from Lung, Lingula         X-Ray Chest 1 View   Final Result      X-Ray Chest AP Portable   Final Result          Inpatient medications  Scheduled Meds:   atorvastatin  40 mg Oral QHS     cefTRIAXone (ROCEPHIN) IVPB  1 g Intravenous Q24H    chlorhexidine  15 mL Mouth/Throat BID    enoxaparin  30 mg Subcutaneous Q24H    fluticasone furoate-vilanteroL  1 puff Inhalation Daily    levothyroxine  25 mcg Oral Daily    metoprolol tartrate  25 mg Oral BID    mupirocin   Nasal BID    tamsulosin  0.4 mg Oral Daily     Continuous Infusions:  PRN Meds:.albuterol **AND** MDI Q6H PRN, dextrose 50%, dextrose 50%, glucagon (human recombinant), glucose, glucose, insulin aspart U-100, promethazine (PHENERGAN) IVPB, sodium chloride 0.9%    Above encounter included review of the medical records, interviewing and examining the patient face-to-face, discussion with family and other health care providers, ordering and interpreting lab/test results and formulating a plan of care.     Medical Decision Making:    [] Low Complexity  [x] Moderate Complexity  [] High Complexity    Ayesha Chavez  [facility] Hospitalist  08/11/2020

## 2020-08-11 NOTE — RESPIRATORY THERAPY
08/11/20 0301   Patient Assessment/Suction   Level of Consciousness (AVPU) alert   Respiratory Effort Mild   Expansion/Accessory Muscles/Retractions accessory muscle use   All Lung Fields Breath Sounds crackles;diminished   PRE-TX-O2   O2 Device (Oxygen Therapy) BiPAP   Oxygen Concentration (%) 40   SpO2 95 %   Pulse Oximetry Type Continuous   $ Pulse Oximetry - Multiple Charge Pulse Oximetry - Multiple   Pulse (!) 129   Resp (!) 29   Ready to Wean/Extubation Screen   FIO2<=50 (chart decimal) 0.4   Preset CPAP/BiPAP Settings   Mode Of Delivery BiPAP S/T   $ Is patient using? Yes   Ipap 14   EPAP (cm H2O) 10   Pressure Support (cm H2O) 4   ITime (sec) 1   Rise Time (sec) 0.3   Patient CPAP/BiPAP Settings   RR Total (Breaths/Min) 29   Tidal Volume (mL) 497   VE Minute Ventilation (L/min) 13 L/min   Peak Inspiratory Pressure (cm H2O) 15   TiTOT (%) 34   Total Leak (L/Min) 10   Patient Trigger - ST Mode Only (%) 99   Respiratory Evaluation   $ Care Plan Tech Time 15 min   Evaluation For   (care plan)

## 2020-08-11 NOTE — RESPIRATORY THERAPY
This note also relates to the following rows which could not be included:  SpO2 - Cannot attach notes to unvalidated device data  Pulse - Cannot attach notes to unvalidated device data  Resp - Cannot attach notes to unvalidated device data       08/11/20 0242   Education   $ Education Other (see comment);15 min  (abg)   Labs   $ Was an ABG obtained? Arterial Puncture;ISTAT - Blood gas   $ Labs Tech Time 15 min

## 2020-08-11 NOTE — RESPIRATORY THERAPY
This note also relates to the following rows which could not be included:  SpO2 - Cannot attach notes to unvalidated device data  Pulse - Cannot attach notes to unvalidated device data  BP - Cannot attach notes to unvalidated device data       08/11/20 0209   PRE-TX-O2   O2 Device (Oxygen Therapy) BiPAP   Oxygen Concentration (%) 50   Resp (!) 38   Ready to Wean/Extubation Screen   FIO2<=50 (chart decimal) 0.5   Preset CPAP/BiPAP Settings   Mode Of Delivery BiPAP S/T   Ipap 16   EPAP (cm H2O) 10   Pressure Support (cm H2O) 6   Patient CPAP/BiPAP Settings   RR Total (Breaths/Min) 38   pt arrived in ED with severe resp distress

## 2020-08-12 VITALS
OXYGEN SATURATION: 94 % | RESPIRATION RATE: 18 BRPM | BODY MASS INDEX: 31.91 KG/M2 | TEMPERATURE: 99 F | DIASTOLIC BLOOD PRESSURE: 67 MMHG | WEIGHT: 210.56 LBS | HEART RATE: 108 BPM | SYSTOLIC BLOOD PRESSURE: 150 MMHG | HEIGHT: 68 IN

## 2020-08-12 LAB
ALBUMIN SERPL BCP-MCNC: 3.1 G/DL (ref 3.5–5.2)
ALP SERPL-CCNC: 90 U/L (ref 55–135)
ALT SERPL W/O P-5'-P-CCNC: 54 U/L (ref 10–44)
ANION GAP SERPL CALC-SCNC: 8 MMOL/L (ref 8–16)
AST SERPL-CCNC: 26 U/L (ref 10–40)
BACTERIA UR CULT: NORMAL
BACTERIA UR CULT: NORMAL
BASOPHILS # BLD AUTO: 0.02 K/UL (ref 0–0.2)
BASOPHILS NFR BLD: 0.1 % (ref 0–1.9)
BILIRUB SERPL-MCNC: 0.8 MG/DL (ref 0.1–1)
BNP SERPL-MCNC: 164 PG/ML (ref 0–99)
BUN SERPL-MCNC: 32 MG/DL (ref 8–23)
CALCIUM SERPL-MCNC: 8.5 MG/DL (ref 8.7–10.5)
CHLORIDE SERPL-SCNC: 100 MMOL/L (ref 95–110)
CO2 SERPL-SCNC: 26 MMOL/L (ref 23–29)
CREAT SERPL-MCNC: 1.2 MG/DL (ref 0.5–1.4)
CRP SERPL-MCNC: 9.56 MG/DL
D DIMER PPP IA.FEU-MCNC: 1.26 UG/ML FEU
DIFFERENTIAL METHOD: ABNORMAL
EOSINOPHIL # BLD AUTO: 0.2 K/UL (ref 0–0.5)
EOSINOPHIL NFR BLD: 1.6 % (ref 0–8)
ERYTHROCYTE [DISTWIDTH] IN BLOOD BY AUTOMATED COUNT: 14.3 % (ref 11.5–14.5)
EST. GFR  (AFRICAN AMERICAN): >60 ML/MIN/1.73 M^2
EST. GFR  (NON AFRICAN AMERICAN): 54.8 ML/MIN/1.73 M^2
FERRITIN SERPL-MCNC: 126 NG/ML (ref 20–300)
GLUCOSE SERPL-MCNC: 151 MG/DL (ref 70–110)
GLUCOSE SERPL-MCNC: 162 MG/DL (ref 70–110)
HCT VFR BLD AUTO: 43.2 % (ref 40–54)
HGB BLD-MCNC: 14 G/DL (ref 14–18)
IMM GRANULOCYTES # BLD AUTO: 0.08 K/UL (ref 0–0.04)
IMM GRANULOCYTES NFR BLD AUTO: 0.6 % (ref 0–0.5)
LDH SERPL L TO P-CCNC: 153 U/L (ref 110–260)
LYMPHOCYTES # BLD AUTO: 0.8 K/UL (ref 1–4.8)
LYMPHOCYTES NFR BLD: 5.5 % (ref 18–48)
MAGNESIUM SERPL-MCNC: 2 MG/DL (ref 1.6–2.6)
MCH RBC QN AUTO: 30 PG (ref 27–31)
MCHC RBC AUTO-ENTMCNC: 32.4 G/DL (ref 32–36)
MCV RBC AUTO: 93 FL (ref 82–98)
MONOCYTES # BLD AUTO: 0.8 K/UL (ref 0.3–1)
MONOCYTES NFR BLD: 6.1 % (ref 4–15)
NEUTROPHILS # BLD AUTO: 11.8 K/UL (ref 1.8–7.7)
NEUTROPHILS NFR BLD: 86.1 % (ref 38–73)
NRBC BLD-RTO: 0 /100 WBC
PHOSPHATE SERPL-MCNC: 3 MG/DL (ref 2.7–4.5)
PLATELET # BLD AUTO: 72 K/UL (ref 150–350)
PMV BLD AUTO: 10.3 FL (ref 9.2–12.9)
POTASSIUM SERPL-SCNC: 4.3 MMOL/L (ref 3.5–5.1)
PROCALCITONIN SERPL IA-MCNC: 4.4 NG/ML (ref 0–0.5)
PROT SERPL-MCNC: 5.9 G/DL (ref 6–8.4)
RBC # BLD AUTO: 4.67 M/UL (ref 4.6–6.2)
SODIUM SERPL-SCNC: 134 MMOL/L (ref 136–145)
WBC # BLD AUTO: 13.68 K/UL (ref 3.9–12.7)

## 2020-08-12 PROCEDURE — 83880 ASSAY OF NATRIURETIC PEPTIDE: CPT

## 2020-08-12 PROCEDURE — 97116 GAIT TRAINING THERAPY: CPT

## 2020-08-12 PROCEDURE — 83735 ASSAY OF MAGNESIUM: CPT

## 2020-08-12 PROCEDURE — 80053 COMPREHEN METABOLIC PANEL: CPT

## 2020-08-12 PROCEDURE — 85025 COMPLETE CBC W/AUTO DIFF WBC: CPT

## 2020-08-12 PROCEDURE — 84100 ASSAY OF PHOSPHORUS: CPT

## 2020-08-12 PROCEDURE — 82728 ASSAY OF FERRITIN: CPT

## 2020-08-12 PROCEDURE — 84145 PROCALCITONIN (PCT): CPT

## 2020-08-12 PROCEDURE — 97161 PT EVAL LOW COMPLEX 20 MIN: CPT

## 2020-08-12 PROCEDURE — 97165 OT EVAL LOW COMPLEX 30 MIN: CPT

## 2020-08-12 PROCEDURE — 94640 AIRWAY INHALATION TREATMENT: CPT

## 2020-08-12 PROCEDURE — 25000003 PHARM REV CODE 250: Performed by: NURSE PRACTITIONER

## 2020-08-12 PROCEDURE — 36415 COLL VENOUS BLD VENIPUNCTURE: CPT

## 2020-08-12 PROCEDURE — 86140 C-REACTIVE PROTEIN: CPT

## 2020-08-12 PROCEDURE — 94761 N-INVAS EAR/PLS OXIMETRY MLT: CPT

## 2020-08-12 PROCEDURE — 99232 PR SUBSEQUENT HOSPITAL CARE,LEVL II: ICD-10-PCS | Mod: ,,, | Performed by: INTERNAL MEDICINE

## 2020-08-12 PROCEDURE — 83615 LACTATE (LD) (LDH) ENZYME: CPT

## 2020-08-12 PROCEDURE — 97535 SELF CARE MNGMENT TRAINING: CPT

## 2020-08-12 PROCEDURE — 99900035 HC TECH TIME PER 15 MIN (STAT)

## 2020-08-12 PROCEDURE — 25000003 PHARM REV CODE 250

## 2020-08-12 PROCEDURE — 85379 FIBRIN DEGRADATION QUANT: CPT

## 2020-08-12 PROCEDURE — 99232 SBSQ HOSP IP/OBS MODERATE 35: CPT | Mod: ,,, | Performed by: INTERNAL MEDICINE

## 2020-08-12 RX ORDER — CEFDINIR 300 MG/1
300 CAPSULE ORAL EVERY 12 HOURS
Qty: 20 CAPSULE | Refills: 0 | Status: SHIPPED | OUTPATIENT
Start: 2020-08-12 | End: 2020-08-22

## 2020-08-12 RX ADMIN — MUPIROCIN: 20 OINTMENT TOPICAL at 09:08

## 2020-08-12 RX ADMIN — TAMSULOSIN HYDROCHLORIDE 0.4 MG: 0.4 CAPSULE ORAL at 09:08

## 2020-08-12 RX ADMIN — METOPROLOL TARTRATE 25 MG: 25 TABLET, FILM COATED ORAL at 09:08

## 2020-08-12 RX ADMIN — LEVOTHYROXINE SODIUM 25 MCG: 25 TABLET ORAL at 05:08

## 2020-08-12 RX ADMIN — ALBUTEROL SULFATE 2 PUFF: 90 AEROSOL, METERED RESPIRATORY (INHALATION) at 08:08

## 2020-08-12 RX ADMIN — FLUTICASONE FUROATE AND VILANTEROL TRIFENATATE 1 PUFF: 100; 25 POWDER RESPIRATORY (INHALATION) at 08:08

## 2020-08-12 RX ADMIN — CHLORHEXIDINE GLUCONATE 15 ML: 1.2 RINSE ORAL at 09:08

## 2020-08-12 NOTE — PROGRESS NOTES
Pulmonary/Critical Care Progress Note      PATIENT NAME: Frank Wyatt  MRN: [id number]  TODAY'S DATE: 2020  9:38 AM  ADMIT DATE: 2020  AGE: 85 y.o. : 10/16/1934    HPI:  Patient is an 85-year-old gentleman who developed fever, chills, and shortness of breath diffuse myalgias.  His temp was 102.8° he was brought to the emergency room.  Patient's daughter, from the H&P, stated that he had been sick for 2 days prior with low-grade temps and fatigue but when he spiked the temperature he was brought here.    Patient states he is feeling fine.  He is not short of breath.  He is not fatigued.  Nothing hurts.  He has no headache, he has no myalgias or arthralgias.      the patient states he is feeling well.  He is wondering when he can go home.  Discussed his diagnosis of COPD with his daughter; she states that prior to his wife's death, she had told Dr. yu that she had heard him wheezing, he was placed on Symbicort and diagnosed with COPD and this has been the situation for several years now.  She states he is not faithful with his Symbicort use at home.  He is wheezing slightly today.  He has not yet had his Breo this morning.    REVIEW OF SYSTEMS  GENERAL: Feeling Well.  EYES: Vision is good.  ENT:  He is hard of hearing.  HEART: No chest pain or palpitations.  LUNGS: No cough, sputum, or wheezing.  GI: No Nausea, vomiting, constipation, diarrhea, or reflux.  : No dysuria, hesitancy, or nocturia.  SKIN: No lesions or rashes.  MUSCULOSKELETAL: No joint pain or myalgias.  NEURO: No headaches or neuropathy.  LYMPH: No edema or adenopathy.  PSYCH: No anxiety or depression.  ENDO: No weight change.      VITAL SIGNS (MOST RECENT)  Temp: 97.6 °F (36.4 °C) (20 033)  Pulse: 63 (20 033)  Resp: 18 (20)  BP: 113/64 (200)  SpO2: (!) 94 % (20)    INTAKE AND OUTPUT (LAST 24 HOURS):    Intake/Output Summary (Last 24 hours) at 2020 0748  Last data filed at  8/12/2020 0600  Gross per 24 hour   Intake 3067 ml   Output 460 ml   Net 2607 ml       WEIGHT  Wt Readings from Last 1 Encounters:   08/11/20 95.5 kg (210 lb 8.6 oz)       PHYSICAL EXAM  GENERAL: Older patient in no distress.  HEENT: Pupils equal and reactive. Extraocular movements intact. Nose intact. Pharynx moist.  NECK: Supple.   HEART: Regular rate and rhythm. No murmur or gallop auscultated.  LUNGS: Clear to auscultation and percussion. Lung excursion symmetrical. No change in fremitus. No adventitial noises.  ABDOMEN: Bowel sounds present. Non-tender, no masses palpated.  : Normal anatomy.  EXTREMITIES: Normal muscle tone and joint movement, no cyanosis or clubbing.   LYMPHATICS: No adenopathy palpated, trace edema in his legs.  SKIN: Dry, intact, no lesions.   NEURO: Cranial nerves II-XII intact. Motor strength 5/5 bilaterally, upper and lower extremities.  PSYCH: Appropriate affect    CBC LAST (LAST 24 HOURS)  Recent Labs   Lab 08/12/20 0425   WBC 13.68*   RBC 4.67   HGB 14.0   HCT 43.2   MCV 93   MCH 30.0   MCHC 32.4   RDW 14.3   PLT 72*   MPV 10.3   GRAN 86.1*  11.8*   LYMPH 5.5*  0.8*   MONO 6.1  0.8   BASO 0.02   NRBC 0       CHEMISTRY LAST (LAST 24 HOURS)  Recent Labs   Lab 08/12/20 0426   *   K 4.3      CO2 26   ANIONGAP 8   BUN 32*   CREATININE 1.2   *   CALCIUM 8.5*   MG 2.0   ALBUMIN 3.1*   PROT 5.9*   ALKPHOS 90   ALT 54*   AST 26   BILITOT 0.8       COAGULATION LAST (LAST 24 HOURS)  No results for input(s): LABPT, INR, APTT in the last 24 hours.    CARDIAC PROFILE (LAST 24 HOURS)  Recent Labs   Lab 08/11/20  0214  08/12/20 0425 08/12/20 0426   *  --  164*  --    LDH  --    < >  --  153   TROPONINI 0.055*  --   --   --     < > = values in this interval not displayed.       LAST 7 DAYS MICROBIOLOGY   Microbiology Results (last 7 days)     Procedure Component Value Units Date/Time    Blood culture x two cultures. Draw prior to antibiotics. [[id number]] Collected:  08/11/20 0220    Order Status: Completed Specimen: Blood from Peripheral, Antecubital, Left Updated: 08/12/20 0432     Blood Culture, Routine No Growth to date      No Growth to date    Narrative:      Aerobic and anaerobic    Blood culture x two cultures. Draw prior to antibiotics. [[id number]] Collected: 08/11/20 0214    Order Status: Completed Specimen: Blood from Peripheral, Antecubital, Left Updated: 08/12/20 0432     Blood Culture, Routine No Growth to date      No Growth to date    Narrative:      Aerobic and anaerobic    Urine culture [[id number]] Collected: 08/11/20 0755    Order Status: No result Specimen: Urine Updated: 08/11/20 0846    Culture, Respiratory with Gram Stain [[id number]]     Order Status: Sent Specimen: Respiratory from Sputum, Expectorated     Gram stain [[id number]]     Order Status: Sent Specimen: Sputum from Lung, Lingula           MOST RECENT IMAGING  X-Ray Chest AP Portable  8/11/20    There are median sternotomy wires and mediastinal surgical clips.  Aortic arch is calcified. Cardiac mediastinal contours are stable. The  heart is not enlarged. There are bilateral interstitial infiltrates  are not present previously. Groundglass is within the lung bases.  Costophrenic angles are blunted. There is bilateral shoulder  osteoarthrosis.      CURRENT VISIT EKG  Results for orders placed or performed during the hospital encounter of 08/11/20   EKG 12-lead    Narrative    Test Reason : R06.02,    Vent. Rate : 111 BPM     Atrial Rate : 111 BPM     P-R Int : 184 ms          QRS Dur : 140 ms      QT Int : 336 ms       P-R-T Axes : 032 038 225 degrees     QTc Int : 456 ms    Sinus tachycardia  Left bundle branch block  Abnormal ECG  When compared with ECG of 11-AUG-2020 02:03,  No significant change was found    Referred By: [email or web address]   SELF           Confirmed By:        ECHOCARDIOGRAM RESULTS Dr. Kim is his cardiologist.  He states his echocardiogram this year showed an EF of 50-55% with no  significant valvular disease.          LAST ARTERIAL BLOOD GAS  ABG  Recent Labs   Lab 08/11/20  0236   PH 7.337*   PO2 118*   PCO2 46.0*   HCO3 24.7   BE -1       IMPRESSION AND PLAN  Severe sepsis with septic shock presentation, now resolved and lactic acidosis   Leukocytosis, expected  Febrile illness, T-max 103.3  No evidence of pneumonia at this time  Some degree of CHF, mild diastolic  COPD  Thrombocytopenia, worse  Hyperglycemia  Transaminitis, improving  Hyponatremia, persisting  History of uric acid nephrolithiasis and frequent UTIs  Proteus UTI      Discontinue daily acute phase reactant labs  Continue Breo or Symbicort  The patient would benefit from following up in the office so we can determine if he truly has COPD. I would continue his bronchodilator therapy until this is clearly determined as he certainly is at risk for having COPD.  Please call if I can be of assistance

## 2020-08-12 NOTE — PLAN OF CARE
08/12/20 0838   Patient Assessment/Suction   Level of Consciousness (AVPU) alert   Respiratory Effort Unlabored;Normal   PRE-TX-O2   O2 Device (Oxygen Therapy) room air   SpO2 (!) 94 %   Pulse Oximetry Type Intermittent   $ Pulse Oximetry - Multiple Charge Pulse Oximetry - Multiple   Pulse 108   Resp 18   Inhaler   $ Inhaler Charges MDI (Metered Dose Inahler) Treatment   Daily Review of Necessity (Inhaler) completed   Respiratory Treatment Status (Inhaler) given   Treatment Route (Inhaler) mouthpiece   Patient Position (Inhaler) HOB elevated   Post Treatment Assessment (Inhaler) breath sounds improved   Signs of Intolerance (Inhaler) none   Respiratory Evaluation   $ Care Plan Tech Time 15 min

## 2020-08-12 NOTE — PLAN OF CARE
Goals to be met by: 2020     Patient will increase functional independence with mobility by performin. Bed to chair transfer with Stand-by Assistance using Rolling Walker  2. Gait  x 250 feet with Stand-by Assistance.   3. Lower extremity exercise program x 20 reps, with supervision

## 2020-08-12 NOTE — PLAN OF CARE
08/12/20 1201   Medicare Message   Important Message from Medicare regarding Discharge Appeal Rights Given to patient/caregiver;Explained to patient/caregiver;Signed/date by patient/caregiver;Other (comments)  (D/C IMM - Pt verbalized understanding.)   Date IMM was signed 08/12/20   Time IMM was signed [id number]

## 2020-08-12 NOTE — RESPIRATORY THERAPY
08/11/20 1934   Patient Assessment/Suction   Level of Consciousness (AVPU) alert   Respiratory Effort Unlabored   Expansion/Accessory Muscles/Retractions expansion symmetric;no retractions;no use of accessory muscles   All Lung Fields Breath Sounds diminished;clear   Rhythm/Pattern, Respiratory no shortness of breath reported;pattern regular;unlabored   PRE-TX-O2   O2 Device (Oxygen Therapy) nasal cannula   Flow (L/min) 0   SpO2 97 %   Pulse Oximetry Type Intermittent   $ Pulse Oximetry - Multiple Charge Pulse Oximetry - Multiple   Inhaler   $ Inhaler Charges PRN treatment not required   Respiratory Interventions   Cough And Deep Breathing done with encouragement   Breathing Techniques/Airway Clearance deep/controlled cough encouraged;diaphragmatic breathing promoted   Education   $ Education Bronchodilator;Other (see comment);15 min  (o2,pox,prn tx,diaphragmatic deep breathing exercises)   Respiratory Evaluation   $ Care Plan Tech Time 15 min   Evaluation For   (care plan)   Admitting Diagnosis pneumonia   Pulmonary Diagnosis COPD   Home Oxygen   Has Home Oxygen? No   Home Aerosol, MDI, DPI, and Other Treatments/Therapies   Home Respiratory Therapy Per Patient/Review of Chart Yes   MDI Home Meds/Freq  albuterol hfa/prn, symbicort/bid   Oxygen Care Plan   SPO2 Goal (%) 92% non-cardiac   Bronchodilator Care Plan   Bronchodilator Care Plan MDI;DPI   MDI Meds w/ frequency Albuterol  puff inhaler PRN   DPI Meds w/ frequency Breo- fluticasone-vilanterol diskus inhaler (100) ONCE DAILY

## 2020-08-12 NOTE — PLAN OF CARE
08/12/20 1203   Final Note   Assessment Type Final Discharge Note   Anticipated Discharge Disposition Home-Health   Post-Acute Status   Post-Acute Authorization Home Health   Home Health Status Referrals Sent  (N - [facility] in the past)   Patient choice form signed by patient/caregiver   (Signed per patient - [facility] -)     Patient understands [facility] process, had [facility] in the past and prefers this agency. Referral sent to [facility] via [facility]. CM following.     Received call from Yasmine Underwood with N stating [facility] accepted.

## 2020-08-12 NOTE — DISCHARGE SUMMARY
[facility]  Discharge Summary  Patient Name: Frank Wyatt MRN: [id number]   Patient Class: IP- Inpatient  Length of Stay: 1   Admission Date: 8/11/2020  2:02 AM Attending Physician: Ayesha Chavez MD   Primary Care Provider: Td Begum MD Face-to-Face encounter date: 08/12/2020   Chief Complaint: Shortness of Breath    Date of Discharge: 8/12/2020  Discharge Disposition: Home/Home with home health PT  Condition: Stable    Reason for Hospitalization   Primary Diagnosis: severe sepsis with septic shock due to UTI that resolved     Secondary Diagnosis: Respiratory failure resolved  Febrile illness  Some degree of CHF as evidence by his elevated BNP and p.r.n. Lasix order, but no echo in the system for the last 5 years  Thrombocytopenia  Hyperglycemia  Transaminitis  Hyponatremia  History of uric acid nephrolithiasis and frequent UTIs, was on Cipro on admission  Elevated troponin  Elevated Lactic acid  Hyperbilirubinemia  COVID 19 negative  Hard of hearing  Debility  Obesity  Hyperlipidemia  COPD  Hypothyroidism         Patient Active Problem List   Diagnosis    Incisional hernia, without obstruction or gangrene    Protein calorie malnutrition    Dyspnea    Renal insufficiency    Renal stone    Sinus tachycardia    UTI (urinary tract infection)    Troponin level elevated    Atypical pneumonia    Pneumonia of both lower lobes due to infectious organism       Brief History of Present Illness    Frank Wyatt is a 85 y.o. 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 The patient presented to [facility] on 8/11/2020 with a  "primary complaint of Shortness of Breath        For the full HPI please refer to the History & Physical from this admission.    Hospital Course By Problem with Pertinent Findings     This is a a 85 year old male admitted for fever and was brought to the the hospital for shortness of breath. Admitted to ICU for sepsis and concern of COVID or pneumonia. Treated with broad spectrum antibiotics and later deescalated to Rocephin for UTI. Patient covid test came negative. Patient recovered significantly and will be going home on Cefdinir. He will follow up with PCP in 1 week.        Physical Exam  BP (!) 150/67   Pulse 108   Temp 98.6 °F (37 °C)   Resp 18   Ht 5' 8" (1.727 m)   Wt 95.5 kg (210 lb 8.6 oz)   SpO2 (!) 94%   BMI 32.01 kg/m²   Vitals reviewed.    Constitutional: No distress.   HENT: Atraumatic.   Cardiovascular: Normal rate, regular rhythm and normal heart sounds.   Pulmonary/Chest: Effort normal. Clear to auscultation bilaterally. No wheezes.   Abdominal: Soft. Bowel sounds are normal. Exhibits no distension and no mass. No tenderness  Neurological: Alert.   Skin: Skin is warm and dry.     Following labs were Reviewed   Recent Labs   Lab 08/12/20 0425 08/12/20 0426   WBC 13.68*  --    HGB 14.0  --    HCT 43.2  --    PLT 72*  --    CALCIUM  --  8.5*   ALBUMIN  --  3.1*   PROT  --  5.9*   NA  --  134*   K  --  4.3   CO2  --  26   CL  --  100   BUN  --  32*   CREATININE  --  1.2   ALKPHOS  --  90   ALT  --  54*   AST  --  26   BILITOT  --  0.8     No results found for: POCTGLUCOSE     All labs within the past 24 hours have been reviewed    Microbiology Results (last 7 days)     Procedure Component Value Units Date/Time    Urine culture [[id number]] Collected: 08/11/20 0755    Order Status: Completed Specimen: Urine Updated: 08/12/20 0770     Urine Culture, Routine Multiple organisms isolated. None in predominance.  Repeat if      clinically necessary.    Narrative:      Specimen Source->Urine    Blood " culture x two cultures. Draw prior to antibiotics. [[id number]] Collected: 08/11/20 0220    Order Status: Completed Specimen: Blood from Peripheral, Antecubital, Left Updated: 08/12/20 0432     Blood Culture, Routine No Growth to date      No Growth to date    Narrative:      Aerobic and anaerobic    Blood culture x two cultures. Draw prior to antibiotics. [[id number]] Collected: 08/11/20 0214    Order Status: Completed Specimen: Blood from Peripheral, Antecubital, Left Updated: 08/12/20 0432     Blood Culture, Routine No Growth to date      No Growth to date    Narrative:      Aerobic and anaerobic    Culture, Respiratory with Gram Stain [[id number]]     Order Status: Sent Specimen: Respiratory from Sputum, Expectorated     Gram stain [[id number]]     Order Status: Sent Specimen: Sputum from Lung, Lingula         X-Ray Chest AP Portable   Final Result      X-Ray Chest 1 View   Final Result      X-Ray Chest AP Portable   Final Result      US Abdomen Limited    (Results Pending)       No results found for this or any previous visit.      Consultants and Procedures   Consultants:  Pulmonary Medicine  ID    Procedures:   none    Discharge Information:   Diet:  Resume regular diet    Physical Activity:  Activity as tolerated    Instructions:  1. Take all medications as prescribed  2. Keep all follow-up appointments  3. Return to the hospital or call your primary care physicians if any worsening symptoms such as  occur.  4. Please take antibiotics as directed.     Follow-Up Appointments:  1. Please call your primary care physician to schedule an appointment in 1 week time.      Pending laboratory work/Tests to be performed/followed by the Primary care Physician:  none    The patient was discharged in the care of her parents//wife/family/caregiver, with discharge instructions were reviewed in written and verbal form. All pertinent questions were discussed and prescriptions were provided. The importance of making follow  up appointments and compliance of medications has been stressed repeatedly. The patient will follow up in 1 week or sooner as needed with the PCP, and the patient is on board with the plan. Upon discharge, patient needs to be on following medications.    Discharge Medications:     Medication List      START taking these medications    cefdinir 300 MG capsule  Commonly known as: OMNICEF  Take 1 capsule (300 mg total) by mouth every 12 (twelve) hours. for 10 days        CONTINUE taking these medications    albuterol 90 mcg/actuation inhaler  Commonly known as: PROVENTIL/VENTOLIN HFA  Inhale 2 puffs into the lungs every 4 (four) hours as needed for Wheezing. Rescue     ARTIFICIAL TEARS([id number]) 0.1-0.3 % Drop  Generic drug: dextran 70-hypromellose     atorvastatin 40 MG tablet  Commonly known as: LIPITOR     ciprofloxacin HCl 250 MG tablet  Commonly known as: CIPRO  Take 1 tablet (250 mg total) by mouth 2 (two) times daily.     furosemide 20 MG tablet  Commonly known as: LASIX     levothyroxine 25 MCG tablet  Commonly known as: SYNTHROID     metoprolol tartrate 25 MG tablet  Commonly known as: LOPRESSOR     SYMBICORT 160-4.5 mcg/actuation Hfaa  Generic drug: budesonide-formoterol 160-4.5 mcg     tamsulosin 0.4 mg Cap  Commonly known as: FLOMAX     traMADoL 50 mg tablet  Commonly known as: ULTRAM  Take 1 tablet (50 mg total) by mouth every 8 (eight) hours as needed for Pain.           Where to Get Your Medications      These medications were sent to [facility] [address]  [place] [address] [place] [address]    Phone: [phone]   · cefdinir 300 MG capsule           I spent 40 minutes preparing the discharge including reviewing records from previous encounters, preparation of discharge summary, assessing and final examination of the patient, discharge medicine reconciliation, discussing plan of care, follow up and education and prescriptions.       Ayesha Chavez  [facility]  Hospitalist  08/12/2020

## 2020-08-12 NOTE — NURSING
Admitted from 3000 in stable condition. Oriented to the room. Call light in place. Reviewed plan of care. Bed alarm placed. Informed to call for needs.

## 2020-08-12 NOTE — NURSING TRANSFER
Nursing Transfer Note      8/11/2020     Transfer To: [id number]     Transfer via bed    Transfer with cardiac monitoring    Transported by PAMELA Smith LPN    Medicines sent: Resp. Meds, insulin pen    Chart send with patient: Yes    Notified: daughter    Patient reassessed at: 08/11/2020 1945    Upon arrival to floor: patient oriented to room, call bell in reach and bed in lowest position    Report given to [facility]. Daughter notified of transfer. Cardiac monitoring in place. Meds and chart sent with patient. Mask on patient during transport.

## 2020-08-12 NOTE — PT/OT/SLP EVAL
Physical Therapy Evaluation    Patient Name:  Frank Wyatt   MRN:  [id number]    Recommendations:     Discharge Recommendations:  home with home health, home health PT   Discharge Equipment Recommendations: none   Barriers to discharge: None    Assessment:     Frank Wyatt is a 85 y.o. male admitted with a medical diagnosis of Atypical pneumonia. Patient admitted to hospital w/ c/o SOB. He presents with the following impairments/functional limitations:  weakness, impaired endurance, impaired functional mobilty, gait instability, decreased lower extremity function. Patient agreeable to gait training.    Rehab Prognosis: Good; patient would benefit from acute skilled PT services to address these deficits and reach maximum level of function.    Recent Surgery: * No surgery found *      Plan:     During this hospitalization, patient to be seen 6 x/week to address the identified rehab impairments via gait training, therapeutic activities, therapeutic exercises and progress toward the following goals:    · Plan of Care Expires:  08/26/20    Subjective     Chief Complaint: generalized weakness  Patient/Family Comments/goals: improve overall strength/mobility  Pain/Comfort:  · Pain Rating 1: 0/10    Patients cultural, spiritual, Confucianism conflicts given the current situation:      Living Environment:  Lives alone in 1-story home (no steps). Dtr staying with patient temporarily.  Prior to admission, patients level of function was supervision needed.  Equipment used at home: walker, rolling, cane, straight.  DME owned (not currently used): none.  Upon discharge, patient will have assistance from family.    Objective:     Communicated with nurse prior to session.  Patient found standing in bathroom with peripheral IV, telemetry upon PT entry to room.    General Precautions: Standard, fall   Orthopedic Precautions:N/A   Braces: N/A     Exams:  · RLE ROM: WFL  · RLE Strength: Deficits: grossly 4-/5  · LLE ROM:  WFL  · LLE Strength: Deficits: grossly 4-/5    Functional Mobility:  · Transfers:     · Sit to Stand:  contact guard assistance with rolling walker  · Gait: 150 ft w/ RW and CGA    Therapeutic Activities and Exercises:   n/a    AM-PAC 6 CLICK MOBILITY  Total Score:17     Patient left up in chair with all lines intact, call button in reach and patient's dtr present.    GOALS:   Multidisciplinary Problems     Physical Therapy Goals        Problem: Physical Therapy Goal    Goal Priority Disciplines Outcome Goal Variances Interventions   Physical Therapy Goal     PT, PT/OT      Description: Goals to be met by: 2020     Patient will increase functional independence with mobility by performin. Bed to chair transfer with Stand-by Assistance using Rolling Walker  2. Gait  x 250 feet with Stand-by Assistance.   3. Lower extremity exercise program x 20 reps, with supervision                     History:     Past Medical History:   Diagnosis Date    Arthritis     COPD (chronic obstructive pulmonary disease)     WHEEZES    Coronary artery disease     Dermatographism 2019    Diverticulosis     Full dentures     [place] (hard of hearing)     left ear    Hypertension     Kidney stones     Meniere's disease     MRSA infection 2019    Skin writing     dermatiographism    Small bowel obstruction due to adhesions 10/2019    Thyroid disease     Wears glasses        Past Surgical History:   Procedure Laterality Date    APPENDECTOMY      CARDIAC SURGERY      CABG 5 vessel    CHOLECYSTECTOMY      COLON SURGERY      Colon resection with colostomy; colostomy reversal.     CORONARY ARTERY BYPASS GRAFT      5-bypass     CYSTOSCOPY N/A 8/15/2019    Procedure: CYSTOSCOPY;  Surgeon: Dipak Sanchez MD;  Location: [facility];  Service: Urology;  Laterality: N/A;    CYSTOSCOPY N/A 10/31/2019    Procedure: CYSTOSCOPY;  Surgeon: Dipak Sanchez MD;  Location: [facility] OR;  Service:  Urology;  Laterality: N/A;    CYSTOSCOPY W/ URETERAL STENT REMOVAL Left 10/31/2019    Procedure: CYSTOSCOPY, WITH URETERAL STENT REMOVAL  URETEROSCOPY;  Surgeon: Dipak Sanchez MD;  Location: [facility];  Service: Urology;  Laterality: Left;    EXTRACORPOREAL SHOCK WAVE LITHOTRIPSY Left 8/15/2019    Procedure: LITHOTRIPSY, ESWL;  Surgeon: Dipak Sanchez MD;  Location: [facility];  Service: Urology;  Laterality: Left;    EXTRACORPOREAL SHOCK WAVE LITHOTRIPSY Left 9/19/2019    Procedure: LITHOTRIPSY, ESWL;  Surgeon: Dipak Sanchez MD;  Location: [facility];  Service: Urology;  Laterality: Left;    EYE SURGERY Right 2014    Cataract    EYE SURGERY Left 2017    Cataract    HERNIA REPAIR  2014    Dr. Bailon - had to have mesh removed    INGUINAL HERNIA REPAIR Right 03/25/2019        LITHOTRIPSY      nephrostomy tube      PERCUTANEOUS NEPHROSTOMY      ROTATOR CUFF REPAIR  2005    right shoulder    URETEROSCOPY Left 10/31/2019    Procedure: URETEROSCOPY;  Surgeon: Dipak Sanchez MD;  Location: [facility];  Service: Urology;  Laterality: Left;       Time Tracking:     PT Received On: 08/12/20  PT Start Time: 0845     PT Stop Time: 0915  PT Total Time (min): 30 min     Billable Minutes: Evaluation 15 and Gait Training 15      Kwesi Marie, PT  08/12/2020

## 2020-08-12 NOTE — PT/OT/SLP EVAL
Occupational Therapy   Evaluation    Name: Frank Wyatt  MRN: [id number]  Admitting Diagnosis:  Atypical pneumonia      Recommendations:     Discharge Recommendations: home health OT  Discharge Equipment Recommendations:  none  Barriers to discharge:  None    Assessment:     Frank Wyatt is a 85 y.o. male with a medical diagnosis of Atypical pneumonia.  Pt agreeable to OT evaluation this AM. Performance deficits affecting function: weakness, impaired endurance, impaired self care skills, impaired functional mobilty, gait instability, decreased upper extremity function, decreased lower extremity function, decreased ROM.  Pt's daughter present and reports she will be staying with pt for a while at d/c. Pt reports PTA, he was independent with ADLs/functional mobility. Pt's sats stable throughout session. Rec HHOT at d/c.     Rehab Prognosis: Good; patient would benefit from acute skilled OT services to address these deficits and reach maximum level of function.       Plan:     Patient to be seen 3 x/week to address the above listed problems via self-care/home management, therapeutic activities, therapeutic exercises  · Plan of Care Expires: 09/12/20  · Plan of Care Reviewed with: patient, daughter    Subjective     Chief Complaint: Chronic shoulder pain  Patient/Family Comments/goals: to return home    Occupational Profile:  Living Environment: Pt lives alone (but daughter will be staying with pt at d/c) in a 1 story home with TH step to enter. Pt has a walk-in shower with a built-in bench, standard height toilet, and grab bars in shower.   Previous level of function: Mod I with ADLs/functional mobility; Pt cooks, cleans, drives, and runs errands.  Roles and Routines: primary homemaker  Equipment Used at Home:  cane, straight, walker, rolling, grab bar(Built in shower bench; Pt reports he uses a urinal that's on his nightstand at night)  Assistance upon Discharge: yes, from daughter     Pain/Comfort:  · Pain  Rating 1: 0/10    Patients cultural, spiritual, Quaker conflicts given the current situation:      Objective:     Communicated with: nursing prior to session.  Patient found up in chair with telemetry upon OT entry to room.    General Precautions: Standard, fall   Orthopedic Precautions:N/A   Braces: N/A     Occupational Performance:    Functional Mobility/Transfers:  · Patient completed Sit <> Stand Transfer with contact guard assistance  with  rolling walker   · Functional Mobility: pt amb around room with CGA with RW with no LOB/SOB    Activities of Daily Living:  · Grooming: contact guard assistance standing at sink to wash face    Cognitive/Visual Perceptual:  Cognitive/Psychosocial Skills:     -       Follows Commands/attention:Follows two-step commands  -       Communication: clear/fluent  -       Memory: No Deficits noted  -       Safety awareness/insight to disability: intact   -       Mood/Affect/Coping skills/emotional control: Appropriate to situation, Cooperative and Pleasant    Physical Exam:  Balance:    -       SBA seated balance; CGA standing balance   Upper Extremity Range of Motion:     -       Right Upper Extremity: Deficits in shoulder flex; WFL distally  -       Left Upper Extremity: Deficits in shoulder flex; WFL distally  Upper Extremity Strength:    -       Right Upper Extremity: 2+/5 shoulder flex; WFL distally  -       Left Upper Extremity: 2+/5 shoulder flex; WFL distally   Strength:    -       Right Upper Extremity: WFL  -       Left Upper Extremity: WFL  Fine Motor Coordination:    -       Intact  Gross motor coordination:   WFL    AMPAC 6 Click ADL:  AMPAC Total Score: 19    Treatment & Education:  Pt educated on role of OT/POC and safety during ADLs, transfers, and functional mobility.   Education:    Patient left up in chair with all lines intact, call button in reach and daughter  present    GOALS:   Multidisciplinary Problems     Occupational Therapy Goals        Problem:  Occupational Therapy Goal    Goal Priority Disciplines Outcome Interventions   Occupational Therapy Goal     OT, PT/OT     Description: Goals to be met by: discharge    Patient will increase functional independence with ADLs by performing:    LE Dressing with Supervision.  Grooming while standing at sink with Supervision.  Toileting from toilet with Supervision for hygiene and clothing management.   Toilet transfer to toilet with Supervision.                     History:     Past Medical History:   Diagnosis Date    Arthritis     COPD (chronic obstructive pulmonary disease)     WHEEZES    Coronary artery disease     Dermatographism 03/2019    Diverticulosis 2004    Full dentures     [place] (hard of hearing)     left ear    Hypertension     Kidney stones     Meniere's disease     MRSA infection 03/25/2019    Skin writing     dermatiographism    Small bowel obstruction due to adhesions 10/2019    Thyroid disease     Wears glasses        Past Surgical History:   Procedure Laterality Date    APPENDECTOMY      CARDIAC SURGERY  1997    CABG 5 vessel    CHOLECYSTECTOMY  2013    COLON SURGERY      Colon resection with colostomy; colostomy reversal. 2004    CORONARY ARTERY BYPASS GRAFT  1996    5-bypass     CYSTOSCOPY N/A 8/15/2019    Procedure: CYSTOSCOPY;  Surgeon: Dipak Sanchez MD;  Location: [facility];  Service: Urology;  Laterality: N/A;    CYSTOSCOPY N/A 10/31/2019    Procedure: CYSTOSCOPY;  Surgeon: Dipak Sanchez MD;  Location: [facility] OR;  Service: Urology;  Laterality: N/A;    CYSTOSCOPY W/ URETERAL STENT REMOVAL Left 10/31/2019    Procedure: CYSTOSCOPY, WITH URETERAL STENT REMOVAL  URETEROSCOPY;  Surgeon: Dipak Sanchez MD;  Location: [facility] OR;  Service: Urology;  Laterality: Left;    EXTRACORPOREAL SHOCK WAVE LITHOTRIPSY Left 8/15/2019    Procedure: LITHOTRIPSY, ESWL;  Surgeon: Dipak Sanchez MD;  Location: [facility];  Service: Urology;  Laterality: Left;    EXTRACORPOREAL SHOCK  WAVE LITHOTRIPSY Left 9/19/2019    Procedure: LITHOTRIPSY, ESWL;  Surgeon: Dipak Sanchez MD;  Location: [facility];  Service: Urology;  Laterality: Left;    EYE SURGERY Right 2014    Cataract    EYE SURGERY Left 2017    Cataract    HERNIA REPAIR  2014    Dr. Bailon - had to have mesh removed    INGUINAL HERNIA REPAIR Right 03/25/2019        LITHOTRIPSY      nephrostomy tube      PERCUTANEOUS NEPHROSTOMY      ROTATOR CUFF REPAIR  2005    right shoulder    URETEROSCOPY Left 10/31/2019    Procedure: URETEROSCOPY;  Surgeon: Dipak Sanchez MD;  Location: [facility];  Service: Urology;  Laterality: Left;       Time Tracking:     OT Date of Treatment: 08/12/20  OT Start Time: 1016  OT Stop Time: 1030  OT Total Time (min): 14 min    Billable Minutes:Evaluation 6  Self Care/Home Management 8    Katelyn Borrego OT  8/12/2020

## 2020-08-12 NOTE — PHYSICIAN QUERY
PT Name: Frank Wyatt  MR #: [id number]     Documentation Clarification      CDS/: Lupe Heck RN, CCDS               Contact information:  [phone]    This form is a permanent document in the medical record.     Query Date: August 12, 2020    By submitting this query, we are merely seeking further clarification of documentation. Please utilize your independent clinical judgment when addressing the question(s) below.    The Medical Record reflects the following:    Clinical Findings Location in Medical Record   Initial Vitals   BP Pulse Resp Temp SpO2   08/11/20 0215 08/11/20 0203 08/11/20 0203 08/11/20 0203 08/11/20 0203   (!) 152/111 (!) 129 (!) 36 98.6 °F (37 °C)      Lactated ringers bolus 2,667 mL intravenous once    104-202/  113-150/52-67    Lactate, [place]:  3.5-->2.3-->3.3-->2.1    Severe sepsis with septic shock presentation, now resolved and lactic acidosis persisting  Febrile illness, T-max 103.3  Continue broad-spectrum antibiotics    Recent UTI, Proteus, resistant to the drugs that he was on, based on culture. Still has pyuria  Respiratory failure, resolved, likely from sepsis    Severe sepsis with septic shock now resolved, pneumonia ruled out. UTI - resistant to drugs he was on.   Respiratory failure resolved  Febrile illness  Treated with Ceftriaxone  De-escalated antibiotics based on Infectious Disease recommendation  Ultrasound abdomen ordered to rule out any abdominal pathology ED provider note              MAR 08/11/2020    Vital signs 08/11/2020    0206 -  08/11/2020  2041  Vital signs 08/12/2020   0006 - 08/12/2020    0838    Lab 08/11/2020  0214--> 0653--> 0947--> 1408    08/11/2020 Pulmonology note file time 08/12/2020 7:40 AM        08/11/2020 Infectious Diseases consult      08/11/2020 Hospital Medicine                                                                                      Provider, please clarify the diagnosis of septic shock:      [  x ] Diagnosis ruled in  and additional clinical support/decision-making indicators for the diagnosis include (please specify):__________________________   [   ] Above stated diagnosis has been ruled out   [   ] Above stated diagnosis has been ruled out, other diagnosis ruled in (please specify):_______________   [   ] Other clarification (please specify): ___________________   [  ] Clinically undetermined

## 2020-08-13 NOTE — PT/OT/SLP DISCHARGE
Occupational Therapy Discharge Summary    Frank Wyatt  MRN: [id number]   Principal Problem: Atypical pneumonia      Patient Discharged from acute Occupational Therapy on 8/12/2020.  Please refer to prior OT note dated 8/12/2020 for functional status.    Assessment:      Patient appropriate for care in another setting.    Objective:     GOALS:   Multidisciplinary Problems     Occupational Therapy Goals        Problem: Occupational Therapy Goal    Goal Priority Disciplines Outcome Interventions   Occupational Therapy Goal     OT, PT/OT     Description: Goals to be met by: discharge    Patient will increase functional independence with ADLs by performing:    LE Dressing with Supervision.  Grooming while standing at sink with Supervision.  Toileting from toilet with Supervision for hygiene and clothing management.   Toilet transfer to toilet with Supervision.                     Reasons for Discontinuation of Therapy Services  Transfer to alternate level of care.      Plan:     Patient Discharged to: Home with Home Health Service    Katelyn Borrego, SHAUN  8/13/2020

## 2020-08-16 LAB
BACTERIA BLD CULT: NORMAL
BACTERIA BLD CULT: NORMAL

## 2020-08-24 DIAGNOSIS — N20.0 URIC ACID NEPHROLITHIASIS: Primary | ICD-10-CM

## 2020-08-25 ENCOUNTER — HOSPITAL ENCOUNTER (OUTPATIENT)
Dept: RADIOLOGY | Facility: HOSPITAL | Age: 85
Discharge: HOME | End: 2020-08-25
Attending: SPECIALIST
Payer: MEDICARE

## 2020-08-25 DIAGNOSIS — N20.0 URIC ACID NEPHROLITHIASIS: ICD-10-CM

## 2020-08-25 DIAGNOSIS — J18.9 PNEUMONIA DUE TO INFECTIOUS ORGANISM, UNSPECIFIED LATERALITY, UNSPECIFIED PART OF LUNG: ICD-10-CM

## 2020-08-25 DIAGNOSIS — J18.9 PNEUMONIA DUE TO INFECTIOUS ORGANISM, UNSPECIFIED LATERALITY, UNSPECIFIED PART OF LUNG: Primary | ICD-10-CM

## 2020-08-25 PROCEDURE — 74018 RADEX ABDOMEN 1 VIEW: CPT | Mod: TC,PO

## 2020-08-25 PROCEDURE — 71046 X-RAY EXAM CHEST 2 VIEWS: CPT | Mod: TC,PO

## 2021-02-01 DIAGNOSIS — E78.5 HYPERLIPIDEMIA, UNSPECIFIED HYPERLIPIDEMIA TYPE: Primary | ICD-10-CM

## 2021-02-01 RX ORDER — ATORVASTATIN CALCIUM 40 MG/1
40 TABLET, FILM COATED ORAL NIGHTLY
Qty: 90 TABLET | Refills: 0 | Status: SHIPPED | OUTPATIENT
Start: 2021-02-01

## 2021-03-04 ENCOUNTER — HOSPITAL ENCOUNTER (OUTPATIENT)
Dept: RADIOLOGY | Facility: HOSPITAL | Age: 86
Discharge: HOME | End: 2021-03-04
Attending: SPECIALIST
Payer: MEDICARE

## 2021-03-04 DIAGNOSIS — N20.0 URIC ACID NEPHROLITHIASIS: ICD-10-CM

## 2021-03-04 DIAGNOSIS — N20.1 CALCULUS OF URETER: ICD-10-CM

## 2021-03-04 PROCEDURE — 74018 RADEX ABDOMEN 1 VIEW: CPT | Mod: TC,PO

## 2021-03-11 ENCOUNTER — HOSPITAL ENCOUNTER (OUTPATIENT)
Dept: RADIOLOGY | Facility: HOSPITAL | Age: 86
Discharge: HOME | End: 2021-03-11
Attending: PEDIATRICS
Payer: MEDICARE

## 2021-03-11 DIAGNOSIS — J44.89 OBSTRUCTIVE CHRONIC BRONCHITIS WITHOUT EXACERBATION: ICD-10-CM

## 2021-03-11 PROCEDURE — 71046 X-RAY EXAM CHEST 2 VIEWS: CPT | Mod: TC,PO

## 2021-05-06 ENCOUNTER — TELEPHONE (OUTPATIENT)
Dept: FAMILY MEDICINE | Facility: CLINIC | Age: 86
End: 2021-05-06

## 2021-05-29 ENCOUNTER — OFFICE VISIT (OUTPATIENT)
Dept: URGENT CARE | Facility: CLINIC | Age: 86
End: 2021-05-29
Payer: MEDICARE

## 2021-05-29 VITALS
RESPIRATION RATE: 18 BRPM | HEIGHT: 66 IN | DIASTOLIC BLOOD PRESSURE: 73 MMHG | HEART RATE: 68 BPM | TEMPERATURE: 98 F | WEIGHT: 199 LBS | OXYGEN SATURATION: 96 % | BODY MASS INDEX: 31.98 KG/M2 | SYSTOLIC BLOOD PRESSURE: 149 MMHG

## 2021-05-29 DIAGNOSIS — H60.02 ABSCESS OF LEFT EAR CANAL: Primary | ICD-10-CM

## 2021-05-29 PROCEDURE — 1157F PR ADVANCE CARE PLAN OR EQUIV PRESENT IN MEDICAL RECORD: ICD-10-PCS | Mod: S$GLB,,, | Performed by: NURSE PRACTITIONER

## 2021-05-29 PROCEDURE — 99214 PR OFFICE/OUTPT VISIT, EST, LEVL IV, 30-39 MIN: ICD-10-PCS | Mod: S$GLB,,, | Performed by: NURSE PRACTITIONER

## 2021-05-29 PROCEDURE — 99214 OFFICE O/P EST MOD 30 MIN: CPT | Mod: S$GLB,,, | Performed by: NURSE PRACTITIONER

## 2021-05-29 PROCEDURE — 1157F ADVNC CARE PLAN IN RCRD: CPT | Mod: S$GLB,,, | Performed by: NURSE PRACTITIONER

## 2021-05-29 RX ORDER — DOXYCYCLINE 100 MG/1
100 CAPSULE ORAL EVERY 12 HOURS
Qty: 14 CAPSULE | Refills: 0 | Status: SHIPPED | OUTPATIENT
Start: 2021-05-29 | End: 2021-06-05

## 2021-05-29 RX ORDER — OFLOXACIN 3 MG/ML
10 SOLUTION AURICULAR (OTIC) DAILY
Qty: 10 ML | Refills: 0 | Status: SHIPPED | OUTPATIENT
Start: 2021-05-29 | End: 2021-06-05

## 2021-08-24 ENCOUNTER — HOSPITAL ENCOUNTER (OUTPATIENT)
Dept: RADIOLOGY | Facility: HOSPITAL | Age: 86
Discharge: HOME | End: 2021-08-24
Attending: SPECIALIST
Payer: MEDICARE

## 2021-08-24 DIAGNOSIS — N20.0 URIC ACID NEPHROLITHIASIS: Primary | ICD-10-CM

## 2021-08-24 DIAGNOSIS — N20.0 URIC ACID NEPHROLITHIASIS: ICD-10-CM

## 2021-08-24 PROCEDURE — 74018 RADEX ABDOMEN 1 VIEW: CPT | Mod: TC,PO

## 2022-01-29 ENCOUNTER — OFFICE VISIT (OUTPATIENT)
Dept: URGENT CARE | Facility: CLINIC | Age: 87
End: 2022-01-29
Payer: MEDICARE

## 2022-01-29 ENCOUNTER — HOSPITAL ENCOUNTER (OUTPATIENT)
Dept: RADIOLOGY | Facility: HOSPITAL | Age: 87
Discharge: HOME | End: 2022-01-29
Attending: NURSE PRACTITIONER
Payer: MEDICARE

## 2022-01-29 VITALS
BODY MASS INDEX: 32.27 KG/M2 | WEIGHT: 200.81 LBS | RESPIRATION RATE: 18 BRPM | DIASTOLIC BLOOD PRESSURE: 59 MMHG | SYSTOLIC BLOOD PRESSURE: 134 MMHG | HEIGHT: 66 IN | OXYGEN SATURATION: 96 % | TEMPERATURE: 98 F | HEART RATE: 75 BPM

## 2022-01-29 DIAGNOSIS — R60.0 EDEMA OF RIGHT LOWER LEG: Primary | ICD-10-CM

## 2022-01-29 DIAGNOSIS — M79.661 RIGHT CALF PAIN: ICD-10-CM

## 2022-01-29 PROCEDURE — 93971 US LOWER EXTREMITY VEINS RIGHT: ICD-10-PCS | Mod: 26,RT,, | Performed by: RADIOLOGY

## 2022-01-29 PROCEDURE — 99214 PR OFFICE/OUTPT VISIT, EST, LEVL IV, 30-39 MIN: ICD-10-PCS | Mod: S$GLB,,, | Performed by: NURSE PRACTITIONER

## 2022-01-29 PROCEDURE — 1157F ADVNC CARE PLAN IN RCRD: CPT | Mod: CPTII,S$GLB,, | Performed by: NURSE PRACTITIONER

## 2022-01-29 PROCEDURE — 1160F RVW MEDS BY RX/DR IN RCRD: CPT | Mod: CPTII,S$GLB,, | Performed by: NURSE PRACTITIONER

## 2022-01-29 PROCEDURE — 1160F PR REVIEW ALL MEDS BY PRESCRIBER/CLIN PHARMACIST DOCUMENTED: ICD-10-PCS | Mod: CPTII,S$GLB,, | Performed by: NURSE PRACTITIONER

## 2022-01-29 PROCEDURE — 93971 EXTREMITY STUDY: CPT | Mod: TC,RT

## 2022-01-29 PROCEDURE — 1159F PR MEDICATION LIST DOCUMENTED IN MEDICAL RECORD: ICD-10-PCS | Mod: CPTII,S$GLB,, | Performed by: NURSE PRACTITIONER

## 2022-01-29 PROCEDURE — 1159F MED LIST DOCD IN RCRD: CPT | Mod: CPTII,S$GLB,, | Performed by: NURSE PRACTITIONER

## 2022-01-29 PROCEDURE — 1157F PR ADVANCE CARE PLAN OR EQUIV PRESENT IN MEDICAL RECORD: ICD-10-PCS | Mod: CPTII,S$GLB,, | Performed by: NURSE PRACTITIONER

## 2022-01-29 PROCEDURE — 99214 OFFICE O/P EST MOD 30 MIN: CPT | Mod: S$GLB,,, | Performed by: NURSE PRACTITIONER

## 2022-01-29 PROCEDURE — 93971 EXTREMITY STUDY: CPT | Mod: 26,RT,, | Performed by: RADIOLOGY

## 2022-01-29 RX ORDER — GLIPIZIDE 5 MG/1
TABLET ORAL
Status: ON HOLD | COMMUNITY
End: 2023-03-29 | Stop reason: HOSPADM

## 2022-01-29 RX ORDER — CEFUROXIME AXETIL 500 MG/1
500 TABLET ORAL 2 TIMES DAILY
Status: ON HOLD | COMMUNITY
Start: 2021-11-23 | End: 2023-03-29 | Stop reason: HOSPADM

## 2022-01-29 RX ORDER — AMLODIPINE AND BENAZEPRIL HYDROCHLORIDE 5; 20 MG/1; MG/1
1 CAPSULE ORAL NIGHTLY
Status: ON HOLD | COMMUNITY
Start: 2022-01-19 | End: 2023-03-29 | Stop reason: HOSPADM

## 2022-01-29 RX ORDER — METOPROLOL SUCCINATE 25 MG/1
1 TABLET, EXTENDED RELEASE ORAL 2 TIMES DAILY
Status: ON HOLD | COMMUNITY
End: 2023-03-29 | Stop reason: HOSPADM

## 2022-01-29 RX ORDER — HYDRALAZINE HYDROCHLORIDE 25 MG/1
25 TABLET, FILM COATED ORAL 3 TIMES DAILY
Status: ON HOLD | COMMUNITY
Start: 2022-01-14 | End: 2023-03-29 | Stop reason: HOSPADM

## 2022-01-29 NOTE — PATIENT INSTRUCTIONS
Go directly over to  [facility] and check into outpatient registration for ultrasound of right lower leg.  I will call later with results for further instructions.    Patient Education       Dependent Edema Discharge Instructions   About this topic   Edema is another word for swelling. It is often caused by an illness. The swelling happens when the fluid inside your body is trapped under the skin and tissues.  Dependent edema means the extra fluid settles in parts affected by gravity. These parts include the legs, ankles, and feet. For people who are in bed most of the time, the edema collects in the back or side of the body. Treatment includes treating the cause of the illness and getting rid of the extra water.     What care is needed at home?   · Ask your doctor what you need to do when you go home. Make sure you ask questions if you do not understand what the doctor says. This way you will know what you need to do.  · Make sure to take all the drugs ordered by your doctor.  · Your doctor may give you support hose to help get the fluid back into the veins so the kidneys can remove it.  · Raise your legs by putting 2 to 3 pillows under them when you sit or lie down.  · You may need to reduce the salt and water in your diet.  What follow-up care is needed?   · Your condition needs to be watched closely. Your doctor may ask you to make visits to the office to check on your progress. Be sure to keep these visits.  · Your doctor will tell you if more tests are needed.  What drugs may be needed?   The doctor may order drugs to:  · Get rid of the extra water, which will result in you making more urine.  · Treat the cause of the edema  Will physical activity be limited?   If the edema in your legs is painful, your movements may be limited for a time. If your heart is weak, then your activity may need to be decreased. Ask your doctor what exercise is right for you.  What changes to diet are needed?    · Low-salt diet  · A dietitian can help you reduce the salt and water in your diet.  What problems could happen?   The amount of edema may cause the skin to stretch and tear or you may develop blisters. If this happens, you may get an infection where the skin is torn.  What can be done to prevent this health problem?   · Take care of your heart. Eat healthy and exercise regularly. Lower your risk factors for heart disease by watching your blood pressure and cholesterol.  · After walking or standing for a long period of time, raise your legs when you get home.  · Avoid foods that are too salty.  · Avoid standing for long periods of time.  When do I need to call the doctor?   · If you notice the edema or fluid increasing in your legs push the skin in with your thumb over the area with the edema. If your thumb sinks in to the swollen area more than usual, call your doctor.  · Muscle weakness  · Muscle cramps  · If you feel more short of breath than usual  · Chest pain  · Dizziness or lightheadedness, feeling like you may pass out  · Your urine becomes darker than normal  · You are not feeling better in 2 to 3 days or you are feeling worse  Teach Back: Helping You Understand   The Teach Back Method helps you understand the information we are giving you. After you talk with the staff, tell them in your own words what you learned. This helps to make sure the staff has described each thing clearly. It also helps to explain things that may have been confusing. Before going home, make sure you can do these:  · I can tell you about my condition.  · I can tell you ways to help lower swelling in my legs.  · I can tell you what I will do if I have more swelling in my legs, muscle weakness, or muscle cramps.  Where can I learn more?   FamilyDoctor.org  https://familydoctor.org/condition/edema/   [facility] Choices  http://www.nhs.uk/conditions/oedema/pages/introduction.aspx   Last Reviewed Date   2021-03-26  Consumer Information Use  and Disclaimer   This information is not specific medical advice and does not replace information you receive from your health care provider. This is only a brief summary of general information. It does NOT include all information about conditions, illnesses, injuries, tests, procedures, treatments, therapies, discharge instructions or life-style choices that may apply to you. You must talk with your health care provider for complete information about your health and treatment options. This information should not be used to decide whether or not to accept your health care providers advice, instructions or recommendations. Only your health care provider has the knowledge and training to provide advice that is right for you.  Copyright   Copyright © 2021 [employer] and its affiliates and/or licensors. All rights reserved.

## 2022-01-29 NOTE — PROGRESS NOTES
"Subjective:       Patient ID: Frank Wyatt is a 87 y.o. male.    Vitals:  height is 5' 6" (1.676 m) and weight is 91.1 kg (200 lb 12.8 oz). His temperature is 98.1 °F (36.7 °C). His blood pressure is 134/59 (abnormal) and his pulse is 75. His respiration is 18 and oxygen saturation is 96%.     Chief Complaint: Leg Swelling (Pt has had pain, redness, and swelling around his right lower limb after feeling "a cramp" that was "like pulling a muscle." Pt started taking hydralazine (1/18/2022) recently. February 3rd follow-up with primary care. )    Leg Swelling (Pt has had pain, redness, and swelling around his right lower limb after feeling "a cramp" that was "like pulling a muscle." Pt started taking hydralazine (1/18/2022) recently. February 3rd follow-up with primary care.)  History of saphenous vein harvest right leg      Cardiovascular: Positive for leg swelling (right x 1 week). Negative for chest pain.   Respiratory: Negative for chest tightness and shortness of breath.    Musculoskeletal: Positive for pain (posterior right calf) and muscle ache. Negative for trauma.   Skin: Positive for color change (redness right ankle and foot). Negative for rash, wound, lesion, skin thickening/induration, erythema and bruising.       Objective:      Physical Exam   Constitutional: He is oriented to person, place, and time. He appears well-developed.  Non-toxic appearance. He does not appear ill. No distress.   HENT:   Head: Normocephalic and atraumatic.   Mouth/Throat: Oropharynx is clear and moist.   Eyes: Conjunctivae and EOM are normal.   Cardiovascular: Normal rate, regular rhythm and normal heart sounds.   Pulses:       Dorsalis pedis pulses are 1+ on the right side and 1+ on the left side.   Pulmonary/Chest: Effort normal and breath sounds normal. No respiratory distress.   Abdominal: Normal appearance.   Musculoskeletal: Normal range of motion.         General: Swelling present. No tenderness, deformity or signs " of injury. Normal range of motion.      Right ankle: He exhibits swelling.      Right lower leg: He exhibits no tenderness, no bony tenderness and no deformity. 2+ Pitting Edema (below knee, 2+ dependent pitting) present.      Right foot: Swelling present. Plantar foot sensation: normal.      Left foot: Plantar foot sensation: normal.   Neurological: no focal deficit. He is alert and oriented to person, place, and time.   Skin: Skin is warm, dry, not diaphoretic and no rash. Capillary refill takes 2 to 3 seconds. No bruising, No erythema and No lesion   Psychiatric: His behavior is normal. Mood normal.   Nursing note and vitals reviewed.        Assessment:       1. Edema of right lower leg    2. Right calf pain        US right lower extremity negative for DVT  Plan:         Edema of right lower leg  -     Cancel: US Lower Extremity Venous Insufficiency Right  -     US Lower Extremity Veins Right    Right calf pain  -     Cancel: US Lower Extremity Venous Insufficiency Right  -     US Lower Extremity Veins Right    Keep appt with PCP on Thursday.    Elevate leg frequently.  Tylenol for discomfort.  Limit overuse, activity as tolerated.

## 2022-03-27 NOTE — H&P
[facility]  History & Physical    DOS: 10/09/2019  3:37 PM      Patient Name: Frank Wyatt  MRN: [id number]  Admission Date: 10/9/2019  Attending Physician: Dr. Turpin  Primary Care Provider: Td Begum MD         Patient information was obtained from patient, relative(s) and ER records.     Subjective:     Principal Problem:Small bowel obstruction    Chief Complaint:   Chief Complaint   Patient presents with    Nausea     onset last pm.     Vomiting    Abdominal Pain        HPI: Mr. Wyatt presents today with complaints of abd pain. It is severe. It is associated with N/V, urinary frequency, and urinary urgency. He denies measured fever, chills, cough, dysuria, dizziness, or LOC. He lives at home alone and called his daughter last night and told her he felt bad. He had eaten some pork that was sitting out, so his daughter initially thought this was the culprit and made him an appt with his PCP, but the pain got progressively worse, so they came to the ED instead. He cannot remember his last BM, but knows it wasn't yesterday. He cannot remember the last time he passed flatus. He had a recent ureteral stent placed for kidney stones and had plans to have it out in the office today, but came to the ED and cancelled that appt. He has a history of CAD s/p 5v CAB, HTN, arthritis, multiple abd surgeries, and kidney stones.    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 2004    CORONARY  Pt's temp at 1430 was 101.0 oral  Tylenol 650mg PO given  Pts temp now at 100.2 oral    MANOLO Rijoas notified of patients status via Perfect serve ARTERY BYPASS GRAFT  1996    5-bypass     CYSTOSCOPY N/A 8/15/2019    Procedure: CYSTOSCOPY;  Surgeon: Dipak Sanchez MD;  Location: [facility] OR;  Service: Urology;  Laterality: N/A;    EXTRACORPOREAL SHOCK WAVE LITHOTRIPSY Left 8/15/2019    Procedure: LITHOTRIPSY, ESWL;  Surgeon: Dipak Sanchez MD;  Location: [facility] OR;  Service: Urology;  Laterality: Left;    EXTRACORPOREAL SHOCK WAVE LITHOTRIPSY Left 9/19/2019    Procedure: LITHOTRIPSY, ESWL;  Surgeon: Dipak Sanchez MD;  Location: [facility] OR;  Service: Urology;  Laterality: Left;    EYE SURGERY Right 2014    Cataract    EYE SURGERY Left 2017    Cataract    HERNIA REPAIR  2014    Dr. Bailon - had to have mesh removed    INGUINAL HERNIA REPAIR Right 03/25/2019        LITHOTRIPSY      nephrostomy tube      PERCUTANEOUS NEPHROSTOMY      ROTATOR CUFF REPAIR  2005    right shoulder       Review of patient's allergies indicates:   Allergen Reactions    Bactrim [sulfamethoxazole-trimethoprim] Hives     itched    Keflex [cephalexin] Rash    Morphine Rash     Patient had morphine and keflex at the same time, developed a rash, not sure which one caused it, or if it was a combination of the two meds.       No current facility-administered medications on file prior to encounter.      Current Outpatient Medications on File Prior to Encounter   Medication Sig    atorvastatin (LIPITOR) 40 MG tablet Take 40 mg by mouth every evening.     dextran 70-hypromellose (ARTIFICIAL TEARS,[id number],) 0.1-0.3 % Drop Apply 1 drop to eye 2 (two) times daily.    furosemide (LASIX) 20 MG tablet Take 10 mg by mouth every morning.     levothyroxine (SYNTHROID) 25 MCG tablet Take 25 mcg by mouth once daily.     metoprolol tartrate (LOPRESSOR) 25 MG tablet Take 25 mg by mouth 2 (two) times daily.     SYMBICORT 160-4.5 mcg/actuation HFAA Inhale 2 puffs into the lungs every 12 (twelve) hours.     tamsulosin (FLOMAX) 0.4 mg Cap Take 0.4 mg by mouth once daily.      [DISCONTINUED] ciprofloxacin HCl (CIPRO) 500 MG tablet Take 1 tablet (500 mg total) by mouth 2 (two) times daily.    [DISCONTINUED] HYDROcodone-acetaminophen (NORCO) 5-325 mg per tablet Take 1 tablet by mouth every 4 (four) hours as needed for Pain.    [DISCONTINUED] HYDROcodone-acetaminophen (NORCO) 5-325 mg per tablet Take 1 tablet by mouth every 6 (six) hours as needed for Pain.     Family History     Problem Relation (Age of Onset)    Cancer Daughter    Diabetes Brother    Heart disease Mother, Brother, Brother, Sister    Hypertension Mother, Sister, Brother, Brother, Sister        Tobacco Use    Smoking status: Former Smoker     Packs/day: 1.00     Years: 25.00     Pack years: 25.00     Last attempt to quit: 1972     Years since quittin.7    Smokeless tobacco: Former User     Quit date: 8/15/1972   Substance and Sexual Activity    Alcohol use: No    Drug use: No    Sexual activity: Not on file     Review of Systems   Constitutional: Negative for chills, diaphoresis, fatigue and fever.   HENT: Negative for congestion, ear pain, sore throat and trouble swallowing.    Eyes: Negative for pain, discharge and visual disturbance.   Respiratory: Negative for cough, chest tightness, shortness of breath and wheezing.    Cardiovascular: Negative for chest pain, palpitations and leg swelling.   Gastrointestinal: Positive for abdominal distention, abdominal pain, nausea and vomiting. Negative for blood in stool, constipation and diarrhea.   Endocrine: Negative for polydipsia, polyphagia and polyuria.   Genitourinary: Positive for frequency and urgency. Negative for dysuria and flank pain.   Musculoskeletal: Negative for back pain, joint swelling, neck pain and neck stiffness.   Skin: Negative for rash and wound.   Allergic/Immunologic: Negative for immunocompromised state.   Neurological: Negative for dizziness, syncope, speech difficulty, weakness, light-headedness, numbness and headaches.    Hematological: Negative for adenopathy.   Psychiatric/Behavioral: Negative for confusion and suicidal ideas. The patient is not nervous/anxious.    All other systems reviewed and are negative.    Objective:     Vital Signs (Most Recent):  Temp: 98.4 °F (36.9 °C) (10/09/19 1041)  Pulse: (!) 128 (10/09/19 1430)  Resp: 16 (10/09/19 1430)  BP: 119/69 (10/09/19 1430)  SpO2: (!) 92 % (10/09/19 1430) Vital Signs (24h Range):  Temp:  [98.4 °F (36.9 °C)] 98.4 °F (36.9 °C)  Pulse:  [113-128] 128  Resp:  [16-24] 16  SpO2:  [89 %-96 %] 92 %  BP: (119-177)/(61-84) 119/69     Weight: 88.9 kg (196 lb)  Body mass index is 30.7 kg/m².    Physical Exam   Constitutional: He is oriented to person, place, and time. He appears well-developed and well-nourished.   HENT:   Head: Normocephalic and atraumatic.   Eyes: Pupils are equal, round, and reactive to light. Conjunctivae and EOM are normal.   Neck: Normal range of motion. Neck supple.   Cardiovascular: Normal rate, regular rhythm, normal heart sounds and intact distal pulses.   Pulmonary/Chest: Effort normal and breath sounds normal.   Abdominal: Soft.   Hypoactive BS in bilat upper quadrants, no bowel sounds in bilat lower quadrants   Musculoskeletal: Normal range of motion.   Neurological: He is alert and oriented to person, place, and time.   Skin: Skin is warm and dry. Capillary refill takes less than 2 seconds.   Psychiatric: He has a normal mood and affect. His behavior is normal. Judgment and thought content normal.   Nursing note and vitals reviewed.        CRANIAL NERVES     CN III, IV, VI   Pupils are equal, round, and reactive to light.  Extraocular motions are normal.        Significant Labs:   CBC:   Recent Labs   Lab 10/09/19  1131   WBC 12.22   HGB 16.1   HCT 47.1   *     CMP:   Recent Labs   Lab 10/09/19  1131   *   K 4.1   CL 91*   CO2 30*   *   BUN 26*   CREATININE 1.1   CALCIUM 10.2   PROT 7.9   ALBUMIN 4.3   BILITOT 1.3*   ALKPHOS 102   AST 26    ALT 27   ANIONGAP 14   EGFRNONAA >60.0       Significant Imaging: I have reviewed all pertinent imaging results/findings within the past 24 hours.     X-ray Abdomen Ap 1 View (kub)    Result Date: 10/9/2019  EXAMINATION: XR ABDOMEN AP 1 VIEW CLINICAL HISTORY: Abdominal pain COMPARISON: September 2019 FINDINGS: Supine view the abdomen is compared to September 24, 2019. There is abnormal mild gaseous distention of small-bowel loops in the left mid abdomen.  Please see CT abdomen report.  No mass, suspicious calcification, or free air is identified. Left ureteral stent is present.  The proximal pigtail is slightly coiled, overlying the left renal hilum.  The distal pigtail is un uncoiled and positioned near the left ureterovesicular junction. Multilevel lumbar degenerative disc disease is noted.     1. Abnormal bowel gas pattern suspicious for partial mechanical small bowel obstruction.  Please see CT abdomen report. 2. Left ureteral stent with positioning as noted above. Electronically signed by: Shahzad Garzon MD Date:    10/09/2019 Time:    13:11        Ct Renal Stone Study Abd Pelvis Wo    Result Date: 10/9/2019  EXAMINATION: CT RENAL STONE STUDY ABD PELVIS WO CLINICAL HISTORY: Abdominal pain, unspecified;. TECHNIQUE: CMS MANDATED QUALITY DATA - CT RADIATION - 436 All CT scans at this facility utilize dose modulation, iterative reconstruction, and/or weight based dosing when appropriate to reduce radiation dose to as low as reasonably achievable. Thin section axial images were obtained, without intravenous contrast. The lack of intravenous contrast limits assessment of solid organs and vascular structures. COMPARISON: CT abdomen and pelvis, August 2019 FINDINGS: The lung bases are unremarkable. Subphrenic hypodensity adjacent to the dome of the liver is unchanged when compared to multiple studies dating back to 2016, therefore considered benign.  The liver is otherwise normal in appearance.  The gallbladder  is surgically absent.  The spleen, pancreas, and adrenal glands are within normal limits.  There is a 12 mm nonobstructing lower pole right renal calculus, and midpole right renal cysts, unchanged.  On the left, ureteral stent is noted.  The distal pigtail is positioned at the level of the distal ureter, similar to the previous CT from August 16.  There is no significant left-sided hydronephrosis. The abdominal aorta is densely calcified without aneurysm.  There is moderate distention and fluid filling of the stomach.  There is abnormal mild distention of small-bowel loops in the left upper quadrant and left mid abdomen, with decompressed distal small bowel loops.  Findings are compatible with partial mechanical small bowel obstruction.  While the transition point is difficult to identify, it is probably at or to the left of midline in the mid lower abdomen.  There is a right paramidline abdominal wall hernia containing a short segment of small bowel, however this does not appear to reflect to the point of obstruction. Images of the pelvis demonstrate prostate enlargement (maximal transverse diameter 5.8 cm).  The urinary bladder is unremarkable.  There is no pelvic free fluid or lymphadenopathy.     1. CT findings compatible with partial mechanical small bowel obstruction.  While the point of transition is difficult to define, it is likely at or to the left of midline in the mid to lower abdomen. 2. While there is a right paramidline abdominal wall hernia containing a short segment of small bowel, this does not appear to reflect the point of obstruction. 3. Left ureteral stent is positioned with its proximal pigtail at the renal pelvis, and its distal pigtail at the distal left ureter just above the ureterovesicular junction.  This positioning is similar to a prior study from August 2019. 4. Additional stable findings as above. Electronically signed by: Shahzad Garzon MD Date:    10/09/2019  Time:    13:03    EKG: Sinus tachycardia  Left bundle branch block  Abnormal ECG  When compared with ECG of 16-AUG-2019 19:13,  No significant change was found  Confirmed by Frank Alvarado MD ([id number]) on 10/9/2019 11:17:11 AM    Assessment/Plan:     * Small bowel obstruction  Admit to cardiology B   Consult Dr. Migdalia SEPULVEDAT to [facility] ALFONSO in AM   Strict NPO        UTI (urinary tract infection)  Culture urine/blood  Last positive culture was 2016:   CULTURE URINE FINAL 09/04/16 08:37 >100,000 cfu/ml Proteus mirabilis Organism P.mirab, susceptible to PCNs and cephalosporins and resistant to fluoroquinolones, macrobid, and bactrim   He was started on levaquin in the ED, I have stopped this and changed to zosyn d/t cephalosporin allergy   Consult Dr. Sanchez for stent removal    Sinus tachycardia  Has not been able to hold down metorprolol  Mildly dehydrated with UTI, and in pain   Metoprolol IV q8h hold for HR <50 or SBP <100  500 cc bolus + maint. Fluids   Check mag      VTE Risk Mitigation (From admission, onward)    None             Caryl Guerrier, NP  Department of Hospital Medicine   [facility]

## 2023-01-24 ENCOUNTER — HOSPITAL ENCOUNTER (OUTPATIENT)
Dept: RADIOLOGY | Facility: HOSPITAL | Age: 88
Discharge: HOME | End: 2023-01-24
Attending: SPECIALIST
Payer: MEDICARE

## 2023-01-24 DIAGNOSIS — N20.0 URIC ACID NEPHROLITHIASIS: ICD-10-CM

## 2023-01-24 DIAGNOSIS — N20.1 CALCULUS OF URETER: ICD-10-CM

## 2023-01-24 DIAGNOSIS — N20.0 URIC ACID NEPHROLITHIASIS: Primary | ICD-10-CM

## 2023-01-24 PROCEDURE — 74018 RADEX ABDOMEN 1 VIEW: CPT | Mod: TC,PO

## 2023-01-25 ENCOUNTER — HOSPITAL ENCOUNTER (OUTPATIENT)
Dept: RADIOLOGY | Facility: HOSPITAL | Age: 88
Discharge: HOME | End: 2023-01-25
Attending: SPECIALIST
Payer: MEDICARE

## 2023-01-25 DIAGNOSIS — N20.1 CALCULUS OF URETER: ICD-10-CM

## 2023-01-25 DIAGNOSIS — N20.1 CALCULUS OF URETER: Primary | ICD-10-CM

## 2023-01-25 PROCEDURE — 74176 CT ABD & PELVIS W/O CONTRAST: CPT | Mod: TC,PO

## 2023-03-24 ENCOUNTER — CLINICAL SUPPORT (OUTPATIENT)
Dept: CARDIOLOGY | Facility: HOSPITAL | Age: 88
DRG: 280 | End: 2023-03-24
Attending: INTERNAL MEDICINE
Payer: MEDICARE

## 2023-03-24 ENCOUNTER — HOSPITAL ENCOUNTER (INPATIENT)
Facility: HOSPITAL | Age: 88
LOS: 4 days | Discharge: HOME HEALTH | DRG: 280 | End: 2023-03-29
Attending: EMERGENCY MEDICINE | Admitting: INTERNAL MEDICINE
Payer: MEDICARE

## 2023-03-24 ENCOUNTER — HOSPITAL ENCOUNTER (OUTPATIENT)
Dept: RADIOLOGY | Facility: HOSPITAL | Age: 88
Discharge: HOME | DRG: 280 | End: 2023-03-24
Attending: INTERNAL MEDICINE
Payer: MEDICARE

## 2023-03-24 VITALS — WEIGHT: 195 LBS | BODY MASS INDEX: 30.61 KG/M2 | HEIGHT: 67 IN

## 2023-03-24 DIAGNOSIS — R07.9 CHEST PAIN: ICD-10-CM

## 2023-03-24 DIAGNOSIS — R06.02 SOB (SHORTNESS OF BREATH): ICD-10-CM

## 2023-03-24 DIAGNOSIS — I21.4 NSTEMI (NON-ST ELEVATED MYOCARDIAL INFARCTION): Primary | ICD-10-CM

## 2023-03-24 DIAGNOSIS — R94.31 ABNORMAL EKG: ICD-10-CM

## 2023-03-24 DIAGNOSIS — R06.00 DYSPNEA: ICD-10-CM

## 2023-03-24 DIAGNOSIS — I50.9 CHF (CONGESTIVE HEART FAILURE): ICD-10-CM

## 2023-03-24 LAB
ANION GAP SERPL CALC-SCNC: 11 MMOL/L (ref 8–16)
AORTIC ROOT ANNULUS: 2.8 CM
AORTIC VALVE CUSP SEPERATION: 0.9 CM
APTT PPP: 31 SEC (ref 21–32)
AV INDEX (PROSTH): 0.37
AV MEAN GRADIENT: 17 MMHG
AV PEAK GRADIENT: 30 MMHG
AV VALVE AREA: 1.06 CM2
AV VELOCITY RATIO: 0.41
BACTERIA #/AREA URNS HPF: NEGATIVE /HPF
BASOPHILS # BLD AUTO: 0.01 K/UL (ref 0–0.2)
BASOPHILS NFR BLD: 0.1 % (ref 0–1.9)
BILIRUB UR QL STRIP: NEGATIVE
BSA FOR ECHO PROCEDURE: 2.04 M2
BUN SERPL-MCNC: 35 MG/DL (ref 8–23)
CALCIUM SERPL-MCNC: 9.1 MG/DL (ref 8.7–10.5)
CHLORIDE SERPL-SCNC: 98 MMOL/L (ref 95–110)
CLARITY UR: ABNORMAL
CO2 SERPL-SCNC: 24 MMOL/L (ref 23–29)
COLOR UR: YELLOW
CREAT SERPL-MCNC: 1.5 MG/DL (ref 0.5–1.4)
CV ECHO LV RWT: 0.69 CM
D DIMER PPP IA.FEU-MCNC: 0.94 MG/L FEU
DIFFERENTIAL METHOD: ABNORMAL
DOP CALC AO PEAK VEL: 2.75 M/S
DOP CALC AO VTI: 56.1 CM
DOP CALC LVOT AREA: 2.8 CM2
DOP CALC LVOT DIAMETER: 1.9 CM
DOP CALC LVOT PEAK VEL: 1.14 M/S
DOP CALC LVOT STROKE VOLUME: 59.51 CM3
DOP CALC MV VTI: 47.9 CM
DOP CALCLVOT PEAK VEL VTI: 21 CM
E WAVE DECELERATION TIME: 209 MSEC
E/A RATIO: 0.64
E/E' RATIO: 24.5 M/S
ECHO LV POSTERIOR WALL: 1.59 CM (ref 0.6–1.1)
EJECTION FRACTION: 50 %
EOSINOPHIL # BLD AUTO: 0 K/UL (ref 0–0.5)
EOSINOPHIL NFR BLD: 0 % (ref 0–8)
ERYTHROCYTE [DISTWIDTH] IN BLOOD BY AUTOMATED COUNT: 13.8 % (ref 11.5–14.5)
EST. GFR  (NO RACE VARIABLE): 44.5 ML/MIN/1.73 M^2
FOLATE SERPL-MCNC: 6.6 NG/ML (ref 4–24)
FRACTIONAL SHORTENING: 29 % (ref 28–44)
GLUCOSE SERPL-MCNC: 273 MG/DL (ref 70–110)
GLUCOSE UR QL STRIP: ABNORMAL
HCT VFR BLD AUTO: 41.9 % (ref 40–54)
HGB BLD-MCNC: 13.9 G/DL (ref 14–18)
HGB UR QL STRIP: ABNORMAL
HR MV ECHO: 87 BPM
HYALINE CASTS #/AREA URNS LPF: 7 /LPF
IMM GRANULOCYTES # BLD AUTO: 0.05 K/UL (ref 0–0.04)
IMM GRANULOCYTES NFR BLD AUTO: 0.4 % (ref 0–0.5)
INR PPP: 1.1 (ref 0.8–1.2)
INTERVENTRICULAR SEPTUM: 1.25 CM (ref 0.6–1.1)
IVC DIAMETER: 1.85 CM
KETONES UR QL STRIP: NEGATIVE
LEFT ATRIUM SIZE: 4.4 CM
LEFT ATRIUM VOLUME INDEX MOD: 17.5 ML/M2
LEFT ATRIUM VOLUME MOD: 34.9 CM3
LEFT INTERNAL DIMENSION IN SYSTOLE: 3.3 CM (ref 2.1–4)
LEFT VENTRICLE DIASTOLIC VOLUME INDEX: 49.4 ML/M2
LEFT VENTRICLE DIASTOLIC VOLUME: 98.8 ML
LEFT VENTRICLE MASS INDEX: 132 G/M2
LEFT VENTRICLE SYSTOLIC VOLUME INDEX: 22.1 ML/M2
LEFT VENTRICLE SYSTOLIC VOLUME: 44.1 ML
LEFT VENTRICULAR INTERNAL DIMENSION IN DIASTOLE: 4.63 CM (ref 3.5–6)
LEFT VENTRICULAR MASS: 264.83 G
LEUKOCYTE ESTERASE UR QL STRIP: ABNORMAL
LV LATERAL E/E' RATIO: 18.38 M/S
LV SEPTAL E/E' RATIO: 36.75 M/S
LVOT MG: 3 MMHG
LVOT MV: 0.75 CM/S
LYMPHOCYTES # BLD AUTO: 0.4 K/UL (ref 1–4.8)
LYMPHOCYTES NFR BLD: 3.4 % (ref 18–48)
MCH RBC QN AUTO: 30.5 PG (ref 27–31)
MCHC RBC AUTO-ENTMCNC: 33.2 G/DL (ref 32–36)
MCV RBC AUTO: 92 FL (ref 82–98)
MICROSCOPIC COMMENT: ABNORMAL
MONOCYTES # BLD AUTO: 0.1 K/UL (ref 0.3–1)
MONOCYTES NFR BLD: 1 % (ref 4–15)
MV MEAN GRADIENT: 13 MMHG
MV PEAK A VEL: 2.3 M/S
MV PEAK E VEL: 1.47 M/S
MV PEAK GRADIENT: 22 MMHG
MV STENOSIS PRESSURE HALF TIME: 79 MS
MV VALVE AREA BY CONTINUITY EQUATION: 1.24 CM2
MV VALVE AREA P 1/2 METHOD: 2.78 CM2
NEUTROPHILS # BLD AUTO: 11 K/UL (ref 1.8–7.7)
NEUTROPHILS NFR BLD: 95.1 % (ref 38–73)
NITRITE UR QL STRIP: NEGATIVE
NRBC BLD-RTO: 0 /100 WBC
PH UR STRIP: 5 [PH] (ref 5–8)
PLATELET # BLD AUTO: 84 K/UL (ref 150–450)
PMV BLD AUTO: 10.2 FL (ref 9.2–12.9)
POTASSIUM SERPL-SCNC: 4.2 MMOL/L (ref 3.5–5.1)
PROT UR QL STRIP: NEGATIVE
PROTHROMBIN TIME: 11.4 SEC (ref 9–12.5)
PV MV: 0.96 M/S
PV PEAK VELOCITY: 1.41 CM/S
RA PRESSURE: 3 MMHG
RBC # BLD AUTO: 4.55 M/UL (ref 4.6–6.2)
RBC #/AREA URNS HPF: 10 /HPF (ref 0–4)
RV TISSUE DOPPLER FREE WALL SYSTOLIC VELOCITY 1 (APICAL 4 CHAMBER VIEW): 0.01 CM/S
SODIUM SERPL-SCNC: 133 MMOL/L (ref 136–145)
SP GR UR STRIP: 1.01 (ref 1–1.03)
SQUAMOUS #/AREA URNS HPF: 3 /HPF
TDI LATERAL: 0.08 M/S
TDI SEPTAL: 0.04 M/S
TDI: 0.06 M/S
TRICUSPID ANNULAR PLANE SYSTOLIC EXCURSION: 1.53 CM
TROPONIN I SERPL HS-MCNC: 1212 PG/ML (ref 0–14.9)
TROPONIN I SERPL HS-MCNC: 1212 PG/ML (ref 0–14.9)
TROPONIN I SERPL HS-MCNC: 194 PG/ML (ref 0–14.9)
TROPONIN I SERPL HS-MCNC: 502.6 PG/ML (ref 0–14.9)
TROPONIN I SERPL HS-MCNC: 502.6 PG/ML (ref 0–14.9)
TROPONIN I SERPL HS-MCNC: 893.9 PG/ML (ref 0–14.9)
TSH SERPL DL<=0.005 MIU/L-ACNC: 0.87 UIU/ML (ref 0.34–5.6)
URN SPEC COLLECT METH UR: ABNORMAL
UROBILINOGEN UR STRIP-ACNC: NEGATIVE EU/DL
VIT B12 SERPL-MCNC: 122 PG/ML (ref 210–950)
WBC # BLD AUTO: 11.55 K/UL (ref 3.9–12.7)
WBC #/AREA URNS HPF: 84 /HPF (ref 0–5)

## 2023-03-24 PROCEDURE — 93005 ELECTROCARDIOGRAM TRACING: CPT | Performed by: SPECIALIST

## 2023-03-24 PROCEDURE — 93010 ELECTROCARDIOGRAM REPORT: CPT | Mod: 76,,, | Performed by: SPECIALIST

## 2023-03-24 PROCEDURE — 85025 COMPLETE CBC W/AUTO DIFF WBC: CPT | Mod: 91 | Performed by: INTERNAL MEDICINE

## 2023-03-24 PROCEDURE — G0378 HOSPITAL OBSERVATION PER HR: HCPCS

## 2023-03-24 PROCEDURE — 85730 THROMBOPLASTIN TIME PARTIAL: CPT | Performed by: INTERNAL MEDICINE

## 2023-03-24 PROCEDURE — 81001 URINALYSIS AUTO W/SCOPE: CPT | Performed by: INTERNAL MEDICINE

## 2023-03-24 PROCEDURE — A9558 XE133 XENON 10MCI: HCPCS

## 2023-03-24 PROCEDURE — 63600175 PHARM REV CODE 636 W HCPCS: Performed by: INTERNAL MEDICINE

## 2023-03-24 PROCEDURE — 82746 ASSAY OF FOLIC ACID SERUM: CPT | Performed by: INTERNAL MEDICINE

## 2023-03-24 PROCEDURE — 93306 TTE W/DOPPLER COMPLETE: CPT | Mod: 26,,, | Performed by: INTERNAL MEDICINE

## 2023-03-24 PROCEDURE — 84484 ASSAY OF TROPONIN QUANT: CPT | Mod: 91 | Performed by: EMERGENCY MEDICINE

## 2023-03-24 PROCEDURE — C9113 INJ PANTOPRAZOLE SODIUM, VIA: HCPCS | Performed by: INTERNAL MEDICINE

## 2023-03-24 PROCEDURE — 96365 THER/PROPH/DIAG IV INF INIT: CPT

## 2023-03-24 PROCEDURE — 96372 THER/PROPH/DIAG INJ SC/IM: CPT | Performed by: INTERNAL MEDICINE

## 2023-03-24 PROCEDURE — 80048 BASIC METABOLIC PNL TOTAL CA: CPT | Performed by: INTERNAL MEDICINE

## 2023-03-24 PROCEDURE — 84484 ASSAY OF TROPONIN QUANT: CPT | Mod: 91 | Performed by: INTERNAL MEDICINE

## 2023-03-24 PROCEDURE — 99285 EMERGENCY DEPT VISIT HI MDM: CPT | Mod: 25

## 2023-03-24 PROCEDURE — 85610 PROTHROMBIN TIME: CPT | Performed by: INTERNAL MEDICINE

## 2023-03-24 PROCEDURE — 87086 URINE CULTURE/COLONY COUNT: CPT | Performed by: INTERNAL MEDICINE

## 2023-03-24 PROCEDURE — 93306 ECHO (CUPID ONLY): ICD-10-PCS | Mod: 26,,, | Performed by: INTERNAL MEDICINE

## 2023-03-24 PROCEDURE — 84443 ASSAY THYROID STIM HORMONE: CPT | Performed by: INTERNAL MEDICINE

## 2023-03-24 PROCEDURE — 96375 TX/PRO/DX INJ NEW DRUG ADDON: CPT

## 2023-03-24 PROCEDURE — 93306 TTE W/DOPPLER COMPLETE: CPT

## 2023-03-24 PROCEDURE — 25000003 PHARM REV CODE 250: Performed by: INTERNAL MEDICINE

## 2023-03-24 PROCEDURE — 82607 VITAMIN B-12: CPT | Performed by: INTERNAL MEDICINE

## 2023-03-24 PROCEDURE — 93010 EKG 12-LEAD: ICD-10-PCS | Mod: 76,,, | Performed by: SPECIALIST

## 2023-03-24 PROCEDURE — 85379 FIBRIN DEGRADATION QUANT: CPT | Performed by: INTERNAL MEDICINE

## 2023-03-24 PROCEDURE — 36415 COLL VENOUS BLD VENIPUNCTURE: CPT | Performed by: INTERNAL MEDICINE

## 2023-03-24 RX ORDER — ASPIRIN 325 MG
325 TABLET ORAL
Status: DISCONTINUED | OUTPATIENT
Start: 2023-03-24 | End: 2023-03-24

## 2023-03-24 RX ORDER — LEVOTHYROXINE SODIUM 25 UG/1
25 TABLET ORAL
Status: DISCONTINUED | OUTPATIENT
Start: 2023-03-25 | End: 2023-03-29 | Stop reason: HOSPADM

## 2023-03-24 RX ORDER — HEPARIN SODIUM 5000 [USP'U]/ML
5000 INJECTION, SOLUTION INTRAVENOUS; SUBCUTANEOUS EVERY 8 HOURS
Status: DISCONTINUED | OUTPATIENT
Start: 2023-03-24 | End: 2023-03-24

## 2023-03-24 RX ORDER — HYDRALAZINE HYDROCHLORIDE 25 MG/1
50 TABLET, FILM COATED ORAL 3 TIMES DAILY
Status: DISCONTINUED | OUTPATIENT
Start: 2023-03-24 | End: 2023-03-24

## 2023-03-24 RX ORDER — BUDESONIDE 0.5 MG/2ML
0.5 INHALANT ORAL EVERY 12 HOURS
Status: DISCONTINUED | OUTPATIENT
Start: 2023-03-25 | End: 2023-03-29 | Stop reason: HOSPADM

## 2023-03-24 RX ORDER — NAPROXEN SODIUM 220 MG/1
81 TABLET, FILM COATED ORAL DAILY
Status: DISCONTINUED | OUTPATIENT
Start: 2023-03-25 | End: 2023-03-29 | Stop reason: HOSPADM

## 2023-03-24 RX ORDER — METOPROLOL TARTRATE 25 MG/1
25 TABLET, FILM COATED ORAL 2 TIMES DAILY
Status: DISCONTINUED | OUTPATIENT
Start: 2023-03-24 | End: 2023-03-25

## 2023-03-24 RX ORDER — ATORVASTATIN CALCIUM 40 MG/1
40 TABLET, FILM COATED ORAL NIGHTLY
Status: DISCONTINUED | OUTPATIENT
Start: 2023-03-24 | End: 2023-03-29 | Stop reason: HOSPADM

## 2023-03-24 RX ORDER — HEPARIN SODIUM 5000 [USP'U]/ML
5000 INJECTION, SOLUTION INTRAVENOUS; SUBCUTANEOUS EVERY 12 HOURS
Status: DISCONTINUED | OUTPATIENT
Start: 2023-03-24 | End: 2023-03-24

## 2023-03-24 RX ORDER — SODIUM CHLORIDE 0.9 % (FLUSH) 0.9 %
10 SYRINGE (ML) INJECTION
Status: DISCONTINUED | OUTPATIENT
Start: 2023-03-24 | End: 2023-03-29 | Stop reason: HOSPADM

## 2023-03-24 RX ORDER — ATORVASTATIN CALCIUM 40 MG/1
40 TABLET, FILM COATED ORAL DAILY
Status: DISCONTINUED | OUTPATIENT
Start: 2023-03-24 | End: 2023-03-24

## 2023-03-24 RX ORDER — ARFORMOTEROL TARTRATE 15 UG/2ML
15 SOLUTION RESPIRATORY (INHALATION) 2 TIMES DAILY
Status: DISCONTINUED | OUTPATIENT
Start: 2023-03-25 | End: 2023-03-29 | Stop reason: HOSPADM

## 2023-03-24 RX ORDER — TAMSULOSIN HYDROCHLORIDE 0.4 MG/1
0.4 CAPSULE ORAL NIGHTLY
Status: DISCONTINUED | OUTPATIENT
Start: 2023-03-25 | End: 2023-03-29 | Stop reason: HOSPADM

## 2023-03-24 RX ORDER — FUROSEMIDE 10 MG/ML
40 INJECTION INTRAMUSCULAR; INTRAVENOUS ONCE
Status: COMPLETED | OUTPATIENT
Start: 2023-03-24 | End: 2023-03-24

## 2023-03-24 RX ORDER — FLUTICASONE FUROATE AND VILANTEROL 100; 25 UG/1; UG/1
1 POWDER RESPIRATORY (INHALATION) DAILY
Status: DISCONTINUED | OUTPATIENT
Start: 2023-03-24 | End: 2023-03-24

## 2023-03-24 RX ORDER — CYANOCOBALAMIN 1000 UG/ML
1000 INJECTION, SOLUTION INTRAMUSCULAR; SUBCUTANEOUS DAILY
Status: DISCONTINUED | OUTPATIENT
Start: 2023-03-25 | End: 2023-03-29 | Stop reason: HOSPADM

## 2023-03-24 RX ORDER — NITROFURANTOIN 25; 75 MG/1; MG/1
100 CAPSULE ORAL EVERY 12 HOURS
Status: DISCONTINUED | OUTPATIENT
Start: 2023-03-24 | End: 2023-03-25

## 2023-03-24 RX ORDER — PANTOPRAZOLE SODIUM 40 MG/10ML
40 INJECTION, POWDER, LYOPHILIZED, FOR SOLUTION INTRAVENOUS 2 TIMES DAILY
Status: DISCONTINUED | OUTPATIENT
Start: 2023-03-24 | End: 2023-03-25

## 2023-03-24 RX ADMIN — FUROSEMIDE 40 MG: 10 INJECTION, SOLUTION INTRAMUSCULAR; INTRAVENOUS at 02:03

## 2023-03-24 RX ADMIN — PANTOPRAZOLE SODIUM 40 MG: 40 INJECTION, POWDER, FOR SOLUTION INTRAVENOUS at 09:03

## 2023-03-24 RX ADMIN — HEPARIN SODIUM AND DEXTROSE 12 UNITS/KG/HR: 10000; 5 INJECTION INTRAVENOUS at 09:03

## 2023-03-24 RX ADMIN — NITROFURANTOIN (MONOHYDRATE/MACROCRYSTALS) 100 MG: 75; 25 CAPSULE ORAL at 04:03

## 2023-03-24 RX ADMIN — HEPARIN SODIUM 5000 UNITS: 5000 INJECTION, SOLUTION INTRAVENOUS; SUBCUTANEOUS at 08:03

## 2023-03-24 RX ADMIN — HYDRALAZINE HYDROCHLORIDE 25 MG: 25 TABLET, FILM COATED ORAL at 02:03

## 2023-03-24 RX ADMIN — METOPROLOL TARTRATE 25 MG: 25 TABLET, FILM COATED ORAL at 08:03

## 2023-03-24 RX ADMIN — ATORVASTATIN CALCIUM 40 MG: 40 TABLET, FILM COATED ORAL at 08:03

## 2023-03-24 NOTE — H&P
[facility] - Emergency Dept    History & Physical      Patient Name: Frank Wyatt  MRN: [id number]  Admission Date: 3/24/2023  Attending Physician: Bernabe Grossman MD   Primary Care Provider: Darci German MD         Patient information was obtained from patient and ER records.     Subjective:     Principal Problem:NSTEMI (non-ST elevated myocardial infarction)    Chief Complaint:   Chief Complaint   Patient presents with    abnormal labs     Called from ER for elevated troponin level this morning        HPI:  80-year-old male history of COPD, remote history of smoking, hypertension, hyperlipidemia, very hard of hearing, hypothyroidism, BPH.    Patient initially was evaluated the emergency department this morning after experiencing acute shortness breath sounding at 2:15 a.m. associated with leg swelling.  Patient was diagnosed with hypoxia, pneumonia and COPD exacerbation, discharged with doxycycline and prednisone.    However it was noted patient to have a troponin elevation, and so was called back to the emergency department..  Patient denies chest discomfort.  No ST or T-wave changes on EKG.    hsTNI: 11 >> 60.8 >> 194    Patient was treated with full-strength aspirin, patient to be admitted for evaluation management of NSTEMI.    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 2004    CORONARY ARTERY BYPASS GRAFT  1996    5-bypass     CYSTOSCOPY N/A 8/15/2019    Procedure: CYSTOSCOPY;  Surgeon: Dipak Sanchez MD;  Location: [facility] OR;  Service: Urology;  Laterality: N/A;    CYSTOSCOPY N/A 10/31/2019    Procedure: CYSTOSCOPY;  Surgeon: Dipak Sanchez MD;  Location: [facility] OR;  Service: Urology;  Laterality: N/A;    CYSTOSCOPY W/ URETERAL STENT REMOVAL Left 10/31/2019    Procedure: CYSTOSCOPY, WITH URETERAL STENT REMOVAL  URETEROSCOPY;  Surgeon: Dipak Sanchez MD;  Location: [facility] OR;  Service: Urology;  Laterality: Left;    EXTRACORPOREAL SHOCK WAVE LITHOTRIPSY Left 8/15/2019    Procedure: LITHOTRIPSY, ESWL;  Surgeon: Dipak Sanchez MD;  Location: [facility] OR;  Service: Urology;  Laterality: Left;    EXTRACORPOREAL SHOCK WAVE LITHOTRIPSY Left 9/19/2019    Procedure: LITHOTRIPSY, ESWL;  Surgeon: Dipak Sanchez MD;  Location: [facility] OR;  Service: Urology;  Laterality: Left;    EYE SURGERY Right 2014    Cataract    EYE SURGERY Left 2017    Cataract    HERNIA REPAIR  2014    Dr. Bailon - had to have mesh removed    INGUINAL HERNIA REPAIR Right 03/25/2019        LITHOTRIPSY      nephrostomy tube      PERCUTANEOUS NEPHROSTOMY      ROTATOR CUFF REPAIR  2005    right shoulder    URETEROSCOPY Left 10/31/2019    Procedure: URETEROSCOPY;  Surgeon: Dipak Sanchez MD;  [facility];  Service: Urology;  Laterality: Left;       Review of patient's allergies indicates:   Allergen Reactions    Bactrim [sulfamethoxazole-trimethoprim] Hives     itched    Keflex [cephalexin] Rash    Morphine Rash     Patient had morphine and keflex at the same time, developed a rash, not sure which one caused it, or if it was a combination of the two meds.       Current Facility-Administered Medications on File Prior to Encounter   Medication    [COMPLETED] albuterol nebulizer solution 5 mg    [COMPLETED] aspirin tablet 325 mg    [COMPLETED]  furosemide injection 40 mg    [COMPLETED] magnesium sulfate 2g in water 50mL IVPB (premix)     Current Outpatient Medications on File Prior to Encounter   Medication Sig    amlodipine-benazepril 5-20 mg (LOTREL) 5-20 mg per capsule Take 1 capsule by mouth nightly.    atorvastatin (LIPITOR) 40 MG tablet Take 1 tablet (40 mg total) by mouth every evening.    dextran 70-hypromellose 0.1-0.3 % Drop Apply 1 drop to eye 2 (two) times daily.    furosemide (LASIX) 20 MG tablet Take 10 mg by mouth once daily. Based on weight gain and edema    glipiZIDE (GLUCOTROL) 5 MG tablet TAKE 1 TABLET BY MOUTH ONCE DAILY 30  MINUTES  BEFORE  BREAKFAST    hydrALAZINE (APRESOLINE) 50 MG tablet Take 50 mg by mouth 3 (three) times daily.    hydrocortisone 1 % cream Apply topically 2 (two) times daily.    levothyroxine (SYNTHROID) 25 MCG tablet Take 25 mcg by mouth once daily.     metoprolol succinate (TOPROL-XL) 25 MG 24 hr tablet Take 1 tablet by mouth 2 (two) times daily.    nitrofurantoin (MACRODANTIN) 100 MG capsule Take 100 mg by mouth 2 (two) times daily.    SYMBICORT 160-4.5 mcg/actuation HFAA Inhale 2 puffs into the lungs every 12 (twelve) hours.     tamsulosin (FLOMAX) 0.4 mg Cap Take 0.4 mg by mouth once daily.     albuterol (PROVENTIL/VENTOLIN HFA) 90 mcg/actuation inhaler Inhale 2 puffs into the lungs every 4 (four) hours as needed for Wheezing. Rescue (Patient not taking: Reported on 3/24/2023)    cefUROXime (CEFTIN) 500 MG tablet Take 500 mg by mouth 2 (two) times daily.    ciprofloxacin HCl (CIPRO) 250 MG tablet Take 1 tablet (250 mg total) by mouth 2 (two) times daily.    doxycycline (VIBRAMYCIN) 100 MG Cap Take 1 capsule (100 mg total) by mouth every 12 (twelve) hours. for 10 days    hydrALAZINE (APRESOLINE) 25 MG tablet Take 25 mg by mouth 3 (three) times daily.    metoprolol tartrate (LOPRESSOR) 25 MG tablet Take 25 mg by mouth 2 (two) times daily.     predniSONE (DELTASONE) 20 MG tablet Take 2 tablets (40 mg total) by  mouth once daily. for 5 days    traMADol (ULTRAM) 50 mg tablet Take 1 tablet (50 mg total) by mouth every 8 (eight) hours as needed for Pain.     Family History       Problem Relation (Age of Onset)    Cancer Daughter    Diabetes Brother    Heart disease Mother, Brother, Brother, Sister    Hypertension Mother, Sister, Brother, Brother, Sister          Tobacco Use    Smoking status: Former     Packs/day: 1.00     Years: 25.00     Pack years: 25.00     Types: Cigarettes     Quit date: 1972     Years since quittin.2    Smokeless tobacco: Former     Quit date: 8/15/1972   Substance and Sexual Activity    Alcohol use: No    Drug use: No    Sexual activity: Not on file     Review of Systems  Objective:     Vital Signs (Most Recent):  Temp: 98.7 °F (37.1 °C) (23 0953)  Pulse: 91 (23 1401)  Resp: 19 (23 1401)  BP: (!) 127/58 (23 1401)  SpO2: (!) 93 % (23 1407)   Vital Signs (24h Range):  Temp:  [98.7 °F (37.1 °C)-99.7 °F (37.6 °C)] 98.7 °F (37.1 °C)  Pulse:  [] 91  Resp:  [16-33] 19  SpO2:  [92 %-96 %] 93 %  BP: (115-155)/(56-97) 127/58     Weight: 88.6 kg (195 lb 5.2 oz)  Body mass index is 30.59 kg/m².    Physical Exam  Constitutional:       Appearance: Normal appearance. He is normal weight.   HENT:      Head: Normocephalic.      Right Ear: Tympanic membrane normal.      Left Ear: Tympanic membrane normal.      Nose: Nose normal.      Mouth/Throat:      Mouth: Mucous membranes are dry.   Eyes:      Extraocular Movements: Extraocular movements intact.      Pupils: Pupils are equal, round, and reactive to light.   Cardiovascular:      Rate and Rhythm: Normal rate and regular rhythm.      Pulses: Normal pulses.      Heart sounds: Normal heart sounds.      Comments: LE trace swelling, tenderness    Pulmonary:      Effort: Pulmonary effort is normal.      Breath sounds: Normal breath sounds.   Abdominal:      General: Abdomen is flat. Bowel sounds are normal.      Palpations:  Abdomen is soft.   Musculoskeletal:         General: Normal range of motion.      Cervical back: Normal range of motion.   Skin:     General: Skin is warm.      Capillary Refill: Capillary refill takes less than 2 seconds.   Neurological:      General: No focal deficit present.      Mental Status: He is alert and oriented to person, place, and time.   Psychiatric:         Mood and Affect: Mood normal.         Behavior: Behavior normal.         CRANIAL NERVES     CN III, IV, VI   Pupils are equal, round, and reactive to light.    Significant Labs: All pertinent labs within the past 24 hours have been reviewed.    Significant Imaging: I have reviewed all pertinent imaging results/findings within the past 24 hours.    Assessment/Plan:     Active Diagnoses:    Diagnosis Date Noted POA    PRINCIPAL PROBLEM:  NSTEMI (non-ST elevated myocardial infarction) [I21.4] 03/24/2023 Unknown      Problems Resolved During this Admission:     VTE Risk Mitigation (From admission, onward)      None          #NSTEMI, CHF  #Some degree of CHF as evidence by his elevated BNP and leg swelling  #Hard of hearing  #Debility  #Obesity  #Hyperlipidemia  #COPD  #Hypothyroidism  # thrombocytopenia    -C/w Aspirin, Statin, add metoprolol  25mg bid   - had recent stress test by Dr Kim, results unavailable about a year ago (reportedly)  -echo with reduced RV systolic function, moderate to severe MS, moderate to severe AS  -NPO after Mn for possible LHC  -place on heparin drip  -trend CE/EKG  -lasix iv 40mg x 1  -heparin drip  -d dimer mildly elevated, v/q scan low to intermediate  -Cardiology csx  -b12 low, will replace          John Teran MD  Department of Hospital Medicine   [facility] - Emergency Dept

## 2023-03-24 NOTE — ED PROVIDER NOTES
Encounter Date: 3/24/2023       History     Chief Complaint   Patient presents with    abnormal labs     Called from ER for elevated troponin level this morning     88-year-old male presents brought back in for evaluation of elevated troponin, patient had initial troponin this morning of 11 and then this increased to 60 however the 2nd troponin returned after patient had left, patient was called and told to come back to the emergency department.  Patient denies chest pain patient does report some wheezing and shortness of breath patient does have COPD and was started on a course of antibiotics for possible pneumonia, patient reports that he woke up this morning short of breath but denied chest discomfort however patient does have a history of coronary artery disease hypertension and hyperlipidemia.    Review of patient's allergies indicates:   Allergen Reactions    Bactrim [sulfamethoxazole-trimethoprim] Hives     itched    Keflex [cephalexin] Rash    Morphine Rash     Patient had morphine and keflex at the same time, developed a rash, not sure which one caused it, or if it was a combination of the two meds.     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 2004    CORONARY ARTERY BYPASS GRAFT  1996    5-bypass     CYSTOSCOPY N/A 8/15/2019    Procedure: CYSTOSCOPY;  Surgeon: Dipak Sanchez MD;   Location: [facility] OR;  Service: Urology;  Laterality: N/A;    CYSTOSCOPY N/A 10/31/2019    Procedure: CYSTOSCOPY;  Surgeon: Dipak Sanchez MD;  Location: [facility] OR;  Service: Urology;  Laterality: N/A;    CYSTOSCOPY W/ URETERAL STENT REMOVAL Left 10/31/2019    Procedure: CYSTOSCOPY, WITH URETERAL STENT REMOVAL  URETEROSCOPY;  Surgeon: Dipak Sanchez MD;  Location: [facility] OR;  Service: Urology;  Laterality: Left;    EXTRACORPOREAL SHOCK WAVE LITHOTRIPSY Left 8/15/2019    Procedure: LITHOTRIPSY, ESWL;  Surgeon: Dipak Sanchez MD;  Location: [facility] OR;  Service: Urology;  Laterality: Left;    EXTRACORPOREAL SHOCK WAVE LITHOTRIPSY Left 2019    Procedure: LITHOTRIPSY, ESWL;  Surgeon: Dipak Sanchez MD;  Location: [facility] OR;  Service: Urology;  Laterality: Left;    EYE SURGERY Right     Cataract    EYE SURGERY Left     Cataract    HERNIA REPAIR      Dr. Bailon - had to have mesh removed    INGUINAL HERNIA REPAIR Right 2019        LITHOTRIPSY      nephrostomy tube      PERCUTANEOUS NEPHROSTOMY      ROTATOR CUFF REPAIR      right shoulder    URETEROSCOPY Left 10/31/2019    Procedure: URETEROSCOPY;  Surgeon: Dipak Sanchez MD;  Location: [facility];  Service: Urology;  Laterality: Left;     Family History   Problem Relation Age of Onset    Heart disease Mother     Hypertension Mother     Hypertension Sister     Heart disease Brother     Hypertension Brother     Cancer Daughter     Diabetes Brother     Heart disease Brother     Hypertension Brother     Hypertension Sister     Heart disease Sister      Social History     Tobacco Use    Smoking status: Former     Packs/day: 1.00     Years: 25.00     Pack years: 25.00     Types: Cigarettes     Quit date: 1972     Years since quittin.2    Smokeless tobacco: Former     Quit date: 8/15/1972   Substance Use Topics    Alcohol use: No    Drug use: No     Review of Systems   Constitutional:  Negative for fever.   HENT:  Negative  for congestion, rhinorrhea, sore throat and trouble swallowing.    Eyes:  Negative for visual disturbance.   Respiratory:  Positive for shortness of breath. Negative for cough, chest tightness and wheezing.    Cardiovascular:  Negative for chest pain, palpitations and leg swelling.   Gastrointestinal:  Negative for abdominal distention, abdominal pain, constipation, diarrhea, nausea and vomiting.   Genitourinary:  Negative for difficulty urinating, dysuria, flank pain and frequency.   Musculoskeletal:  Negative for arthralgias, back pain, joint swelling and neck pain.   Skin:  Negative for color change and rash.   Neurological:  Negative for dizziness, syncope, speech difficulty, weakness, numbness and headaches.   All other systems reviewed and are negative.    Physical Exam     Initial Vitals [03/24/23 0953]   BP Pulse Resp Temp SpO2   (!) 115/56 101 16 98.7 °F (37.1 °C) (!) 93 %      MAP       --         Physical Exam    Nursing note and vitals reviewed.  Constitutional: He appears well-developed and well-nourished. He is not diaphoretic. No distress.   HENT:   Head: Normocephalic and atraumatic.   Right Ear: External ear normal.   Left Ear: External ear normal.   Nose: Nose normal.   Mouth/Throat: Oropharynx is clear and moist. No oropharyngeal exudate.   Eyes: Conjunctivae and EOM are normal. Pupils are equal, round, and reactive to light. Right eye exhibits no discharge. Left eye exhibits no discharge. No scleral icterus.   Neck: Neck supple. No thyromegaly present. No tracheal deviation present. No JVD present.   Normal range of motion.  Cardiovascular:  Normal rate, regular rhythm, normal heart sounds and intact distal pulses.     Exam reveals no gallop and no friction rub.       No murmur heard.  Pulmonary/Chest: Breath sounds normal. No stridor. No respiratory distress. He has no wheezes. He has no rhonchi. He has no rales. He exhibits no tenderness.   Abdominal: Abdomen is soft. Bowel sounds are normal.  He exhibits no distension and no mass. There is no abdominal tenderness. There is no rebound and no guarding.   Musculoskeletal:         General: No tenderness or edema. Normal range of motion.      Cervical back: Normal range of motion and neck supple.     Lymphadenopathy:     He has no cervical adenopathy.   Neurological: He is alert and oriented to person, place, and time. He has normal strength. No cranial nerve deficit or sensory deficit.   Skin: Skin is warm and dry. No rash noted. No erythema.       ED Course   Procedures  Labs Reviewed   TROPONIN I HIGH SENSITIVITY - Abnormal; Notable for the following components:       Result Value    Troponin I High Sensitivity 194.0 (*)     All other components within normal limits    Narrative:        Trop critical result(s) called and verbal readback obtained from   Jay Mancilla RN  by [facility] 03/24/2023 13:13   TROPONIN I HIGH SENSITIVITY - Abnormal; Notable for the following components:    Troponin I High Sensitivity 502.6 (*)     All other components within normal limits    Narrative:     In order to access the algorithm for troponin high  sensitivity orders access the address below:  [email or web address]  Trop critical result(s) repeated. Called and verbal readback obtained   from Bernabe Ansari RN/ed by [facility] 03/24/2023 15:11   TROPONIN I HIGH SENSITIVITY - Abnormal; Notable for the following components:    Troponin I High Sensitivity 502.6 (*)     All other components within normal limits    Narrative:     In order to access the algorithm for troponin high  sensitivity orders access the address below:  [email or web address] [email or web address]  Trop critical result(s) repeated. Called and verbal readback obtained   from Bernabe Ansari RN/ed by [facility] 03/24/2023 15:11   CBC W/ AUTO DIFFERENTIAL - Abnormal; Notable for the following components:    RBC 4.55 (*)     Hemoglobin 13.9 (*)      Platelets 84 (*)     Gran # (ANC) 11.0 (*)     Immature Grans (Abs) 0.05 (*)     Lymph # 0.4 (*)     Mono # 0.1 (*)     Gran % 95.1 (*)     Lymph % 3.4 (*)     Mono % 1.0 (*)     All other components within normal limits    Narrative:     In order to access the algorithm for troponin high  sensitivity orders access the address below:  [email or web address] SENSITIVITY  [email or web address]   BASIC METABOLIC PANEL - Abnormal; Notable for the following components:    Sodium 133 (*)     Glucose 273 (*)     BUN 35 (*)     Creatinine 1.5 (*)     eGFR 44.5 (*)     All other components within normal limits    Narrative:     In order to access the algorithm for troponin high  sensitivity orders access the address below:  [email or web address] SENSITIVITY  [email or web address]   URINALYSIS, REFLEX TO URINE CULTURE - Abnormal; Notable for the following components:    Appearance, UA Hazy (*)     Glucose, UA 1+ (*)     Occult Blood UA Trace (*)     Leukocytes, UA 3+ (*)     All other components within normal limits    Narrative:     Specimen Source->Urine   D DIMER, QUANTITATIVE - Abnormal; Notable for the following components:    D-Dimer 0.94 (*)     All other components within normal limits    Narrative:     ddimr critical result(s) repeated. Called and verbal readback   obtained from  Francisco Mancilla RN by [facility] 03/24/2023 14:31   URINALYSIS MICROSCOPIC - Abnormal; Notable for the following components:    RBC, UA 10 (*)     WBC, UA 84 (*)     Hyaline Casts, UA 7 (*)     All other components within normal limits    Narrative:     Specimen Source->Urine   VITAMIN B12 - Abnormal; Notable for the following components:    Vitamin B-12 122 (*)     All other components within normal limits   CULTURE, URINE   PROTIME-INR    Narrative:     In order to access the algorithm for troponin high  sensitivity orders access the address below:  [email or web address]/HIGH  SENSITIVITY  [email or web address].[email or web address]   TSH   D DIMER, QUANTITATIVE   TSH   VITAMIN B12   FOLATE   FOLATE   TROPONIN I HIGH SENSITIVITY   TROPONIN I HIGH SENSITIVITY   TROPONIN I HIGH SENSITIVITY        ECG Results              EKG 12-lead (In process)  Result time 03/24/23 15:55:48      In process by Interface, Lab In [facility] (03/24/23 15:55:48)                   Narrative:    Test Reason : R94.31,    Vent. Rate : 083 BPM     Atrial Rate : 083 BPM     P-R Int : 226 ms          QRS Dur : 134 ms      QT Int : 400 ms       P-R-T Axes : 064 -09 151 degrees     QTc Int : 470 ms    Sinus rhythm with 1st degree A-V block  Left bundle branch block  Abnormal ECG  When compared with ECG of 24-MAR-2023 14:31,  No significant change was found    Referred By: [email or web address]   SELF           Confirmed By:                                      EKG 12-LEAD (In process)  Result time 03/24/23 14:45:55      In process by Interface, Lab In [facility] (03/24/23 14:45:55)                   Narrative:    Test Reason : R07.9,    Vent. Rate : 086 BPM     Atrial Rate : 086 BPM     P-R Int : 216 ms          QRS Dur : 138 ms      QT Int : 392 ms       P-R-T Axes : 055 -03 148 degrees     QTc Int : 469 ms    Sinus rhythm with 1st degree A-V block  Left bundle branch block  Abnormal ECG  When compared with ECG of 24-MAR-2023 10:04,  No significant change was found    Referred By: [email or web address]   SELF           Confirmed By:                                      EKG 12-lead (In process)  Result time 03/24/23 10:09:02      In process by Interface, Lab In [facility] (03/24/23 10:09:02)                   Narrative:    Test Reason : R06.02,    Vent. Rate : 099 BPM     Atrial Rate : 099 BPM     P-R Int : 204 ms          QRS Dur : 128 ms      QT Int : 372 ms       P-R-T Axes : 046 029 169 degrees     QTc Int : 477 ms    Normal sinus rhythm  Left bundle branch block  Abnormal ECG  When compared with ECG of 24-MAR-2023 04:25,  No significant change was found    Referred By:  [email or web address]   SELF           Confirmed By:                                   Imaging Results              US Lower Extremity Veins Bilateral (Final result)  Result time 03/24/23 15:43:09      Final result by David Mancilla MD (03/24/23 15:43:09)                   Narrative:    US LOWER EXTREMITY VEINS BILATERAL    CLINICAL HISTORY:  88 years Male swelling    FINDINGS: Grayscale, color and spectral Doppler analysis of the bilateral lower extremity deep venous system was performed.    There is normal compressibility, with normal flow by color and spectral Doppler analysis in the bilateral lower extremity deep venous system, with normal augmentation and no evidence of deep venous thrombosis.    IMPRESSION: Negative for DVT.    Electronically signed by:  David Mancilla MD  3/24/2023 3:43 PM CDT Workstation: [id number]                                     Medications   sodium chloride 0.9% flush 10 mL (has no administration in time range)   atorvastatin tablet 40 mg (has no administration in time range)   dextran 70-hypromellose 0.1-0.3 % Drop 1 drop (has no administration in time range)   levothyroxine tablet 25 mcg (has no administration in time range)   fluticasone furoate-vilanteroL 100-25 mcg/dose diskus inhaler 1 puff (has no administration in time range)   tamsulosin 24 hr capsule 0.4 mg (has no administration in time range)   metoprolol tartrate (LOPRESSOR) tablet 25 mg (0 mg Oral Hold 3/24/23 1445)   cyanocobalamin injection 1,000 mcg (has no administration in time range)   nitrofurantoin (macrocrystal-monohydrate) 100 MG capsule 100 mg (has no administration in time range)   furosemide injection 40 mg (40 mg Intravenous Given 3/24/23 1415)     Medical Decision Making:   History:   Old Medical Records: I decided to obtain old medical records.  Initial Assessment:   Emergent evaluation of an 88-year-old male presenting with an episode of shortness of breath that woke him from sleeping this morning patient had a  positive troponin was called to the emergency department patient currently without symptoms will monitor closely will recheck EKG and troponin.          Attending Attestation:             Attending ED Notes:   Patient's troponin noted to be increased, EKG is unchanged, patient consulted to Internal Medicine and Cardiology for admission and further evaluation    [facility]    Patient presents for emergent evaluation of acute complaint that poses a possible threat to life and/or bodily function.    I may have ordered labs and personally reviewed them. If applicable, Labs significant for abnormalities noted above.    I may have ordered X-rays and personally reviewed them and reviewed the radiologist interpretation.  If applicable, Xray significant for findings noted above.    I may have ordered EKG and personally reviewed it.  If applicable, EKG significant for findings noted above.    I may have ordered CT scan and personally reviewed it and reviewed the radiologist interpretation.  If applicable, CT significant for findings noted above.      Admission MDM  I discussed the patient presentation labs, ekg, X-rays, CT findings (if applicable) with the consultant(s)   Patient was managed in the ED with IV labs, meds, fluids (if applicable).    The response to treatment was noted with improved stabilization.    Patient required emergent consultation to Hospitalist for admission.    A dictation software program was used for this note.  Please expect some simple typographical  errors in this note.                      Clinical Impression:   Final diagnoses:  [R06.02] SOB (shortness of breath)  [I21.4] NSTEMI (non-ST elevated myocardial infarction) (Primary)        ED Disposition Condition    Admit Stable                Bernabe Grossman MD  03/24/23 6503

## 2023-03-25 PROBLEM — I38 VALVULAR HEART DISEASE: Chronic | Status: ACTIVE | Noted: 2023-03-25

## 2023-03-25 PROBLEM — N40.0 BPH (BENIGN PROSTATIC HYPERPLASIA): Chronic | Status: ACTIVE | Noted: 2023-03-25

## 2023-03-25 PROBLEM — N18.30 CKD (CHRONIC KIDNEY DISEASE), STAGE III: Chronic | Status: ACTIVE | Noted: 2023-03-25

## 2023-03-25 PROBLEM — D69.6 THROMBOCYTOPENIA: Chronic | Status: ACTIVE | Noted: 2023-03-25

## 2023-03-25 PROBLEM — H91.90 HOH (HARD OF HEARING): Chronic | Status: ACTIVE | Noted: 2023-03-25

## 2023-03-25 PROBLEM — E03.8 OTHER SPECIFIED HYPOTHYROIDISM: Chronic | Status: ACTIVE | Noted: 2023-03-25

## 2023-03-25 PROBLEM — J44.9 COPD (CHRONIC OBSTRUCTIVE PULMONARY DISEASE): Chronic | Status: ACTIVE | Noted: 2023-03-25

## 2023-03-25 PROBLEM — E53.8 B12 DEFICIENCY: Chronic | Status: ACTIVE | Noted: 2023-03-25

## 2023-03-25 PROBLEM — H81.09 MENIERE DISEASE: Chronic | Status: ACTIVE | Noted: 2023-03-25

## 2023-03-25 PROBLEM — Z95.1 S/P CABG X 5: Chronic | Status: ACTIVE | Noted: 2023-03-25

## 2023-03-25 LAB
ALBUMIN SERPL BCP-MCNC: 3.6 G/DL (ref 3.5–5.2)
ALP SERPL-CCNC: 63 U/L (ref 55–135)
ALT SERPL W/O P-5'-P-CCNC: 27 U/L (ref 10–44)
ANION GAP SERPL CALC-SCNC: 11 MMOL/L (ref 8–16)
APTT PPP: 50 SEC (ref 21–32)
APTT PPP: 52.2 SEC (ref 21–32)
APTT PPP: 56.4 SEC (ref 21–32)
APTT PPP: 65.8 SEC (ref 21–32)
AST SERPL-CCNC: 34 U/L (ref 10–40)
BASOPHILS # BLD AUTO: 0.05 K/UL (ref 0–0.2)
BASOPHILS NFR BLD: 0.3 % (ref 0–1.9)
BILIRUB SERPL-MCNC: 0.5 MG/DL (ref 0.1–1)
BUN SERPL-MCNC: 41 MG/DL (ref 8–23)
CALCIUM SERPL-MCNC: 9.3 MG/DL (ref 8.7–10.5)
CHLORIDE SERPL-SCNC: 99 MMOL/L (ref 95–110)
CO2 SERPL-SCNC: 28 MMOL/L (ref 23–29)
CREAT SERPL-MCNC: 1.4 MG/DL (ref 0.5–1.4)
DIFFERENTIAL METHOD: ABNORMAL
EOSINOPHIL # BLD AUTO: 0.3 K/UL (ref 0–0.5)
EOSINOPHIL NFR BLD: 2.2 % (ref 0–8)
ERYTHROCYTE [DISTWIDTH] IN BLOOD BY AUTOMATED COUNT: 13.9 % (ref 11.5–14.5)
EST. GFR  (NO RACE VARIABLE): 48.3 ML/MIN/1.73 M^2
GLUCOSE SERPL-MCNC: 137 MG/DL (ref 70–110)
HCT VFR BLD AUTO: 43.4 % (ref 40–54)
HGB BLD-MCNC: 14.5 G/DL (ref 14–18)
IMM GRANULOCYTES # BLD AUTO: 0.07 K/UL (ref 0–0.04)
IMM GRANULOCYTES NFR BLD AUTO: 0.4 % (ref 0–0.5)
LYMPHOCYTES # BLD AUTO: 0.7 K/UL (ref 1–4.8)
LYMPHOCYTES NFR BLD: 4.1 % (ref 18–48)
MAGNESIUM SERPL-MCNC: 2.1 MG/DL (ref 1.6–2.6)
MCH RBC QN AUTO: 31.1 PG (ref 27–31)
MCHC RBC AUTO-ENTMCNC: 33.4 G/DL (ref 32–36)
MCV RBC AUTO: 93 FL (ref 82–98)
MONOCYTES # BLD AUTO: 0.7 K/UL (ref 0.3–1)
MONOCYTES NFR BLD: 4.5 % (ref 4–15)
NEUTROPHILS # BLD AUTO: 13.9 K/UL (ref 1.8–7.7)
NEUTROPHILS NFR BLD: 88.5 % (ref 38–73)
NRBC BLD-RTO: 0 /100 WBC
PLATELET # BLD AUTO: 92 K/UL (ref 150–450)
PMV BLD AUTO: 10.4 FL (ref 9.2–12.9)
POTASSIUM SERPL-SCNC: 4.1 MMOL/L (ref 3.5–5.1)
PROCALCITONIN SERPL IA-MCNC: 0.85 NG/ML (ref 0–0.5)
PROT SERPL-MCNC: 7.1 G/DL (ref 6–8.4)
RBC # BLD AUTO: 4.66 M/UL (ref 4.6–6.2)
SODIUM SERPL-SCNC: 138 MMOL/L (ref 136–145)
TROPONIN I SERPL HS-MCNC: 1233.5 PG/ML (ref 0–14.9)
TROPONIN I SERPL HS-MCNC: 1233.5 PG/ML (ref 0–14.9)
TROPONIN I SERPL HS-MCNC: 1474.4 PG/ML (ref 0–14.9)
TROPONIN I SERPL HS-MCNC: 750.5 PG/ML (ref 0–14.9)
TROPONIN I SERPL HS-MCNC: 869.8 PG/ML (ref 0–14.9)
TROPONIN I SERPL HS-MCNC: 957.2 PG/ML (ref 0–14.9)
WBC # BLD AUTO: 15.72 K/UL (ref 3.9–12.7)

## 2023-03-25 PROCEDURE — 93005 ELECTROCARDIOGRAM TRACING: CPT | Performed by: SPECIALIST

## 2023-03-25 PROCEDURE — 96376 TX/PRO/DX INJ SAME DRUG ADON: CPT

## 2023-03-25 PROCEDURE — 25000242 PHARM REV CODE 250 ALT 637 W/ HCPCS: Performed by: INTERNAL MEDICINE

## 2023-03-25 PROCEDURE — 85730 THROMBOPLASTIN TIME PARTIAL: CPT | Mod: 91 | Performed by: INTERNAL MEDICINE

## 2023-03-25 PROCEDURE — 96366 THER/PROPH/DIAG IV INF ADDON: CPT

## 2023-03-25 PROCEDURE — U0002 COVID-19 LAB TEST NON-CDC: HCPCS | Performed by: INTERNAL MEDICINE

## 2023-03-25 PROCEDURE — 87040 BLOOD CULTURE FOR BACTERIA: CPT | Performed by: INTERNAL MEDICINE

## 2023-03-25 PROCEDURE — 36415 COLL VENOUS BLD VENIPUNCTURE: CPT | Performed by: INTERNAL MEDICINE

## 2023-03-25 PROCEDURE — 84145 PROCALCITONIN (PCT): CPT | Performed by: INTERNAL MEDICINE

## 2023-03-25 PROCEDURE — 99900031 HC PATIENT EDUCATION (STAT)

## 2023-03-25 PROCEDURE — 94760 N-INVAS EAR/PLS OXIMETRY 1: CPT

## 2023-03-25 PROCEDURE — 27000221 HC OXYGEN, UP TO 24 HOURS

## 2023-03-25 PROCEDURE — 63600175 PHARM REV CODE 636 W HCPCS: Performed by: INTERNAL MEDICINE

## 2023-03-25 PROCEDURE — 84484 ASSAY OF TROPONIN QUANT: CPT | Mod: 91 | Performed by: INTERNAL MEDICINE

## 2023-03-25 PROCEDURE — 94640 AIRWAY INHALATION TREATMENT: CPT

## 2023-03-25 PROCEDURE — 85025 COMPLETE CBC W/AUTO DIFF WBC: CPT | Performed by: INTERNAL MEDICINE

## 2023-03-25 PROCEDURE — 83735 ASSAY OF MAGNESIUM: CPT | Performed by: INTERNAL MEDICINE

## 2023-03-25 PROCEDURE — 93010 EKG 12-LEAD: ICD-10-PCS | Mod: 76,,, | Performed by: SPECIALIST

## 2023-03-25 PROCEDURE — 25000003 PHARM REV CODE 250: Performed by: INTERNAL MEDICINE

## 2023-03-25 PROCEDURE — 99223 PR INITIAL HOSPITAL CARE,LEVL III: ICD-10-PCS | Mod: ,,, | Performed by: INTERNAL MEDICINE

## 2023-03-25 PROCEDURE — 21400001 HC TELEMETRY ROOM

## 2023-03-25 PROCEDURE — C9113 INJ PANTOPRAZOLE SODIUM, VIA: HCPCS | Performed by: INTERNAL MEDICINE

## 2023-03-25 PROCEDURE — 93010 ELECTROCARDIOGRAM REPORT: CPT | Mod: ,,, | Performed by: SPECIALIST

## 2023-03-25 PROCEDURE — 93010 ELECTROCARDIOGRAM REPORT: CPT | Mod: 76,,, | Performed by: SPECIALIST

## 2023-03-25 PROCEDURE — 99223 1ST HOSP IP/OBS HIGH 75: CPT | Mod: ,,, | Performed by: INTERNAL MEDICINE

## 2023-03-25 PROCEDURE — 80053 COMPREHEN METABOLIC PANEL: CPT | Performed by: HOSPITALIST

## 2023-03-25 PROCEDURE — 84484 ASSAY OF TROPONIN QUANT: CPT | Performed by: INTERNAL MEDICINE

## 2023-03-25 RX ORDER — METOPROLOL TARTRATE 1 MG/ML
2.5 INJECTION, SOLUTION INTRAVENOUS ONCE
Status: COMPLETED | OUTPATIENT
Start: 2023-03-25 | End: 2023-03-25

## 2023-03-25 RX ORDER — LANOLIN ALCOHOL/MO/W.PET/CERES
800 CREAM (GRAM) TOPICAL
Status: DISCONTINUED | OUTPATIENT
Start: 2023-03-25 | End: 2023-03-29 | Stop reason: HOSPADM

## 2023-03-25 RX ORDER — ISOSORBIDE MONONITRATE 30 MG/1
30 TABLET, EXTENDED RELEASE ORAL DAILY
Status: DISCONTINUED | OUTPATIENT
Start: 2023-03-25 | End: 2023-03-29 | Stop reason: HOSPADM

## 2023-03-25 RX ORDER — AMOXICILLIN 250 MG
2 CAPSULE ORAL 2 TIMES DAILY PRN
Status: DISCONTINUED | OUTPATIENT
Start: 2023-03-25 | End: 2023-03-29 | Stop reason: HOSPADM

## 2023-03-25 RX ORDER — PANTOPRAZOLE SODIUM 40 MG/1
40 TABLET, DELAYED RELEASE ORAL DAILY
Status: DISCONTINUED | OUTPATIENT
Start: 2023-03-26 | End: 2023-03-29 | Stop reason: HOSPADM

## 2023-03-25 RX ORDER — METOPROLOL TARTRATE 25 MG/1
25 TABLET, FILM COATED ORAL 3 TIMES DAILY
Status: DISCONTINUED | OUTPATIENT
Start: 2023-03-25 | End: 2023-03-25

## 2023-03-25 RX ORDER — METOPROLOL TARTRATE 25 MG/1
25 TABLET, FILM COATED ORAL 3 TIMES DAILY
Status: DISCONTINUED | OUTPATIENT
Start: 2023-03-25 | End: 2023-03-28

## 2023-03-25 RX ORDER — SODIUM,POTASSIUM PHOSPHATES 280-250MG
2 POWDER IN PACKET (EA) ORAL
Status: DISCONTINUED | OUTPATIENT
Start: 2023-03-25 | End: 2023-03-29 | Stop reason: HOSPADM

## 2023-03-25 RX ORDER — IPRATROPIUM BROMIDE 0.5 MG/2.5ML
0.5 SOLUTION RESPIRATORY (INHALATION) EVERY 6 HOURS
Status: DISCONTINUED | OUTPATIENT
Start: 2023-03-25 | End: 2023-03-26

## 2023-03-25 RX ORDER — LEVOFLOXACIN 5 MG/ML
750 INJECTION, SOLUTION INTRAVENOUS
Status: DISCONTINUED | OUTPATIENT
Start: 2023-03-25 | End: 2023-03-27

## 2023-03-25 RX ORDER — METHYLPREDNISOLONE SOD SUCC 125 MG
60 VIAL (EA) INJECTION
Status: DISCONTINUED | OUTPATIENT
Start: 2023-03-25 | End: 2023-03-25

## 2023-03-25 RX ORDER — ACETAMINOPHEN 325 MG/1
650 TABLET ORAL EVERY 6 HOURS PRN
Status: DISCONTINUED | OUTPATIENT
Start: 2023-03-25 | End: 2023-03-29 | Stop reason: HOSPADM

## 2023-03-25 RX ORDER — CARBOXYMETHYLCELLULOSE SODIUM 5 MG/ML
1 SOLUTION/ DROPS OPHTHALMIC 2 TIMES DAILY
Status: DISCONTINUED | OUTPATIENT
Start: 2023-03-25 | End: 2023-03-29 | Stop reason: HOSPADM

## 2023-03-25 RX ORDER — ONDANSETRON 2 MG/ML
4 INJECTION INTRAMUSCULAR; INTRAVENOUS EVERY 6 HOURS PRN
Status: DISCONTINUED | OUTPATIENT
Start: 2023-03-25 | End: 2023-03-29 | Stop reason: HOSPADM

## 2023-03-25 RX ORDER — NITROGLYCERIN 0.4 MG/1
0.4 TABLET SUBLINGUAL EVERY 5 MIN PRN
Status: DISCONTINUED | OUTPATIENT
Start: 2023-03-25 | End: 2023-03-29 | Stop reason: HOSPADM

## 2023-03-25 RX ORDER — FUROSEMIDE 10 MG/ML
40 INJECTION INTRAMUSCULAR; INTRAVENOUS ONCE
Status: COMPLETED | OUTPATIENT
Start: 2023-03-25 | End: 2023-03-25

## 2023-03-25 RX ADMIN — METOPROLOL TARTRATE 25 MG: 25 TABLET, FILM COATED ORAL at 09:03

## 2023-03-25 RX ADMIN — IPRATROPIUM BROMIDE 0.5 MG: 0.5 SOLUTION RESPIRATORY (INHALATION) at 03:03

## 2023-03-25 RX ADMIN — TAMSULOSIN HYDROCHLORIDE 0.4 MG: 0.4 CAPSULE ORAL at 09:03

## 2023-03-25 RX ADMIN — BUDESONIDE INHALATION 0.5 MG: 0.5 SUSPENSION RESPIRATORY (INHALATION) at 07:03

## 2023-03-25 RX ADMIN — ASPIRIN 81 MG CHEWABLE TABLET 81 MG: 81 TABLET CHEWABLE at 09:03

## 2023-03-25 RX ADMIN — NITROFURANTOIN (MONOHYDRATE/MACROCRYSTALS) 100 MG: 75; 25 CAPSULE ORAL at 09:03

## 2023-03-25 RX ADMIN — CARBOXYMETHYLCELLULOSE SODIUM 1 DROP: 5 SOLUTION/ DROPS OPHTHALMIC at 09:03

## 2023-03-25 RX ADMIN — ACETAMINOPHEN 650 MG: 325 TABLET ORAL at 04:03

## 2023-03-25 RX ADMIN — ATORVASTATIN CALCIUM 40 MG: 40 TABLET, FILM COATED ORAL at 09:03

## 2023-03-25 RX ADMIN — LEVOTHYROXINE SODIUM 25 MCG: 0.03 TABLET ORAL at 06:03

## 2023-03-25 RX ADMIN — ISOSORBIDE MONONITRATE 30 MG: 30 TABLET, EXTENDED RELEASE ORAL at 09:03

## 2023-03-25 RX ADMIN — ARFORMOTEROL TARTRATE 15 MCG: 15 SOLUTION RESPIRATORY (INHALATION) at 09:03

## 2023-03-25 RX ADMIN — ARFORMOTEROL TARTRATE 15 MCG: 15 SOLUTION RESPIRATORY (INHALATION) at 07:03

## 2023-03-25 RX ADMIN — HEPARIN SODIUM AND DEXTROSE 16 UNITS/KG/HR: 10000; 5 INJECTION INTRAVENOUS at 04:03

## 2023-03-25 RX ADMIN — DOXYCYCLINE 100 MG: 100 INJECTION, POWDER, LYOPHILIZED, FOR SOLUTION INTRAVENOUS at 06:03

## 2023-03-25 RX ADMIN — PANTOPRAZOLE SODIUM 40 MG: 40 INJECTION, POWDER, FOR SOLUTION INTRAVENOUS at 09:03

## 2023-03-25 RX ADMIN — LEVOFLOXACIN 750 MG: 750 INJECTION, SOLUTION INTRAVENOUS at 07:03

## 2023-03-25 RX ADMIN — BUDESONIDE INHALATION 0.5 MG: 0.5 SUSPENSION RESPIRATORY (INHALATION) at 09:03

## 2023-03-25 RX ADMIN — METOPROLOL TARTRATE 2.5 MG: 1 INJECTION, SOLUTION INTRAVENOUS at 03:03

## 2023-03-25 RX ADMIN — METHYLPREDNISOLONE SODIUM SUCCINATE 60 MG: 125 INJECTION, POWDER, FOR SOLUTION INTRAMUSCULAR; INTRAVENOUS at 03:03

## 2023-03-25 RX ADMIN — FUROSEMIDE 40 MG: 10 INJECTION, SOLUTION INTRAMUSCULAR; INTRAVENOUS at 04:03

## 2023-03-25 RX ADMIN — HEPARIN SODIUM AND DEXTROSE 16 UNITS/KG/HR: 10000; 5 INJECTION INTRAVENOUS at 11:03

## 2023-03-25 RX ADMIN — ISOSORBIDE MONONITRATE 30 MG: 30 TABLET, EXTENDED RELEASE ORAL at 01:03

## 2023-03-25 NOTE — PLAN OF CARE
03/25/23 1040   KELLY Message   Medicare Outpatient and Observation Notification regarding financial responsibility Given to patient/caregiver;Explained to patient/caregiver;Signed/date by patient/caregiver   Date KELLY was signed 03/25/23   Time KELLY was signed 1040

## 2023-03-25 NOTE — SUBJECTIVE & OBJECTIVE
Interval History:  See hospital course.  Patient is hard of hearing.  He denies any recent history of chest pain.  States his breathing feels back to his baseline.  He was initially evaluated in the ED due to acute onset shortness of breath around 2:30 a.m..  CABG X 5 in 1996 currently followed by cardiologist Dr. Kim, daughter states nuclear stress test December 2022 was normal for him.  Daughter states he has been experiencing headache which is new recently.  Telemetry with sinus rhythm.  On room air, afebrile with T-max 98.9°.  Labs with WBC 15, platelet 92, BUN/creatinine 41/1.4, echocardiogram EF of 50% with moderate to severe MS/AS, EKG sinus rhythm with left bundle-branch block, urine culture no growth, V/Q scan low probability PE.  Discussed with patient and visitors at bedside.    Review of Systems   Constitutional:  Negative for fever.   HENT:          Hard of hearing   Respiratory:  Negative for shortness of breath.    Cardiovascular:  Negative for chest pain.   Gastrointestinal:  Negative for nausea and vomiting.   Neurological:  Positive for headaches.   Objective:     Vital Signs (Most Recent):  Temp: 98.9 °F (37.2 °C) (03/25/23 0720)  Pulse: 72 (03/25/23 0747)  Resp: 18 (03/25/23 0747)  BP: (!) 155/72 (03/25/23 0720)  SpO2: (!) 94 % (03/25/23 0747)   Vital Signs (24h Range):  Temp:  [96.8 °F (36 °C)-98.9 °F (37.2 °C)] 98.9 °F (37.2 °C)  Pulse:  [62-91] 72  Resp:  [17-20] 18  SpO2:  [92 %-95 %] 94 %  BP: (109-155)/(54-80) 155/72     Weight: 88.4 kg (194 lb 14.2 oz)  Body mass index is 30.52 kg/m².    Intake/Output Summary (Last 24 hours) at 3/25/2023 1045  Last data filed at 3/25/2023 0818  Gross per 24 hour   Intake 240 ml   Output 550 ml   Net -310 ml      Physical Exam  Vitals and nursing note reviewed.   Constitutional:       General: He is not in acute distress.     Appearance: He is not ill-appearing.      Comments: Elderly male lying in bed, cooperative, NAD   HENT:      Head: Normocephalic  and atraumatic.      Mouth/Throat:      Mouth: Mucous membranes are moist.   Cardiovascular:      Rate and Rhythm: Normal rate and regular rhythm.      Heart sounds: Murmur heard.   Pulmonary:      Effort: No respiratory distress.      Breath sounds: No stridor. No wheezing.      Comments: On RA  Abdominal:      General: There is no distension.      Palpations: Abdomen is soft.      Tenderness: There is no abdominal tenderness. There is no guarding.   Genitourinary:     Comments: Wearing depends  Skin:     General: Skin is warm.   Neurological:      Mental Status: He is alert and oriented to person, place, and time.   Psychiatric:         Mood and Affect: Mood normal.       Significant Labs: BMP:   Recent Labs   Lab 03/25/23  0342   *      K 4.1   CL 99   CO2 28   BUN 41*   CREATININE 1.4   CALCIUM 9.3   MG 2.1     CBC:   Recent Labs   Lab 03/24/23  0442 03/24/23  1356 03/25/23  0342   WBC 11.46 11.55 15.72*   HGB 14.3 13.9* 14.5   HCT 43.3 41.9 43.4   PLT 94* 84* 92*     CMP:   Recent Labs   Lab 03/24/23  0442 03/24/23  1356 03/25/23  0342   * 133* 138   K 5.0 4.2 4.1    98 99   CO2 26 24 28   * 273* 137*   BUN 30* 35* 41*   CREATININE 1.2 1.5* 1.4   CALCIUM 9.2 9.1 9.3   PROT 7.0  --  7.1   ALBUMIN 3.7  --  3.6   BILITOT 1.7*  --  0.5   ALKPHOS 66  --  63   AST 35  --  34   ALT 26  --  27   ANIONGAP 8 11 11     Cardiac Markers:   Recent Labs   Lab 03/24/23  0442   BNP 77     Lactic Acid: No results for input(s): LACTATE in the last 48 hours.  Lipid Panel: No results for input(s): CHOL, HDL, LDLCALC, TRIG, CHOLHDL in the last 48 hours.  POCT Glucose: No results for input(s): POCTGLUCOSE in the last 48 hours.  Troponin:   Recent Labs   Lab 03/25/23  0003 03/25/23  0342 03/25/23  0841   TROPONINIHS 1474.4* 1233.5*  1233.5* 957.2*     TSH:   Recent Labs   Lab 03/24/23  1352   TSH 0.870     Urine Culture:   Recent Labs   Lab 03/24/23  1400   LABURIN No growth to date     Urine Studies:    Recent Labs   Lab 03/24/23  1400   COLORU Yellow   APPEARANCEUA Hazy*   PHUR 5.0   SPECGRAV 1.015   PROTEINUA Negative   GLUCUA 1+*   KETONESU Negative   BILIRUBINUA Negative   OCCULTUA Trace*   NITRITE Negative   UROBILINOGEN Negative   LEUKOCYTESUR 3+*   RBCUA 10*   WBCUA 84*   BACTERIA Negative   SQUAMEPITHEL 3   HYALINECASTS 7*       Significant Imaging: I have reviewed all pertinent imaging results/findings within the past 24 hours.    X-Ray Chest AP Portable    Result Date: 3/24/2023  HISTORY: Chest Pain  and dyspnea. FINDINGS: Portable chest radiograph at 0545 hours compared to 03/11/2021 shows median sternotomy wires and mediastinal surgical clips from prior CABG, with stable cardiomediastinal silhouette and pulmonary vascularity. There are aortic vascular calcifications. The lungs are normally expanded, with no consolidation, large pleural effusion, or evidence of pulmonary edema. No confluent infiltrates or pneumothorax. No acute fractures. IMPRESSION: No evidence of acute cardiopulmonary disease. Electronically signed by:  Sabas Infante MD  3/24/2023 7:49 AM CDT Workstation: [id number]    US Lower Extremity Veins Bilateral    Result Date: 3/24/2023  US LOWER EXTREMITY VEINS BILATERAL CLINICAL HISTORY: 88 years Male swelling FINDINGS: Grayscale, color and spectral Doppler analysis of the bilateral lower extremity deep venous system was performed. There is normal compressibility, with normal flow by color and spectral Doppler analysis in the bilateral lower extremity deep venous system, with normal augmentation and no evidence of deep venous thrombosis. IMPRESSION: Negative for DVT. Electronically signed by:  David Mancilla MD  3/24/2023 3:43 PM CDT Workstation: [id number]    Echo    Result Date: 3/24/2023  · The left ventricle is normal in size with moderate concentric hypertrophy and low normal systolic function. · Mild left atrial enlargement. · The estimated ejection fraction is 50%. · Indeterminate  left ventricular diastolic function. · RV is not well visualized. Mildly to moderately reduced right ventricular systolic function. · The mean diastolic gradient across the mitral valve is 13 mmHg at a heart rate of 87 bpm. · There is moderate to severe mitral stenosis. · There is moderate-to-severe aortic valve stenosis. · Aortic valve area is 1.06 cm2; peak velocity is 2.75 m/s; mean gradient is 17 mmHg. Indexed area is 0.45-0.5 cm/m2. Indexed stroke volume is 29 ml/m2. · Normal central venous pressure (3 mmHg).      NM Lung Scan Ventilation Perfusion    Result Date: 3/24/2023  EXAM DESCRIPTION: NM LUNG VENTILATION PERFUSION CLINICAL HISTORY: Pulmonary embolism (PE) suspected, positive D-dimer. TECHNIQUE: Nuclear medicine perfusion only scan performed using 4.5 mCi intravenous Tc-99m MAA for perfusion images. 8 views. COMPARISON: Previous chest x-ray obtained earlier the same day. FINDINGS: A single tiny subsegmental defect along the anterior basal segment of the right lower lobe. A questionable medium-sized segmental defect along the superior segment of the left lower lobe. By modified PIOPED II criteria, the findings are compatible with a low to intermediate probability of pulmonary embolism. IMPRESSION: Low to intermediate probability of pulmonary embolism. Electronically signed by:  Yumi Sanchez MD  3/24/2023 11:20 PM CDT Workstation: [id number]

## 2023-03-25 NOTE — PROGRESS NOTES
[facility] Medicine  Progress Note    Patient Name: Frank Wyatt  MRN: [id number]  Patient Class: OP- Observation   Admission Date: 3/24/2023  Length of Stay: 0 days  Attending Physician: Maggie Singh MD  Primary Care Provider: Darci German MD        Subjective:     Principal Problem:NSTEMI (non-ST elevated myocardial infarction)        HPI:  80-year-old male history of COPD, remote history of smoking, hypertension, hyperlipidemia, very hard of hearing, hypothyroidism, BPH.     Patient initially was evaluated the emergency department this morning after experiencing acute shortness breath sounding at 2:15 a.m. associated with leg swelling.  Patient was diagnosed with hypoxia, pneumonia and COPD exacerbation, discharged with doxycycline and prednisone.     However it was noted patient to have a troponin elevation, and so was called back to the emergency department..  Patient denies chest discomfort.  No ST or T-wave changes on EKG.     hsTNI: 11 >> 60.8 >> 194     Patient was treated with full-strength aspirin, patient to be admitted for evaluation management of NSTEMI.     Ten point review systems otherwise negative      Overview/Hospital Course:  Assumed care of this patient on 3/25/23. Patient is hard of hearing and collateral from daughter at bedside.  Patient with known history of CAD status post remote CABG X 5 1996 followed by cardiologist Dr. Kim and reported negative nuclear stress test December 2022, COPD, not on home oxygen, BPH, hypothyroidism, CKD stage 3 who initially presented to the ED with acute onset of shortness of breath.  Patient was awoken around 2:30 a.m. with shortness of breath, daughter states he was experiencing headache prior, new onset.  He was initially evaluated in the ED with workup done and diagnosed with COPD exacerbation, discharge to complete course of doxycycline and prednisone however recall to the ED when 2nd high sensitivity troponin  reported elevated at 60.  In the ED EKG with sinus rhythm with left bundle-branch block, denies any chest pain, D-dimer 0.94, V/Q scan low probability PE, ultrasound leg no DVT, echocardiogram EF of 50% with moderate to severe MS/AS, he was placed on heparin drip and admitted to telemetry floor with cardiology consult.  On 03/25 troponin downtrending, he feels back to his baseline, awaiting Cardiology recommendations.      Interval History:  See hospital course.  Patient is hard of hearing.  He denies any recent history of chest pain.  States his breathing feels back to his baseline.  He was initially evaluated in the ED due to acute onset shortness of breath around 2:30 a.m..  CABG X 5 in 1996 currently followed by cardiologist Dr. Kim, daughter states nuclear stress test December 2022 was normal for him.  Daughter states he has been experiencing headache which is new recently.  Telemetry with sinus rhythm.  On room air, afebrile with T-max 98.9°.  Labs with WBC 15, platelet 92, BUN/creatinine 41/1.4, echocardiogram EF of 50% with moderate to severe MS/AS, EKG sinus rhythm with left bundle-branch block, urine culture no growth, V/Q scan low probability PE.  Discussed with patient and visitors at bedside.    Review of Systems   Constitutional:  Negative for fever.   HENT:          Hard of hearing   Respiratory:  Negative for shortness of breath.    Cardiovascular:  Negative for chest pain.   Gastrointestinal:  Negative for nausea and vomiting.   Neurological:  Positive for headaches.   Objective:     Vital Signs (Most Recent):  Temp: 98.9 °F (37.2 °C) (03/25/23 0720)  Pulse: 72 (03/25/23 0747)  Resp: 18 (03/25/23 0747)  BP: (!) 155/72 (03/25/23 0720)  SpO2: (!) 94 % (03/25/23 0747)   Vital Signs (24h Range):  Temp:  [96.8 °F (36 °C)-98.9 °F (37.2 °C)] 98.9 °F (37.2 °C)  Pulse:  [62-91] 72  Resp:  [17-20] 18  SpO2:  [92 %-95 %] 94 %  BP: (109-155)/(54-80) 155/72     Weight: 88.4 kg (194 lb 14.2 oz)  Body mass  index is 30.52 kg/m².    Intake/Output Summary (Last 24 hours) at 3/25/2023 1045  Last data filed at 3/25/2023 0818  Gross per 24 hour   Intake 240 ml   Output 550 ml   Net -310 ml      Physical Exam  Vitals and nursing note reviewed.   Constitutional:       General: He is not in acute distress.     Appearance: He is not ill-appearing.      Comments: Elderly male lying in bed, cooperative, NAD   HENT:      Head: Normocephalic and atraumatic.      Mouth/Throat:      Mouth: Mucous membranes are moist.   Cardiovascular:      Rate and Rhythm: Normal rate and regular rhythm.      Heart sounds: Murmur heard.   Pulmonary:      Effort: No respiratory distress.      Breath sounds: No stridor. No wheezing.      Comments: On RA  Abdominal:      General: There is no distension.      Palpations: Abdomen is soft.      Tenderness: There is no abdominal tenderness. There is no guarding.   Genitourinary:     Comments: Wearing depends  Skin:     General: Skin is warm.   Neurological:      Mental Status: He is alert and oriented to person, place, and time.   Psychiatric:         Mood and Affect: Mood normal.       Significant Labs: BMP:   Recent Labs   Lab 03/25/23  0342   *      K 4.1   CL 99   CO2 28   BUN 41*   CREATININE 1.4   CALCIUM 9.3   MG 2.1     CBC:   Recent Labs   Lab 03/24/23  0442 03/24/23  1356 03/25/23  0342   WBC 11.46 11.55 15.72*   HGB 14.3 13.9* 14.5   HCT 43.3 41.9 43.4   PLT 94* 84* 92*     CMP:   Recent Labs   Lab 03/24/23  0442 03/24/23  1356 03/25/23  0342   * 133* 138   K 5.0 4.2 4.1    98 99   CO2 26 24 28   * 273* 137*   BUN 30* 35* 41*   CREATININE 1.2 1.5* 1.4   CALCIUM 9.2 9.1 9.3   PROT 7.0  --  7.1   ALBUMIN 3.7  --  3.6   BILITOT 1.7*  --  0.5   ALKPHOS 66  --  63   AST 35  --  34   ALT 26  --  27   ANIONGAP 8 11 11     Cardiac Markers:   Recent Labs   Lab 03/24/23  0442   BNP 77     Lactic Acid: No results for input(s): LACTATE in the last 48 hours.  Lipid Panel: No  results for input(s): CHOL, HDL, LDLCALC, TRIG, CHOLHDL in the last 48 hours.  POCT Glucose: No results for input(s): POCTGLUCOSE in the last 48 hours.  Troponin:   Recent Labs   Lab 03/25/23  0003 03/25/23  0342 03/25/23  0841   TROPONINIHS 1474.4* 1233.5*  1233.5* 957.2*     TSH:   Recent Labs   Lab 03/24/23  1352   TSH 0.870     Urine Culture:   Recent Labs   Lab 03/24/23  1400   LABURIN No growth to date     Urine Studies:   Recent Labs   Lab 03/24/23  1400   COLORU Yellow   APPEARANCEUA Hazy*   PHUR 5.0   SPECGRAV 1.015   PROTEINUA Negative   GLUCUA 1+*   KETONESU Negative   BILIRUBINUA Negative   OCCULTUA Trace*   NITRITE Negative   UROBILINOGEN Negative   LEUKOCYTESUR 3+*   RBCUA 10*   WBCUA 84*   BACTERIA Negative   SQUAMEPITHEL 3   HYALINECASTS 7*       Significant Imaging: I have reviewed all pertinent imaging results/findings within the past 24 hours.    X-Ray Chest AP Portable    Result Date: 3/24/2023  HISTORY: Chest Pain  and dyspnea. FINDINGS: Portable chest radiograph at 0545 hours compared to 03/11/2021 shows median sternotomy wires and mediastinal surgical clips from prior CABG, with stable cardiomediastinal silhouette and pulmonary vascularity. There are aortic vascular calcifications. The lungs are normally expanded, with no consolidation, large pleural effusion, or evidence of pulmonary edema. No confluent infiltrates or pneumothorax. No acute fractures. IMPRESSION: No evidence of acute cardiopulmonary disease. Electronically signed by:  Sabas Infante MD  3/24/2023 7:49 AM CDT Workstation: [id number]    US Lower Extremity Veins Bilateral    Result Date: 3/24/2023  US LOWER EXTREMITY VEINS BILATERAL CLINICAL HISTORY: 88 years Male swelling FINDINGS: Grayscale, color and spectral Doppler analysis of the bilateral lower extremity deep venous system was performed. There is normal compressibility, with normal flow by color and spectral Doppler analysis in the bilateral lower extremity deep venous  system, with normal augmentation and no evidence of deep venous thrombosis. IMPRESSION: Negative for DVT. Electronically signed by:  David Mancilla MD  3/24/2023 3:43 PM CDT Workstation: [id number]    Echo    Result Date: 3/24/2023  · The left ventricle is normal in size with moderate concentric hypertrophy and low normal systolic function. · Mild left atrial enlargement. · The estimated ejection fraction is 50%. · Indeterminate left ventricular diastolic function. · RV is not well visualized. Mildly to moderately reduced right ventricular systolic function. · The mean diastolic gradient across the mitral valve is 13 mmHg at a heart rate of 87 bpm. · There is moderate to severe mitral stenosis. · There is moderate-to-severe aortic valve stenosis. · Aortic valve area is 1.06 cm2; peak velocity is 2.75 m/s; mean gradient is 17 mmHg. Indexed area is 0.45-0.5 cm/m2. Indexed stroke volume is 29 ml/m2. · Normal central venous pressure (3 mmHg).      NM Lung Scan Ventilation Perfusion    Result Date: 3/24/2023  EXAM DESCRIPTION: NM LUNG VENTILATION PERFUSION CLINICAL HISTORY: Pulmonary embolism (PE) suspected, positive D-dimer. TECHNIQUE: Nuclear medicine perfusion only scan performed using 4.5 mCi intravenous Tc-99m MAA for perfusion images. 8 views. COMPARISON: Previous chest x-ray obtained earlier the same day. FINDINGS: A single tiny subsegmental defect along the anterior basal segment of the right lower lobe. A questionable medium-sized segmental defect along the superior segment of the left lower lobe. By modified PIOPED II criteria, the findings are compatible with a low to intermediate probability of pulmonary embolism. IMPRESSION: Low to intermediate probability of pulmonary embolism. 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/AS    [place] (hard of hearing)    Meniere disease    CKD (chronic kidney disease), stage III    S/P CABG x 5    B12 deficiency    Thrombocytopenia    COPD (chronic obstructive pulmonary disease)    Hypothyroidism    BPH (benign prostatic hyperplasia)    Dyspnea     Plan:  Continue care on telemetry floor with continuous cardiac monitoring  Resume oral diet, no procedure plan today  Currently on heparin drip as per nomogram for ACS protocol, no evidence of bleeding but thrombocytopenia, monitor closely   Start B12 supplementation with deficiency   On aspirin 81 mg, Lipitor 40 mg, Imdur 30 mg daily started, Lopressor increased to 25 mg t.i.d. 3/2 5   Echocardiogram with EF of 50% with moderate to severe MS/AS   Troponin peaked at 1200, currently downtrending   Continue scheduled breathing treatments   Fall and delirium precautions  Renally dose all medications and avoid nephrotoxin drugs  A.m. labs ordered  Increase activity, out of bed to chair, ambulation and monitor for any recurrent symptoms   Await Cardiology recommendations   Further plan as per hospital course    Addendum  This afternoon patient had recurrent episode of acute shortness of breath associated with shaking, continues to deny chest pain.  Sinus tach to the low 100s, examination with expiratory wheeze.  Temperature 99.5°.  EKG and chest x-ray ordered.  Empirically starting doxycycline, 3 dose of IV steroid, metoprolol dose already increased by Cardiology.  Blood cultures ordered. Family present and updated.  Did address code status, does not have any advanced directive or living will, family wishes to discuss with patient further once he is more settled.  Requesting 5 wishes booklet, case management contacted.  Further plan as per hospital course.    VTE Risk Mitigation (From admission, onward)           Ordered     heparin 25,000 units in dextrose 5% (100 units/ml) infusion MINIMAL INTENSITY  nomogram - [facility]  Continuous        Question Answer Comment   Heparin Infusion Adjustment (DO NOT MODIFY ANSWER) [email or web address]  INTENSITY nomogram for [facility] [id number]    Begin at (in units/kg/hr) 12 03/24/23 2055                    Discharge Planning   ALESSANDRA:      Code Status: Prior   Is the patient medically ready for discharge?:     Reason for patient still in hospital (select all that apply): Consult recommendations                     Maggie Singh MD  Department of Hospital Medicine   [facility]

## 2023-03-25 NOTE — PROGRESS NOTES
Automatic Inhaler to Nebulizer Interchange    fluticasone/vilanterol (Breo Ellipta) 100 mcg/25 mcg changed to budesonide 0.5 mg twice daily AND arformoterol 15 mcg twice daily per [facility] Automatic Therapeutic Substitutions Protocol.    Please contact pharmacy at extension [phone] with any questions.     Thank you,   Katelyn Arreguin

## 2023-03-25 NOTE — CARE UPDATE
03/25/23 0747   Patient Assessment/Suction   Level of Consciousness (AVPU) alert   Respiratory Effort Shallow   All Lung Fields Breath Sounds wheezes, expiratory   Cough Frequency infrequent   Cough Type nonproductive   PRE-TX-O2   Device (Oxygen Therapy) room air   SpO2 (!) 94 %   Pulse 72   Resp 18   Aerosol Therapy   $ Aerosol Therapy Charges Aerosol Treatment   Daily Review of Necessity (SVN) completed   Respiratory Treatment Status (SVN) given   Treatment Route (SVN) oxygen   Patient Position (SVN) HOB elevated   Post Treatment Assessment (SVN) increased aeration   Signs of Intolerance (SVN) none   Breath Sounds Post-Respiratory Treatment   Throughout All Fields Post-Treatment All Fields   Throughout All Fields Post-Treatment aeration increased   Education   $ Education Bronchodilator;15 min

## 2023-03-25 NOTE — NURSING
"1507 Pt SOB noted wheezing.  Family report pt unable to focus.  Pt state "I just don't feel right.  MD notified.  Pt Sat @ 88%, O2 applied at 3 LPM Sat increased to 92%.  Pt assisted to HOB.  Pt state he was sleeping and woke up shaking and SOB.  MD on way to see pt. MD aware of vitals at this time.   at this time.      1509 MD at bedside new orders noted.      1650 Notified of temp.  Pt medicated with Tylenol.  MD notified at this time.     1630 Pt in bed with family at bedside.  Pt state feel a little better.  O2 @ 2LPM.  Bed in lowest position.  Call light within reach.  Will continue to monitor pt.    "

## 2023-03-25 NOTE — CONSULTS
[facility]  Department of Cardiology  Consult Note      PATIENT NAME: Frank Wyatt  MRN: [id number]  TODAY'S DATE: 03/25/2023  ADMIT DATE: 3/24/2023                          CONSULT REQUESTED BY: Maggie Singh MD    SUBJECTIVE     PRINCIPAL PROBLEM: NSTEMI (non-ST elevated myocardial infarction)      REASON FOR CONSULT:    NSTEMI      HPI:    Patient is an 80-year-old male with past medical history of COPD, hypertension, hyperlipidemia, CABG x5 in 1996 who presented to the ED with acute shortness of breath and bilateral lower extremity edema.  Patient presented to the ED earlier yesterday with the same complaints and was found to have acute shortness of breath with hypoxia and was discharged on doxycycline and prednisone.  He received IV Lasix and breathing treatments in the ED.  His symptoms continue to worsen upon discharge and he was brought back to ED for further evaluation.  Patient was found to have elevated troponins and with no acute ST-T wave changes.    During exam, patient is in no acute distress, sinus rhythm on monitor with heart rate 80s to 100s.    IN ED: H&H 14.3/43, K 5.0, Cr 1.2, Mag 1.6. BNP 77    Serial Trop HS: 3/24/23 11 > 60.8 > 194 > 502 > 893.9 > 1212; 3/25/23- 1474 > 1233 >957     ECHO 3/24/23  The left ventricle is normal in size with moderate concentric hypertrophy and low normal systolic function.  Mild left atrial enlargement.  The estimated ejection fraction is 50%.  Indeterminate left ventricular diastolic function.  RV is not well visualized. Mildly to moderately reduced right ventricular systolic function.  The mean diastolic gradient across the mitral valve is 13 mmHg at a heart rate of 87 bpm.  There is moderate to severe mitral stenosis.  There is moderate-to-severe aortic valve stenosis.  Aortic valve area is 1.06 cm2; peak velocity is 2.75 m/s; mean gradient is 17 mmHg. Indexed area is 0.45-0.5 cm/m2. Indexed stroke volume is 29 ml/m2.  Normal central  venous pressure (3 mmHg).    FROM H&P:  HPI:  80-year-old male history of COPD, remote history of smoking, hypertension, hyperlipidemia, very hard of hearing, hypothyroidism, BPH.     Patient initially was evaluated the emergency department this morning after experiencing acute shortness breath sounding at 2:15 a.m. associated with leg swelling.  Patient was diagnosed with hypoxia, pneumonia and COPD exacerbation, discharged with doxycycline and prednisone.     However it was noted patient to have a troponin elevation, and so was called back to the emergency department..  Patient denies chest discomfort.  No ST or T-wave changes on EKG.     hsTNI: 11 >> 60.8 >> 194     Patient was treated with full-strength aspirin, patient to be admitted for evaluation management of NSTEMI.     Ten point review systems otherwise negative          Review of patient's allergies indicates:   Allergen Reactions    Bactrim [sulfamethoxazole-trimethoprim] Hives     itched    Keflex [cephalexin] Rash    Morphine Rash     Patient had morphine and keflex at the same time, developed a rash, not sure which one caused it, or if it was a combination of the two meds.       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 2004    CORONARY ARTERY BYPASS GRAFT  1996    5-bypass     CYSTOSCOPY N/A 8/15/2019    Procedure: CYSTOSCOPY;   Surgeon: Dipak Sanchez MD;  Location: [facility];  Service: Urology;  Laterality: N/A;    CYSTOSCOPY N/A 10/31/2019    Procedure: CYSTOSCOPY;  Surgeon: Dipak Sanchez MD;  Location: [facility];  Service: Urology;  Laterality: N/A;    CYSTOSCOPY W/ URETERAL STENT REMOVAL Left 10/31/2019    Procedure: CYSTOSCOPY, WITH URETERAL STENT REMOVAL  URETEROSCOPY;  Surgeon: Dipak Sanchez MD;  Location: [facility];  Service: Urology;  Laterality: Left;    EXTRACORPOREAL SHOCK WAVE LITHOTRIPSY Left 8/15/2019    Procedure: LITHOTRIPSY, ESWL;  Surgeon: Dipak Sanchez MD;  Location: [facility];  Service: Urology;  Laterality: Left;    EXTRACORPOREAL SHOCK WAVE LITHOTRIPSY Left 2019    Procedure: LITHOTRIPSY, ESWL;  Surgeon: Dipak Sanchez MD;  Location: [facility];  Service: Urology;  Laterality: Left;    EYE SURGERY Right     Cataract    EYE SURGERY Left     Cataract    HERNIA REPAIR      Dr. Bailon - had to have mesh removed    INGUINAL HERNIA REPAIR Right 2019        LITHOTRIPSY      nephrostomy tube      PERCUTANEOUS NEPHROSTOMY      ROTATOR CUFF REPAIR      right shoulder    URETEROSCOPY Left 10/31/2019    Procedure: URETEROSCOPY;  Surgeon: Dipak Sanchez MD;  Location: [facility];  Service: Urology;  Laterality: Left;     Social History     Tobacco Use    Smoking status: Former     Packs/day: 1.00     Years: 25.00     Pack years: 25.00     Types: Cigarettes     Quit date: 1972     Years since quittin.2    Smokeless tobacco: Former     Quit date: 8/15/1972   Substance Use Topics    Alcohol use: No    Drug use: No        REVIEW OF SYSTEMS  CONSTITUTIONAL: Negative for chills, fatigue and fever.   EYES: No double vision, No blurred vision  NEURO: No headaches, No dizziness  RESPIRATORY:  Positive shortness of breath    CARDIOVASCULAR: Negative for chest pain. Negative for palpitations and leg swelling.   GI: Negative for abdominal pain, No melena, diarrhea, nausea and  vomiting.   : Negative for dysuria and frequency, Negative for hematuria  SKIN: Negative for bruising, Negative for edema or discoloration noted.   ENDOCRINE: Negative for polyphagia, Negative for heat intolerance, Negative for cold intolerance  PSYCHIATRIC: Negative for depression, Negative for anxiety, Negative for memory loss  MUSCULOSKELETAL: Negative for neck pain, Negative for muscle weakness, Negative for back pain     OBJECTIVE     VITAL SIGNS (Most Recent)  Temp: 98.7 °F (37.1 °C) (03/25/23 1146)  Pulse: 86 (03/25/23 1146)  Resp: 20 (03/25/23 1146)  BP: (!) 123/59 (03/25/23 1146)  SpO2: (!) 92 % (03/25/23 1146)    VENTILATION STATUS  Resp: 20 (03/25/23 1146)  SpO2: (!) 92 % (03/25/23 1146)       I & O (Last 24H):  Intake/Output Summary (Last 24 hours) at 3/25/2023 1432  Last data filed at 3/25/2023 0818  Gross per 24 hour   Intake 240 ml   Output 550 ml   Net -310 ml       WEIGHTS  Wt Readings from Last 3 Encounters:   03/24/23 2300 88.4 kg (194 lb 14.2 oz)   03/24/23 0953 88.6 kg (195 lb 5.2 oz)   03/24/23 1500 88.5 kg (195 lb)   03/24/23 0416 91.6 kg (202 lb)       PHYSICAL EXAM  GENERAL: well built, well nourished, well-developed in no apparent distress alert and oriented.   HEENT: Normocephalic. Pupils normal and conjunctivae normal.NECK: No JVD. No bruit..   THYROID: Thyroid not examined  CARDIAC: Regular rate and rhythm. S1 is normal.S2 is normal.  2/6 systolic murmur CHEST ANATOMY: normal.   LUNGS: Diminished in lower lobes  ABDOMEN:  Obese, soft, nontender, normal bowel sounds  URINARY: No merino catheter   EXTREMITIES: No cyanosis, clubbing or edema noted at this time  PERIPHERAL VASCULAR SYSTEM: Good palpable distal pulses.   CENTRAL NERVOUS SYSTEM: No focal motor or sensory deficits noted.   SKIN: Skin without lesions, moist, well perfused.   MUSCLE STRENGTH & TONE: No noteable weakness, atrophy or abnormal movement.     HOME MEDICATIONS:  No current facility-administered medications on file  prior to encounter.     Current Outpatient Medications on File Prior to Encounter   Medication Sig Dispense Refill    amlodipine-benazepril 5-20 mg (LOTREL) 5-20 mg per capsule Take 1 capsule by mouth nightly.      atorvastatin (LIPITOR) 40 MG tablet Take 1 tablet (40 mg total) by mouth every evening. 90 tablet 0    dextran 70-hypromellose 0.1-0.3 % Drop Apply 1 drop to eye 2 (two) times daily.      furosemide (LASIX) 20 MG tablet Take 10 mg by mouth once daily. Based on weight gain and edema  1    glipiZIDE (GLUCOTROL) 5 MG tablet TAKE 1 TABLET BY MOUTH ONCE DAILY 30  MINUTES  BEFORE  BREAKFAST      hydrALAZINE (APRESOLINE) 50 MG tablet Take 50 mg by mouth 3 (three) times daily.      hydrocortisone 1 % cream Apply topically 2 (two) times daily.      levothyroxine (SYNTHROID) 25 MCG tablet Take 25 mcg by mouth once daily.   1    metoprolol succinate (TOPROL-XL) 25 MG 24 hr tablet Take 1 tablet by mouth 2 (two) times daily.      nitrofurantoin (MACRODANTIN) 100 MG capsule Take 100 mg by mouth 2 (two) times daily.      SYMBICORT 160-4.5 mcg/actuation HFAA Inhale 2 puffs into the lungs every 12 (twelve) hours.       tamsulosin (FLOMAX) 0.4 mg Cap Take 0.4 mg by mouth once daily.       albuterol (PROVENTIL/VENTOLIN HFA) 90 mcg/actuation inhaler Inhale 2 puffs into the lungs every 4 (four) hours as needed for Wheezing. Rescue (Patient not taking: Reported on 3/24/2023) 1 Inhaler 0    cefUROXime (CEFTIN) 500 MG tablet Take 500 mg by mouth 2 (two) times daily.      ciprofloxacin HCl (CIPRO) 250 MG tablet Take 1 tablet (250 mg total) by mouth 2 (two) times daily. 12 tablet 0    doxycycline (VIBRAMYCIN) 100 MG Cap Take 1 capsule (100 mg total) by mouth every 12 (twelve) hours. for 10 days 20 capsule 0    hydrALAZINE (APRESOLINE) 25 MG tablet Take 25 mg by mouth 3 (three) times daily.      metoprolol tartrate (LOPRESSOR) 25 MG tablet Take 25 mg by mouth 2 (two) times daily.       predniSONE (DELTASONE) 20 MG tablet Take 2  tablets (40 mg total) by mouth once daily. for 5 days 10 tablet 0    traMADol (ULTRAM) 50 mg tablet Take 1 tablet (50 mg total) by mouth every 8 (eight) hours as needed for Pain. 12 tablet 0       SCHEDULED MEDS:   arformoteroL  15 mcg Nebulization BID    aspirin  81 mg Oral Daily    atorvastatin  40 mg Oral QHS    budesonide  0.5 mg Nebulization Q12H    carboxymethylcellulose  1 drop Ophthalmic BID    cyanocobalamin  1,000 mcg Intramuscular Daily    isosorbide mononitrate  30 mg Oral Daily    levothyroxine  25 mcg Oral Before breakfast    metoprolol tartrate  25 mg Oral TID    [START ON 3/26/2023] pantoprazole  40 mg Oral Daily    tamsulosin  0.4 mg Oral QHS       CONTINUOUS INFUSIONS:   heparin (PORCINE) in D5W 16 Units/kg/hr (03/25/23 1149)       PRN MEDS:acetaminophen, magnesium oxide, magnesium oxide, nitroGLYCERIN, ondansetron, potassium bicarbonate, potassium bicarbonate, potassium bicarbonate, potassium, sodium phosphates, potassium, sodium phosphates, potassium, sodium phosphates, senna-docusate 8.6-50 mg, sodium chloride 0.9%    LABS AND DIAGNOSTICS     CBC LAST 3 DAYS  Recent Labs   Lab 03/24/23  0442 03/24/23  1356 03/25/23  0342   WBC 11.46 11.55 15.72*   RBC 4.64 4.55* 4.66   HGB 14.3 13.9* 14.5   HCT 43.3 41.9 43.4   MCV 93 92 93   MCH 30.8 30.5 31.1*   MCHC 33.0 33.2 33.4   RDW 14.1 13.8 13.9   PLT 94* 84* 92*   MPV 10.6 10.2 10.4   GRAN 87.9*  10.1* 95.1*  11.0* 88.5*  13.9*   LYMPH 5.4*  0.6* 3.4*  0.4* 4.1*  0.7*   MONO 4.2  0.5 1.0*  0.1* 4.5  0.7   BASO 0.02 0.01 0.05   NRBC 0 0 0       COAGULATION LAST 3 DAYS  Recent Labs   Lab 03/24/23  1356 03/24/23  2115 03/25/23  0342 03/25/23  0841 03/25/23  1118   INR 1.1  --   --   --   --    APTT  --    < > 56.4* 50.0* 52.2*    < > = values in this interval not displayed.       CHEMISTRY LAST 3 DAYS  Recent Labs   Lab 03/24/23  0442 03/24/23  1356 03/25/23  0342   * 133* 138   K 5.0 4.2 4.1    98 99   CO2 26 24 28   ANIONGAP 8 11 11    BUN 30* 35* 41*   CREATININE 1.2 1.5* 1.4   * 273* 137*   CALCIUM 9.2 9.1 9.3   MG 1.6  --  2.1   ALBUMIN 3.7  --  3.6   PROT 7.0  --  7.1   ALKPHOS 66  --  63   ALT 26  --  27   AST 35  --  34   BILITOT 1.7*  --  0.5       CARDIAC PROFILE LAST 3 DAYS  Recent Labs   Lab 03/24/23  0442 03/24/23  0720 03/25/23  0003 03/25/23  0342 03/25/23  0841   BNP 77  --   --   --   --    TROPONINIHS 11.0   < > 1474.4* 1233.5*  1233.5* 957.2*    < > = values in this interval not displayed.       ENDOCRINE LAST 3 DAYS  Recent Labs   Lab 03/24/23  1352   TSH 0.870       LAST ARTERIAL BLOOD GAS  ABG  No results for input(s): PH, PO2, PCO2, HCO3, BE in the last 168 hours.    LAST 7 DAYS MICROBIOLOGY   Microbiology Results (last 7 days)       Procedure Component Value Units Date/Time    Urine culture [[id number]] Collected: 03/24/23 1400    Order Status: Completed Specimen: Urine Updated: 03/25/23 0816     Urine Culture, Routine No growth to date    Narrative:      Specimen Source->Urine            MOST RECENT IMAGING  NM Lung Scan Ventilation Perfusion  EXAM DESCRIPTION:  NM LUNG VENTILATION PERFUSION    CLINICAL HISTORY:  Pulmonary embolism (PE) suspected, positive D-dimer.    TECHNIQUE:  Nuclear medicine perfusion only scan performed using 4.5 mCi intravenous Tc-99m MAA for perfusion images. 8 views.    COMPARISON: Previous chest x-ray obtained earlier the same day.    FINDINGS:  A single tiny subsegmental defect along the anterior basal segment of the right lower lobe. A questionable medium-sized segmental defect along the superior segment of the left lower lobe.    By modified PIOPED II criteria, the findings are compatible with a low to intermediate probability of pulmonary embolism.    IMPRESSION:  Low to intermediate probability of pulmonary embolism.    Electronically signed by:  Yumi Sanchez MD  3/24/2023 11:20 PM CDT Workstation: [id number]  Echo  · The left ventricle is normal in size with moderate concentric    hypertrophy and low normal systolic function.  · Mild left atrial enlargement.  · The estimated ejection fraction is 50%.  · Indeterminate left ventricular diastolic function.  · RV is not well visualized. Mildly to moderately reduced right   ventricular systolic function.  · The mean diastolic gradient across the mitral valve is 13 mmHg at a   heart rate of 87 bpm.  · There is moderate to severe mitral stenosis.  · There is moderate-to-severe aortic valve stenosis.  · Aortic valve area is 1.06 cm2; peak velocity is 2.75 m/s; mean gradient   is 17 mmHg. Indexed area is 0.45-0.5 cm/m2. Indexed stroke volume is 29   ml/m2.  · Normal central venous pressure (3 mmHg).     US Lower Extremity Veins Bilateral  US LOWER EXTREMITY VEINS BILATERAL    CLINICAL HISTORY:  88 years Male swelling    FINDINGS: Grayscale, color and spectral Doppler analysis of the bilateral lower extremity deep venous system was performed.    There is normal compressibility, with normal flow by color and spectral Doppler analysis in the bilateral lower extremity deep venous system, with normal augmentation and no evidence of deep venous thrombosis.    IMPRESSION: Negative for DVT.    Electronically signed by:  David Mancilla MD  3/24/2023 3:43 PM CDT Workstation: [id number]  X-Ray Chest AP Portable  HISTORY: Chest Pain  and dyspnea.    FINDINGS: Portable chest radiograph at 0545 hours compared to 03/11/2021 shows median sternotomy wires and mediastinal surgical clips from prior CABG, with stable cardiomediastinal silhouette and pulmonary vascularity. There are aortic vascular calcifications.    The lungs are normally expanded, with no consolidation, large pleural effusion, or evidence of pulmonary edema. No confluent infiltrates or pneumothorax. No acute fractures.    IMPRESSION: No evidence of acute cardiopulmonary disease.    Electronically signed by:  Sabas Infante MD  3/24/2023 7:49 AM CDT Workstation: [id number]      ECHOCARDIOGRAM RESULTS  (last 5)  Results for orders placed during the hospital encounter of 03/24/23    Echo    Interpretation Summary  · The left ventricle is normal in size with moderate concentric hypertrophy and low normal systolic function.  · Mild left atrial enlargement.  · The estimated ejection fraction is 50%.  · Indeterminate left ventricular diastolic function.  · RV is not well visualized. Mildly to moderately reduced right ventricular systolic function.  · The mean diastolic gradient across the mitral valve is 13 mmHg at a heart rate of 87 bpm.  · There is moderate to severe mitral stenosis.  · There is moderate-to-severe aortic valve stenosis.  · Aortic valve area is 1.06 cm2; peak velocity is 2.75 m/s; mean gradient is 17 mmHg. Indexed area is 0.45-0.5 cm/m2. Indexed stroke volume is 29 ml/m2.  · Normal central venous pressure (3 mmHg).      Results for orders placed during the hospital encounter of 08/11/20    Echo Color Flow Doppler? Yes; Bubble Contrast? No    Interpretation Summary  · The estimated PA systolic pressure is 52 mmHg.  · Concentric left ventricular remodeling.  · Normal left ventricular systolic function. The estimated ejection fraction is 64%.  · Grade II (moderate) left ventricular diastolic dysfunction consistent with pseudonormalization.  · Mild left atrial enlargement.  · Mild-to-moderate aortic valve stenosis.  · Aortic valve area is 1.46 cm2; peak velocity is 2.54 m/s; mean gradient is 15 mmHg.  · Mild-to-moderate mitral regurgitation.  · Mild tricuspid regurgitation.  · Elevated central venous pressure (15 mmHg).  · Pulmonary hypertension present.      CURRENT/PREVIOUS VISIT EKG  Results for orders placed or performed during the hospital encounter of 03/24/23   EKG 12-lead    Collection Time: 03/25/23  6:48 AM    Narrative    Test Reason : R94.31,    Vent. Rate : 085 BPM     Atrial Rate : 085 BPM     P-R Int : 178 ms          QRS Dur : 130 ms      QT Int : 380 ms       P-R-T Axes : 050 044 170  degrees     QTc Int : 452 ms    Normal sinus rhythm  Left bundle branch block  Abnormal ECG  When compared with ECG of 24-MAR-2023 15:39,  WA interval has decreased    Referred By: MARY   SELF           Confirmed By:            ASSESSMENT/PLAN:     Active Hospital Problems    Diagnosis    *NSTEMI (non-ST elevated myocardial infarction)    Valvular heart disease, moderate to severe MS/AS    [place] (hard of hearing)    Meniere disease    CKD (chronic kidney disease), stage III    S/P CABG x 5    B12 deficiency    Thrombocytopenia    COPD (chronic obstructive pulmonary disease)    Hypothyroidism    BPH (benign prostatic hyperplasia)    Troponin level elevated    Dyspnea       ASSESSMENT & PLAN:     NSTEMI  dyspnea  Moderate to severe mitral stenosis  Moderate to severe aortic stenosis  CKD  S/p CABG x5  Thrombocytopenia  Left bundle-branch block        RECOMMENDATIONS:    Patient presented to the ED with acute shortness of breath and was found to have pneumonia and NSTEMI.  Patient denies chest pain and troponins are trending downward. Patient has moderate to severe MS and AS.  Limited interventions available for management.  Discussed with family extensively that patient would benefit from medical management at this time with consideration for palliative care in the future if symptoms do not improve.  Patient history of CAD and CABG x5.  Continue aspirin 81 mg daily, atorvastatin 40 mg daily.  Low-sodium heart healthy diet.  Patient's heart rate ranging from upper 80s to 100.  Patient has mitral stenosis.  Recommend heart rate between 50s and 60s.  Increase metoprolol tartrate to 25 mg t.i.d. hold for heart rate less than 50.  Hold for systolic BP less than 100.  Thank you for the consultation.  We will continue to follow.    Sarah Conti NP  Department of Cardiology  Date of Service: 03/25/2023

## 2023-03-25 NOTE — HPI
80-year-old male history of COPD, remote history of smoking, hypertension, hyperlipidemia, very hard of hearing, hypothyroidism, BPH.     Patient initially was evaluated the emergency department this morning after experiencing acute shortness breath sounding at 2:15 a.m. associated with leg swelling.  Patient was diagnosed with hypoxia, pneumonia and COPD exacerbation, discharged with doxycycline and prednisone.     However it was noted patient to have a troponin elevation, and so was called back to the emergency department..  Patient denies chest discomfort.  No ST or T-wave changes on EKG.     hsTNI: 11 >> 60.8 >> 194     Patient was treated with full-strength aspirin, patient to be admitted for evaluation management of NSTEMI.     Ten point review systems otherwise negative

## 2023-03-25 NOTE — PLAN OF CARE
[facility]  Initial Discharge Assessment       Primary Care Provider: Darci German MD    Admission Diagnosis: NSTEMI (non-ST elevated myocardial infarction) [I21.4]    Admission Date: 3/24/2023  Expected Discharge Date:     Discharge Barriers Identified: None    Payor: [employer] / Plan: [email or web address] 65 / Product Type: Medicare Advantage /     Extended Emergency Contact Information  Primary Emergency Contact: Jewell Whatley  Mobile Phone: [phone]  Relation: Daughter  Secondary Emergency Contact: LEXUS JOLLEY  Mobile Phone: [phone]  Relation: Daughter  Preferred language: English   needed? No    Discharge Plan A: Assisted Living  Discharge Plan B: Assisted Living      [facility] [address] [place] - [address]  [address] [address]  Phone: [phone] Fax: [phone]  Assessment conducted with pt's daughters at bedside and additional information obtain from medical record. Pt is independent with ADL's . Resides in [facility]. Pt's POA are both daughters.     Initial Assessment (most recent)       Adult Discharge Assessment - 03/25/23 1104          Discharge Assessment    Assessment Type Discharge Planning Assessment     Confirmed/corrected address, phone number and insurance Yes     Confirmed Demographics Correct on Facesheet     Source of Information family;health record     Does patient/caregiver understand observation status Yes     Reason For Admission NSTEMI (non-ST elevated myocardial infarction)     People in Home facility resident     Facility Arrived From: Home([facility]) assisted living     Do you expect to return to your current living situation? Yes     Do you have help at home or someone to help you manage your care at home? Yes     Who are your caregiver(s) and their phone number(s)? Facility staff, LEXUS JOLLEY Daughter   [phone] ,   BARBARA CALABRESE Sister   [phone],Jewell Whatley (Daughter)   [phone] (Mobile)     Prior to hospitilization cognitive status: Alert/Oriented     Current cognitive status: Alert/Oriented     Home Accessibility wheelchair accessible     Home Layout Able to live on 1st floor     Equipment Currently Used at Home none     Readmission within 30 days? No     Patient currently being followed by outpatient case management? No     Do you currently have service(s) that help you manage your care at home? No     Do you take prescription medications? Yes     Do you have prescription coverage? Yes     Coverage [employer]     Do you have any problems affording any of your prescribed medications? No     Is the patient taking medications as prescribed? yes     Who is going to help you get home at discharge? Facility staff and family     How do you get to doctors appointments? --   facility and family    Are you on dialysis? No     Do you take coumadin? No     Discharge Plan A Assisted Living     Discharge Plan B Assisted Living     DME Needed Upon Discharge  none     Discharge Plan discussed with: Adult children     Discharge Barriers Identified None

## 2023-03-25 NOTE — HOSPITAL COURSE
Assumed care of this patient on 3/25/23. Patient is hard of hearing and collateral from daughter at bedside.  Patient with known history of CAD status post remote CABG X 5 1996 followed by cardiologist Dr. Kim and reported negative nuclear stress test December 2022, COPD, not on home oxygen, BPH, hypothyroidism, CKD stage 3 who initially presented to the ED with acute onset of shortness of breath.  Patient was awoken around 2:30 a.m. with shortness of breath, daughter states he was experiencing headache prior, new onset.  He was initially evaluated in the ED with workup done and diagnosed with COPD exacerbation, discharge to complete course of doxycycline and prednisone however recall to the ED when 2nd high sensitivity troponin reported elevated at 60.  In the ED EKG with sinus rhythm with left bundle-branch block, denies any chest pain, D-dimer 0.94, V/Q scan low probability PE, ultrasound leg no DVT, echocardiogram EF of 50% with moderate to severe MS/AS, he was placed on heparin drip and admitted to telemetry floor with cardiology consult.  On 03/25 troponin downtrending, he feels back to his baseline, awaiting Cardiology recommendations.  Subsequently in afternoon patient had episode of acute shortness of breath, tachycardic, wheezing noted, later febrile to 101.2, blood cultures sent, initiated on antibiotics, cardiology started on iv lasix.

## 2023-03-26 PROBLEM — R50.9 FEVER: Status: ACTIVE | Noted: 2023-03-26

## 2023-03-26 LAB
ALBUMIN SERPL BCP-MCNC: 3.1 G/DL (ref 3.5–5.2)
ALP SERPL-CCNC: 69 U/L (ref 55–135)
ALT SERPL W/O P-5'-P-CCNC: 42 U/L (ref 10–44)
ANION GAP SERPL CALC-SCNC: 11 MMOL/L (ref 8–16)
APTT PPP: 146.2 SEC (ref 21–32)
APTT PPP: 29.9 SEC (ref 21–32)
APTT PPP: 60.6 SEC (ref 21–32)
APTT PPP: 78.6 SEC (ref 21–32)
AST SERPL-CCNC: 44 U/L (ref 10–40)
BACTERIA UR CULT: NO GROWTH
BASOPHILS # BLD AUTO: 0.01 K/UL (ref 0–0.2)
BASOPHILS NFR BLD: 0.1 % (ref 0–1.9)
BILIRUB SERPL-MCNC: 1.3 MG/DL (ref 0.1–1)
BNP SERPL-MCNC: 122 PG/ML (ref 0–99)
BUN SERPL-MCNC: 46 MG/DL (ref 8–23)
CALCIUM SERPL-MCNC: 8.3 MG/DL (ref 8.7–10.5)
CHLORIDE SERPL-SCNC: 96 MMOL/L (ref 95–110)
CO2 SERPL-SCNC: 26 MMOL/L (ref 23–29)
CREAT SERPL-MCNC: 1.6 MG/DL (ref 0.5–1.4)
DIFFERENTIAL METHOD: ABNORMAL
EOSINOPHIL # BLD AUTO: 0 K/UL (ref 0–0.5)
EOSINOPHIL NFR BLD: 0.4 % (ref 0–8)
ERYTHROCYTE [DISTWIDTH] IN BLOOD BY AUTOMATED COUNT: 13.8 % (ref 11.5–14.5)
EST. GFR  (NO RACE VARIABLE): 41.2 ML/MIN/1.73 M^2
GLUCOSE SERPL-MCNC: 223 MG/DL (ref 70–110)
HCT VFR BLD AUTO: 41.2 % (ref 40–54)
HGB BLD-MCNC: 13.5 G/DL (ref 14–18)
IMM GRANULOCYTES # BLD AUTO: 0.05 K/UL (ref 0–0.04)
IMM GRANULOCYTES NFR BLD AUTO: 0.5 % (ref 0–0.5)
LYMPHOCYTES # BLD AUTO: 0.5 K/UL (ref 1–4.8)
LYMPHOCYTES NFR BLD: 5.3 % (ref 18–48)
MAGNESIUM SERPL-MCNC: 1.9 MG/DL (ref 1.6–2.6)
MCH RBC QN AUTO: 30.4 PG (ref 27–31)
MCHC RBC AUTO-ENTMCNC: 32.8 G/DL (ref 32–36)
MCV RBC AUTO: 93 FL (ref 82–98)
MONOCYTES # BLD AUTO: 0.4 K/UL (ref 0.3–1)
MONOCYTES NFR BLD: 4.5 % (ref 4–15)
NEUTROPHILS # BLD AUTO: 8.3 K/UL (ref 1.8–7.7)
NEUTROPHILS NFR BLD: 89.2 % (ref 38–73)
NRBC BLD-RTO: 0 /100 WBC
PLATELET # BLD AUTO: 79 K/UL (ref 150–450)
PMV BLD AUTO: 10.5 FL (ref 9.2–12.9)
POTASSIUM SERPL-SCNC: 4.6 MMOL/L (ref 3.5–5.1)
PROT SERPL-MCNC: 6.2 G/DL (ref 6–8.4)
RBC # BLD AUTO: 4.44 M/UL (ref 4.6–6.2)
SARS-COV-2 RDRP RESP QL NAA+PROBE: NEGATIVE
SODIUM SERPL-SCNC: 133 MMOL/L (ref 136–145)
WBC # BLD AUTO: 9.32 K/UL (ref 3.9–12.7)

## 2023-03-26 PROCEDURE — 25000242 PHARM REV CODE 250 ALT 637 W/ HCPCS: Performed by: INTERNAL MEDICINE

## 2023-03-26 PROCEDURE — 36415 COLL VENOUS BLD VENIPUNCTURE: CPT | Performed by: INTERNAL MEDICINE

## 2023-03-26 PROCEDURE — 99900035 HC TECH TIME PER 15 MIN (STAT)

## 2023-03-26 PROCEDURE — 83880 ASSAY OF NATRIURETIC PEPTIDE: CPT | Performed by: INTERNAL MEDICINE

## 2023-03-26 PROCEDURE — 85730 THROMBOPLASTIN TIME PARTIAL: CPT | Performed by: INTERNAL MEDICINE

## 2023-03-26 PROCEDURE — 27000221 HC OXYGEN, UP TO 24 HOURS

## 2023-03-26 PROCEDURE — 94799 UNLISTED PULMONARY SVC/PX: CPT

## 2023-03-26 PROCEDURE — 97161 PT EVAL LOW COMPLEX 20 MIN: CPT

## 2023-03-26 PROCEDURE — 94640 AIRWAY INHALATION TREATMENT: CPT

## 2023-03-26 PROCEDURE — 63600175 PHARM REV CODE 636 W HCPCS: Performed by: INTERNAL MEDICINE

## 2023-03-26 PROCEDURE — 94645 CONT INHLJ TX EACH ADDL HOUR: CPT

## 2023-03-26 PROCEDURE — 99233 PR SUBSEQUENT HOSPITAL CARE,LEVL III: ICD-10-PCS | Mod: ,,, | Performed by: INTERNAL MEDICINE

## 2023-03-26 PROCEDURE — 83735 ASSAY OF MAGNESIUM: CPT | Performed by: INTERNAL MEDICINE

## 2023-03-26 PROCEDURE — 85025 COMPLETE CBC W/AUTO DIFF WBC: CPT | Performed by: INTERNAL MEDICINE

## 2023-03-26 PROCEDURE — 85730 THROMBOPLASTIN TIME PARTIAL: CPT | Mod: 91 | Performed by: INTERNAL MEDICINE

## 2023-03-26 PROCEDURE — 99900031 HC PATIENT EDUCATION (STAT)

## 2023-03-26 PROCEDURE — 25000003 PHARM REV CODE 250: Performed by: INTERNAL MEDICINE

## 2023-03-26 PROCEDURE — 94761 N-INVAS EAR/PLS OXIMETRY MLT: CPT

## 2023-03-26 PROCEDURE — 21400001 HC TELEMETRY ROOM

## 2023-03-26 PROCEDURE — 80053 COMPREHEN METABOLIC PANEL: CPT | Performed by: HOSPITALIST

## 2023-03-26 PROCEDURE — 99233 SBSQ HOSP IP/OBS HIGH 50: CPT | Mod: ,,, | Performed by: INTERNAL MEDICINE

## 2023-03-26 RX ORDER — IPRATROPIUM BROMIDE 0.5 MG/2.5ML
0.5 SOLUTION RESPIRATORY (INHALATION)
Status: DISCONTINUED | OUTPATIENT
Start: 2023-03-27 | End: 2023-03-29 | Stop reason: HOSPADM

## 2023-03-26 RX ORDER — FUROSEMIDE 10 MG/ML
40 INJECTION INTRAMUSCULAR; INTRAVENOUS ONCE
Status: COMPLETED | OUTPATIENT
Start: 2023-03-26 | End: 2023-03-26

## 2023-03-26 RX ADMIN — IPRATROPIUM BROMIDE 0.5 MG: 0.5 SOLUTION RESPIRATORY (INHALATION) at 08:03

## 2023-03-26 RX ADMIN — IPRATROPIUM BROMIDE 0.5 MG: 0.5 SOLUTION RESPIRATORY (INHALATION) at 02:03

## 2023-03-26 RX ADMIN — ARFORMOTEROL TARTRATE 15 MCG: 15 SOLUTION RESPIRATORY (INHALATION) at 08:03

## 2023-03-26 RX ADMIN — DOXYCYCLINE 100 MG: 100 INJECTION, POWDER, LYOPHILIZED, FOR SOLUTION INTRAVENOUS at 04:03

## 2023-03-26 RX ADMIN — METOPROLOL TARTRATE 25 MG: 25 TABLET, FILM COATED ORAL at 09:03

## 2023-03-26 RX ADMIN — PANTOPRAZOLE SODIUM 40 MG: 40 TABLET, DELAYED RELEASE ORAL at 06:03

## 2023-03-26 RX ADMIN — LEVOTHYROXINE SODIUM 25 MCG: 0.03 TABLET ORAL at 05:03

## 2023-03-26 RX ADMIN — HEPARIN SODIUM AND DEXTROSE 13 UNITS/KG/HR: 10000; 5 INJECTION INTRAVENOUS at 11:03

## 2023-03-26 RX ADMIN — ATORVASTATIN CALCIUM 40 MG: 40 TABLET, FILM COATED ORAL at 09:03

## 2023-03-26 RX ADMIN — CARBOXYMETHYLCELLULOSE SODIUM 1 DROP: 5 SOLUTION/ DROPS OPHTHALMIC at 08:03

## 2023-03-26 RX ADMIN — CYANOCOBALAMIN 1000 MCG: 1000 INJECTION, SOLUTION INTRAMUSCULAR at 08:03

## 2023-03-26 RX ADMIN — BUDESONIDE INHALATION 0.5 MG: 0.5 SUSPENSION RESPIRATORY (INHALATION) at 08:03

## 2023-03-26 RX ADMIN — TAMSULOSIN HYDROCHLORIDE 0.4 MG: 0.4 CAPSULE ORAL at 09:03

## 2023-03-26 RX ADMIN — ISOSORBIDE MONONITRATE 30 MG: 30 TABLET, EXTENDED RELEASE ORAL at 08:03

## 2023-03-26 RX ADMIN — DOXYCYCLINE 100 MG: 100 INJECTION, POWDER, LYOPHILIZED, FOR SOLUTION INTRAVENOUS at 05:03

## 2023-03-26 RX ADMIN — METOPROLOL TARTRATE 25 MG: 25 TABLET, FILM COATED ORAL at 03:03

## 2023-03-26 RX ADMIN — FUROSEMIDE 40 MG: 10 INJECTION, SOLUTION INTRAMUSCULAR; INTRAVENOUS at 10:03

## 2023-03-26 RX ADMIN — ASPIRIN 81 MG CHEWABLE TABLET 81 MG: 81 TABLET CHEWABLE at 08:03

## 2023-03-26 RX ADMIN — METOPROLOL TARTRATE 25 MG: 25 TABLET, FILM COATED ORAL at 08:03

## 2023-03-26 RX ADMIN — IPRATROPIUM BROMIDE 0.5 MG: 0.5 SOLUTION RESPIRATORY (INHALATION) at 01:03

## 2023-03-26 NOTE — PROGRESS NOTES
Formerly [facility], [facility]  Department of Cardiology  Consult Note      PATIENT NAME: Frank Wyatt  MRN: [id number]  TODAY'S DATE: 03/26/2023  ADMIT DATE: 3/24/2023                          CONSULT REQUESTED BY: Maggie Singh MD    SUBJECTIVE     PRINCIPAL PROBLEM: Dyspnea      REASON FOR CONSULT:    NSTEMI    INTERVAL HISTORY:  Patient is resting comfortably in bed during examination.  Daughter at bedside.  Patient remains on heparin drip.  No acute distress.  Sinus rhythm with heart rate in the 70s on telemetry.  No events reported overnight.    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  Patient presented to the ED earlier yesterday with the same complaints and was found to have acute shortness of breath with hypoxia and was discharged on doxycycline and prednisone.  He received IV Lasix and breathing treatments in the ED.  His symptoms continue to worsen upon discharge and he was brought back to ED for further evaluation.  Patient was found to have elevated troponins and with no acute ST-T wave changes.    During exam, patient is in no acute distress, sinus rhythm on monitor with heart rate 80s to 100s.    IN ED: H&H 14.3/43, K 5.0, Cr 1.2, Mag 1.6. BNP 77    Serial Trop HS: 3/24/23 11 > 60.8 > 194 > 502 > 893.9 > 1212; 3/25/23- 1474 > 1233 >957     ECHO 3/24/23  The left ventricle is normal in size with moderate concentric hypertrophy and low normal systolic function.  Mild left atrial enlargement.  The estimated ejection fraction is 50%.  Indeterminate left ventricular diastolic function.  RV is not well visualized. Mildly to moderately reduced right ventricular systolic function.  The mean diastolic gradient across the mitral valve is 13 mmHg at a heart rate of 87 bpm.  There is moderate to severe mitral stenosis.  There is  moderate-to-severe aortic valve stenosis.  Aortic valve area is 1.06 cm2; peak velocity is 2.75 m/s; mean gradient is 17 mmHg. Indexed area is 0.45-0.5 cm/m2. Indexed stroke volume is 29 ml/m2.  Normal central venous pressure (3 mmHg).    FROM H&P:  HPI:  80-year-old male history of COPD, remote history of smoking, hypertension, hyperlipidemia, very hard of hearing, hypothyroidism, BPH.     Patient initially was evaluated the emergency department this morning after experiencing acute shortness breath sounding at 2:15 a.m. associated with leg swelling.  Patient was diagnosed with hypoxia, pneumonia and COPD exacerbation, discharged with doxycycline and prednisone.     However it was noted patient to have a troponin elevation, and so was called back to the emergency department..  Patient denies chest discomfort.  No ST or T-wave changes on EKG.     hsTNI: 11 >> 60.8 >> 194     Patient was treated with full-strength aspirin, patient to be admitted for evaluation management of NSTEMI.     Ten point review systems otherwise negative          Review of patient's allergies indicates:   Allergen Reactions    Bactrim [sulfamethoxazole-trimethoprim] Hives     itched    Keflex [cephalexin] Rash    Morphine Rash     Patient had morphine and keflex at the same time, developed a rash, not sure which one caused it, or if it was a combination of the two meds.       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     CORONARY ARTERY BYPASS GRAFT      5-bypass     CYSTOSCOPY N/A 8/15/2019    Procedure: CYSTOSCOPY;  Surgeon: Dipak Sanchez MD;  Location: [facility] OR;  Service: Urology;  Laterality: N/A;    CYSTOSCOPY N/A 10/31/2019    Procedure: CYSTOSCOPY;  Surgeon: Dipak Sanchez MD;  Location: [facility] OR;  Service: Urology;  Laterality: N/A;    CYSTOSCOPY W/ URETERAL STENT REMOVAL Left 10/31/2019    Procedure: CYSTOSCOPY, WITH URETERAL STENT REMOVAL  URETEROSCOPY;  Surgeon: Dipak Sanchez MD;  Location: [facility] OR;  Service: Urology;  Laterality: Left;    EXTRACORPOREAL SHOCK WAVE LITHOTRIPSY Left 8/15/2019    Procedure: LITHOTRIPSY, ESWL;  Surgeon: Dipak Sanchez MD;  Location: [facility] OR;  Service: Urology;  Laterality: Left;    EXTRACORPOREAL SHOCK WAVE LITHOTRIPSY Left 2019    Procedure: LITHOTRIPSY, ESWL;  Surgeon: Dipak Sanchez MD;  Location: [facility] OR;  Service: Urology;  Laterality: Left;    EYE SURGERY Right     Cataract    EYE SURGERY Left     Cataract    HERNIA REPAIR      Dr. Bailon - had to have mesh removed    INGUINAL HERNIA REPAIR Right 2019        LITHOTRIPSY      nephrostomy tube      PERCUTANEOUS NEPHROSTOMY      ROTATOR CUFF REPAIR  2005    right shoulder    URETEROSCOPY Left 10/31/2019    Procedure: URETEROSCOPY;  Surgeon: Dipak Sanchez MD;  Location: [facility] OR;  Service: Urology;  Laterality: Left;     Social History     Tobacco Use    Smoking status: Former     Packs/day: 1.00     Years: 25.00     Pack years: 25.00     Types: Cigarettes     Quit date: 1972     Years since quittin.2    Smokeless tobacco: Former     Quit date: 8/15/1972   Substance Use Topics    Alcohol use: No    Drug use: No        REVIEW OF SYSTEMS  CONSTITUTIONAL: Negative for chills, fatigue and fever.   EYES: No double vision, No blurred vision  NEURO: No headaches, No  dizziness  RESPIRATORY:  Positive shortness of breath    CARDIOVASCULAR: Negative for chest pain. Negative for palpitations and leg swelling.   GI: Negative for abdominal pain, No melena, diarrhea, nausea and vomiting.   : Negative for dysuria and frequency, Negative for hematuria  SKIN: Negative for bruising, Negative for edema or discoloration noted.   ENDOCRINE: Negative for polyphagia, Negative for heat intolerance, Negative for cold intolerance  PSYCHIATRIC: Negative for depression, Negative for anxiety, Negative for memory loss  MUSCULOSKELETAL: Negative for neck pain, Negative for muscle weakness, Negative for back pain     OBJECTIVE     VITAL SIGNS (Most Recent)  Temp: 98.2 °F (36.8 °C) (03/26/23 1213)  Pulse: 79 (03/26/23 1357)  Resp: 18 (03/26/23 1357)  BP: (!) 164/81 (03/26/23 1213)  SpO2: 97 % (03/26/23 1357)    VENTILATION STATUS  Resp: 18 (03/26/23 1357)  SpO2: 97 % (03/26/23 1357)       I & O (Last 24H):  Intake/Output Summary (Last 24 hours) at 3/26/2023 1449  Last data filed at 3/26/2023 1228  Gross per 24 hour   Intake 1502.21 ml   Output 1700 ml   Net -197.79 ml       WEIGHTS  Wt Readings from Last 3 Encounters:   03/24/23 2300 88.4 kg (194 lb 14.2 oz)   03/24/23 0953 88.6 kg (195 lb 5.2 oz)   03/24/23 1500 88.5 kg (195 lb)   03/24/23 0416 91.6 kg (202 lb)       PHYSICAL EXAM  GENERAL: well built, well nourished, well-developed in no apparent distress alert and oriented.   HEENT: Normocephalic. Pupils normal and conjunctivae normal.  NECK: No JVD. No bruit..   THYROID: Thyroid not examined  CARDIAC: Regular rate and rhythm. S1 is normal.S2 is normal.  2/6 systolic murmur CHEST ANATOMY: normal.   LUNGS: Diminished in lower lobes  ABDOMEN:  Obese, soft, nontender, normal bowel sounds  URINARY: No merino catheter   EXTREMITIES: No cyanosis, clubbing or edema noted at this time  PERIPHERAL VASCULAR SYSTEM: Good palpable distal pulses.   CENTRAL NERVOUS SYSTEM: No focal motor or sensory deficits  noted.   SKIN: Skin without lesions, moist, well perfused.   MUSCLE STRENGTH & TONE: No noteable weakness, atrophy or abnormal movement.     HOME MEDICATIONS:  No current facility-administered medications on file prior to encounter.     Current Outpatient Medications on File Prior to Encounter   Medication Sig Dispense Refill    amlodipine-benazepril 5-20 mg (LOTREL) 5-20 mg per capsule Take 1 capsule by mouth nightly.      atorvastatin (LIPITOR) 40 MG tablet Take 1 tablet (40 mg total) by mouth every evening. 90 tablet 0    dextran 70-hypromellose 0.1-0.3 % Drop Apply 1 drop to eye 2 (two) times daily.      furosemide (LASIX) 20 MG tablet Take 10 mg by mouth once daily. Based on weight gain and edema  1    glipiZIDE (GLUCOTROL) 5 MG tablet TAKE 1 TABLET BY MOUTH ONCE DAILY 30  MINUTES  BEFORE  BREAKFAST      hydrALAZINE (APRESOLINE) 50 MG tablet Take 50 mg by mouth 3 (three) times daily.      hydrocortisone 1 % cream Apply topically 2 (two) times daily.      levothyroxine (SYNTHROID) 25 MCG tablet Take 25 mcg by mouth once daily.   1    metoprolol succinate (TOPROL-XL) 25 MG 24 hr tablet Take 1 tablet by mouth 2 (two) times daily.      nitrofurantoin (MACRODANTIN) 100 MG capsule Take 100 mg by mouth 2 (two) times daily.      SYMBICORT 160-4.5 mcg/actuation HFAA Inhale 2 puffs into the lungs every 12 (twelve) hours.       tamsulosin (FLOMAX) 0.4 mg Cap Take 0.4 mg by mouth once daily.       albuterol (PROVENTIL/VENTOLIN HFA) 90 mcg/actuation inhaler Inhale 2 puffs into the lungs every 4 (four) hours as needed for Wheezing. Rescue (Patient not taking: Reported on 3/24/2023) 1 Inhaler 0    cefUROXime (CEFTIN) 500 MG tablet Take 500 mg by mouth 2 (two) times daily.      ciprofloxacin HCl (CIPRO) 250 MG tablet Take 1 tablet (250 mg total) by mouth 2 (two) times daily. 12 tablet 0    doxycycline (VIBRAMYCIN) 100 MG Cap Take 1 capsule (100 mg total) by mouth every 12 (twelve) hours. for 10 days 20 capsule 0    hydrALAZINE  (APRESOLINE) 25 MG tablet Take 25 mg by mouth 3 (three) times daily.      metoprolol tartrate (LOPRESSOR) 25 MG tablet Take 25 mg by mouth 2 (two) times daily.       predniSONE (DELTASONE) 20 MG tablet Take 2 tablets (40 mg total) by mouth once daily. for 5 days 10 tablet 0    traMADol (ULTRAM) 50 mg tablet Take 1 tablet (50 mg total) by mouth every 8 (eight) hours as needed for Pain. 12 tablet 0       SCHEDULED MEDS:   arformoteroL  15 mcg Nebulization BID    aspirin  81 mg Oral Daily    atorvastatin  40 mg Oral QHS    budesonide  0.5 mg Nebulization Q12H    carboxymethylcellulose  1 drop Ophthalmic BID    cyanocobalamin  1,000 mcg Intramuscular Daily    doxycycline (VIBRAMYCIN) IVPB  100 mg Intravenous Q12H    ipratropium  0.5 mg Nebulization Q6H    isosorbide mononitrate  30 mg Oral Daily    levoFLOXacin  750 mg Intravenous Q48H    levothyroxine  25 mcg Oral Before breakfast    metoprolol tartrate  25 mg Oral TID    pantoprazole  40 mg Oral Daily    tamsulosin  0.4 mg Oral QHS       CONTINUOUS INFUSIONS:        PRN MEDS:acetaminophen, magnesium oxide, magnesium oxide, nitroGLYCERIN, ondansetron, potassium bicarbonate, potassium bicarbonate, potassium bicarbonate, potassium, sodium phosphates, potassium, sodium phosphates, potassium, sodium phosphates, senna-docusate 8.6-50 mg, sodium chloride 0.9%    LABS AND DIAGNOSTICS     CBC LAST 3 DAYS  Recent Labs   Lab 03/24/23  1356 03/25/23  0342 03/26/23  0317   WBC 11.55 15.72* 9.32   RBC 4.55* 4.66 4.44*   HGB 13.9* 14.5 13.5*   HCT 41.9 43.4 41.2   MCV 92 93 93   MCH 30.5 31.1* 30.4   MCHC 33.2 33.4 32.8   RDW 13.8 13.9 13.8   PLT 84* 92* 79*   MPV 10.2 10.4 10.5   GRAN 95.1*  11.0* 88.5*  13.9* 89.2*  8.3*   LYMPH 3.4*  0.4* 4.1*  0.7* 5.3*  0.5*   MONO 1.0*  0.1* 4.5  0.7 4.5  0.4   BASO 0.01 0.05 0.01   NRBC 0 0 0       COAGULATION LAST 3 DAYS  Recent Labs   Lab 03/24/23  1356 03/24/23  2115 03/26/23  0103 03/26/23  0430 03/26/23  1104   INR 1.1  --   --    --   --    APTT  --    < > 146.2* 78.6* 60.6*    < > = values in this interval not displayed.       CHEMISTRY LAST 3 DAYS  Recent Labs   Lab 03/24/23  0442 03/24/23  1356 03/25/23  0342 03/26/23  0317   * 133* 138 133*   K 5.0 4.2 4.1 4.6    98 99 96   CO2 26 24 28 26   ANIONGAP 8 11 11 11   BUN 30* 35* 41* 46*   CREATININE 1.2 1.5* 1.4 1.6*   * 273* 137* 223*   CALCIUM 9.2 9.1 9.3 8.3*   MG 1.6  --  2.1 1.9   ALBUMIN 3.7  --  3.6 3.1*   PROT 7.0  --  7.1 6.2   ALKPHOS 66  --  63 69   ALT 26  --  27 42   AST 35  --  34 44*   BILITOT 1.7*  --  0.5 1.3*       CARDIAC PROFILE LAST 3 DAYS  Recent Labs   Lab 03/24/23  0442 03/24/23  0720 03/25/23  0841 03/25/23  1456 03/25/23  1755 03/26/23  0317   BNP 77  --   --   --   --  122*   TROPONINIHS 11.0   < > 957.2* 869.8* 750.5*  --     < > = values in this interval not displayed.       ENDOCRINE LAST 3 DAYS  Recent Labs   Lab 03/24/23  1352 03/25/23  1616   TSH 0.870  --    PROCAL  --  0.85*       LAST ARTERIAL BLOOD GAS  ABG  No results for input(s): PH, PO2, PCO2, HCO3, BE in the last 168 hours.    LAST 7 DAYS MICROBIOLOGY   Microbiology Results (last 7 days)       Procedure Component Value Units Date/Time    Urine culture [[id number]] Collected: 03/24/23 1400    Order Status: Completed Specimen: Urine Updated: 03/26/23 0814     Urine Culture, Routine No growth    Narrative:      Specimen Source->Urine    Blood culture [[id number]] Collected: 03/25/23 1616    Order Status: Completed Specimen: Blood Updated: 03/25/23 2317     Blood Culture, Routine No Growth to date    Narrative:      Collection has been rescheduled by ATN at 03/25/2023 16:17 Reason:   Done blood cultures and gold top  Collection has been rescheduled by ATN at 03/25/2023 16:17 Reason:   Done blood cultures and gold top            MOST RECENT IMAGING  X-Ray Chest AP Portable  HISTORY: shotness of breath    COMPARISON:3/24/2023    FINDINGS: Postoperative changes of previous coronary artery  bypass surgery with sternotomy wires in place. Extensive atheromatous calcifications about the thoracic aorta.    Lungs are underexpanded with prominent perihilar interstitial opacities, however no evidence of pulmonary edema. No evidence of colonic infiltrate or pneumothorax. Regional skeleton appears normal. Arthritic changes about the bilateral AC joints.    IMPRESSION:Stable appearance the chest when compared to previous. Postoperative changes of previous coronary artery bypass surgery.    Electronically signed by:  Keanu Richey MD  3/26/2023 6:06 AM CDT Workstation: [id number]      ECHOCARDIOGRAM RESULTS (last 5)  Results for orders placed during the hospital encounter of 03/24/23    Echo    Interpretation Summary  · The left ventricle is normal in size with moderate concentric hypertrophy and low normal systolic function.  · Mild left atrial enlargement.  · The estimated ejection fraction is 50%.  · Indeterminate left ventricular diastolic function.  · RV is not well visualized. Mildly to moderately reduced right ventricular systolic function.  · The mean diastolic gradient across the mitral valve is 13 mmHg at a heart rate of 87 bpm.  · There is moderate to severe mitral stenosis.  · There is moderate-to-severe aortic valve stenosis.  · Aortic valve area is 1.06 cm2; peak velocity is 2.75 m/s; mean gradient is 17 mmHg. Indexed area is 0.45-0.5 cm/m2. Indexed stroke volume is 29 ml/m2.  · Normal central venous pressure (3 mmHg).      Results for orders placed during the hospital encounter of 08/11/20    Echo Color Flow Doppler? Yes; Bubble Contrast? No    Interpretation Summary  · The estimated PA systolic pressure is 52 mmHg.  · Concentric left ventricular remodeling.  · Normal left ventricular systolic function. The estimated ejection fraction is 64%.  · Grade II (moderate) left ventricular diastolic dysfunction consistent with pseudonormalization.  · Mild left atrial enlargement.  · Mild-to-moderate  aortic valve stenosis.  · Aortic valve area is 1.46 cm2; peak velocity is 2.54 m/s; mean gradient is 15 mmHg.  · Mild-to-moderate mitral regurgitation.  · Mild tricuspid regurgitation.  · Elevated central venous pressure (15 mmHg).  · Pulmonary hypertension present.      CURRENT/PREVIOUS VISIT EKG  Results for orders placed or performed during the hospital encounter of 03/24/23   EKG 12-lead    Collection Time: 03/25/23  3:19 PM    Narrative    Test Reason : R06.00,    Vent. Rate : 113 BPM     Atrial Rate : 113 BPM     P-R Int : 208 ms          QRS Dur : 122 ms      QT Int : 326 ms       P-R-T Axes : 000 117 164 degrees     QTc Int : 447 ms    Sinus tachycardia Intraventricular conduction delay    Left posterior fascicular block  Anteroseptal infarct ,age undetermined  Abnormal ECG  When compared with ECG of 25-MAR-2023 06:48,  Left posterior fascicular block is now Present  Anteroseptal infarct is now Present  T wave inversion less evident in Lateral leads  Confirmed by Osvaldo CLINTON, Bladimir LANDAVERDE ([id number]) on 3/25/2023 6:35:13 PM    Referred By: MARY   SELF           Confirmed By:Bladimir Riley MD           ASSESSMENT/PLAN:     Active Hospital Problems    Diagnosis    *Dyspnea    Fever    Valvular heart disease, moderate to severe MS/AS    [place] (hard of hearing)    Meniere disease    CKD (chronic kidney disease), stage III    S/P CABG x 5    B12 deficiency    Thrombocytopenia    COPD (chronic obstructive pulmonary disease)    Hypothyroidism    BPH (benign prostatic hyperplasia)    NSTEMI (non-ST elevated myocardial infarction)    Troponin level elevated       ASSESSMENT & PLAN:     NSTEMI  dyspnea  Moderate to severe mitral stenosis  Moderate to severe aortic stenosis  CKD  S/p CABG x5  Thrombocytopenia  Left bundle-branch block        RECOMMENDATIONS:    Patient continues to complain of shortness of breath.  Patient denies chest pain and troponins have trending downward.  this am.  We will give patient Lasix 40  mg IV 1 time.  Strict I&Os.  1.5 L fluid restriction.  2 g sodium diet.  Patient has moderate to severe MS and AS.  Recommend urgent follow up at [facility] for further evaluation upon discharge.  Recommend continuing heparin drip for a total 48 hours and starting Plavix 75 mg daily next a.m..  Patient history of CAD and CABG x5.  Continue aspirin 81 mg daily, atorvastatin 40 mg daily.   Continue metoprolol.  Hold for systolic BP less than 100 and heart rate less than 60.  Mild increase in creatinine overnight.  Creatinine 1.6 this a.m..  Continue to monitor renal function daily.  Recommend ambulation when euvolemic and evaluate for acute symptoms of shortness of breath or chest pain.  We will continue to follow.    Sarah Conti NP  Department of Cardiology  Date of Service: 03/26/2023

## 2023-03-26 NOTE — SUBJECTIVE & OBJECTIVE
Interval History:  Yesterday patient had acute onset shortness of breath associated with shaking, wheezing, denied chest pain, subsequently had fever of 101.2.  No further episodes since then, denies any shortness of breath.  Tolerated breakfast well.  States he has been urinating more.  Telemetry with sinus rhythm.  Labs with WBC 9, hemoglobin 13.5, platelets 79, BUN/creatinine 46/1.6, , blood culture no growth to date.  Discussed with patient and visitors at bedside.  Code status confirmed DNR.    Review of Systems   Constitutional:  Negative for fever.   HENT:          Hard of hearing   Respiratory:  Negative for shortness of breath.    Cardiovascular:  Negative for chest pain.   Gastrointestinal:  Negative for nausea and vomiting.   Objective:     Vital Signs (Most Recent):  Temp: 98.2 °F (36.8 °C) (03/26/23 1213)  Pulse: 71 (03/26/23 1213)  Resp: 20 (03/26/23 1213)  BP: (!) 164/81 (03/26/23 1213)  SpO2: 97 % (03/26/23 1213)   Vital Signs (24h Range):  Temp:  [97.3 °F (36.3 °C)-101.2 °F (38.4 °C)] 98.2 °F (36.8 °C)  Pulse:  [] 71  Resp:  [15-24] 20  SpO2:  [7 %-97 %] 97 %  BP: (117-174)/(58-82) 164/81     Weight: 88.4 kg (194 lb 14.2 oz)  Body mass index is 30.52 kg/m².    Intake/Output Summary (Last 24 hours) at 3/26/2023 1219  Last data filed at 3/26/2023 1147  Gross per 24 hour   Intake 1120 ml   Output 1700 ml   Net -580 ml      Physical Exam  Vitals and nursing note reviewed.   Constitutional:       General: He is not in acute distress.     Appearance: He is not ill-appearing.      Comments: Elderly male lying in bed, cooperative, NAD   HENT:      Head: Normocephalic and atraumatic.      Mouth/Throat:      Mouth: Mucous membranes are moist.   Cardiovascular:      Rate and Rhythm: Normal rate and regular rhythm.      Heart sounds: Murmur heard.   Pulmonary:      Comments: On nasal cannula, no wheeze or accessory muscle use  Abdominal:      General: There is no distension.      Palpations:  Abdomen is soft.      Tenderness: There is no abdominal tenderness. There is no guarding.   Genitourinary:     Comments: Wearing depends  Skin:     General: Skin is warm.   Neurological:      Mental Status: He is alert and oriented to person, place, and time.   Psychiatric:         Mood and Affect: Mood normal.       Significant Labs: BMP:   Recent Labs   Lab 03/26/23 0317   *   *   K 4.6   CL 96   CO2 26   BUN 46*   CREATININE 1.6*   CALCIUM 8.3*   MG 1.9     CBC:   Recent Labs   Lab 03/24/23  1356 03/25/23  0342 03/26/23 0317   WBC 11.55 15.72* 9.32   HGB 13.9* 14.5 13.5*   HCT 41.9 43.4 41.2   PLT 84* 92* 79*     CMP:   Recent Labs   Lab 03/24/23  1356 03/25/23  0342 03/26/23 0317   * 138 133*   K 4.2 4.1 4.6   CL 98 99 96   CO2 24 28 26   * 137* 223*   BUN 35* 41* 46*   CREATININE 1.5* 1.4 1.6*   CALCIUM 9.1 9.3 8.3*   PROT  --  7.1 6.2   ALBUMIN  --  3.6 3.1*   BILITOT  --  0.5 1.3*   ALKPHOS  --  63 69   AST  --  34 44*   ALT  --  27 42   ANIONGAP 11 11 11     Lactic Acid: No results for input(s): LACTATE in the last 48 hours.  Magnesium:   Recent Labs   Lab 03/25/23 0342 03/26/23 0317   MG 2.1 1.9     POCT Glucose: No results for input(s): POCTGLUCOSE in the last 48 hours.  Urine Culture:   Recent Labs   Lab 03/24/23  1400   LABURIN No growth     Urine Studies:   Recent Labs   Lab 03/24/23  1400   COLORU Yellow   APPEARANCEUA Hazy*   PHUR 5.0   SPECGRAV 1.015   PROTEINUA Negative   GLUCUA 1+*   KETONESU Negative   BILIRUBINUA Negative   OCCULTUA Trace*   NITRITE Negative   UROBILINOGEN Negative   LEUKOCYTESUR 3+*   RBCUA 10*   WBCUA 84*   BACTERIA Negative   SQUAMEPITHEL 3   HYALINECASTS 7*       Significant Imaging: I have reviewed all pertinent imaging results/findings within the past 24 hours.

## 2023-03-26 NOTE — PT/OT/SLP EVAL
Physical Therapy Evaluation    Patient Name:  Frank Wyatt   MRN:  [id number]    Recommendations:     Discharge Recommendations: home health PT   Discharge Equipment Recommendations: none (has canes at home)   Barriers to discharge: None    Assessment:     Frank Wyatt is a 88 y.o. male admitted with a medical diagnosis of Dyspnea.  He presents with the following impairments/functional limitations: weakness, impaired endurance, impaired functional mobility, gait instability, impaired balance, decreased lower extremity function, decreased safety awareness, impaired cardiopulmonary response to activity. Patient is agreeable to participation with PT evaluation. He is very [place]. He lives at an independent living facility and does not use an AD for ambulation at baseline. He requires SBA for supine to sit and CGA for sit to stand with no AD. He ambulated 50' holding rail in hallway with CGA and 2L. He is agreeable to sit up in chair with family and RN present. Discussed using one of his canes for safe ambulation upon D/C and to avoid furniture surfing.     Rehab Prognosis: Good; patient would benefit from acute skilled PT services to address these deficits and reach maximum level of function.    Recent Surgery: * No surgery found *      Plan:     During this hospitalization, patient to be seen 6 x/week to address the identified rehab impairments via gait training, therapeutic activities, therapeutic exercises and progress toward the following goals:    Plan of Care Expires:  04/26/23    Subjective     Chief Complaint: heard to hear therapist with mask on   Patient/Family Comments/goals: agrees to sit up in chair   Pain/Comfort:  Pain Rating 1: 0/10    Patients cultural, spiritual, Syn[employer] conflicts given the current situation:      Living Environment:  Patient resides at an independent living facility   Prior to admission, patients level of function was ambulatory with no AD. He denies any recent falls or  PT.  Equipment used at home: none.  DME owned (not currently used): single point cane and QC .  Upon discharge, patient will have assistance from facility and family.    Objective:     Communicated with THI Fletcher prior to session.  Patient found HOB elevated with bed alarm, oxygen, peripheral IV  upon PT entry to room.    General Precautions: Standard, fall, respiratory  Orthopedic Precautions:N/A   Braces: N/A  Respiratory Status: Nasal cannula, flow 2 L/min    Exams:  RLE Strength: WFL  LLE Strength: WFL    Functional Mobility:  Bed Mobility:     Supine to Sit: stand by assistance  Transfers:     Sit to Stand:  contact guard assistance with no AD  Gait: 50' holding rail in hallway with CGA and 2L      AM-PAC 6 CLICK MOBILITY  Total Score:20       Treatment & Education:  Patient was educated on the importance of OOB activity and functional mobility to negate negative effects of prolonged bed rest during hospitalization, safe transfers and ambulation, HHPT, cane use, and D/C planning     Patient left up in chair with all lines intact, call button in reach, and RN and family present.    GOALS:   Multidisciplinary Problems       Physical Therapy Goals          Problem: Physical Therapy    Goal Priority Disciplines Outcome Goal Variances Interventions   Physical Therapy Goal     PT, PT/OT      Description: Goals to be met by: 23    Patient will increase functional independence with mobility by performin. Supine to sit with Supervision  2. Sit to stand transfer with Supervision  3. Bed to chair transfer with Supervision using Rolling Walker or Cane  4. Gait  x 250 feet with Supervision using Rolling Walker or Cane.   5. Lower extremity exercise program x20 reps per handout, with supervision                       History:     Past Medical History:   Diagnosis Date    Arthritis     COPD (chronic obstructive pulmonary disease)     WHEEZES    Coronary artery disease     Dermatographism 2019    Diverticulosis  2004    Full dentures     [place] (hard of hearing)     left ear    Hypertension     Kidney stones     Meniere's disease     MRSA infection 03/25/2019    Skin writing     dermatiographism    Small bowel obstruction due to adhesions 10/2019    Thyroid disease     Wears glasses        Past Surgical History:   Procedure Laterality Date    APPENDECTOMY      CARDIAC SURGERY  1997    CABG 5 vessel    CHOLECYSTECTOMY  2013    COLON SURGERY      Colon resection with colostomy; colostomy reversal. 2004    CORONARY ARTERY BYPASS GRAFT  1996    5-bypass     CYSTOSCOPY N/A 8/15/2019    Procedure: CYSTOSCOPY;  Surgeon: Dipak Sanchez MD;  Location: [facility] OR;  Service: Urology;  Laterality: N/A;    CYSTOSCOPY N/A 10/31/2019    Procedure: CYSTOSCOPY;  Surgeon: Dipak Sanchez MD;  Location: [facility] OR;  Service: Urology;  Laterality: N/A;    CYSTOSCOPY W/ URETERAL STENT REMOVAL Left 10/31/2019    Procedure: CYSTOSCOPY, WITH URETERAL STENT REMOVAL  URETEROSCOPY;  Surgeon: Dipak Sanchez MD;  Location: [facility] OR;  Service: Urology;  Laterality: Left;    EXTRACORPOREAL SHOCK WAVE LITHOTRIPSY Left 8/15/2019    Procedure: LITHOTRIPSY, ESWL;  Surgeon: Dipak Sanchez MD;  Location: [facility] OR;  Service: Urology;  Laterality: Left;    EXTRACORPOREAL SHOCK WAVE LITHOTRIPSY Left 9/19/2019    Procedure: LITHOTRIPSY, ESWL;  Surgeon: Dipak Sanchez MD;  Location: [facility] OR;  Service: Urology;  Laterality: Left;    EYE SURGERY Right 2014    Cataract    EYE SURGERY Left 2017    Cataract    HERNIA REPAIR  2014    Dr. Bailon - had to have mesh removed    INGUINAL HERNIA REPAIR Right 03/25/2019        LITHOTRIPSY      nephrostomy tube      PERCUTANEOUS NEPHROSTOMY      ROTATOR CUFF REPAIR  2005    right shoulder    URETEROSCOPY Left 10/31/2019    Procedure: URETEROSCOPY;  Surgeon: Dipak Sanchez MD;  Location: [facility];  Service: Urology;  Laterality: Left;       Time Tracking:     PT Received On: 03/26/23  PT Start Time: 1228      PT Stop Time: 1244  PT Total Time (min): 18 min     Billable Minutes: Evaluation 18 03/26/2023

## 2023-03-26 NOTE — PROGRESS NOTES
[facility] Medicine  Progress Note    Patient Name: Frank Wyatt  MRN: [id number]  Patient Class: IP- Inpatient   Admission Date: 3/24/2023  Length of Stay: 1 days  Attending Physician: Maggie Singh MD  Primary Care Provider: Darci German MD        Subjective:     Principal Problem:Dyspnea        HPI:  80-year-old male history of COPD, remote history of smoking, hypertension, hyperlipidemia, very hard of hearing, hypothyroidism, BPH.     Patient initially was evaluated the emergency department this morning after experiencing acute shortness breath sounding at 2:15 a.m. associated with leg swelling.  Patient was diagnosed with hypoxia, pneumonia and COPD exacerbation, discharged with doxycycline and prednisone.     However it was noted patient to have a troponin elevation, and so was called back to the emergency department..  Patient denies chest discomfort.  No ST or T-wave changes on EKG.     hsTNI: 11 >> 60.8 >> 194     Patient was treated with full-strength aspirin, patient to be admitted for evaluation management of NSTEMI.     Ten point review systems otherwise negative      Overview/Hospital Course:  Assumed care of this patient on 3/25/23. Patient is hard of hearing and collateral from daughter at bedside.  Patient with known history of CAD status post remote CABG X 5 1996 followed by cardiologist Dr. Kim and reported negative nuclear stress test December 2022, COPD, not on home oxygen, BPH, hypothyroidism, CKD stage 3 who initially presented to the ED with acute onset of shortness of breath.  Patient was awoken around 2:30 a.m. with shortness of breath, daughter states he was experiencing headache prior, new onset.  He was initially evaluated in the ED with workup done and diagnosed with COPD exacerbation, discharge to complete course of doxycycline and prednisone however recall to the ED when 2nd high sensitivity troponin reported elevated at 60.  In the ED EKG  with sinus rhythm with left bundle-branch block, denies any chest pain, D-dimer 0.94, V/Q scan low probability PE, ultrasound leg no DVT, echocardiogram EF of 50% with moderate to severe MS/AS, he was placed on heparin drip and admitted to telemetry floor with cardiology consult.  On 03/25 troponin downtrending, he feels back to his baseline, awaiting Cardiology recommendations.  Subsequently in afternoon patient had episode of acute shortness of breath, tachycardic, wheezing noted, later febrile to 101.2, blood cultures sent, initiated on antibiotics, cardiology started on iv lasix.       Interval History:  Yesterday patient had acute onset shortness of breath associated with shaking, wheezing, denied chest pain, subsequently had fever of 101.2.  No further episodes since then, denies any shortness of breath.  Tolerated breakfast well.  States he has been urinating more.  Telemetry with sinus rhythm.  Labs with WBC 9, hemoglobin 13.5, platelets 79, BUN/creatinine 46/1.6, , blood culture no growth to date.  Discussed with patient and visitors at bedside.  Code status confirmed DNR.    Review of Systems   Constitutional:  Negative for fever.   HENT:          Hard of hearing   Respiratory:  Negative for shortness of breath.    Cardiovascular:  Negative for chest pain.   Gastrointestinal:  Negative for nausea and vomiting.   Objective:     Vital Signs (Most Recent):  Temp: 98.2 °F (36.8 °C) (03/26/23 1213)  Pulse: 71 (03/26/23 1213)  Resp: 20 (03/26/23 1213)  BP: (!) 164/81 (03/26/23 1213)  SpO2: 97 % (03/26/23 1213)   Vital Signs (24h Range):  Temp:  [97.3 °F (36.3 °C)-101.2 °F (38.4 °C)] 98.2 °F (36.8 °C)  Pulse:  [] 71  Resp:  [15-24] 20  SpO2:  [7 %-97 %] 97 %  BP: (117-174)/(58-82) 164/81     Weight: 88.4 kg (194 lb 14.2 oz)  Body mass index is 30.52 kg/m².    Intake/Output Summary (Last 24 hours) at 3/26/2023 1219  Last data filed at 3/26/2023 1147  Gross per 24 hour   Intake 1120 ml   Output 1700 ml    Net -580 ml      Physical Exam  Vitals and nursing note reviewed.   Constitutional:       General: He is not in acute distress.     Appearance: He is not ill-appearing.      Comments: Elderly male lying in bed, cooperative, NAD   HENT:      Head: Normocephalic and atraumatic.      Mouth/Throat:      Mouth: Mucous membranes are moist.   Cardiovascular:      Rate and Rhythm: Normal rate and regular rhythm.      Heart sounds: Murmur heard.   Pulmonary:      Comments: On nasal cannula, no wheeze or accessory muscle use  Abdominal:      General: There is no distension.      Palpations: Abdomen is soft.      Tenderness: There is no abdominal tenderness. There is no guarding.   Genitourinary:     Comments: Wearing depends  Skin:     General: Skin is warm.   Neurological:      Mental Status: He is alert and oriented to person, place, and time.   Psychiatric:         Mood and Affect: Mood normal.       Significant Labs: BMP:   Recent Labs   Lab 03/26/23 0317   *   *   K 4.6   CL 96   CO2 26   BUN 46*   CREATININE 1.6*   CALCIUM 8.3*   MG 1.9     CBC:   Recent Labs   Lab 03/24/23  1356 03/25/23  0342 03/26/23 0317   WBC 11.55 15.72* 9.32   HGB 13.9* 14.5 13.5*   HCT 41.9 43.4 41.2   PLT 84* 92* 79*     CMP:   Recent Labs   Lab 03/24/23  1356 03/25/23  0342 03/26/23 0317   * 138 133*   K 4.2 4.1 4.6   CL 98 99 96   CO2 24 28 26   * 137* 223*   BUN 35* 41* 46*   CREATININE 1.5* 1.4 1.6*   CALCIUM 9.1 9.3 8.3*   PROT  --  7.1 6.2   ALBUMIN  --  3.6 3.1*   BILITOT  --  0.5 1.3*   ALKPHOS  --  63 69   AST  --  34 44*   ALT  --  27 42   ANIONGAP 11 11 11     Lactic Acid: No results for input(s): LACTATE in the last 48 hours.  Magnesium:   Recent Labs   Lab 03/25/23  0342 03/26/23 0317   MG 2.1 1.9     POCT Glucose: No results for input(s): POCTGLUCOSE in the last 48 hours.  Urine Culture:   Recent Labs   Lab 03/24/23  1400   LABURIN No growth     Urine Studies:   Recent Labs   Lab 03/24/23  1400    COLORU Yellow   APPEARANCEUA Hazy*   PHUR 5.0   SPECGRAV 1.015   PROTEINUA Negative   GLUCUA 1+*   KETONESU Negative   BILIRUBINUA Negative   OCCULTUA Trace*   NITRITE Negative   UROBILINOGEN Negative   LEUKOCYTESUR 3+*   RBCUA 10*   WBCUA 84*   BACTERIA Negative   SQUAMEPITHEL 3   HYALINECASTS 7*       Significant Imaging: I have reviewed all pertinent imaging results/findings within the past 24 hours.      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    [place] (hard of hearing)    Meniere disease    CKD (chronic kidney disease), stage III    S/P CABG x 5    B12 deficiency    Thrombocytopenia    COPD (chronic obstructive pulmonary disease)    Hypothyroidism    BPH (benign prostatic hyperplasia)    NSTEMI (non-ST elevated myocardial infarction)     Plan:  Continue care on telemetry floor with continuous cardiac monitoring  Continue heparin drip to complete 48 hours as per Cardiology  Started on IV Lasix 40 mg daily by Cardiology on 03/25, monitoring volume status  Febrile to 101.2, blood culture no growth to date, continue empiric Levaquin and doxycycline, deescalate as able   Continue B12 supplementation with deficiency   On aspirin 81 mg, Lipitor 40 mg, Imdur 30 mg daily, Lopressor increased to 25 mg t.i.d. 3/2 5   Echocardiogram with EF of 50% with moderate to severe MS/AS   Continue scheduled breathing treatments   Fall and delirium precautions  Renally dose all medications and avoid nephrotoxin drugs  A.m. labs ordered  Increase activity, out of bed to chair, ambulation, PT consult  Appreciate cardiology recommendations  Further plan as per hospital course  Confirmed code status, DNR    VTE Risk Mitigation (From admission, onward)         Ordered     heparin 25,000 units in dextrose 5% (100 units/ml) infusion MINIMAL INTENSITY nomogram - [facility]  Continuous        Question Answer Comment    Heparin Infusion Adjustment (DO NOT MODIFY ANSWER) [email or web address]  INTENSITY nomogram for [facility] [email or web address]    Begin at (in units/kg/hr) 12 03/24/23 2055                Discharge Planning   ALESSANDRA:      Code Status: DNR   Is the patient medically ready for discharge?:     Reason for patient still in hospital (select all that apply): Patient trending condition  Discharge Plan A: Assisted Living                  Maggie Singh MD  Department of Hospital Medicine   [facility]

## 2023-03-26 NOTE — PLAN OF CARE
Problem: Adult Inpatient Plan of Care  Goal: Plan of Care Review  Outcome: Ongoing, Progressing  Goal: Patient-Specific Goal (Individualized)  Outcome: Ongoing, Progressing  Goal: Absence of Hospital-Acquired Illness or Injury  Outcome: Ongoing, Progressing  Goal: Optimal Comfort and Wellbeing  Outcome: Ongoing, Progressing     Problem: Fluid Imbalance (Pneumonia)  Goal: Fluid Balance  Outcome: Ongoing, Progressing     Problem: Respiratory Compromise (Pneumonia)  Goal: Effective Oxygenation and Ventilation  Outcome: Ongoing, Progressing     Problem: Fall Injury Risk  Goal: Absence of Fall and Fall-Related Injury  Outcome: Ongoing, Progressing

## 2023-03-26 NOTE — CARE UPDATE
03/26/23 0803   Patient Assessment/Suction   Level of Consciousness (AVPU) alert   Respiratory Effort Unlabored   Expansion/Accessory Muscles/Retractions no use of accessory muscles   All Lung Fields Breath Sounds diminished;clear   Rhythm/Pattern, Respiratory unlabored   Cough Frequency infrequent   Cough Type congested   PRE-TX-O2   Device (Oxygen Therapy) nasal cannula   $ Is the patient on Low Flow Oxygen? Yes   Flow (L/min) 2   SpO2 97 %   Pulse Oximetry Type Intermittent   $ Pulse Oximetry - Multiple Charge Pulse Oximetry - Multiple   Pulse 81   Resp 17   Aerosol Therapy   $ Aerosol Therapy Charges Aerosol Treatment   Daily Review of Necessity (SVN) completed   Respiratory Treatment Status (SVN) given   Treatment Route (SVN) mask;oxygen   Patient Position (SVN) HOB elevated   Post Treatment Assessment (SVN) breath sounds improved   Signs of Intolerance (SVN) none   Education   $ Education Bronchodilator;30 min   Respiratory Evaluation   $ Care Plan Tech Time 30 min   $ Eval/Re-eval Charges Re-evaluation

## 2023-03-27 PROBLEM — R50.9 FEVER: Status: RESOLVED | Noted: 2023-03-26 | Resolved: 2023-03-27

## 2023-03-27 PROBLEM — I50.9 CHF (CONGESTIVE HEART FAILURE): Status: ACTIVE | Noted: 2023-03-27

## 2023-03-27 LAB
ALBUMIN SERPL BCP-MCNC: 3.1 G/DL (ref 3.5–5.2)
ALP SERPL-CCNC: 76 U/L (ref 55–135)
ALT SERPL W/O P-5'-P-CCNC: 86 U/L (ref 10–44)
ANION GAP SERPL CALC-SCNC: 8 MMOL/L (ref 8–16)
AST SERPL-CCNC: 61 U/L (ref 10–40)
BASOPHILS # BLD AUTO: 0.03 K/UL (ref 0–0.2)
BASOPHILS NFR BLD: 0.4 % (ref 0–1.9)
BILIRUB SERPL-MCNC: 0.9 MG/DL (ref 0.1–1)
BUN SERPL-MCNC: 45 MG/DL (ref 8–23)
CALCIUM SERPL-MCNC: 8.4 MG/DL (ref 8.7–10.5)
CHLORIDE SERPL-SCNC: 98 MMOL/L (ref 95–110)
CO2 SERPL-SCNC: 28 MMOL/L (ref 23–29)
CREAT SERPL-MCNC: 1.6 MG/DL (ref 0.5–1.4)
DIFFERENTIAL METHOD: ABNORMAL
EOSINOPHIL # BLD AUTO: 0.8 K/UL (ref 0–0.5)
EOSINOPHIL NFR BLD: 9.4 % (ref 0–8)
ERYTHROCYTE [DISTWIDTH] IN BLOOD BY AUTOMATED COUNT: 13.6 % (ref 11.5–14.5)
EST. GFR  (NO RACE VARIABLE): 41.2 ML/MIN/1.73 M^2
GLUCOSE SERPL-MCNC: 156 MG/DL (ref 70–110)
HCT VFR BLD AUTO: 40.4 % (ref 40–54)
HGB BLD-MCNC: 13.4 G/DL (ref 14–18)
IMM GRANULOCYTES # BLD AUTO: 0.05 K/UL (ref 0–0.04)
IMM GRANULOCYTES NFR BLD AUTO: 0.6 % (ref 0–0.5)
LYMPHOCYTES # BLD AUTO: 0.8 K/UL (ref 1–4.8)
LYMPHOCYTES NFR BLD: 9.7 % (ref 18–48)
MAGNESIUM SERPL-MCNC: 1.9 MG/DL (ref 1.6–2.6)
MCH RBC QN AUTO: 30.6 PG (ref 27–31)
MCHC RBC AUTO-ENTMCNC: 33.2 G/DL (ref 32–36)
MCV RBC AUTO: 92 FL (ref 82–98)
MONOCYTES # BLD AUTO: 0.6 K/UL (ref 0.3–1)
MONOCYTES NFR BLD: 7.8 % (ref 4–15)
NEUTROPHILS # BLD AUTO: 5.7 K/UL (ref 1.8–7.7)
NEUTROPHILS NFR BLD: 72.1 % (ref 38–73)
NRBC BLD-RTO: 0 /100 WBC
PLATELET # BLD AUTO: 89 K/UL (ref 150–450)
PMV BLD AUTO: 10.2 FL (ref 9.2–12.9)
POTASSIUM SERPL-SCNC: 4.4 MMOL/L (ref 3.5–5.1)
PROT SERPL-MCNC: 6 G/DL (ref 6–8.4)
RBC # BLD AUTO: 4.38 M/UL (ref 4.6–6.2)
SODIUM SERPL-SCNC: 134 MMOL/L (ref 136–145)
WBC # BLD AUTO: 7.95 K/UL (ref 3.9–12.7)

## 2023-03-27 PROCEDURE — 99232 PR SUBSEQUENT HOSPITAL CARE,LEVL II: ICD-10-PCS | Mod: ,,, | Performed by: INTERNAL MEDICINE

## 2023-03-27 PROCEDURE — 63600175 PHARM REV CODE 636 W HCPCS: Performed by: NURSE PRACTITIONER

## 2023-03-27 PROCEDURE — 36415 COLL VENOUS BLD VENIPUNCTURE: CPT | Performed by: INTERNAL MEDICINE

## 2023-03-27 PROCEDURE — 25000242 PHARM REV CODE 250 ALT 637 W/ HCPCS: Performed by: INTERNAL MEDICINE

## 2023-03-27 PROCEDURE — 94799 UNLISTED PULMONARY SVC/PX: CPT

## 2023-03-27 PROCEDURE — 83735 ASSAY OF MAGNESIUM: CPT | Performed by: INTERNAL MEDICINE

## 2023-03-27 PROCEDURE — 99900035 HC TECH TIME PER 15 MIN (STAT)

## 2023-03-27 PROCEDURE — 21400001 HC TELEMETRY ROOM

## 2023-03-27 PROCEDURE — 99900031 HC PATIENT EDUCATION (STAT)

## 2023-03-27 PROCEDURE — 99232 SBSQ HOSP IP/OBS MODERATE 35: CPT | Mod: ,,, | Performed by: INTERNAL MEDICINE

## 2023-03-27 PROCEDURE — 25000003 PHARM REV CODE 250: Performed by: INTERNAL MEDICINE

## 2023-03-27 PROCEDURE — 85025 COMPLETE CBC W/AUTO DIFF WBC: CPT | Performed by: INTERNAL MEDICINE

## 2023-03-27 PROCEDURE — 97116 GAIT TRAINING THERAPY: CPT | Mod: CQ

## 2023-03-27 PROCEDURE — 27000221 HC OXYGEN, UP TO 24 HOURS

## 2023-03-27 PROCEDURE — 94761 N-INVAS EAR/PLS OXIMETRY MLT: CPT

## 2023-03-27 PROCEDURE — 63600175 PHARM REV CODE 636 W HCPCS: Performed by: INTERNAL MEDICINE

## 2023-03-27 PROCEDURE — 80053 COMPREHEN METABOLIC PANEL: CPT | Performed by: HOSPITALIST

## 2023-03-27 PROCEDURE — 94640 AIRWAY INHALATION TREATMENT: CPT

## 2023-03-27 RX ORDER — ENOXAPARIN SODIUM 100 MG/ML
40 INJECTION SUBCUTANEOUS
Status: DISCONTINUED | OUTPATIENT
Start: 2023-03-27 | End: 2023-03-29 | Stop reason: HOSPADM

## 2023-03-27 RX ORDER — FUROSEMIDE 10 MG/ML
20 INJECTION INTRAMUSCULAR; INTRAVENOUS ONCE
Status: COMPLETED | OUTPATIENT
Start: 2023-03-27 | End: 2023-03-27

## 2023-03-27 RX ORDER — CLOPIDOGREL BISULFATE 75 MG/1
75 TABLET ORAL DAILY
Status: DISCONTINUED | OUTPATIENT
Start: 2023-03-28 | End: 2023-03-29 | Stop reason: HOSPADM

## 2023-03-27 RX ORDER — LEVOFLOXACIN 750 MG/1
750 TABLET ORAL EVERY OTHER DAY
Status: DISCONTINUED | OUTPATIENT
Start: 2023-03-28 | End: 2023-03-27

## 2023-03-27 RX ORDER — LEVOFLOXACIN 750 MG/1
750 TABLET ORAL EVERY OTHER DAY
Status: DISCONTINUED | OUTPATIENT
Start: 2023-03-27 | End: 2023-03-29 | Stop reason: HOSPADM

## 2023-03-27 RX ADMIN — IPRATROPIUM BROMIDE 0.5 MG: 0.5 SOLUTION RESPIRATORY (INHALATION) at 07:03

## 2023-03-27 RX ADMIN — ISOSORBIDE MONONITRATE 30 MG: 30 TABLET, EXTENDED RELEASE ORAL at 09:03

## 2023-03-27 RX ADMIN — PANTOPRAZOLE SODIUM 40 MG: 40 TABLET, DELAYED RELEASE ORAL at 09:03

## 2023-03-27 RX ADMIN — METOPROLOL TARTRATE 25 MG: 25 TABLET, FILM COATED ORAL at 02:03

## 2023-03-27 RX ADMIN — TAMSULOSIN HYDROCHLORIDE 0.4 MG: 0.4 CAPSULE ORAL at 09:03

## 2023-03-27 RX ADMIN — BUDESONIDE INHALATION 0.5 MG: 0.5 SUSPENSION RESPIRATORY (INHALATION) at 07:03

## 2023-03-27 RX ADMIN — LEVOFLOXACIN 750 MG: 750 TABLET, FILM COATED ORAL at 03:03

## 2023-03-27 RX ADMIN — METOPROLOL TARTRATE 25 MG: 25 TABLET, FILM COATED ORAL at 09:03

## 2023-03-27 RX ADMIN — ASPIRIN 81 MG CHEWABLE TABLET 81 MG: 81 TABLET CHEWABLE at 09:03

## 2023-03-27 RX ADMIN — ENOXAPARIN SODIUM 40 MG: 100 INJECTION SUBCUTANEOUS at 09:03

## 2023-03-27 RX ADMIN — IPRATROPIUM BROMIDE 0.5 MG: 0.5 SOLUTION RESPIRATORY (INHALATION) at 01:03

## 2023-03-27 RX ADMIN — CARBOXYMETHYLCELLULOSE SODIUM 1 DROP: 5 SOLUTION/ DROPS OPHTHALMIC at 09:03

## 2023-03-27 RX ADMIN — FUROSEMIDE 20 MG: 10 INJECTION, SOLUTION INTRAMUSCULAR; INTRAVENOUS at 05:03

## 2023-03-27 RX ADMIN — LEVOTHYROXINE SODIUM 25 MCG: 0.03 TABLET ORAL at 05:03

## 2023-03-27 RX ADMIN — CYANOCOBALAMIN 1000 MCG: 1000 INJECTION, SOLUTION INTRAMUSCULAR at 09:03

## 2023-03-27 RX ADMIN — ARFORMOTEROL TARTRATE 15 MCG: 15 SOLUTION RESPIRATORY (INHALATION) at 07:03

## 2023-03-27 RX ADMIN — DOXYCYCLINE 100 MG: 100 INJECTION, POWDER, LYOPHILIZED, FOR SOLUTION INTRAVENOUS at 05:03

## 2023-03-27 RX ADMIN — ATORVASTATIN CALCIUM 40 MG: 40 TABLET, FILM COATED ORAL at 09:03

## 2023-03-27 NOTE — PROGRESS NOTES
[facility] Medicine  Progress Note    Patient Name: Frank Wyatt  MRN: [id number]  Patient Class: IP- Inpatient   Admission Date: 3/24/2023  Length of Stay: 2 days  Attending Physician: Maggie Singh MD  Primary Care Provider: Darci German MD        Subjective:     Principal Problem:Dyspnea        HPI:  80-year-old male history of COPD, remote history of smoking, hypertension, hyperlipidemia, very hard of hearing, hypothyroidism, BPH.     Patient initially was evaluated the emergency department this morning after experiencing acute shortness breath sounding at 2:15 a.m. associated with leg swelling.  Patient was diagnosed with hypoxia, pneumonia and COPD exacerbation, discharged with doxycycline and prednisone.     However it was noted patient to have a troponin elevation, and so was called back to the emergency department..  Patient denies chest discomfort.  No ST or T-wave changes on EKG.     hsTNI: 11 >> 60.8 >> 194     Patient was treated with full-strength aspirin, patient to be admitted for evaluation management of NSTEMI.     Ten point review systems otherwise negative      Overview/Hospital Course:  Assumed care of this patient on 3/25/23. Patient is hard of hearing and collateral from daughter at bedside.  Patient with known history of CAD status post remote CABG X 5 1996 followed by cardiologist Dr. Kim and reported negative nuclear stress test December 2022, COPD, not on home oxygen, BPH, hypothyroidism, CKD stage 3 who initially presented to the ED with acute onset of shortness of breath.  Patient was awoken around 2:30 a.m. with shortness of breath, daughter states he was experiencing headache prior, new onset.  He was initially evaluated in the ED with workup done and diagnosed with COPD exacerbation, discharge to complete course of doxycycline and prednisone however recall to the ED when 2nd high sensitivity troponin reported elevated at 60.  In the ED EKG  with sinus rhythm with left bundle-branch block, denies any chest pain, D-dimer 0.94, V/Q scan low probability PE, ultrasound leg no DVT, echocardiogram EF of 50% with moderate to severe MS/AS, he was placed on heparin drip and admitted to telemetry floor with cardiology consult.  On 03/25 troponin downtrending, he feels back to his baseline, awaiting Cardiology recommendations.  Subsequently in afternoon patient had episode of acute shortness of breath, tachycardic, wheezing noted, later febrile to 101.2, blood cultures sent, initiated on antibiotics, cardiology started on iv lasix.       Interval History:  Patient is seen and examined with daughter at bedside.  No further episodes of acute dyspnea.  States he has been urinating more.  Daughter is emotional, states she just discussed with Cardiology and was told his options are limited, she is unsure if going to [facility] will offer any options.  She states what is most important to patient is to return to assisted living facility.  He did ambulate the hallway with therapy using a cane, which is his baseline.  Afebrile with T-max 98°.  Labs with platelet 89, BUN/creatinine 45/1.6.  Blood culture no growth to date.  Urine output 1050.  Discussed with daughter at bedside.  Questions addressed.    Review of Systems   Constitutional:  Negative for fever.   HENT:          Hard of hearing   Cardiovascular:  Negative for chest pain.   Gastrointestinal:  Negative for vomiting.   Objective:     Vital Signs (Most Recent):  Temp: 98.1 °F (36.7 °C) (03/27/23 1544)  Pulse: 79 (03/27/23 1544)  Resp: 20 (03/27/23 1544)  BP: 131/69 (03/27/23 1544)  SpO2: (!) 92 % (03/27/23 1544)   Vital Signs (24h Range):  Temp:  [97.5 °F (36.4 °C)-98.1 °F (36.7 °C)] 98.1 °F (36.7 °C)  Pulse:  [66-79] 79  Resp:  [16-20] 20  SpO2:  [92 %-99 %] 92 %  BP: (119-153)/(61-74) 131/69     Weight: 88.4 kg (194 lb 14.2 oz)  Body mass index is 30.52 kg/m².    Intake/Output Summary (Last 24 hours) at 3/27/2023  1601  Last data filed at 3/27/2023 1544  Gross per 24 hour   Intake 690 ml   Output 1050 ml   Net -360 ml      Physical Exam  Vitals and nursing note reviewed.   Constitutional:       General: He is not in acute distress.     Appearance: He is not ill-appearing.      Comments: Elderly male lying in bed, cooperative, NAD   HENT:      Head: Normocephalic and atraumatic.      Mouth/Throat:      Mouth: Mucous membranes are moist.   Cardiovascular:      Rate and Rhythm: Normal rate and regular rhythm.      Heart sounds: Murmur heard.   Pulmonary:      Comments: No wheeze or accessory muscle use  Abdominal:      General: There is no distension.      Palpations: Abdomen is soft.      Tenderness: There is no abdominal tenderness. There is no guarding.   Genitourinary:     Comments: Wearing depends  Skin:     General: Skin is warm.   Neurological:      Mental Status: He is alert and oriented to person, place, and time.   Psychiatric:         Mood and Affect: Mood normal.       Significant Labs: BMP:   Recent Labs   Lab 03/27/23 0557   *   *   K 4.4   CL 98   CO2 28   BUN 45*   CREATININE 1.6*   CALCIUM 8.4*   MG 1.9     CBC:   Recent Labs   Lab 03/26/23 0317 03/27/23  0557   WBC 9.32 7.95   HGB 13.5* 13.4*   HCT 41.2 40.4   PLT 79* 89*     CMP:   Recent Labs   Lab 03/26/23 0317 03/27/23  0557   * 134*   K 4.6 4.4   CL 96 98   CO2 26 28   * 156*   BUN 46* 45*   CREATININE 1.6* 1.6*   CALCIUM 8.3* 8.4*   PROT 6.2 6.0   ALBUMIN 3.1* 3.1*   BILITOT 1.3* 0.9   ALKPHOS 69 76   AST 44* 61*   ALT 42 86*   ANIONGAP 11 8     Cardiac Markers:   Recent Labs   Lab 03/26/23 0317   *     Magnesium:   Recent Labs   Lab 03/26/23 0317 03/27/23  0557   MG 1.9 1.9     POCT Glucose: No results for input(s): POCTGLUCOSE in the last 48 hours.    Significant Imaging: I have reviewed all pertinent imaging results/findings within the past 24 hours.      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    [place] (hard of hearing)    Meniere disease    CKD (chronic kidney disease), stage III    S/P CABG x 5    B12 deficiency    Thrombocytopenia    COPD (chronic obstructive pulmonary disease)    Hypothyroidism    BPH (benign prostatic hyperplasia)    NSTEMI (non-ST elevated myocardial infarction)        Plan:  Continue care on telemetry floor with continuous cardiac monitoring  Further diuresis as per Cardiology  No further fever, blood culture no growth to date, plan to complete brief course of Levaquin and monitor temperature curve   Continue B12 supplementation with deficiency   On aspirin 81 mg, Lipitor 40 mg, Imdur 30 mg daily, Lopressor increased to 25 mg t.i.d. 3/2 5   Echocardiogram with EF of 50% with moderate to severe MS/AS --cardiology recommending referral to [facility] for valve evaluation  Continue scheduled breathing treatments   Fall and delirium precautions  Renally dose all medications and avoid nephrotoxin drugs  A.m. labs ordered  Increase activity, out of bed to chair, ambulation, therapy following  Appreciate cardiology recommendations  Further plan as per hospital course    VTE Risk Mitigation (From admission, onward)    None          Discharge Planning   ALESSANDRA: 3/28/2023     Code Status: DNR   Is the patient medically ready for discharge?:     Reason for patient still in hospital (select all that apply): Consult recommendations  Discharge Plan A: Assisted Living                  Maggie Singh MD  Department of Hospital Medicine   Pending sale to [facility]

## 2023-03-27 NOTE — PT/OT/SLP PROGRESS
Physical Therapy Treatment    Patient Name:  Frank Wyatt   MRN:  [id number]    Recommendations:     Discharge Recommendations: home health PT  Discharge Equipment Recommendations: none (has canes at home)  Barriers to discharge:  contact guard assist; balance deficits; decreased activity tolerance    Assessment:     Frank Wyatt is a 88 y.o. male admitted with a medical diagnosis of Dyspnea.  He presents with the following impairments/functional limitations: weakness, impaired endurance, impaired functional mobility, gait instability, impaired balance, decreased lower extremity function, decreased safety awareness, impaired cardiopulmonary response to activity.    Pt agreeable to visit. Pt's daughter present. Pt ambulated 100' with single point cane and 2 L O2 via NC. Pt with some unsteadiness while using cane due to cane being new and pt learning how to ambulate with cane. Will continue to work on gait training with cane and assessing whether pt will require an AD upon discharge. Daughter reports that pt's breathing sounds better today and that he is not as short of breath as previous session.    Rehab Prognosis: Fair; patient would benefit from acute skilled PT services to address these deficits and reach maximum level of function.    Recent Surgery: * No surgery found *      Plan:     During this hospitalization, patient to be seen 6 x/week to address the identified rehab impairments via gait training, therapeutic activities, therapeutic exercises and progress toward the following goals:    Plan of Care Expires:  04/26/23    Subjective     Chief Complaint: pt and daughter just wanting to get update from doctor  Patient/Family Comments/goals: to get better  Pain/Comfort:  Pain Rating 1: 0/10      Objective:     Communicated with RN prior to session.  Patient found HOB elevated with oxygen, peripheral IV upon PT entry to room.     General Precautions: Standard, fall, respiratory  Orthopedic Precautions:  N/A  Braces: N/A  Respiratory Status: Nasal cannula, flow 2 L/min     Functional Mobility:  Bed Mobility:     Supine to Sit: stand by assistance  Transfers:     Sit to Stand:  contact guard assistance with no AD  Gait: x 100' with SPC and CGA on 2L. Some unsteadiness as cane is new and pt is learning.      AM-PAC 6 CLICK MOBILITY          Treatment & Education:  Pt educated on importance of time OOB, importance of intermittent mobility, safe techniques for transfers/ambulation, discharge recommendations/options, and use of call light for assistance and fall prevention.      Patient left sitting edge of bed with all lines intact, call button in reach, Rn notified, and daughter present..    GOALS:   Multidisciplinary Problems       Physical Therapy Goals          Problem: Physical Therapy    Goal Priority Disciplines Outcome Goal Variances Interventions   Physical Therapy Goal     PT, PT/OT      Description: Goals to be met by: 23    Patient will increase functional independence with mobility by performin. Supine to sit with Supervision  2. Sit to stand transfer with Supervision  3. Bed to chair transfer with Supervision using Rolling Walker or Cane  4. Gait  x 250 feet with Supervision using Rolling Walker or Cane.   5. Lower extremity exercise program x20 reps per handout, with supervision                       Time Tracking:     PT Received On: 23  PT Start Time: 1017     PT Stop Time: 1034  PT Total Time (min): 17 min     Billable Minutes: Gait Training 17    Treatment Type: Treatment  PT/PTA: PTA     Number of PTA visits since last PT visit: 2023

## 2023-03-27 NOTE — SUBJECTIVE & OBJECTIVE
Interval History:  Patient is seen and examined with daughter at bedside.  No further episodes of acute dyspnea.  States he has been urinating more.  Daughter is emotional, states she just discussed with Cardiology and was told his options are limited, she is unsure if [place] [facility] [place] options.  She states what is most important to patient is to return to assisted living facility.  He did ambulate the hallway with therapy using a cane, which is his baseline.  Afebrile with T-max 98°.  Labs with platelet 89, BUN/creatinine 45/1.6.  Blood culture no growth to date.  Urine output 1050.  Discussed with daughter at bedside.  Questions addressed.    Review of Systems   Constitutional:  Negative for fever.   HENT:          Hard of hearing   Cardiovascular:  Negative for chest pain.   Gastrointestinal:  Negative for vomiting.   Objective:     Vital Signs (Most Recent):  Temp: 98.1 °F (36.7 °C) (03/27/23 1544)  Pulse: 79 (03/27/23 1544)  Resp: 20 (03/27/23 1544)  BP: 131/69 (03/27/23 1544)  SpO2: (!) 92 % (03/27/23 1544)   Vital Signs (24h Range):  Temp:  [97.5 °F (36.4 °C)-98.1 °F (36.7 °C)] 98.1 °F (36.7 °C)  Pulse:  [66-79] 79  Resp:  [16-20] 20  SpO2:  [92 %-99 %] 92 %  BP: (119-153)/(61-74) 131/69     Weight: 88.4 kg (194 lb 14.2 oz)  Body mass index is 30.52 kg/m².    Intake/Output Summary (Last 24 hours) at 3/27/2023 1601  Last data filed at 3/27/2023 1544  Gross per 24 hour   Intake 690 ml   Output 1050 ml   Net -360 ml      Physical Exam  Vitals and nursing note reviewed.   Constitutional:       General: He is not in acute distress.     Appearance: He is not ill-appearing.      Comments: Elderly male lying in bed, cooperative, NAD   HENT:      Head: Normocephalic and atraumatic.      Mouth/Throat:      Mouth: Mucous membranes are moist.   Cardiovascular:      Rate and Rhythm: Normal rate and regular rhythm.      Heart sounds: Murmur heard.   Pulmonary:      Comments: No wheeze or accessory muscle  use  Abdominal:      General: There is no distension.      Palpations: Abdomen is soft.      Tenderness: There is no abdominal tenderness. There is no guarding.   Genitourinary:     Comments: Wearing depends  Skin:     General: Skin is warm.   Neurological:      Mental Status: He is alert and oriented to person, place, and time.   Psychiatric:         Mood and Affect: Mood normal.       Significant Labs: BMP:   Recent Labs   Lab 03/27/23 0557   *   *   K 4.4   CL 98   CO2 28   BUN 45*   CREATININE 1.6*   CALCIUM 8.4*   MG 1.9     CBC:   Recent Labs   Lab 03/26/23 0317 03/27/23 0557   WBC 9.32 7.95   HGB 13.5* 13.4*   HCT 41.2 40.4   PLT 79* 89*     CMP:   Recent Labs   Lab 03/26/23 0317 03/27/23  0557   * 134*   K 4.6 4.4   CL 96 98   CO2 26 28   * 156*   BUN 46* 45*   CREATININE 1.6* 1.6*   CALCIUM 8.3* 8.4*   PROT 6.2 6.0   ALBUMIN 3.1* 3.1*   BILITOT 1.3* 0.9   ALKPHOS 69 76   AST 44* 61*   ALT 42 86*   ANIONGAP 11 8     Cardiac Markers:   Recent Labs   Lab 03/26/23 0317   *     Magnesium:   Recent Labs   Lab 03/26/23 0317 03/27/23  0557   MG 1.9 1.9     POCT Glucose: No results for input(s): POCTGLUCOSE in the last 48 hours.    Significant Imaging: I have reviewed all pertinent imaging results/findings within the past 24 hours.

## 2023-03-27 NOTE — PROGRESS NOTES
Formerly [facility]  Department of Cardiology  Consult Note      PATIENT NAME: Frank Wyatt  MRN: [id number]  TODAY'S DATE: 03/27/2023  ADMIT DATE: 3/24/2023                          CONSULT REQUESTED BY: Maggie Singh MD    SUBJECTIVE     PRINCIPAL PROBLEM: Dyspnea      REASON FOR CONSULT:            INTERVAL HISTORY: 3/27/2023      Patient off heparin drip. Will start plavix in am. Creatinine stable same at 1.6.  Denies CP or increase in SOB, but still on NC at 2 liters.       NSTEMI    INTERVAL HISTORY:  Patient is resting comfortably in bed during examination.  Daughter at bedside.  Patient remains on heparin drip.  No acute distress.  Sinus rhythm with heart rate in the 70s on telemetry.  No events reported overnight.    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  Patient presented to the ED earlier yesterday with the same complaints and was found to have acute shortness of breath with hypoxia and was discharged on doxycycline and prednisone.  He received IV Lasix and breathing treatments in the ED.  His symptoms continue to worsen upon discharge and he was brought back to ED for further evaluation.  Patient was found to have elevated troponins and with no acute ST-T wave changes.    During exam, patient is in no acute distress, sinus rhythm on monitor with heart rate 80s to 100s.    IN ED: H&H 14.3/43, K 5.0, Cr 1.2, Mag 1.6. BNP 77    Serial Trop HS: 3/24/23 11 > 60.8 > 194 > 502 > 893.9 > 1212; 3/25/23- 1474 > 1233 >957     ECHO 3/24/23  The left ventricle is normal in size with moderate concentric hypertrophy and low normal systolic function.  Mild left atrial enlargement.  The estimated ejection fraction is 50%.  Indeterminate left ventricular diastolic function.  RV is not well visualized. Mildly to moderately reduced right  ventricular systolic function.  The mean diastolic gradient across the mitral valve is 13 mmHg at a heart rate of 87 bpm.  There is moderate to severe mitral stenosis.  There is moderate-to-severe aortic valve stenosis.  Aortic valve area is 1.06 cm2; peak velocity is 2.75 m/s; mean gradient is 17 mmHg. Indexed area is 0.45-0.5 cm/m2. Indexed stroke volume is 29 ml/m2.  Normal central venous pressure (3 mmHg).    FROM H&P:  HPI:  80-year-old male history of COPD, remote history of smoking, hypertension, hyperlipidemia, very hard of hearing, hypothyroidism, BPH.     Patient initially was evaluated the emergency department this morning after experiencing acute shortness breath sounding at 2:15 a.m. associated with leg swelling.  Patient was diagnosed with hypoxia, pneumonia and COPD exacerbation, discharged with doxycycline and prednisone.     However it was noted patient to have a troponin elevation, and so was called back to the emergency department..  Patient denies chest discomfort.  No ST or T-wave changes on EKG.     hsTNI: 11 >> 60.8 >> 194     Patient was treated with full-strength aspirin, patient to be admitted for evaluation management of NSTEMI.     Ten point review systems otherwise negative          Review of patient's allergies indicates:   Allergen Reactions    Bactrim [sulfamethoxazole-trimethoprim] Hives     itched    Keflex [cephalexin] Rash    Morphine Rash     Patient had morphine and keflex at the same time, developed a rash, not sure which one caused it, or if it was a combination of the two meds.       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     CORONARY ARTERY BYPASS GRAFT      5-bypass     CYSTOSCOPY N/A 8/15/2019    Procedure: CYSTOSCOPY;  Surgeon: Dipak Sanchez MD;  Location: [facility];  Service: Urology;  Laterality: N/A;    CYSTOSCOPY N/A 10/31/2019    Procedure: CYSTOSCOPY;  Surgeon: Dipak Sanchez MD;  Location: [facility] OR;  Service: Urology;  Laterality: N/A;    CYSTOSCOPY W/ URETERAL STENT REMOVAL Left 10/31/2019    Procedure: CYSTOSCOPY, WITH URETERAL STENT REMOVAL  URETEROSCOPY;  Surgeon: Dipak Sanchez MD;  Location: [facility];  Service: Urology;  Laterality: Left;    EXTRACORPOREAL SHOCK WAVE LITHOTRIPSY Left 8/15/2019    Procedure: LITHOTRIPSY, ESWL;  Surgeon: Dipak Sanchez MD;  Location: [facility];  Service: Urology;  Laterality: Left;    EXTRACORPOREAL SHOCK WAVE LITHOTRIPSY Left 2019    Procedure: LITHOTRIPSY, ESWL;  Surgeon: Dipak Sanchez MD;  Location: [facility] OR;  Service: Urology;  Laterality: Left;    EYE SURGERY Right     Cataract    EYE SURGERY Left     Cataract    HERNIA REPAIR      Dr. Bailon - had to have mesh removed    INGUINAL HERNIA REPAIR Right 2019        LITHOTRIPSY      nephrostomy tube      PERCUTANEOUS NEPHROSTOMY      ROTATOR CUFF REPAIR      right shoulder    URETEROSCOPY Left 10/31/2019    Procedure: URETEROSCOPY;  Surgeon: Dipak Sanchez MD;  Location: [facility];  Service: Urology;  Laterality: Left;     Social History     Tobacco Use    Smoking status: Former     Packs/day: 1.00     Years: 25.00     Pack years: 25.00     Types: Cigarettes     Quit date: 1972     Years since quittin.2    Smokeless tobacco: Former     Quit date: 8/15/1972   Substance Use Topics     Alcohol use: No    Drug use: No        REVIEW OF SYSTEMS  CONSTITUTIONAL: Negative for chills, fatigue and fever.   EYES: No double vision, No blurred vision  NEURO: No headaches, No dizziness  RESPIRATORY:  Positive shortness of breath    CARDIOVASCULAR: Negative for chest pain. Negative for palpitations and leg swelling.   GI: Negative for abdominal pain, No melena, diarrhea, nausea and vomiting.   : Negative for dysuria and frequency, Negative for hematuria  SKIN: Negative for bruising, Negative for edema or discoloration noted.   ENDOCRINE: Negative for polyphagia, Negative for heat intolerance, Negative for cold intolerance  PSYCHIATRIC: Negative for depression, Negative for anxiety, Negative for memory loss  MUSCULOSKELETAL: Negative for neck pain, Negative for muscle weakness, Negative for back pain     OBJECTIVE     VITAL SIGNS (Most Recent)  Temp: 97.7 °F (36.5 °C) (03/27/23 1206)  Pulse: 79 (03/27/23 1341)  Resp: 18 (03/27/23 1341)  BP: 122/63 (03/27/23 1206)  SpO2: 99 % (03/27/23 1341)    VENTILATION STATUS  Resp: 18 (03/27/23 1341)  SpO2: 99 % (03/27/23 1341)       I & O (Last 24H):  Intake/Output Summary (Last 24 hours) at 3/27/2023 1400  Last data filed at 3/27/2023 0849  Gross per 24 hour   Intake 690 ml   Output 850 ml   Net -160 ml       WEIGHTS  Wt Readings from Last 3 Encounters:   03/24/23 2300 88.4 kg (194 lb 14.2 oz)   03/24/23 0953 88.6 kg (195 lb 5.2 oz)   03/24/23 1500 88.5 kg (195 lb)   03/24/23 0416 91.6 kg (202 lb)       PHYSICAL EXAM  GENERAL: well built, well nourished, well-developed in no apparent distress alert and oriented.   HEENT: Normocephalic. Pupils normal and conjunctivae normal.  NECK: No JVD. No bruit..   THYROID: Thyroid not examined  CARDIAC: Regular rate and rhythm. S1 is normal.S2 is normal.  2/6 systolic murmur CHEST ANATOMY: normal.   LUNGS: Diminished in lower lobes  ABDOMEN:  Obese, soft, nontender, normal bowel sounds  URINARY: No merino catheter   EXTREMITIES: No  cyanosis, clubbing or edema noted at this time  PERIPHERAL VASCULAR SYSTEM: Good palpable distal pulses.   CENTRAL NERVOUS SYSTEM: No focal motor or sensory deficits noted.   SKIN: Skin without lesions, moist, well perfused.   MUSCLE STRENGTH & TONE: No noteable weakness, atrophy or abnormal movement.     HOME MEDICATIONS:  No current facility-administered medications on file prior to encounter.     Current Outpatient Medications on File Prior to Encounter   Medication Sig Dispense Refill    amlodipine-benazepril 5-20 mg (LOTREL) 5-20 mg per capsule Take 1 capsule by mouth nightly.      atorvastatin (LIPITOR) 40 MG tablet Take 1 tablet (40 mg total) by mouth every evening. 90 tablet 0    dextran 70-hypromellose 0.1-0.3 % Drop Apply 1 drop to eye 2 (two) times daily.      furosemide (LASIX) 20 MG tablet Take 10 mg by mouth once daily. Based on weight gain and edema  1    glipiZIDE (GLUCOTROL) 5 MG tablet TAKE 1 TABLET BY MOUTH ONCE DAILY 30  MINUTES  BEFORE  BREAKFAST      hydrALAZINE (APRESOLINE) 50 MG tablet Take 50 mg by mouth 3 (three) times daily.      hydrocortisone 1 % cream Apply topically 2 (two) times daily.      levothyroxine (SYNTHROID) 25 MCG tablet Take 25 mcg by mouth once daily.   1    metoprolol succinate (TOPROL-XL) 25 MG 24 hr tablet Take 1 tablet by mouth 2 (two) times daily.      nitrofurantoin (MACRODANTIN) 100 MG capsule Take 100 mg by mouth 2 (two) times daily.      SYMBICORT 160-4.5 mcg/actuation HFAA Inhale 2 puffs into the lungs every 12 (twelve) hours.       tamsulosin (FLOMAX) 0.4 mg Cap Take 0.4 mg by mouth once daily.       albuterol (PROVENTIL/VENTOLIN HFA) 90 mcg/actuation inhaler Inhale 2 puffs into the lungs every 4 (four) hours as needed for Wheezing. Rescue (Patient not taking: Reported on 3/24/2023) 1 Inhaler 0    cefUROXime (CEFTIN) 500 MG tablet Take 500 mg by mouth 2 (two) times daily.      ciprofloxacin HCl (CIPRO) 250 MG tablet Take 1 tablet (250 mg total) by mouth 2 (two)  times daily. 12 tablet 0    doxycycline (VIBRAMYCIN) 100 MG Cap Take 1 capsule (100 mg total) by mouth every 12 (twelve) hours. for 10 days 20 capsule 0    hydrALAZINE (APRESOLINE) 25 MG tablet Take 25 mg by mouth 3 (three) times daily.      metoprolol tartrate (LOPRESSOR) 25 MG tablet Take 25 mg by mouth 2 (two) times daily.       predniSONE (DELTASONE) 20 MG tablet Take 2 tablets (40 mg total) by mouth once daily. for 5 days 10 tablet 0    traMADol (ULTRAM) 50 mg tablet Take 1 tablet (50 mg total) by mouth every 8 (eight) hours as needed for Pain. 12 tablet 0       SCHEDULED MEDS:   arformoteroL  15 mcg Nebulization BID    aspirin  81 mg Oral Daily    atorvastatin  40 mg Oral QHS    budesonide  0.5 mg Nebulization Q12H    carboxymethylcellulose  1 drop Ophthalmic BID    cyanocobalamin  1,000 mcg Intramuscular Daily    doxycycline (VIBRAMYCIN) IVPB  100 mg Intravenous Q12H    ipratropium  0.5 mg Nebulization Q6H WAKE    isosorbide mononitrate  30 mg Oral Daily    levoFLOXacin  750 mg Intravenous Q48H    levothyroxine  25 mcg Oral Before breakfast    metoprolol tartrate  25 mg Oral TID    pantoprazole  40 mg Oral Daily    tamsulosin  0.4 mg Oral QHS       CONTINUOUS INFUSIONS:        PRN MEDS:acetaminophen, magnesium oxide, magnesium oxide, nitroGLYCERIN, ondansetron, potassium bicarbonate, potassium bicarbonate, potassium bicarbonate, potassium, sodium phosphates, potassium, sodium phosphates, potassium, sodium phosphates, senna-docusate 8.6-50 mg, sodium chloride 0.9%    LABS AND DIAGNOSTICS     CBC LAST 3 DAYS  Recent Labs   Lab 03/25/23  0342 03/26/23  0317 03/27/23  0557   WBC 15.72* 9.32 7.95   RBC 4.66 4.44* 4.38*   HGB 14.5 13.5* 13.4*   HCT 43.4 41.2 40.4   MCV 93 93 92   MCH 31.1* 30.4 30.6   MCHC 33.4 32.8 33.2   RDW 13.9 13.8 13.6   PLT 92* 79* 89*   MPV 10.4 10.5 10.2   GRAN 88.5*  13.9* 89.2*  8.3* 72.1  5.7   LYMPH 4.1*  0.7* 5.3*  0.5* 9.7*  0.8*   MONO 4.5  0.7 4.5  0.4 7.8  0.6   BASO 0.05  0.01 0.03   NRBC 0 0 0       COAGULATION LAST 3 DAYS  Recent Labs   Lab 03/24/23  1356 03/24/23  2115 03/26/23  0430 03/26/23  1104 03/26/23  1757   INR 1.1  --   --   --   --    APTT  --    < > 78.6* 60.6* 29.9    < > = values in this interval not displayed.       CHEMISTRY LAST 3 DAYS  Recent Labs   Lab 03/25/23  0342 03/26/23  0317 03/27/23  0557    133* 134*   K 4.1 4.6 4.4   CL 99 96 98   CO2 28 26 28   ANIONGAP 11 11 8   BUN 41* 46* 45*   CREATININE 1.4 1.6* 1.6*   * 223* 156*   CALCIUM 9.3 8.3* 8.4*   MG 2.1 1.9 1.9   ALBUMIN 3.6 3.1* 3.1*   PROT 7.1 6.2 6.0   ALKPHOS 63 69 76   ALT 27 42 86*   AST 34 44* 61*   BILITOT 0.5 1.3* 0.9       CARDIAC PROFILE LAST 3 DAYS  Recent Labs   Lab 03/24/23  0442 03/24/23  0720 03/25/23  0841 03/25/23  1456 03/25/23  1755 03/26/23  0317   BNP 77  --   --   --   --  122*   TROPONINIHS 11.0   < > 957.2* 869.8* 750.5*  --     < > = values in this interval not displayed.       ENDOCRINE LAST 3 DAYS  Recent Labs   Lab 03/24/23  1352 03/25/23  1616   TSH 0.870  --    PROCAL  --  0.85*       LAST ARTERIAL BLOOD GAS  ABG  No results for input(s): PH, PO2, PCO2, HCO3, BE in the last 168 hours.    LAST 7 DAYS MICROBIOLOGY   Microbiology Results (last 7 days)       Procedure Component Value Units Date/Time    Blood culture [[id number]] Collected: 03/25/23 1616    Order Status: Completed Specimen: Blood Updated: 03/26/23 1832     Blood Culture, Routine No Growth to date      No Growth to date    Narrative:      Collection has been rescheduled by ATN at 03/25/2023 16:17 Reason:   Done blood cultures and gold top  Collection has been rescheduled by ATN at 03/25/2023 16:17 Reason:   Done blood cultures and gold top    Urine culture [[id number]] Collected: 03/24/23 1400    Order Status: Completed Specimen: Urine Updated: 03/26/23 0814     Urine Culture, Routine No growth    Narrative:      Specimen Source->Urine            MOST RECENT IMAGING  X-Ray Chest AP Portable  HISTORY:  shotness of breath    COMPARISON:3/24/2023    FINDINGS: Postoperative changes of previous coronary artery bypass surgery with sternotomy wires in place. Extensive atheromatous calcifications about the thoracic aorta.    Lungs are underexpanded with prominent perihilar interstitial opacities, however no evidence of pulmonary edema. No evidence of colonic infiltrate or pneumothorax. Regional skeleton appears normal. Arthritic changes about the bilateral AC joints.    IMPRESSION:Stable appearance the chest when compared to previous. Postoperative changes of previous coronary artery bypass surgery.    Electronically signed by:  Keanu Richey MD  3/26/2023 6:06 AM CDT Workstation: [id number]      ECHOCARDIOGRAM RESULTS (last 5)  Results for orders placed during the hospital encounter of 03/24/23    Echo    Interpretation Summary  · The left ventricle is normal in size with moderate concentric hypertrophy and low normal systolic function.  · Mild left atrial enlargement.  · The estimated ejection fraction is 50%.  · Indeterminate left ventricular diastolic function.  · RV is not well visualized. Mildly to moderately reduced right ventricular systolic function.  · The mean diastolic gradient across the mitral valve is 13 mmHg at a heart rate of 87 bpm.  · There is moderate to severe mitral stenosis.  · There is moderate-to-severe aortic valve stenosis.  · Aortic valve area is 1.06 cm2; peak velocity is 2.75 m/s; mean gradient is 17 mmHg. Indexed area is 0.45-0.5 cm/m2. Indexed stroke volume is 29 ml/m2.  · Normal central venous pressure (3 mmHg).      Results for orders placed during the hospital encounter of 08/11/20    Echo Color Flow Doppler? Yes; Bubble Contrast? No    Interpretation Summary  · The estimated PA systolic pressure is 52 mmHg.  · Concentric left ventricular remodeling.  · Normal left ventricular systolic function. The estimated ejection fraction is 64%.  · Grade II (moderate) left ventricular diastolic  dysfunction consistent with pseudonormalization.  · Mild left atrial enlargement.  · Mild-to-moderate aortic valve stenosis.  · Aortic valve area is 1.46 cm2; peak velocity is 2.54 m/s; mean gradient is 15 mmHg.  · Mild-to-moderate mitral regurgitation.  · Mild tricuspid regurgitation.  · Elevated central venous pressure (15 mmHg).  · Pulmonary hypertension present.      CURRENT/PREVIOUS VISIT EKG  Results for orders placed or performed during the hospital encounter of 03/24/23   EKG 12-lead    Collection Time: 03/25/23  3:19 PM    Narrative    Test Reason : R06.00,    Vent. Rate : 113 BPM     Atrial Rate : 113 BPM     P-R Int : 208 ms          QRS Dur : 122 ms      QT Int : 326 ms       P-R-T Axes : 000 117 164 degrees     QTc Int : 447 ms    Sinus tachycardia Intraventricular conduction delay    Left posterior fascicular block  Anteroseptal infarct ,age undetermined  Abnormal ECG  When compared with ECG of 25-MAR-2023 06:48,  Left posterior fascicular block is now Present  Anteroseptal infarct is now Present  T wave inversion less evident in Lateral leads  Confirmed by Osvaldo CLINTON, Bladimir LANDAVERDE ([id number]) on 3/25/2023 6:35:13 PM    Referred By: [email or web address]   SELF           Confirmed By:Bladimir Riley MD           ASSESSMENT/PLAN:     Active Hospital Problems    Diagnosis    *Dyspnea    Fever    Valvular heart disease, moderate to severe MS/AS    [place] (hard of hearing)    Meniere disease    CKD (chronic kidney disease), stage III    S/P CABG x 5    B12 deficiency    Thrombocytopenia    COPD (chronic obstructive pulmonary disease)    Hypothyroidism    BPH (benign prostatic hyperplasia)    NSTEMI (non-ST elevated myocardial infarction)    Troponin level elevated       ASSESSMENT & PLAN:     NSTEMI  dyspnea  Moderate to severe mitral stenosis  Moderate to severe aortic stenosis  CKD  S/p CABG x5  Thrombocytopenia  Left bundle-branch block        RECOMMENDATIONS:    Give IV lasix today 20 mg IV  Patient has moderate to severe MS  and AS.  Recommend urgent follow up at main [place] for further evaluation upon discharge this has been discussed with patient and daughter at bedside  Recommend ambulation and PT Evaluation  BMP BNP in am  CXR in am   Possible dc in am    Inez Avila NP  Department of Cardiology  Date of Service: 03/27/2023

## 2023-03-27 NOTE — PLAN OF CARE
03/27/23 0736   Patient Assessment/Suction   Level of Consciousness (AVPU) alert   Respiratory Effort Normal;Unlabored   MARY Breath Sounds clear   LLL Breath Sounds wheezes, expiratory   RUL Breath Sounds clear   RML Breath Sounds diminished   RLL Breath Sounds wheezes, expiratory   PRE-TX-O2   Device (Oxygen Therapy) nasal cannula   $ Is the patient on Low Flow Oxygen? Yes   Flow (L/min) 2   SpO2 98 %   Pulse Oximetry Type Intermittent   $ Pulse Oximetry - Multiple Charge Pulse Oximetry - Multiple   Pulse 66   Resp 18   Aerosol Therapy   $ Aerosol Therapy Charges Aerosol Treatment   Daily Review of Necessity (SVN) completed   Respiratory Treatment Status (SVN) given   Treatment Route (SVN) mask;oxygen   Patient Position (SVN) Rivera's;HOB elevated   Post Treatment Assessment (SVN) increased aeration;breath sounds improved   Signs of Intolerance (SVN) none   Education   $ Education Bronchodilator;15 min   Respiratory Evaluation   $ Care Plan Tech Time 15 min   $ Eval/Re-eval Charges Re-evaluation

## 2023-03-27 NOTE — CARE UPDATE
03/26/23 2035   Patient Assessment/Suction   Level of Consciousness (AVPU) alert   Respiratory Effort Normal;Unlabored   Expansion/Accessory Muscles/Retractions no use of accessory muscles   All Lung Fields Breath Sounds equal bilaterally;diminished;clear   Rhythm/Pattern, Respiratory unlabored   Cough Frequency infrequent   PRE-TX-O2   Device (Oxygen Therapy) nasal cannula   Flow (L/min) 2   SpO2 97 %   Pulse Oximetry Type Intermittent   $ Pulse Oximetry - Multiple Charge Pulse Oximetry - Multiple   Pulse 72   Resp 16   Aerosol Therapy   $ Aerosol Therapy Charges Subsequent Continuous   Daily Review of Necessity (SVN) completed   Respiratory Treatment Status (SVN) given   Treatment Route (SVN) mask   Patient Position (SVN) HOB elevated   Post Treatment Assessment (SVN) breath sounds unchanged   Signs of Intolerance (SVN) none   Breath Sounds Post-Respiratory Treatment   Post-treatment Heart Rate (beats/min) 83   Post-treatment Resp Rate (breaths/min) 18   Education   $ Education Bronchodilator;15 min   Respiratory Evaluation   $ Care Plan Tech Time 15 min   $ Eval/Re-eval Charges Evaluation   Evaluation For Re-Eval 3 day

## 2023-03-28 DIAGNOSIS — I34.2 NONRHEUMATIC MITRAL VALVE STENOSIS: ICD-10-CM

## 2023-03-28 DIAGNOSIS — I35.0 NONRHEUMATIC AORTIC VALVE STENOSIS: Primary | ICD-10-CM

## 2023-03-28 LAB
ALBUMIN SERPL BCP-MCNC: 3.2 G/DL (ref 3.5–5.2)
ALBUMIN SERPL BCP-MCNC: 3.2 G/DL (ref 3.5–5.2)
ALP SERPL-CCNC: 85 U/L (ref 55–135)
ALP SERPL-CCNC: 85 U/L (ref 55–135)
ALT SERPL W/O P-5'-P-CCNC: 81 U/L (ref 10–44)
ALT SERPL W/O P-5'-P-CCNC: 81 U/L (ref 10–44)
ANION GAP SERPL CALC-SCNC: 10 MMOL/L (ref 8–16)
ANION GAP SERPL CALC-SCNC: 10 MMOL/L (ref 8–16)
AST SERPL-CCNC: 49 U/L (ref 10–40)
AST SERPL-CCNC: 49 U/L (ref 10–40)
BASOPHILS # BLD AUTO: 0.04 K/UL (ref 0–0.2)
BASOPHILS NFR BLD: 0.6 % (ref 0–1.9)
BILIRUB SERPL-MCNC: 0.8 MG/DL (ref 0.1–1)
BILIRUB SERPL-MCNC: 0.8 MG/DL (ref 0.1–1)
BNP SERPL-MCNC: 41 PG/ML (ref 0–99)
BUN SERPL-MCNC: 43 MG/DL (ref 8–23)
BUN SERPL-MCNC: 43 MG/DL (ref 8–23)
CALCIUM SERPL-MCNC: 9.1 MG/DL (ref 8.7–10.5)
CALCIUM SERPL-MCNC: 9.1 MG/DL (ref 8.7–10.5)
CHLORIDE SERPL-SCNC: 102 MMOL/L (ref 95–110)
CHLORIDE SERPL-SCNC: 102 MMOL/L (ref 95–110)
CO2 SERPL-SCNC: 26 MMOL/L (ref 23–29)
CO2 SERPL-SCNC: 26 MMOL/L (ref 23–29)
CREAT SERPL-MCNC: 1.4 MG/DL (ref 0.5–1.4)
CREAT SERPL-MCNC: 1.4 MG/DL (ref 0.5–1.4)
DIFFERENTIAL METHOD: ABNORMAL
EOSINOPHIL # BLD AUTO: 0.7 K/UL (ref 0–0.5)
EOSINOPHIL NFR BLD: 9.2 % (ref 0–8)
ERYTHROCYTE [DISTWIDTH] IN BLOOD BY AUTOMATED COUNT: 13.9 % (ref 11.5–14.5)
EST. GFR  (NO RACE VARIABLE): 48.3 ML/MIN/1.73 M^2
EST. GFR  (NO RACE VARIABLE): 48.3 ML/MIN/1.73 M^2
GLUCOSE SERPL-MCNC: 127 MG/DL (ref 70–110)
GLUCOSE SERPL-MCNC: 127 MG/DL (ref 70–110)
HCT VFR BLD AUTO: 42.5 % (ref 40–54)
HGB BLD-MCNC: 14.1 G/DL (ref 14–18)
IMM GRANULOCYTES # BLD AUTO: 0.03 K/UL (ref 0–0.04)
IMM GRANULOCYTES NFR BLD AUTO: 0.4 % (ref 0–0.5)
LYMPHOCYTES # BLD AUTO: 1 K/UL (ref 1–4.8)
LYMPHOCYTES NFR BLD: 14.5 % (ref 18–48)
MAGNESIUM SERPL-MCNC: 1.8 MG/DL (ref 1.6–2.6)
MCH RBC QN AUTO: 30.7 PG (ref 27–31)
MCHC RBC AUTO-ENTMCNC: 33.2 G/DL (ref 32–36)
MCV RBC AUTO: 93 FL (ref 82–98)
MONOCYTES # BLD AUTO: 0.5 K/UL (ref 0.3–1)
MONOCYTES NFR BLD: 7.2 % (ref 4–15)
NEUTROPHILS # BLD AUTO: 4.9 K/UL (ref 1.8–7.7)
NEUTROPHILS NFR BLD: 68.1 % (ref 38–73)
NRBC BLD-RTO: 0 /100 WBC
PLATELET # BLD AUTO: 91 K/UL (ref 150–450)
PLATELET BLD QL SMEAR: ABNORMAL
PMV BLD AUTO: 10.8 FL (ref 9.2–12.9)
POTASSIUM SERPL-SCNC: 4.5 MMOL/L (ref 3.5–5.1)
POTASSIUM SERPL-SCNC: 4.5 MMOL/L (ref 3.5–5.1)
PROT SERPL-MCNC: 6.5 G/DL (ref 6–8.4)
PROT SERPL-MCNC: 6.5 G/DL (ref 6–8.4)
RBC # BLD AUTO: 4.59 M/UL (ref 4.6–6.2)
SODIUM SERPL-SCNC: 138 MMOL/L (ref 136–145)
SODIUM SERPL-SCNC: 138 MMOL/L (ref 136–145)
WBC # BLD AUTO: 7.19 K/UL (ref 3.9–12.7)

## 2023-03-28 PROCEDURE — 99232 PR SUBSEQUENT HOSPITAL CARE,LEVL II: ICD-10-PCS | Mod: ,,, | Performed by: INTERNAL MEDICINE

## 2023-03-28 PROCEDURE — 99900031 HC PATIENT EDUCATION (STAT)

## 2023-03-28 PROCEDURE — 99900035 HC TECH TIME PER 15 MIN (STAT)

## 2023-03-28 PROCEDURE — 21400001 HC TELEMETRY ROOM

## 2023-03-28 PROCEDURE — 25000003 PHARM REV CODE 250: Performed by: INTERNAL MEDICINE

## 2023-03-28 PROCEDURE — 97530 THERAPEUTIC ACTIVITIES: CPT | Mod: CQ

## 2023-03-28 PROCEDURE — 36415 COLL VENOUS BLD VENIPUNCTURE: CPT | Performed by: INTERNAL MEDICINE

## 2023-03-28 PROCEDURE — 25000242 PHARM REV CODE 250 ALT 637 W/ HCPCS: Performed by: INTERNAL MEDICINE

## 2023-03-28 PROCEDURE — 94799 UNLISTED PULMONARY SVC/PX: CPT

## 2023-03-28 PROCEDURE — 97116 GAIT TRAINING THERAPY: CPT | Mod: CQ

## 2023-03-28 PROCEDURE — 94640 AIRWAY INHALATION TREATMENT: CPT

## 2023-03-28 PROCEDURE — 85025 COMPLETE CBC W/AUTO DIFF WBC: CPT | Performed by: INTERNAL MEDICINE

## 2023-03-28 PROCEDURE — 63600175 PHARM REV CODE 636 W HCPCS: Performed by: INTERNAL MEDICINE

## 2023-03-28 PROCEDURE — 80053 COMPREHEN METABOLIC PANEL: CPT | Performed by: HOSPITALIST

## 2023-03-28 PROCEDURE — 94761 N-INVAS EAR/PLS OXIMETRY MLT: CPT

## 2023-03-28 PROCEDURE — 83735 ASSAY OF MAGNESIUM: CPT | Performed by: INTERNAL MEDICINE

## 2023-03-28 PROCEDURE — 25000003 PHARM REV CODE 250: Performed by: NURSE PRACTITIONER

## 2023-03-28 PROCEDURE — 99232 SBSQ HOSP IP/OBS MODERATE 35: CPT | Mod: ,,, | Performed by: INTERNAL MEDICINE

## 2023-03-28 PROCEDURE — 83880 ASSAY OF NATRIURETIC PEPTIDE: CPT | Performed by: NURSE PRACTITIONER

## 2023-03-28 RX ORDER — METOPROLOL TARTRATE 50 MG/1
50 TABLET ORAL 2 TIMES DAILY
Status: DISCONTINUED | OUTPATIENT
Start: 2023-03-28 | End: 2023-03-29 | Stop reason: HOSPADM

## 2023-03-28 RX ORDER — FUROSEMIDE 40 MG/1
40 TABLET ORAL DAILY
Status: DISCONTINUED | OUTPATIENT
Start: 2023-03-28 | End: 2023-03-29 | Stop reason: HOSPADM

## 2023-03-28 RX ADMIN — CARBOXYMETHYLCELLULOSE SODIUM 1 DROP: 5 SOLUTION/ DROPS OPHTHALMIC at 09:03

## 2023-03-28 RX ADMIN — CLOPIDOGREL BISULFATE 75 MG: 75 TABLET, FILM COATED ORAL at 09:03

## 2023-03-28 RX ADMIN — BUDESONIDE INHALATION 0.5 MG: 0.5 SUSPENSION RESPIRATORY (INHALATION) at 07:03

## 2023-03-28 RX ADMIN — ISOSORBIDE MONONITRATE 30 MG: 30 TABLET, EXTENDED RELEASE ORAL at 09:03

## 2023-03-28 RX ADMIN — PANTOPRAZOLE SODIUM 40 MG: 40 TABLET, DELAYED RELEASE ORAL at 09:03

## 2023-03-28 RX ADMIN — ARFORMOTEROL TARTRATE 15 MCG: 15 SOLUTION RESPIRATORY (INHALATION) at 08:03

## 2023-03-28 RX ADMIN — METOPROLOL TARTRATE 50 MG: 50 TABLET, FILM COATED ORAL at 09:03

## 2023-03-28 RX ADMIN — IPRATROPIUM BROMIDE 0.5 MG: 0.5 SOLUTION RESPIRATORY (INHALATION) at 02:03

## 2023-03-28 RX ADMIN — ARFORMOTEROL TARTRATE 15 MCG: 15 SOLUTION RESPIRATORY (INHALATION) at 07:03

## 2023-03-28 RX ADMIN — FUROSEMIDE 40 MG: 40 TABLET ORAL at 03:03

## 2023-03-28 RX ADMIN — ATORVASTATIN CALCIUM 40 MG: 40 TABLET, FILM COATED ORAL at 09:03

## 2023-03-28 RX ADMIN — CYANOCOBALAMIN 1000 MCG: 1000 INJECTION, SOLUTION INTRAMUSCULAR at 09:03

## 2023-03-28 RX ADMIN — IPRATROPIUM BROMIDE 0.5 MG: 0.5 SOLUTION RESPIRATORY (INHALATION) at 08:03

## 2023-03-28 RX ADMIN — IPRATROPIUM BROMIDE 0.5 MG: 0.5 SOLUTION RESPIRATORY (INHALATION) at 07:03

## 2023-03-28 RX ADMIN — TAMSULOSIN HYDROCHLORIDE 0.4 MG: 0.4 CAPSULE ORAL at 09:03

## 2023-03-28 RX ADMIN — LEVOTHYROXINE SODIUM 25 MCG: 0.03 TABLET ORAL at 06:03

## 2023-03-28 RX ADMIN — ENOXAPARIN SODIUM 40 MG: 100 INJECTION SUBCUTANEOUS at 09:03

## 2023-03-28 RX ADMIN — ASPIRIN 81 MG CHEWABLE TABLET 81 MG: 81 TABLET CHEWABLE at 09:03

## 2023-03-28 RX ADMIN — METOPROLOL TARTRATE 25 MG: 25 TABLET, FILM COATED ORAL at 09:03

## 2023-03-28 RX ADMIN — ACETAMINOPHEN 650 MG: 325 TABLET ORAL at 11:03

## 2023-03-28 RX ADMIN — BUDESONIDE INHALATION 0.5 MG: 0.5 SUSPENSION RESPIRATORY (INHALATION) at 08:03

## 2023-03-28 NOTE — PLAN OF CARE
Problem: Physical Therapy  Goal: Physical Therapy Goal  Description: Goals to be met by: 23    Patient will increase functional independence with mobility by performin. Supine to sit with Supervision  2. Sit to stand transfer with Supervision  3. Bed to chair transfer with Supervision using Rolling Walker or Cane  4. Gait  x 250 feet with Supervision using Rolling Walker or Cane.   5. Lower extremity exercise program x20 reps per handout, with supervision  Outcome: Ongoing, Progressing

## 2023-03-28 NOTE — PROGRESS NOTES
[facility] Medicine Progress Note  Patient Name: Farnk Wyatt MRN: [id number]   Patient Class: IP- Inpatient  Length of Stay: 3   Admission Date: 3/24/2023  9:52 AM Attending Physician: Ayesha Chavez MD   Primary Care Provider: Darci German MD Face-to-Face encounter date: 03/28/2023   Chief Complaint: abnormal labs (Called from ER for elevated troponin level this morning)      Subjective:    Interval History   Patient is doing well.    Denies chest pain, shortness of breath, palpitations, abdominal pain, nausea/vomiting.   No concerns/issues overnight reported by the patient or the nursing staff.  Reviewed the labs and discussed the plan of care.   No family present at bedside.     Review of Systems   All other Review of Systems were found to be negative expect for that mentioned already in HPI.     Hospital Course      Overview/Hospital Course:  Assumed care of this patient on 3/25/23. Patient is hard of hearing and collateral from daughter at bedside.  Patient with known history of CAD status post remote CABG X 5 1996 followed by cardiologist Dr. Kim and reported negative nuclear stress test December 2022, COPD, not on home oxygen, BPH, hypothyroidism, CKD stage 3 who initially presented to the ED with acute onset of shortness of breath.  Patient was awoken around 2:30 a.m. with shortness of breath, daughter states he was experiencing headache prior, new onset.  He was initially evaluated in the ED with workup done and diagnosed with COPD exacerbation, discharge to complete course of doxycycline and prednisone however recall to the ED when 2nd high sensitivity troponin reported elevated at 60.  In the ED EKG with sinus rhythm with left bundle-branch block, denies any chest pain, D-dimer 0.94, V/Q scan low probability PE, ultrasound leg no DVT, echocardiogram EF of 50% with moderate to severe MS/AS, he was placed on heparin drip and admitted to telemetry floor with  cardiology consult.  On 03/25 troponin downtrending, he feels back to his baseline, awaiting Cardiology recommendations.  Subsequently in afternoon patient had episode of acute shortness of breath, tachycardic, wheezing noted, later febrile to 101.2, blood cultures sent, initiated on antibiotics, cardiology started on iv lasix.         Interval History:  Patient is seen and examined with daughter at bedside.  No further episodes of acute dyspnea.  States he has been urinating more.  Daughter is emotional, states she just discussed with Cardiology and was told his options are limited, she is unsure if going to [facility] will offer any options.  She states what is most important to patient is to return to assisted living facility.  He did ambulate the hallway with therapy using a cane, which is his baseline.  Afebrile with T-max 98°.  Labs with platelet 89, BUN/creatinine 45/1.6.  Blood culture no growth to date.  Urine output 1050.  Discussed with daughter at bedside.  Questions addressed.    03/28/2023 - doing much better. Breathing has improved. Discussed care with daughter at bedside. They are planning to follow up at [facility] for revaluation of severe MS and AS. D/w with cardiology. Not cleared for discharge from Dr. Olea standpoint     arformoteroL  15 mcg Nebulization BID    aspirin  81 mg Oral Daily    atorvastatin  40 mg Oral QHS    budesonide  0.5 mg Nebulization Q12H    carboxymethylcellulose  1 drop Ophthalmic BID    clopidogreL  75 mg Oral Daily    cyanocobalamin  1,000 mcg Intramuscular Daily    enoxparin  40 mg Subcutaneous Q24H    ipratropium  0.5 mg Nebulization Q6H WAKE    isosorbide mononitrate  30 mg Oral Daily    levoFLOXacin  750 mg Oral Every other day    levothyroxine  25 mcg Oral Before breakfast    metoprolol tartrate  25 mg Oral TID    pantoprazole  40 mg Oral Daily    tamsulosin  0.4 mg Oral QHS       acetaminophen, magnesium oxide, magnesium oxide, nitroGLYCERIN, ondansetron, potassium  "bicarbonate, potassium bicarbonate, potassium bicarbonate, potassium, sodium phosphates, potassium, sodium phosphates, potassium, sodium phosphates, senna-docusate 8.6-50 mg, sodium chloride 0.9%      Objective:   Physical Exam  /68 (BP Location: Right arm, Patient Position: Lying)   Pulse 86   Temp 98.1 °F (36.7 °C) (Oral)   Resp 17   Ht 5' 7" (1.702 m)   Wt 86.1 kg (189 lb 13.1 oz)   SpO2 95%   BMI 29.73 kg/m²   Constitutional: No distress.   HENT: Atraumatic.   Cardiovascular: Normal rate, regular rhythm and normal heart sounds.   Pulmonary/Chest: Effort normal. Clear to auscultation bilaterally. No wheezes.   Abdominal: Soft. Bowel sounds are normal. Exhibits no distension and no mass. No tenderness  Neurological: Alert.   Skin: Skin is warm and dry.     Labs and Imaging    Significant Labs: All pertinent labs within the past 24 hours have been reviewed.    Significant Imaging: I have reviewed all pertinent imaging results/findings within the past 24 hours.    I have reviewed the Vitals, labs and imaging as above.     Assessment & Plan:   Frank Wyatt is a 88 y.o. 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    [place] hearing[place]    Meniere disease    CKD (chronic kidney disease), stage III    S/P CABG x 5    B12 deficiency    Thrombocytopenia    COPD (chronic obstructive pulmonary disease)    Hypothyroidism    BPH (benign prostatic hyperplasia)    NSTEMI (non-ST elevated myocardial infarction)    Troponin level elevated       Plan  Continue care on telemetry floor with continuous cardiac monitoring  Further diuresis as per Cardiology  No further fever, blood culture no growth to date, plan to complete brief course of Levaquin and monitor temperature curve   Continue B12 supplementation with deficiency   On aspirin 81 mg, Lipitor 40 mg, Imdur 30 mg daily, Lopressor " increased to 25 mg t.i.d. 3/2 5   Echocardiogram with EF of 50% with moderate to severe MS/AS --cardiology recommending referral to [facility] for valve evaluation  Continue scheduled breathing treatments   Fall and delirium precautions  Renally dose all medications and avoid nephrotoxin drugs  A.m. labs ordered  Increase activity, out of bed to chair, ambulation, therapy following  Appreciate cardiology recommendations  Further plan as per hospital course  Hopefully discharge tomorrow if Ok with cardiology    Active PT: Yes  Active OT: No  Active SLP: No    Core measures:  - Code status:   - Diet: Cardiac  VTE Risk Mitigation (From admission, onward)           Ordered     enoxaparin injection 40 mg  Every 24 hours (non-standard times)         03/27/23 1604                    Discharge Planning:   Discharge Planning   ALESSANDRA: 03/29    Code Status: Full Code   Is the patient medically ready for discharge?: no    Reason for patient still in hospital (select all that apply): Patient trending condition  Discharge Plan A:  assisted living facility         Above encounter included review of the medical records, interviewing and examining the patient face-to-face, discussion with family and other health care providers, ordering and interpreting lab/test results and formulating a plan of care.     Medical Decision Making:  [] Low Complexity  [x] Moderate Complexity  [] High Complexity    Ayesha Chavez MD  [facility] Hospitalist  03/28/2023

## 2023-03-28 NOTE — PT/OT/SLP PROGRESS
Physical Therapy Treatment    Patient Name:  Frank Wyatt   MRN:  [id number]    Recommendations:     Discharge Recommendations: home health PT  Discharge Equipment Recommendations: walker, rolling (pt requests RW as he feels more steady)  Barriers to discharge:  decreased activity tolerance; balance deficits    Assessment:     Frank Wyatt is a 88 y.o. male admitted with a medical diagnosis of Dyspnea.  He presents with the following impairments/functional limitations: weakness, impaired endurance, impaired functional mobility, gait instability, impaired balance, decreased lower extremity function, decreased safety awareness, impaired cardiopulmonary response to activity.    Pt agreeable to visit. Daughter reports that pt told her last night that he would prefer to use a RW over a cane as he did not feel confident with the cane. Pt ambulated 100' x 2 with RW and stand by assist progressing to supervision. Pt with good RW management and posture, no LOB. Pt ambulated much better today with RW versus yesterday with cane. Pt and daughter agreeable to RW over cane and therapist sent secure chat to CM that pt would need RW for discharge. Pt and daughter educated on how to adjust RW height for pt.    Rehab Prognosis: Fair; patient would benefit from acute skilled PT services to address these deficits and reach maximum level of function.    Recent Surgery: * No surgery found *      Plan:     During this hospitalization, patient to be seen 6 x/week to address the identified rehab impairments via gait training, therapeutic activities, therapeutic exercises and progress toward the following goals:    Plan of Care Expires:  04/26/23    Subjective     Chief Complaint: daughter reports possible d/c today but wanting pt to remain in the hospital overnight just to make sure that everything is okay  Patient/Family Comments/goals: to get stronger  Pain/Comfort:  Pain Rating 1: 0/10      Objective:     Communicated with RN  prior to session.  Patient found up in chair with peripheral IV, telemetry upon PT entry to room.     General Precautions: Standard, fall, respiratory  Orthopedic Precautions: N/A  Braces: N/A  Respiratory Status: Room air     Functional Mobility:  Transfers:     Sit to Stand:  stand by assistance with no AD  Gait: 2 x 100' RW and SBA > supervision. Good pacing and RW management. Some shortness of breaths but sats 94% on RA      AM-PAC 6 CLICK MOBILITY          Treatment & Education:  Pt educated on importance of time OOB, importance of intermittent mobility, safe techniques for transfers/ambulation, discharge recommendations/options, and use of call light for assistance and fall prevention.      Patient left up in chair with all lines intact, call button in reach, and daughter and RN present..    GOALS:   Multidisciplinary Problems       Physical Therapy Goals          Problem: Physical Therapy    Goal Priority Disciplines Outcome Goal Variances Interventions   Physical Therapy Goal     PT, PT/OT      Description: Goals to be met by: 23    Patient will increase functional independence with mobility by performin. Supine to sit with Supervision  2. Sit to stand transfer with Supervision  3. Bed to chair transfer with Supervision using Rolling Walker or Cane  4. Gait  x 250 feet with Supervision using Rolling Walker or Cane.   5. Lower extremity exercise program x20 reps per handout, with supervision                       Time Tracking:     PT Received On: 23  PT Start Time: 0936     PT Stop Time: 1002  PT Total Time (min): 26 min     Billable Minutes: Gait Training 10 and Therapeutic Activity 16    Treatment Type: Treatment  PT/PTA: PTA     Number of PTA visits since last PT visit: 2     2023

## 2023-03-28 NOTE — PROGRESS NOTES
[facility]  Department of Cardiology  Consult Note      PATIENT NAME: Frank Wyatt  MRN: [id number]  TODAY'S DATE: 03/28/2023  ADMIT DATE: 3/24/2023                          CONSULT REQUESTED BY: Ayesha Chavez MD    SUBJECTIVE     PRINCIPAL PROBLEM: Dyspnea      REASON FOR CONSULT:        INTERVAL HISTORY 3/28/2023:      Patient doing well. He is off NC. He denies CP or SOB.  He diuresed well yesterday.        INTERVAL HISTORY: 3/27/2023      Patient off heparin drip. Will start plavix in am. Creatinine stable same at 1.6.  Denies CP or increase in SOB, but still on NC at 2 liters.       NSTEMI    INTERVAL HISTORY:  Patient is resting comfortably in bed during examination.  Daughter at bedside.  Patient remains on heparin drip.  No acute distress.  Sinus rhythm with heart rate in the 70s on telemetry.  No events reported overnight.    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  Patient presented to the ED earlier yesterday with the same complaints and was found to have acute shortness of breath with hypoxia and was discharged on doxycycline and prednisone.  He received IV Lasix and breathing treatments in the ED.  His symptoms continue to worsen upon discharge and he was brought back to ED for further evaluation.  Patient was found to have elevated troponins and with no acute ST-T wave changes.    During exam, patient is in no acute distress, sinus rhythm on monitor with heart rate 80s to 100s.    IN ED: H&H 14.3/43, K 5.0, Cr 1.2, Mag 1.6. BNP 77    Serial Trop HS: 3/24/23 11 > 60.8 > 194 > 502 > 893.9 > 1212; 3/25/23- 1474 > 1233 >957     ECHO 3/24/23  The left ventricle is normal in size with moderate concentric hypertrophy and low normal systolic function.  Mild left atrial enlargement.  The estimated ejection fraction is  50%.  Indeterminate left ventricular diastolic function.  RV is not well visualized. Mildly to moderately reduced right ventricular systolic function.  The mean diastolic gradient across the mitral valve is 13 mmHg at a heart rate of 87 bpm.  There is moderate to severe mitral stenosis.  There is moderate-to-severe aortic valve stenosis.  Aortic valve area is 1.06 cm2; peak velocity is 2.75 m/s; mean gradient is 17 mmHg. Indexed area is 0.45-0.5 cm/m2. Indexed stroke volume is 29 ml/m2.  Normal central venous pressure (3 mmHg).    FROM H&P:  HPI:  80-year-old male history of COPD, remote history of smoking, hypertension, hyperlipidemia, very hard of hearing, hypothyroidism, BPH.     Patient initially was evaluated the emergency department this morning after experiencing acute shortness breath sounding at 2:15 a.m. associated with leg swelling.  Patient was diagnosed with hypoxia, pneumonia and COPD exacerbation, discharged with doxycycline and prednisone.     However it was noted patient to have a troponin elevation, and so was called back to the emergency department..  Patient denies chest discomfort.  No ST or T-wave changes on EKG.     hsTNI: 11 >> 60.8 >> 194     Patient was treated with full-strength aspirin, patient to be admitted for evaluation management of NSTEMI.     Ten point review systems otherwise negative          Review of patient's allergies indicates:   Allergen Reactions    Bactrim [sulfamethoxazole-trimethoprim] Hives     itched    Keflex [cephalexin] Rash    Morphine Rash     Patient had morphine and keflex at the same time, developed a rash, not sure which one caused it, or if it was a combination of the two meds.       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 2004    CORONARY ARTERY BYPASS GRAFT  1996    5-bypass     CYSTOSCOPY N/A 8/15/2019    Procedure: CYSTOSCOPY;  Surgeon: Dipak Sanchez MD;  Location: [facility] OR;  Service: Urology;  Laterality: N/A;    CYSTOSCOPY N/A 10/31/2019    Procedure: CYSTOSCOPY;  Surgeon: Dipak Sanchez MD;  Location: [facility] OR;  Service: Urology;  Laterality: N/A;    CYSTOSCOPY W/ URETERAL STENT REMOVAL Left 10/31/2019    Procedure: CYSTOSCOPY, WITH URETERAL STENT REMOVAL  URETEROSCOPY;  Surgeon: Dipak Sanchez MD;  Location: [facility] OR;  Service: Urology;  Laterality: Left;    EXTRACORPOREAL SHOCK WAVE LITHOTRIPSY Left 8/15/2019    Procedure: LITHOTRIPSY, ESWL;  Surgeon: Dipak Sanchez MD;  Location: [facility] OR;  Service: Urology;  Laterality: Left;    EXTRACORPOREAL SHOCK WAVE LITHOTRIPSY Left 9/19/2019    Procedure: LITHOTRIPSY, ESWL;  Surgeon: Dipak Sanchez MD;  Location: [facility];  Service: Urology;  Laterality: Left;    EYE SURGERY Right 2014    Cataract    EYE SURGERY Left 2017    Cataract    HERNIA REPAIR  2014    Dr. Bailon - had to have mesh removed    INGUINAL HERNIA REPAIR Right 03/25/2019        LITHOTRIPSY      nephrostomy tube      PERCUTANEOUS NEPHROSTOMY      ROTATOR CUFF REPAIR  2005    right shoulder    URETEROSCOPY Left 10/31/2019    Procedure: URETEROSCOPY;  Surgeon: Dipak Sanchez MD;  Location: [facility];  Service: Urology;  Laterality: Left;     Social History     Tobacco Use    Smoking status: Former     Packs/day: 1.00     Years: 25.00     Pack years: 25.00     Types: Cigarettes     Quit date:  1972     Years since quittin.2    Smokeless tobacco: Former     Quit date: 8/15/1972   Substance Use Topics    Alcohol use: No    Drug use: No        REVIEW OF SYSTEMS  CONSTITUTIONAL: Negative for chills, fatigue and fever.   EYES: No double vision, No blurred vision  NEURO: No headaches, No dizziness  RESPIRATORY:  Positive shortness of breath    CARDIOVASCULAR: Negative for chest pain. Negative for palpitations and leg swelling.   GI: Negative for abdominal pain, No melena, diarrhea, nausea and vomiting.   : Negative for dysuria and frequency, Negative for hematuria  SKIN: Negative for bruising, Negative for edema or discoloration noted.   ENDOCRINE: Negative for polyphagia, Negative for heat intolerance, Negative for cold intolerance  PSYCHIATRIC: Negative for depression, Negative for anxiety, Negative for memory loss  MUSCULOSKELETAL: Negative for neck pain, Negative for muscle weakness, Negative for back pain     OBJECTIVE     VITAL SIGNS (Most Recent)  Temp: 98.1 °F (36.7 °C) (23 1100)  Pulse: 86 (23 1100)  Resp: 17 (23 1100)  BP: 135/68 (23 1100)  SpO2: 95 % (23 1100)    VENTILATION STATUS  Resp: 17 (23 1100)  SpO2: 95 % (23 1100)       I & O (Last 24H):  Intake/Output Summary (Last 24 hours) at 3/28/2023 1343  Last data filed at 3/28/2023 1241  Gross per 24 hour   Intake 1120 ml   Output 2225 ml   Net -1105 ml       WEIGHTS  Wt Readings from Last 3 Encounters:   23 0442 86.1 kg (189 lb 13.1 oz)   23 2300 88.4 kg (194 lb 14.2 oz)   23 0953 88.6 kg (195 lb 5.2 oz)   23 1500 88.5 kg (195 lb)   23 0416 91.6 kg (202 lb)       PHYSICAL EXAM  GENERAL: well built, well nourished, well-developed in no apparent distress alert and oriented.   HEENT: Normocephalic. Pupils normal and conjunctivae normal.  NECK: No JVD. No bruit..   THYROID: Thyroid not examined  CARDIAC: Regular rate and rhythm. S1 is normal.S2 is normal.  2/6 systolic  murmur CHEST ANATOMY: normal.   LUNGS: Diminished in lower lobes  ABDOMEN:  Obese, soft, nontender, normal bowel sounds  URINARY: No merino catheter   EXTREMITIES: No cyanosis, clubbing or edema noted at this time  PERIPHERAL VASCULAR SYSTEM: Good palpable distal pulses.   CENTRAL NERVOUS SYSTEM: No focal motor or sensory deficits noted.   SKIN: Skin without lesions, moist, well perfused.   MUSCLE STRENGTH & TONE: No noteable weakness, atrophy or abnormal movement.     HOME MEDICATIONS:  No current facility-administered medications on file prior to encounter.     Current Outpatient Medications on File Prior to Encounter   Medication Sig Dispense Refill    amlodipine-benazepril 5-20 mg (LOTREL) 5-20 mg per capsule Take 1 capsule by mouth nightly.      atorvastatin (LIPITOR) 40 MG tablet Take 1 tablet (40 mg total) by mouth every evening. 90 tablet 0    dextran 70-hypromellose 0.1-0.3 % Drop Apply 1 drop to eye 2 (two) times daily.      furosemide (LASIX) 20 MG tablet Take 10 mg by mouth once daily. Based on weight gain and edema  1    glipiZIDE (GLUCOTROL) 5 MG tablet TAKE 1 TABLET BY MOUTH ONCE DAILY 30  MINUTES  BEFORE  BREAKFAST      hydrALAZINE (APRESOLINE) 50 MG tablet Take 50 mg by mouth 3 (three) times daily.      hydrocortisone 1 % cream Apply topically 2 (two) times daily.      levothyroxine (SYNTHROID) 25 MCG tablet Take 25 mcg by mouth once daily.   1    metoprolol succinate (TOPROL-XL) 25 MG 24 hr tablet Take 1 tablet by mouth 2 (two) times daily.      nitrofurantoin (MACRODANTIN) 100 MG capsule Take 100 mg by mouth 2 (two) times daily.      SYMBICORT 160-4.5 mcg/actuation HFAA Inhale 2 puffs into the lungs every 12 (twelve) hours.       tamsulosin (FLOMAX) 0.4 mg Cap Take 0.4 mg by mouth once daily.       albuterol (PROVENTIL/VENTOLIN HFA) 90 mcg/actuation inhaler Inhale 2 puffs into the lungs every 4 (four) hours as needed for Wheezing. Rescue (Patient not taking: Reported on 3/24/2023) 1 Inhaler 0     cefUROXime (CEFTIN) 500 MG tablet Take 500 mg by mouth 2 (two) times daily.      ciprofloxacin HCl (CIPRO) 250 MG tablet Take 1 tablet (250 mg total) by mouth 2 (two) times daily. 12 tablet 0    doxycycline (VIBRAMYCIN) 100 MG Cap Take 1 capsule (100 mg total) by mouth every 12 (twelve) hours. for 10 days 20 capsule 0    hydrALAZINE (APRESOLINE) 25 MG tablet Take 25 mg by mouth 3 (three) times daily.      metoprolol tartrate (LOPRESSOR) 25 MG tablet Take 25 mg by mouth 2 (two) times daily.       predniSONE (DELTASONE) 20 MG tablet Take 2 tablets (40 mg total) by mouth once daily. for 5 days 10 tablet 0    traMADol (ULTRAM) 50 mg tablet Take 1 tablet (50 mg total) by mouth every 8 (eight) hours as needed for Pain. 12 tablet 0       SCHEDULED MEDS:   arformoteroL  15 mcg Nebulization BID    aspirin  81 mg Oral Daily    atorvastatin  40 mg Oral QHS    budesonide  0.5 mg Nebulization Q12H    carboxymethylcellulose  1 drop Ophthalmic BID    clopidogreL  75 mg Oral Daily    cyanocobalamin  1,000 mcg Intramuscular Daily    enoxparin  40 mg Subcutaneous Q24H    ipratropium  0.5 mg Nebulization Q6H WAKE    isosorbide mononitrate  30 mg Oral Daily    levoFLOXacin  750 mg Oral Every other day    levothyroxine  25 mcg Oral Before breakfast    metoprolol tartrate  25 mg Oral TID    pantoprazole  40 mg Oral Daily    tamsulosin  0.4 mg Oral QHS       CONTINUOUS INFUSIONS:        PRN MEDS:acetaminophen, magnesium oxide, magnesium oxide, nitroGLYCERIN, ondansetron, potassium bicarbonate, potassium bicarbonate, potassium bicarbonate, potassium, sodium phosphates, potassium, sodium phosphates, potassium, sodium phosphates, senna-docusate 8.6-50 mg, sodium chloride 0.9%    LABS AND DIAGNOSTICS     CBC LAST 3 DAYS  Recent Labs   Lab 03/26/23  0317 03/27/23  0557 03/28/23  0348   WBC 9.32 7.95 7.19   RBC 4.44* 4.38* 4.59*   HGB 13.5* 13.4* 14.1   HCT 41.2 40.4 42.5   MCV 93 92 93   MCH 30.4 30.6 30.7   MCHC 32.8 33.2 33.2   RDW 13.8 13.6  13.9   PLT 79* 89* 91*   MPV 10.5 10.2 10.8   GRAN 89.2*  8.3* 72.1  5.7 68.1  4.9   LYMPH 5.3*  0.5* 9.7*  0.8* 14.5*  1.0   MONO 4.5  0.4 7.8  0.6 7.2  0.5   BASO 0.01 0.03 0.04   NRBC 0 0 0       COAGULATION LAST 3 DAYS  Recent Labs   Lab 03/24/23  1356 03/24/23  2115 03/26/23  0430 03/26/23  1104 03/26/23  1757   INR 1.1  --   --   --   --    APTT  --    < > 78.6* 60.6* 29.9    < > = values in this interval not displayed.       CHEMISTRY LAST 3 DAYS  Recent Labs   Lab 03/26/23 0317 03/27/23  0557 03/28/23  0348   * 134* 138  138   K 4.6 4.4 4.5  4.5   CL 96 98 102  102   CO2 26 28 26  26   ANIONGAP 11 8 10  10   BUN 46* 45* 43*  43*   CREATININE 1.6* 1.6* 1.4  1.4   * 156* 127*  127*   CALCIUM 8.3* 8.4* 9.1  9.1   MG 1.9 1.9 1.8   ALBUMIN 3.1* 3.1* 3.2*  3.2*   PROT 6.2 6.0 6.5  6.5   ALKPHOS 69 76 85  85   ALT 42 86* 81*  81*   AST 44* 61* 49*  49*   BILITOT 1.3* 0.9 0.8  0.8       CARDIAC PROFILE LAST 3 DAYS  Recent Labs   Lab 03/24/23  0442 03/24/23  0720 03/25/23  0841 03/25/23  1456 03/25/23  1755 03/26/23  0317 03/28/23  0348   BNP 77  --   --   --   --  122* 41   TROPONINIHS 11.0   < > 957.2* 869.8* 750.5*  --   --     < > = values in this interval not displayed.       ENDOCRINE LAST 3 DAYS  Recent Labs   Lab 03/24/23  1352 03/25/23  1616   TSH 0.870  --    PROCAL  --  0.85*       LAST ARTERIAL BLOOD GAS  ABG  No results for input(s): PH, PO2, PCO2, HCO3, BE in the last 168 hours.    LAST 7 DAYS MICROBIOLOGY   Microbiology Results (last 7 days)       Procedure Component Value Units Date/Time    Blood culture [[id number]] Collected: 03/25/23 1616    Order Status: Completed Specimen: Blood Updated: 03/27/23 1832     Blood Culture, Routine No Growth to date      No Growth to date      No Growth to date    Narrative:      Collection has been rescheduled by ATN at 03/25/2023 16:17 Reason:   Done blood cultures and gold top  Collection has been rescheduled by ATN at  03/25/2023 16:17 Reason:   Done blood cultures and gold top    Urine culture [[id number]] Collected: 03/24/23 1400    Order Status: Completed Specimen: Urine Updated: 03/26/23 0814     Urine Culture, Routine No growth    Narrative:      Specimen Source->Urine            MOST RECENT IMAGING  X-Ray Chest AP Portable  HISTORY: CHF    FINDINGS: Portable chest radiograph at 0655 hours compared to 03/25/2023 shows median sternotomy wires and mediastinal surgical clips from prior CABG, with the cardiac silhouette and pulmonary vasculature are within normal limits. There are aortic vascular calcifications.    The lungs are normally expanded, with no consolidation, large pleural effusion, or evidence of pulmonary edema. No confluent infiltrates or pneumothorax. No new osseous abnormalities.    IMPRESSION: No evidence of acute cardiopulmonary disease.    Electronically signed by:  Sabas Infante MD  3/28/2023 7:44 AM CDT Workstation: [id number]      ECHOCARDIOGRAM RESULTS (last 5)  Results for orders placed during the hospital encounter of 03/24/23    Echo    Interpretation Summary  · The left ventricle is normal in size with moderate concentric hypertrophy and low normal systolic function.  · Mild left atrial enlargement.  · The estimated ejection fraction is 50%.  · Indeterminate left ventricular diastolic function.  · RV is not well visualized. Mildly to moderately reduced right ventricular systolic function.  · The mean diastolic gradient across the mitral valve is 13 mmHg at a heart rate of 87 bpm.  · There is moderate to severe mitral stenosis.  · There is moderate-to-severe aortic valve stenosis.  · Aortic valve area is 1.06 cm2; peak velocity is 2.75 m/s; mean gradient is 17 mmHg. Indexed area is 0.45-0.5 cm/m2. Indexed stroke volume is 29 ml/m2.  · Normal central venous pressure (3 mmHg).      Results for orders placed during the hospital encounter of 08/11/20    Echo Color Flow Doppler? Yes; Bubble Contrast?  No    Interpretation Summary  · The estimated PA systolic pressure is 52 mmHg.  · Concentric left ventricular remodeling.  · Normal left ventricular systolic function. The estimated ejection fraction is 64%.  · Grade II (moderate) left ventricular diastolic dysfunction consistent with pseudonormalization.  · Mild left atrial enlargement.  · Mild-to-moderate aortic valve stenosis.  · Aortic valve area is 1.46 cm2; peak velocity is 2.54 m/s; mean gradient is 15 mmHg.  · Mild-to-moderate mitral regurgitation.  · Mild tricuspid regurgitation.  · Elevated central venous pressure (15 mmHg).  · Pulmonary hypertension present.      CURRENT/PREVIOUS VISIT EKG  Results for orders placed or performed during the hospital encounter of 03/24/23   EKG 12-lead    Collection Time: 03/25/23  3:19 PM    Narrative    Test Reason : R06.00,    Vent. Rate : 113 BPM     Atrial Rate : 113 BPM     P-R Int : 208 ms          QRS Dur : 122 ms      QT Int : 326 ms       P-R-T Axes : 000 117 164 degrees     QTc Int : 447 ms    Sinus tachycardia Intraventricular conduction delay    Left posterior fascicular block  Anteroseptal infarct ,age undetermined  Abnormal ECG  When compared with ECG of 25-MAR-2023 06:48,  Left posterior fascicular block is now Present  Anteroseptal infarct is now Present  T wave inversion less evident in Lateral leads  Confirmed by Osvaldo CLINTON, Bladimir LANDAVERDE ([id number]) on 3/25/2023 6:35:13 PM    Referred By: MARY   SELF           Confirmed By:Bladimir Riley MD           ASSESSMENT/PLAN:     Active Hospital Problems    Diagnosis    *Dyspnea    Acute on chronic diastolic heart failure    Valvular heart disease, moderate to severe MS/AS    [place] (hard of hearing)    Meniere disease    CKD (chronic kidney disease), stage III    S/P CABG x 5    B12 deficiency    Thrombocytopenia    COPD (chronic obstructive pulmonary disease)    Hypothyroidism    BPH (benign prostatic hyperplasia)    NSTEMI (non-ST elevated myocardial infarction)     Troponin level elevated       ASSESSMENT & PLAN:     NSTEMI  dyspnea  Moderate to severe mitral stenosis  Moderate to severe aortic stenosis  CKD  S/p CABG x5  Thrombocytopenia  Left bundle-branch block        RECOMMENDATIONS:    Start Lasix PO 40 mg Daily  Increase Metoprolol to 50 mg PO BID  Continue current regimen other wise to include Imdur 30 mg daily  Plavix and ASA  PPI  Ambulatory Consult Placed to Dr. Bharati Castillo onvernight if remains stable can discharge in am.     Inez Avila NP  Department of Cardiology  Date of Service: 03/28/2023

## 2023-03-28 NOTE — PLAN OF CARE
03/28/23 1147   Post-Acute Status   Post-Acute Authorization Home Health;HME   HME Status Referrals Sent   Home Health Status Referrals Sent     [facility],  Request for home roselia sent to [facility] at family request.

## 2023-03-28 NOTE — PLAN OF CARE
03/28/23 1221   Post-Acute Status   [facility] obtained     Pt received rolling walker from [facility] ([facility])

## 2023-03-28 NOTE — PLAN OF CARE
03/28/23 0728   Patient Assessment/Suction   Level of Consciousness (AVPU) alert   Respiratory Effort Normal;Unlabored   All Lung Fields Breath Sounds clear;diminished   PRE-TX-O2   Device (Oxygen Therapy) room air   SpO2 95 %   Pulse Oximetry Type Intermittent   $ Pulse Oximetry - Multiple Charge Pulse Oximetry - Multiple   Pulse 73   Resp 18   Aerosol Therapy   $ Aerosol Therapy Charges Aerosol Treatment   Daily Review of Necessity (SVN) completed   Respiratory Treatment Status (SVN) given   Treatment Route (SVN) mask;oxygen   Patient Position (SVN) Rivera's;HOB elevated   Post Treatment Assessment (SVN) breath sounds unchanged   Signs of Intolerance (SVN) none   Education   $ Education Bronchodilator;15 min   Respiratory Evaluation   $ Care Plan Tech Time 15 min

## 2023-03-28 NOTE — RESPIRATORY THERAPY
03/27/23 1959   Patient Assessment/Suction   Level of Consciousness (AVPU) alert   Respiratory Effort Unlabored   Expansion/Accessory Muscles/Retractions no use of accessory muscles   All Lung Fields Breath Sounds clear;diminished;wheezes, expiratory   PRE-TX-O2   Device (Oxygen Therapy) room air   SpO2 (!) 92 %   Pulse Oximetry Type Intermittent   $ Pulse Oximetry - Multiple Charge Pulse Oximetry - Multiple   Pulse 77   Resp 18   Aerosol Therapy   $ Aerosol Therapy Charges Aerosol Treatment   Daily Review of Necessity (SVN) completed   Respiratory Treatment Status (SVN) given   Treatment Route (SVN) mask;oxygen   Patient Position (SVN) HOB elevated   Post Treatment Assessment (SVN) breath sounds unchanged   Signs of Intolerance (SVN) none   Breath Sounds Post-Respiratory Treatment   Throughout All Fields Post-Treatment no change   Post-treatment Heart Rate (beats/min) 70   Post-treatment Resp Rate (breaths/min) 18   Education   $ Education Bronchodilator;15 min   Respiratory Evaluation   $ Care Plan Tech Time 15 min   $ Eval/Re-eval Charges Evaluation

## 2023-03-28 NOTE — PROGRESS NOTES
"[facility]  Adult Nutrition   Progress Note (Initial Assessment)     SUMMARY     Recommendations  Continue current nutritional POC.    Goals:   Pt to continue to meet 75 to 100% of his energy and protein needs.    Nutrition Goal Status: goal met    Communication of RD Recs: other (comment)    Dietitian Rounds Brief  LOS: Pt eating 100% of meals and tells me he is supposed to be dc'd tomorrow. He lives in an Assisted Living Facility and has a Low Sodium diet and his appetite is fair. LBM was today and will follow prn.    Diet order:   Current Diet Order: Low Sodium (2 G)      Evaluation of Received Nutrient/Fluid Intake  Energy Calories Required: meeting needs  Protein Required: meeting needs  Fluid Required: meeting needs  Tolerance: tolerating     % Intake of Estimated Energy Needs: 75 - 100 %  % Meal Intake: 75 - 100 %      Intake/Output Summary (Last 24 hours) at 3/28/2023 1130  Last data filed at 3/28/2023 1122  Gross per 24 hour   Intake 720 ml   Output 2225 ml   Net -1505 ml        Anthropometrics  Temp: 98.1 °F (36.7 °C)  Height Method: Stated  Height: 5' 7" (170.2 cm)  Height (inches): 67 in  Weight Method: Standard Scale  Weight: 86.1 kg (189 lb 13.1 oz)  Weight (lb): 189.82 lb  Ideal Body Weight (IBW), Male: 148 lb  % Ideal Body Weight, Male (lb): 131.68 %  BMI (Calculated): 29.7  BMI Grade: 25 - 29.9 - overweight       Estimated/Assessed Needs  Weight Used For Calorie Calculations: 86 kg (189 lb 9.5 oz)  Energy Calorie Requirements (kcal): 0364-2974 kcals/day (20-25 kcals/kg ABW)  Energy Need Method: Kcal/kg  Protein Requirements: 103-129 g/day (1.2-1.5 g/kg ABW)  Weight Used For Protein Calculations: 86 kg (189 lb 9.5 oz)     Estimated Fluid Requirement Method: RDA Method  RDA Method (mL): 1720       Reason for Assessment  Reason For Assessment: length of stay  Diagnosis: other (see comments) (Dyspnea)  Relevant Medical History: Meniere's disease, Hypertension, Arthritis, Coronary artery " disease, Wears glasses,  [place] (hard of hearing), left ear, Full dentures, Kidney stones, Dermatographism, MRSA infection, Skin writing, dermatiographism, Small bowel obstruction due to adhesions, Diverticulosis, Thyroid disease, COPD (chronic obstructive pulmonary disease)    Nutrition/Diet History  Spiritual, Cultural Beliefs, Synagogue Practices, Values that Affect Care: no  Food Allergies: NKFA  Factors Affecting Nutritional Intake: None identified at this time    Nutrition Risk Screen  Nutrition Risk Screen: no indicators present     MST Score: 0  Have you recently lost weight without trying?: No  Weight loss score: 0  Have you been eating poorly because of a decreased appetite?: No  Appetite score: 0       Weight History:  Wt Readings from Last 5 Encounters:   03/28/23 86.1 kg (189 lb 13.1 oz)   03/24/23 88.5 kg (195 lb)   03/24/23 91.6 kg (202 lb)   01/29/22 91.1 kg (200 lb 12.8 oz)   05/29/21 90.3 kg (199 lb)        Lab/Procedures/Meds: Pertinent Labs/Meds Reviewed    Medications:Pertinent Medications Reviewed  Scheduled Meds:   arformoteroL  15 mcg Nebulization BID    aspirin  81 mg Oral Daily    atorvastatin  40 mg Oral QHS    budesonide  0.5 mg Nebulization Q12H    carboxymethylcellulose  1 drop Ophthalmic BID    clopidogreL  75 mg Oral Daily    cyanocobalamin  1,000 mcg Intramuscular Daily    enoxparin  40 mg Subcutaneous Q24H    ipratropium  0.5 mg Nebulization Q6H WAKE    isosorbide mononitrate  30 mg Oral Daily    levoFLOXacin  750 mg Oral Every other day    levothyroxine  25 mcg Oral Before breakfast    metoprolol tartrate  25 mg Oral TID    pantoprazole  40 mg Oral Daily    tamsulosin  0.4 mg Oral QHS     Continuous Infusions:  PRN Meds:.acetaminophen, magnesium oxide, magnesium oxide, nitroGLYCERIN, ondansetron, potassium bicarbonate, potassium bicarbonate, potassium bicarbonate, potassium, sodium phosphates, potassium, sodium phosphates, potassium, sodium phosphates, senna-docusate 8.6-50 mg, sodium  chloride 0.9%    Labs: Pertinent Labs Reviewed  Clinical Chemistry:  Recent Labs   Lab 03/28/23  0348     138   K 4.5  4.5     102   CO2 26  26   *  127*   BUN 43*  43*   CREATININE 1.4  1.4   CALCIUM 9.1  9.1   PROT 6.5  6.5   ALBUMIN 3.2*  3.2*   BILITOT 0.8  0.8   ALKPHOS 85  85   AST 49*  49*   ALT 81*  81*   ANIONGAP 10  10   MG 1.8     CBC:   Recent Labs   Lab 03/28/23  0348   WBC 7.19   RBC 4.59*   HGB 14.1   HCT 42.5   PLT 91*   MCV 93   MCH 30.7   MCHC 33.2     Cardiac Profile:  Recent Labs   Lab 03/24/23  0442 03/26/23  0317 03/28/23  0348   BNP 77 122* 41     Thyroid & Parathyroid:  Recent Labs   Lab 03/24/23  1352   TSH 0.870       Monitor and Evaluation  Food and Nutrient Intake: food and beverage intake, energy intake  Food and Nutrient Adminstration: diet order  Knowledge/Beliefs/Attitudes: food and nutrition knowledge/skill, beliefs and attitudes  Physical Activity and Function: nutrition-related ADLs and IADLs, factors affecting access to physical activity  Anthropometric Measurements: weight, weight change, body mass index  Biochemical Data, Medical Tests and Procedures: lipid profile, inflammatory profile, glucose/endocrine profile, gastrointestinal profile, electrolyte and renal panel  Nutrition-Focused Physical Findings: overall appearance     Nutrition Risk  Level of Risk/Frequency of Follow-up: low     Nutrition Follow-Up  RD Follow-up?: Yes      Carly Valentin RD 03/28/2023 11:30 AM

## 2023-03-29 VITALS
BODY MASS INDEX: 30.86 KG/M2 | TEMPERATURE: 98 F | SYSTOLIC BLOOD PRESSURE: 136 MMHG | OXYGEN SATURATION: 95 % | RESPIRATION RATE: 18 BRPM | WEIGHT: 196.63 LBS | HEART RATE: 86 BPM | DIASTOLIC BLOOD PRESSURE: 82 MMHG | HEIGHT: 67 IN

## 2023-03-29 LAB
ALBUMIN SERPL BCP-MCNC: 3.2 G/DL (ref 3.5–5.2)
ALP SERPL-CCNC: 104 U/L (ref 55–135)
ALT SERPL W/O P-5'-P-CCNC: 90 U/L (ref 10–44)
ANION GAP SERPL CALC-SCNC: 8 MMOL/L (ref 8–16)
AST SERPL-CCNC: 52 U/L (ref 10–40)
BASOPHILS # BLD AUTO: 0.06 K/UL (ref 0–0.2)
BASOPHILS NFR BLD: 0.8 % (ref 0–1.9)
BILIRUB SERPL-MCNC: 0.8 MG/DL (ref 0.1–1)
BUN SERPL-MCNC: 40 MG/DL (ref 8–23)
CALCIUM SERPL-MCNC: 8.9 MG/DL (ref 8.7–10.5)
CHLORIDE SERPL-SCNC: 101 MMOL/L (ref 95–110)
CO2 SERPL-SCNC: 28 MMOL/L (ref 23–29)
CREAT SERPL-MCNC: 1.5 MG/DL (ref 0.5–1.4)
DIFFERENTIAL METHOD: ABNORMAL
EOSINOPHIL # BLD AUTO: 0.7 K/UL (ref 0–0.5)
EOSINOPHIL NFR BLD: 9.2 % (ref 0–8)
ERYTHROCYTE [DISTWIDTH] IN BLOOD BY AUTOMATED COUNT: 13.7 % (ref 11.5–14.5)
EST. GFR  (NO RACE VARIABLE): 44.5 ML/MIN/1.73 M^2
GLUCOSE SERPL-MCNC: 145 MG/DL (ref 70–110)
HCT VFR BLD AUTO: 42.7 % (ref 40–54)
HGB BLD-MCNC: 14 G/DL (ref 14–18)
IMM GRANULOCYTES # BLD AUTO: 0.05 K/UL (ref 0–0.04)
IMM GRANULOCYTES NFR BLD AUTO: 0.6 % (ref 0–0.5)
LYMPHOCYTES # BLD AUTO: 1.6 K/UL (ref 1–4.8)
LYMPHOCYTES NFR BLD: 20.8 % (ref 18–48)
MAGNESIUM SERPL-MCNC: 1.9 MG/DL (ref 1.6–2.6)
MCH RBC QN AUTO: 30.4 PG (ref 27–31)
MCHC RBC AUTO-ENTMCNC: 32.8 G/DL (ref 32–36)
MCV RBC AUTO: 93 FL (ref 82–98)
MONOCYTES # BLD AUTO: 0.6 K/UL (ref 0.3–1)
MONOCYTES NFR BLD: 7.3 % (ref 4–15)
NEUTROPHILS # BLD AUTO: 4.7 K/UL (ref 1.8–7.7)
NEUTROPHILS NFR BLD: 61.3 % (ref 38–73)
NRBC BLD-RTO: 0 /100 WBC
PLATELET # BLD AUTO: 99 K/UL (ref 150–450)
PMV BLD AUTO: 10.2 FL (ref 9.2–12.9)
POTASSIUM SERPL-SCNC: 4.4 MMOL/L (ref 3.5–5.1)
PROT SERPL-MCNC: 6.4 G/DL (ref 6–8.4)
RBC # BLD AUTO: 4.61 M/UL (ref 4.6–6.2)
SODIUM SERPL-SCNC: 137 MMOL/L (ref 136–145)
WBC # BLD AUTO: 7.71 K/UL (ref 3.9–12.7)

## 2023-03-29 PROCEDURE — 83735 ASSAY OF MAGNESIUM: CPT | Performed by: INTERNAL MEDICINE

## 2023-03-29 PROCEDURE — 25000242 PHARM REV CODE 250 ALT 637 W/ HCPCS: Performed by: INTERNAL MEDICINE

## 2023-03-29 PROCEDURE — 80053 COMPREHEN METABOLIC PANEL: CPT | Performed by: HOSPITALIST

## 2023-03-29 PROCEDURE — 94640 AIRWAY INHALATION TREATMENT: CPT

## 2023-03-29 PROCEDURE — 25000003 PHARM REV CODE 250: Performed by: NURSE PRACTITIONER

## 2023-03-29 PROCEDURE — 36415 COLL VENOUS BLD VENIPUNCTURE: CPT | Performed by: INTERNAL MEDICINE

## 2023-03-29 PROCEDURE — 63600175 PHARM REV CODE 636 W HCPCS: Performed by: INTERNAL MEDICINE

## 2023-03-29 PROCEDURE — 25000003 PHARM REV CODE 250: Performed by: INTERNAL MEDICINE

## 2023-03-29 PROCEDURE — 85025 COMPLETE CBC W/AUTO DIFF WBC: CPT | Performed by: INTERNAL MEDICINE

## 2023-03-29 PROCEDURE — 94761 N-INVAS EAR/PLS OXIMETRY MLT: CPT

## 2023-03-29 PROCEDURE — 99900031 HC PATIENT EDUCATION (STAT)

## 2023-03-29 PROCEDURE — 99900035 HC TECH TIME PER 15 MIN (STAT)

## 2023-03-29 PROCEDURE — 97530 THERAPEUTIC ACTIVITIES: CPT | Mod: CQ

## 2023-03-29 PROCEDURE — 97116 GAIT TRAINING THERAPY: CPT | Mod: CQ

## 2023-03-29 PROCEDURE — 94799 UNLISTED PULMONARY SVC/PX: CPT

## 2023-03-29 RX ORDER — FUROSEMIDE 40 MG/1
40 TABLET ORAL DAILY
Qty: 30 TABLET | Refills: 0 | Status: SHIPPED | OUTPATIENT
Start: 2023-03-30 | End: 2023-04-29

## 2023-03-29 RX ORDER — PANTOPRAZOLE SODIUM 40 MG/1
40 TABLET, DELAYED RELEASE ORAL DAILY
Qty: 30 TABLET | Refills: 0 | Status: SHIPPED | OUTPATIENT
Start: 2023-03-30 | End: 2023-04-29

## 2023-03-29 RX ORDER — ISOSORBIDE MONONITRATE 30 MG/1
30 TABLET, EXTENDED RELEASE ORAL DAILY
Qty: 30 TABLET | Refills: 0 | Status: SHIPPED | OUTPATIENT
Start: 2023-03-30 | End: 2023-04-29

## 2023-03-29 RX ORDER — LEVOFLOXACIN 750 MG/1
750 TABLET ORAL EVERY OTHER DAY
Qty: 3 TABLET | Refills: 0 | Status: SHIPPED | OUTPATIENT
Start: 2023-03-31 | End: 2023-04-05

## 2023-03-29 RX ORDER — CLOPIDOGREL BISULFATE 75 MG/1
75 TABLET ORAL DAILY
Qty: 30 TABLET | Refills: 0 | Status: SHIPPED | OUTPATIENT
Start: 2023-03-30 | End: 2023-04-29

## 2023-03-29 RX ORDER — NAPROXEN SODIUM 220 MG/1
81 TABLET, FILM COATED ORAL DAILY
Qty: 30 TABLET | Refills: 0 | Status: SHIPPED | OUTPATIENT
Start: 2023-03-30 | End: 2023-04-29

## 2023-03-29 RX ORDER — METOPROLOL TARTRATE 50 MG/1
50 TABLET ORAL 2 TIMES DAILY
Qty: 60 TABLET | Refills: 0 | Status: SHIPPED | OUTPATIENT
Start: 2023-03-29 | End: 2023-04-28

## 2023-03-29 RX ADMIN — IPRATROPIUM BROMIDE 0.5 MG: 0.5 SOLUTION RESPIRATORY (INHALATION) at 08:03

## 2023-03-29 RX ADMIN — CYANOCOBALAMIN 1000 MCG: 1000 INJECTION, SOLUTION INTRAMUSCULAR at 09:03

## 2023-03-29 RX ADMIN — ARFORMOTEROL TARTRATE 15 MCG: 15 SOLUTION RESPIRATORY (INHALATION) at 08:03

## 2023-03-29 RX ADMIN — FUROSEMIDE 40 MG: 40 TABLET ORAL at 09:03

## 2023-03-29 RX ADMIN — LEVOTHYROXINE SODIUM 25 MCG: 0.03 TABLET ORAL at 05:03

## 2023-03-29 RX ADMIN — ACETAMINOPHEN 650 MG: 325 TABLET ORAL at 10:03

## 2023-03-29 RX ADMIN — ASPIRIN 81 MG CHEWABLE TABLET 81 MG: 81 TABLET CHEWABLE at 09:03

## 2023-03-29 RX ADMIN — IPRATROPIUM BROMIDE 0.5 MG: 0.5 SOLUTION RESPIRATORY (INHALATION) at 03:03

## 2023-03-29 RX ADMIN — ISOSORBIDE MONONITRATE 30 MG: 30 TABLET, EXTENDED RELEASE ORAL at 09:03

## 2023-03-29 RX ADMIN — CLOPIDOGREL BISULFATE 75 MG: 75 TABLET, FILM COATED ORAL at 09:03

## 2023-03-29 RX ADMIN — BUDESONIDE INHALATION 0.5 MG: 0.5 SUSPENSION RESPIRATORY (INHALATION) at 08:03

## 2023-03-29 RX ADMIN — PANTOPRAZOLE SODIUM 40 MG: 40 TABLET, DELAYED RELEASE ORAL at 05:03

## 2023-03-29 RX ADMIN — LEVOFLOXACIN 750 MG: 750 TABLET, FILM COATED ORAL at 05:03

## 2023-03-29 RX ADMIN — CARBOXYMETHYLCELLULOSE SODIUM 1 DROP: 5 SOLUTION/ DROPS OPHTHALMIC at 09:03

## 2023-03-29 NOTE — DISCHARGE SUMMARY
[facility]  Discharge Summary  Patient Name: Frank Wyatt MRN: [id number]   Patient Class: IP- Inpatient  Length of Stay: 4   Admission Date: 3/24/2023  9:52 AM Attending Physician: Ayesha Chavez MD   Primary Care Provider: Darci German MD Face-to-Face encounter date: 03/29/2023   Chief Complaint: abnormal labs (Called from ER for elevated troponin level this morning)    Date of Discharge: 3/29/2023  Discharge Disposition:  Home or Self Care  Condition: Stable       Reason for Hospitalization     Active Hospital Problems    Diagnosis    *Dyspnea    Acute on chronic diastolic heart failure    Valvular heart disease, 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 The patient presented to [facility] on 3/24/2023 with a primary complaint of abnormal labs (Called from ER for elevated troponin level this morning)  .     For the full HPI please refer to the History & Physical from this admission.    Hospital Course By Problem with Pertinent Findings     Assumed care of this patient on 3/25/23. Patient is hard of hearing and collateral from daughter at bedside.  Patient with known history of CAD status post remote CABG X 5  1996 followed by cardiologist Dr. Kim and reported negative nuclear stress test December 2022, COPD, not on home oxygen, BPH, hypothyroidism, CKD stage 3 who initially presented to the ED with acute onset of shortness of breath.  Patient was awoken around 2:30 a.m. with shortness of breath, daughter states he was experiencing headache prior, new onset.  He was initially evaluated in the ED with workup done and diagnosed with COPD exacerbation, discharge to complete course of doxycycline and prednisone however recall to the ED when 2nd high sensitivity troponin reported elevated at 60.  In the ED EKG with sinus rhythm with left bundle-branch block, denies any chest pain, D-dimer 0.94, V/Q scan low probability PE, ultrasound leg no DVT, echocardiogram EF of 50% with moderate to severe MS/AS, he was placed on heparin drip and admitted to telemetry floor with cardiology consult.  On 03/25 troponin downtrending, he feels back to his baseline, awaiting Cardiology recommendations.  Subsequently in afternoon patient had episode of acute shortness of breath, tachycardic, wheezing noted, later febrile to 101.2, blood cultures sent, initiated on antibiotics, cardiology started on iv lasix.         Interval History:  Patient is seen and examined with daughter at bedside.  No further episodes of acute dyspnea.  States he has been urinating more.  Daughter is emotional, states she just discussed with Cardiology and was told his options are limited, she is unsure if going to [facility] will offer any options.  She states what is most important to patient is to return to assisted living facility.  He did ambulate the hallway with therapy using a cane, which is his baseline.  Afebrile with T-max 98°.  Labs with platelet 89, BUN/creatinine 45/1.6.  Blood culture no growth to date.  Urine output 1050.  Discussed with daughter at bedside.  Questions addressed.     03/28/2023 - doing much better. Breathing has improved. Discussed care with  "daughter at bedside. They are planning to follow up at [facility] for revaluation of severe MS and AS. D/w with cardiology. Not cleared for discharge from Dr. Olea standpoint      03/29 - doing much better. Cardiology cleared for discharge.     Patient was seen and examined on the date of discharge and determined to be suitable for discharge.    Physical Exam  /62   Pulse 86   Temp 97.7 °F (36.5 °C) (Oral)   Resp 18   Ht 5' 7" (1.702 m)   Wt 89.2 kg (196 lb 10.4 oz)   SpO2 95%   BMI 30.80 kg/m²   Vitals reviewed.    Constitutional: No distress.   HENT: Atraumatic.   Cardiovascular: Normal rate, regular rhythm and normal heart sounds.   Pulmonary/Chest: Effort normal. Clear to auscultation bilaterally. No wheezes.   Abdominal: Soft. Bowel sounds are normal. Exhibits no distension and no mass. No tenderness  Neurological: Alert.   Skin: Skin is warm and dry.     Following labs were Reviewed   Recent Labs   Lab 03/29/23  0346   WBC 7.71   HGB 14.0   HCT 42.7   PLT 99*   CALCIUM 8.9   ALBUMIN 3.2*   PROT 6.4      K 4.4   CO2 28      BUN 40*   CREATININE 1.5*   ALKPHOS 104   ALT 90*   AST 52*   BILITOT 0.8     No results found for: POCTGLUCOSE     All labs within the past 24 hours have been reviewed    Microbiology Results (last 7 days)       Procedure Component Value Units Date/Time    Blood culture [[id number]] Collected: 03/25/23 1616    Order Status: Completed Specimen: Blood Updated: 03/28/23 1832     Blood Culture, Routine No Growth to date      No Growth to date      No Growth to date      No Growth to date    Narrative:      Collection has been rescheduled by ATN at 03/25/2023 16:17 Reason:   Done blood cultures and gold top  Collection has been rescheduled by ATN at 03/25/2023 16:17 Reason:   Done blood cultures and gold top    Urine culture [[id number]] Collected: 03/24/23 1400    Order Status: Completed Specimen: Urine Updated: 03/26/23 0814     Urine Culture, Routine No growth    " Narrative:      Specimen Source->Urine          X-Ray Chest AP Portable   Final Result      X-Ray Chest AP Portable   Final Result      NM Lung Scan Ventilation Perfusion   Final Result      US Lower Extremity Veins Bilateral   Final Result          No results found for this or any previous visit.      Consultants and Procedures   Consultants:  Consults (From admission, onward)          Status Ordering Provider     Inpatient consult to Midline team  Once        Provider:  (Not yet assigned)    Acknowledged SARAH HARRIS     Inpatient consult to Cardiology  Once        Provider:  Med Olea MD    Completed JOHN BURTON     Inpatient consult to Internal Medicine  Once        Provider:  John Burton MD    Acknowledged FRANK RIVERA            Procedures:   * No surgery found *     Discharge Information:   Diet:  Resume cardiac diet    Physical Activity:  Activity as tolerated    Instructions:  1. Take all medications as prescribed  2. Keep all follow-up appointments  3. Return to the hospital or call your primary care physicians if any worsening symptoms such as chest pain, shortness of breath occur.    Follow-Up Appointments:  Please call your primary care physician to schedule an appointment in 1 week time.     Follow-up Information       Darci German MD Follow up in 1 week(s).    Specialty: Family Medicine  Contact information:  [address] [place] [id number]  [phone]                               Pending laboratory work/Tests to be performed/followed by the Primary care Physician: None    The patient was discharged in the care of her parents//wife/family/caregiver, with discharge instructions were reviewed in written and verbal form. All pertinent questions were discussed and prescriptions were provided. The importance of making follow up appointments and compliance of medications has been stressed repeatedly. The patient will follow up in 1 week or sooner as needed with  the PCP, and the patient is on board with the plan. Upon discharge, patient needs to be on following medications.    Discharge Medications:     Medication List        START taking these medications      albuterol 90 mcg/actuation inhaler  Commonly known as: PROVENTIL/VENTOLIN HFA  Inhale 2 puffs into the lungs every 4 (four) hours as needed for Wheezing. Rescue     aspirin 81 MG Chew  Take 1 tablet (81 mg total) by mouth once daily.  Start taking on: March 30, 2023     clopidogreL 75 mg tablet  Commonly known as: PLAVIX  Take 1 tablet (75 mg total) by mouth once daily.  Start taking on: March 30, 2023     isosorbide mononitrate 30 MG 24 hr tablet  Commonly known as: IMDUR  Take 1 tablet (30 mg total) by mouth once daily.  Start taking on: March 30, 2023     levoFLOXacin 750 MG tablet  Commonly known as: LEVAQUIN  Take 1 tablet (750 mg total) by mouth every other day. for 3 doses  Start taking on: March 31, 2023     pantoprazole 40 MG tablet  Commonly known as: PROTONIX  Take 1 tablet (40 mg total) by mouth once daily.  Start taking on: March 30, 2023            CHANGE how you take these medications      furosemide 40 MG tablet  Commonly known as: LASIX  Take 1 tablet (40 mg total) by mouth once daily.  Start taking on: March 30, 2023  What changed:   medication strength  how much to take  additional instructions     metoprolol tartrate 50 MG tablet  Commonly known as: LOPRESSOR  Take 1 tablet (50 mg total) by mouth 2 (two) times daily.  What changed:   medication strength  how much to take            CONTINUE taking these medications      atorvastatin 40 MG tablet  Commonly known as: LIPITOR  Take 1 tablet (40 mg total) by mouth every evening.     levothyroxine 25 MCG tablet  Commonly known as: SYNTHROID     SYMBICORT 160-4.5 mcg/actuation Hfaa  Generic drug: budesonide-formoterol 160-4.5 mcg     tamsulosin 0.4 mg Cap  Commonly known as: FLOMAX            STOP taking these medications      amlodipine-benazepril 5-20  mg 5-20 mg per capsule  Commonly known as: LOTREL     cefUROXime 500 MG tablet  Commonly known as: CEFTIN     ciprofloxacin HCl 250 MG tablet  Commonly known as: CIPRO     dextran 70-hypromellose 0.1-0.3 % Drop     doxycycline 100 MG Cap  Commonly known as: VIBRAMYCIN     glipiZIDE 5 MG tablet  Commonly known as: GLUCOTROL     hydrALAZINE 25 MG tablet  Commonly known as: APRESOLINE     hydrALAZINE 50 MG tablet  Commonly known as: APRESOLINE     hydrocortisone 1 % cream     metoprolol succinate 25 MG 24 hr tablet  Commonly known as: TOPROL-XL     nitrofurantoin 100 MG capsule  Commonly known as: MACRODANTIN     predniSONE 20 MG tablet  Commonly known as: DELTASONE     traMADoL 50 mg tablet  Commonly known as: ULTRAM               Where to Get Your Medications        Information about where to get these medications is not yet available    Ask your nurse or doctor about these medications  aspirin 81 MG Chew  clopidogreL 75 mg tablet  furosemide 40 MG tablet  isosorbide mononitrate 30 MG 24 hr tablet  levoFLOXacin 750 MG tablet  metoprolol tartrate 50 MG tablet  pantoprazole 40 MG tablet           I spent 40 minutes preparing the discharge including reviewing records from previous encounters, preparation of discharge summary, assessing and final examination of the patient, discharge medicine reconciliation, discussing plan of care, follow up and education and prescriptions.       Ayesha Chavez  [facility] Hospitalist  03/29/2023

## 2023-03-29 NOTE — CARE UPDATE
03/29/23 0801   Patient Assessment/Suction   Level of Consciousness (AVPU) alert   Respiratory Effort Unlabored   Expansion/Accessory Muscles/Retractions no use of accessory muscles;no retractions   All Lung Fields Breath Sounds diminished   PRE-TX-O2   Device (Oxygen Therapy) room air   SpO2 95 %   Pulse Oximetry Type Intermittent   $ Pulse Oximetry - Multiple Charge Pulse Oximetry - Multiple   Pulse 86   Resp 18   Aerosol Therapy   $ Aerosol Therapy Charges Aerosol Treatment   Daily Review of Necessity (SVN) completed   Respiratory Treatment Status (SVN) given   Treatment Route (SVN) mask;oxygen   Patient Position (SVN) HOB elevated   Post Treatment Assessment (SVN) increased aeration   Signs of Intolerance (SVN) none   Education   $ Education Bronchodilator;15 min   Respiratory Evaluation   $ Care Plan Tech Time 15 min   $ Eval/Re-eval Charges Re-evaluation

## 2023-03-29 NOTE — PLAN OF CARE
03/29/23 1256   Final Note   Assessment Type Final Discharge Note   Anticipated Discharge Disposition Home-Health   What phone number can be called within the next 1-3 days to see how you are doing after discharge? [phone]   Hospital Resources/Appts/Education Provided Post-Acute resouces added to AVS   Post-Acute Status   Post-Acute Authorization Home Health;HME   HME Status Set-up Complete/Auth obtained   Home Health Status Set-up Complete/Auth obtained   Discharge Delays None known at this time     Patient to discharge home with [facility] and will be seen 3/30/23.  Patient cleared for discharge from case management standpoint.    Follow up appointments scheduled and added to AVS.    Chart and discharge orders reviewed.  Patient discharged home with no further case management needs.

## 2023-03-29 NOTE — CARE UPDATE
03/28/23 2030   Patient Assessment/Suction   Respiratory Effort Unlabored   All Lung Fields Breath Sounds diminished   Rhythm/Pattern, Respiratory pattern regular   PRE-TX-O2   Device (Oxygen Therapy) room air   SpO2 (!) 92 %   Pulse 84   Resp 16   Aerosol Therapy   $ Aerosol Therapy Charges Aerosol Treatment   Respiratory Treatment Status (SVN) given   Treatment Route (SVN) mask   Patient Position (SVN) HOB elevated   Post Treatment Assessment (SVN) increased aeration   Signs of Intolerance (SVN) none   Breath Sounds Post-Respiratory Treatment   Throughout All Fields Post-Treatment All Fields   Throughout All Fields Post-Treatment aeration increased   Post-treatment Heart Rate (beats/min) 84   Post-treatment Resp Rate (breaths/min) 16   Education   $ Education 15 min;Bronchodilator   Respiratory Evaluation   $ Care Plan Tech Time 15 min   $ Eval/Re-eval Charges Re-evaluation   Evaluation For   (CARE PLAN)

## 2023-03-29 NOTE — PT/OT/SLP PROGRESS
Physical Therapy Treatment    Patient Name:  Frank Wyatt   MRN:  [id number]    Recommendations:     Discharge Recommendations: home health PT  Discharge Equipment Recommendations: walker, rolling (pt requests RW as he feels more steady)  Barriers to discharge: None    Assessment:     Frank Wyatt is a 88 y.o. male admitted with a medical diagnosis of Dyspnea.  He presents with the following impairments/functional limitations: weakness, impaired endurance, impaired functional mobility, gait instability, impaired balance, decreased lower extremity function, decreased safety awareness, impaired cardiopulmonary response to activity.    Pt up in chair with daughter present. Nursing students present changing pt's bed linens. Nursing student reports that pt had a headache and was given tylenol. Daughter reports that a RW was delivered for pt, she set it up for his height, and he used it to ambulate to the bathroom. Daughter reports that she noticed it made a weird noise and one of the wheels did not seem to be turning. Left wheel noted to be to tight and unable to spin.  Pt with improved endurance and decreased shortness of breath during ambulation. Pt ambulated 150' with RW and supervision. Pt with good pacing and good management of RW without need for verbal cuing.    Therapist returned and was able to loosen wheel with wrench so that both wheels were spinning efficiently. Discussed with daughter if pt's need for RW would be permanent or if it would be for a short period of time until he was stronger. Pt to have home health PT and they will be able to assess if he will continue to require RW.    Rehab Prognosis: Fair; patient would benefit from acute skilled PT services to address these deficits and reach maximum level of function.    Recent Surgery: * No surgery found *      Plan:     During this hospitalization, patient to be seen 6 x/week to address the identified rehab impairments via gait training,  therapeutic activities, therapeutic exercises and progress toward the following goals:    Plan of Care Expires:  23    Subjective     Chief Complaint: daughter reports that the left wheel on the RW is not rolling well  Patient/Family Comments/goals: to go home  Pain/Comfort:  Pain Rating 1: 0/10      Objective:     Communicated with RN prior to session.  Patient found up in chair with peripheral IV, telemetry upon PT entry to room.     General Precautions: Standard, fall, respiratory  Orthopedic Precautions: N/A  Braces: N/A  Respiratory Status: Room air     Functional Mobility:  Transfers:     Sit to Stand:  stand by assistance with no AD  Gait: x 150' with RW and supervision. Good pacing, reciprocal gait, and decreased shortness of breath      AM-PAC 6 CLICK MOBILITY          Treatment & Education:  Pt educated on importance of time OOB, importance of intermittent mobility, safe techniques for transfers/ambulation, discharge recommendations/options, and use of call light for assistance and fall prevention.      Patient left up in chair with all lines intact, call button in reach, RN notified, and daughter present..    GOALS:   Multidisciplinary Problems       Physical Therapy Goals          Problem: Physical Therapy    Goal Priority Disciplines Outcome Goal Variances Interventions   Physical Therapy Goal     PT, PT/OT Ongoing, Progressing     Description: Goals to be met by: 23    Patient will increase functional independence with mobility by performin. Supine to sit with Supervision  2. Sit to stand transfer with Supervision  3. Bed to chair transfer with Supervision using Rolling Walker or Cane  4. Gait  x 250 feet with Supervision using Rolling Walker or Cane.   5. Lower extremity exercise program x20 reps per handout, with supervision                       Time Tracking:     PT Received On: 23  PT Start Time: 1015     PT Stop Time: 1039  PT Total Time (min): 24 min     Billable Minutes:  Gait Training 9 and Therapeutic Activity 15    Treatment Type: Treatment  PT/PTA: PTA     Number of PTA visits since last PT visit: 3     03/29/2023

## 2023-03-29 NOTE — DISCHARGE INSTRUCTIONS
Diet:  Resume cardiac diet     Physical Activity:  Activity as tolerated     Instructions:  1. Take all medications as prescribed  2. Keep all follow-up appointments  3. Return to the hospital or call your primary care physicians if any worsening symptoms such as chest pain, shortness of breath occur.     Follow-Up Appointments:  Please call your primary care physician to schedule an appointment in 1 week time.

## 2023-03-30 LAB — BACTERIA BLD CULT: NORMAL

## 2023-04-03 ENCOUNTER — OFFICE VISIT (OUTPATIENT)
Dept: CARDIOLOGY | Facility: CLINIC | Age: 88
End: 2023-04-03
Payer: MEDICARE

## 2023-04-03 VITALS
SYSTOLIC BLOOD PRESSURE: 132 MMHG | WEIGHT: 196.44 LBS | HEIGHT: 65 IN | DIASTOLIC BLOOD PRESSURE: 62 MMHG | OXYGEN SATURATION: 96 % | BODY MASS INDEX: 32.73 KG/M2 | HEART RATE: 72 BPM

## 2023-04-03 DIAGNOSIS — I35.0 MODERATE AORTIC STENOSIS: ICD-10-CM

## 2023-04-03 DIAGNOSIS — J44.9 CHRONIC OBSTRUCTIVE PULMONARY DISEASE, UNSPECIFIED COPD TYPE: Chronic | ICD-10-CM

## 2023-04-03 DIAGNOSIS — Z95.1 S/P CABG X 5: Chronic | ICD-10-CM

## 2023-04-03 DIAGNOSIS — I35.0 NONRHEUMATIC AORTIC VALVE STENOSIS: ICD-10-CM

## 2023-04-03 DIAGNOSIS — I34.2 NONRHEUMATIC MITRAL VALVE STENOSIS: ICD-10-CM

## 2023-04-03 DIAGNOSIS — N18.31 STAGE 3A CHRONIC KIDNEY DISEASE: Chronic | ICD-10-CM

## 2023-04-03 DIAGNOSIS — I05.0 MODERATE MITRAL STENOSIS: ICD-10-CM

## 2023-04-03 PROCEDURE — 99214 PR OFFICE/OUTPT VISIT, EST, LEVL IV, 30-39 MIN: ICD-10-PCS | Mod: S$GLB,,, | Performed by: INTERNAL MEDICINE

## 2023-04-03 PROCEDURE — 1159F MED LIST DOCD IN RCRD: CPT | Mod: CPTII,S$GLB,, | Performed by: INTERNAL MEDICINE

## 2023-04-03 PROCEDURE — 1126F AMNT PAIN NOTED NONE PRSNT: CPT | Mod: CPTII,S$GLB,, | Performed by: INTERNAL MEDICINE

## 2023-04-03 PROCEDURE — 1157F PR ADVANCE CARE PLAN OR EQUIV PRESENT IN MEDICAL RECORD: ICD-10-PCS | Mod: CPTII,S$GLB,, | Performed by: INTERNAL MEDICINE

## 2023-04-03 PROCEDURE — 99999 PR PBB SHADOW E&M-EST. PATIENT-LVL IV: ICD-10-PCS | Mod: PBBFAC,,, | Performed by: INTERNAL MEDICINE

## 2023-04-03 PROCEDURE — 99214 OFFICE O/P EST MOD 30 MIN: CPT | Mod: S$GLB,,, | Performed by: INTERNAL MEDICINE

## 2023-04-03 PROCEDURE — 1111F PR DISCHARGE MEDS RECONCILED W/ CURRENT OUTPATIENT MED LIST: ICD-10-PCS | Mod: CPTII,S$GLB,, | Performed by: INTERNAL MEDICINE

## 2023-04-03 PROCEDURE — 1157F ADVNC CARE PLAN IN RCRD: CPT | Mod: CPTII,S$GLB,, | Performed by: INTERNAL MEDICINE

## 2023-04-03 PROCEDURE — 1159F PR MEDICATION LIST DOCUMENTED IN MEDICAL RECORD: ICD-10-PCS | Mod: CPTII,S$GLB,, | Performed by: INTERNAL MEDICINE

## 2023-04-03 PROCEDURE — 99999 PR PBB SHADOW E&M-EST. PATIENT-LVL IV: CPT | Mod: PBBFAC,,, | Performed by: INTERNAL MEDICINE

## 2023-04-03 PROCEDURE — 1126F PR PAIN SEVERITY QUANTIFIED, NO PAIN PRESENT: ICD-10-PCS | Mod: CPTII,S$GLB,, | Performed by: INTERNAL MEDICINE

## 2023-04-03 PROCEDURE — 1111F DSCHRG MED/CURRENT MED MERGE: CPT | Mod: CPTII,S$GLB,, | Performed by: INTERNAL MEDICINE

## 2023-04-03 NOTE — ASSESSMENT & PLAN NOTE
Patient with moderate to severe aortic stenosis on echocardiogram, he is not having symptoms at this time.  Volume status well controlled on current on current lasix dose  Plan to follow up in 6 months with repeat echo

## 2023-04-03 NOTE — ASSESSMENT & PLAN NOTE
Patient had bypass surgery in 1996, he has not had any coronary intervention or coronary angiogram since his bypass surgery  Currently on aspirin 81mg PO daily and plavix 75mg PO daily   Followed by Dr. Kim as an outpatient, seen by Dr. Olea while he was an inpatient recently  Continue high intensity statin

## 2023-04-03 NOTE — ASSESSMENT & PLAN NOTE
Echo reviewed, significant for MAC  Plan to continue medical management with beta blockers  Continue metoprolol tartrate 50mg PO BID

## 2023-06-26 PROBLEM — I21.4 NSTEMI (NON-ST ELEVATED MYOCARDIAL INFARCTION): Status: RESOLVED | Noted: 2023-03-24 | Resolved: 2023-06-26

## 2023-07-28 ENCOUNTER — HOSPITAL ENCOUNTER (EMERGENCY)
Facility: HOSPITAL | Age: 88
Discharge: HOME | End: 2023-07-28
Attending: EMERGENCY MEDICINE
Payer: MEDICARE

## 2023-07-28 VITALS
BODY MASS INDEX: 29.19 KG/M2 | WEIGHT: 186 LBS | OXYGEN SATURATION: 96 % | DIASTOLIC BLOOD PRESSURE: 65 MMHG | HEIGHT: 67 IN | TEMPERATURE: 98 F | RESPIRATION RATE: 16 BRPM | HEART RATE: 63 BPM | SYSTOLIC BLOOD PRESSURE: 149 MMHG

## 2023-07-28 DIAGNOSIS — R52 PAIN: ICD-10-CM
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
## 2023-07-28 DIAGNOSIS — S51.012A SKIN TEAR OF LEFT ELBOW WITHOUT COMPLICATION, INITIAL ENCOUNTER: ICD-10-CM

## 2023-07-28 PROCEDURE — 99284 EMERGENCY DEPT VISIT MOD MDM: CPT | Mod: 25

## 2023-07-28 PROCEDURE — 25000003 PHARM REV CODE 250: Performed by: STUDENT IN AN ORGANIZED HEALTH CARE EDUCATION/TRAINING PROGRAM

## 2023-07-28 RX ORDER — NAPROXEN 500 MG/1
500 TABLET ORAL 2 TIMES DAILY WITH MEALS
Qty: 10 TABLET | Refills: 0 | Status: SHIPPED | OUTPATIENT
Start: 2023-07-28 | End: 2023-08-02

## 2023-07-28 RX ORDER — MUPIROCIN 20 MG/G
OINTMENT TOPICAL
Status: COMPLETED | OUTPATIENT
Start: 2023-07-28 | End: 2023-07-28

## 2023-07-28 RX ORDER — ACETAMINOPHEN 500 MG
1000 TABLET ORAL
Status: COMPLETED | OUTPATIENT
Start: 2023-07-28 | End: 2023-07-28

## 2023-07-28 RX ADMIN — MUPIROCIN 1 G: 20 OINTMENT TOPICAL at 07:07

## 2023-07-28 RX ADMIN — ACETAMINOPHEN 1000 MG: 500 TABLET ORAL at 07:07

## 2023-07-28 NOTE — FIRST PROVIDER EVALUATION
"Medical screening examination initiated.  I have conducted a focused provider triage encounter, findings are as follows:    Brief history of present illness:  Slip and fall, mild trauma to the left hand and left elbow.  No loss of consciousness, did not hit his head.    Vitals:    07/28/23 1753   BP: (!) 149/61   Patient Position: Lying   Pulse: 70   Resp: 16   Temp: 97.6 °F (36.4 °C)   TempSrc: Oral   SpO2: 96%   Weight: 84.4 kg (186 lb)   Height: 5' 7" (1.702 m)       Pertinent physical exam:  Skin tears    Brief workup plan:  CT, x-ray    Preliminary workup initiated; this workup will be continued and followed by the physician or advanced practice provider that is assigned to the patient when roomed.  "

## 2023-07-28 NOTE — ED PROVIDER NOTES
Encounter Date: 7/28/2023       History     Chief Complaint   Patient presents with    Fall     Coming with EMS from assisted living for a fall. Left elbow skin tear, hematoma noted to left forearm, dressed by EMS. No blood thinners, No LOC     88-year-old male presents emergency room today for evaluation of mechanical ground level fall.  Patient was reaching above his head whenever he tripped and fell, landing on his left knee, left elbow hand and scraping his right chin.  Did not lose consciousness.  He is not on any anticoagulant medications.    Review of patient's allergies indicates:   Allergen Reactions    Bactrim [sulfamethoxazole-trimethoprim] Hives     itched    Keflex [cephalexin] Rash    Morphine Rash     Patient had morphine and keflex at the same time, developed a rash, not sure which one caused it, or if it was a combination of the two meds.     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 2004    CORONARY ARTERY BYPASS GRAFT  1996    5-bypass     CYSTOSCOPY N/A 8/15/2019    Procedure: CYSTOSCOPY;  Surgeon: Dipak Sanchez MD;  Location: [facility] OR;  Service: Urology;  Laterality: N/A;    CYSTOSCOPY N/A 10/31/2019    Procedure: CYSTOSCOPY;  Surgeon: Dipak Sanchez MD;  Location: [facility] OR;  Service: Urology;  Laterality: N/A;    CYSTOSCOPY W/ URETERAL STENT REMOVAL Left 10/31/2019     Procedure: CYSTOSCOPY, WITH URETERAL STENT REMOVAL  URETEROSCOPY;  Surgeon: Dipak Sanchez MD;  Location: [facility] OR;  Service: Urology;  Laterality: Left;    EXTRACORPOREAL SHOCK WAVE LITHOTRIPSY Left 8/15/2019    Procedure: LITHOTRIPSY, ESWL;  Surgeon: Dipak Sanchez MD;  Location: [facility] OR;  Service: Urology;  Laterality: Left;    EXTRACORPOREAL SHOCK WAVE LITHOTRIPSY Left 2019    Procedure: LITHOTRIPSY, ESWL;  Surgeon: Dipak Sanchez MD;  Location: [facility] OR;  Service: Urology;  Laterality: Left;    EYE SURGERY Right     Cataract    EYE SURGERY Left 2017    Cataract    HERNIA REPAIR      Dr. Bailon - had to have mesh removed    INGUINAL HERNIA REPAIR Right 2019        LITHOTRIPSY      nephrostomy tube      PERCUTANEOUS NEPHROSTOMY      ROTATOR CUFF REPAIR      right shoulder    URETEROSCOPY Left 10/31/2019    Procedure: URETEROSCOPY;  Surgeon: Dipak Sanchez MD;  Location: [facility] OR;  Service: Urology;  Laterality: Left;     Family History   Problem Relation Age of Onset    Heart disease Mother     Hypertension Mother     Hypertension Sister     Heart disease Brother     Hypertension Brother     Cancer Daughter     Diabetes Brother     Heart disease Brother     Hypertension Brother     Hypertension Sister     Heart disease Sister      Social History     Tobacco Use    Smoking status: Former     Packs/day: 1.00     Years: 25.00     Pack years: 25.00     Types: Cigarettes     Quit date: 1972     Years since quittin.5    Smokeless tobacco: Former     Quit date: 8/15/1972   Substance Use Topics    Alcohol use: No    Drug use: No     Review of Systems   Constitutional:  Negative for chills, fatigue and fever.   HENT:  Negative for congestion, hearing loss, sore throat and trouble swallowing.    Eyes:  Negative for visual disturbance.   Respiratory:  Negative for cough, chest tightness and shortness of breath.    Cardiovascular:  Negative for chest pain.    Gastrointestinal:  Negative for abdominal pain and nausea.   Endocrine: Negative for polyuria.   Genitourinary:  Negative for difficulty urinating.   Musculoskeletal:  Positive for myalgias. Negative for arthralgias.   Skin:  Negative for rash.   Neurological:  Negative for dizziness and headaches.   Psychiatric/Behavioral:  The patient is not nervous/anxious.      Physical Exam     Initial Vitals [07/28/23 1753]   BP Pulse Resp Temp SpO2   (!) 149/61 70 16 97.6 °F (36.4 °C) 96 %      MAP       --         Physical Exam    Nursing note and vitals reviewed.  Constitutional: He appears well-developed and well-nourished.   HENT:   Head: Normocephalic.   Abrasions noted to the right chin.   Eyes: Conjunctivae and EOM are normal.   Neck: Neck supple.   Cardiovascular:  Normal rate, regular rhythm, normal heart sounds and intact distal pulses.           Pulmonary/Chest: Breath sounds normal. No respiratory distress.   Abdominal: Abdomen is soft. He exhibits no distension. There is no abdominal tenderness.   Musculoskeletal:      Cervical back: Neck supple.     Neurological: He is alert and oriented to person, place, and time. GCS score is 15. GCS eye subscore is 4. GCS verbal subscore is 5. GCS motor subscore is 6.   Skin: Skin is warm and dry. Capillary refill takes less than 2 seconds.   Skin tears noted to the left elbow, hematoma noted to the left dorsal hand.  Abrasion noted to the left knee.   Psychiatric: He has a normal mood and affect. His behavior is normal. Judgment and thought content normal.       ED Course   Procedures  Labs Reviewed - No data to display       Imaging Results              CT Cervical Spine Without Contrast (Final result)  Result time 07/28/23 19:54:26      Final result by Florencio Gonzalez MD (07/28/23 19:54:26)                   Narrative:    EXAM: CT BRAIN WITHOUT CONTRAST  EXAM: CT FACIAL BONES WITHOUT CONTRAST  EXAM: CT CERVICAL SPINE WITHOUT CONTRAST    DATE: 7/28/2023 6:42 PM  CDT    INDICATION: Head trauma. Neck trauma. Facial trauma.    COMPARISON: None available    TECHNIQUE: Axial CT images of the brain, maxillofacial bones, and cervical spine were obtained from the skull vertex to the lung apices. Sagittal and coronal reformats. CMS MANDATED QUALITY DATA - CT RADIATION 436. All CT scans at this facility utilize dose modulation, iterative reconstruction, and/or weight based dosing when appropriate to reduce radiation dose to as low as reasonably achievable.  IV contrast: None.  DLP(mSv): Refer to CT protocol form    FINDINGS:    : Noncontributory.    CT BRAIN:    There is no edema, hemorrhage, mass lesion or other acute intracranial abnormality.    Mild diffuse cerebral atrophy with commensurate ventricular dilation. Scattered periventricular subcortical hypodensities are noted bilaterally in keeping with microvascular ischemic disease. Choroid plexus calcifications are noted.    There is no fracture of the skull, skull base, or visible facial bones. 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    CT FACE:    Bones: No fracture or other acute bony abnormality is identified. The mandible is intact, and the temporomandibular joints are well-aligned. Mucoperiosteal thickening of the right maxillary and ethmoid sinus with moderate to severe right maxillary sinus disease.    Soft tissues: No soft tissue abnormality is identified. No globe contour abnormality is seen. There is no intraconal hematoma. No radiopaque foreign body is identified.    CT CERVICAL SPINE:    The spine is imaged from the skull base to the level of T1.    Bones: No acute fracture or malalignment is identified. Severe disc height loss with multilevel osteophytosis, uncovertebral and facet hypertrophic degenerative changes. Complete loss of cervical intervertebral disc height is most noted at C5-6 and C6-7.  The spinous processes are equidistant and the facets are well aligned.    Soft tissues: No acute soft tissue  abnormality is identified.    IMPRESSION:    1.  No acute intracranial abnormality.  2.  Moderate to severe right maxillary sinus disease.  3.  Multilevel cervical degenerative changes detailed above.        Electronically signed by:  Florencio Gonzalez DO  7/28/2023 7:54 PM CDT Workstation: [id number]                                     CT Maxillofacial Without Contrast (Final result)  Result time 07/28/23 19:54:26      Final result by Florencio Gonzalez MD (07/28/23 19:54:26)                   Narrative:    EXAM: CT BRAIN WITHOUT CONTRAST  EXAM: CT FACIAL BONES WITHOUT CONTRAST  EXAM: CT CERVICAL SPINE WITHOUT CONTRAST    DATE: 7/28/2023 6:42 PM CDT    INDICATION: Head trauma. Neck trauma. Facial trauma.    COMPARISON: None available    TECHNIQUE: Axial CT images of the brain, maxillofacial bones, and cervical spine were obtained from the skull vertex to the lung apices. Sagittal and coronal reformats. CMS MANDATED QUALITY DATA - CT RADIATION 436. All CT scans at this facility utilize dose modulation, iterative reconstruction, and/or weight based dosing when appropriate to reduce radiation dose to as low as reasonably achievable.  IV contrast: None.  DLP(mSv): Refer to CT protocol form    FINDINGS:    : Noncontributory.    CT BRAIN:    There is no edema, hemorrhage, mass lesion or other acute intracranial abnormality.    Mild diffuse cerebral atrophy with commensurate ventricular dilation. Scattered periventricular subcortical hypodensities are noted bilaterally in keeping with microvascular ischemic disease. Choroid plexus calcifications are noted.    There is no fracture of the skull, skull base, or visible facial bones. 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    CT FACE:    Bones: No fracture or other acute bony abnormality is identified. The mandible is intact, and the temporomandibular joints are well-aligned. Mucoperiosteal thickening of the right maxillary and ethmoid sinus with moderate to severe right  maxillary sinus disease.    Soft tissues: No soft tissue abnormality is identified. No globe contour abnormality is seen. There is no intraconal hematoma. No radiopaque foreign body is identified.    CT CERVICAL SPINE:    The spine is imaged from the skull base to the level of T1.    Bones: No acute fracture or malalignment is identified. Severe disc height loss with multilevel osteophytosis, uncovertebral and facet hypertrophic degenerative changes. Complete loss of cervical intervertebral disc height is most noted at C5-6 and C6-7.  The spinous processes are equidistant and the facets are well aligned.    Soft tissues: No acute soft tissue abnormality is identified.    IMPRESSION:    1.  No acute intracranial abnormality.  2.  Moderate to severe right maxillary sinus disease.  3.  Multilevel cervical degenerative changes detailed above.        Electronically signed by:  Florencio Gonzalez DO  7/28/2023 7:54 PM CDT Workstation: [id number]                                     CT Head Without Contrast (Final result)  Result time 07/28/23 19:54:26      Final result by Florencio Gonzalez MD (07/28/23 19:54:26)                   Narrative:    EXAM: CT BRAIN WITHOUT CONTRAST  EXAM: CT FACIAL BONES WITHOUT CONTRAST  EXAM: CT CERVICAL SPINE WITHOUT CONTRAST    DATE: 7/28/2023 6:42 PM CDT    INDICATION: Head trauma. Neck trauma. Facial trauma.    COMPARISON: None available    TECHNIQUE: Axial CT images of the brain, maxillofacial bones, and cervical spine were obtained from the skull vertex to the lung apices. Sagittal and coronal reformats. CMS MANDATED QUALITY DATA - CT RADIATION 436. All CT scans at this facility utilize dose modulation, iterative reconstruction, and/or weight based dosing when appropriate to reduce radiation dose to as low as reasonably achievable.  IV contrast: None.  DLP(mSv): Refer to CT protocol form    FINDINGS:    : Noncontributory.    CT BRAIN:    There is no edema, hemorrhage, mass lesion or  other acute intracranial abnormality.    Mild diffuse cerebral atrophy with commensurate ventricular dilation. Scattered periventricular subcortical hypodensities are noted bilaterally in keeping with microvascular ischemic disease. Choroid plexus calcifications are noted.    There is no fracture of the skull, skull base, or visible facial bones. 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    CT FACE:    Bones: No fracture or other acute bony abnormality is identified. The mandible is intact, and the temporomandibular joints are well-aligned. Mucoperiosteal thickening of the right maxillary and ethmoid sinus with moderate to severe right maxillary sinus disease.    Soft tissues: No soft tissue abnormality is identified. No globe contour abnormality is seen. There is no intraconal hematoma. No radiopaque foreign body is identified.    CT CERVICAL SPINE:    The spine is imaged from the skull base to the level of T1.    Bones: No acute fracture or malalignment is identified. Severe disc height loss with multilevel osteophytosis, uncovertebral and facet hypertrophic degenerative changes. Complete loss of cervical intervertebral disc height is most noted at C5-6 and C6-7.  The spinous processes are equidistant and the facets are well aligned.    Soft tissues: No acute soft tissue abnormality is identified.    IMPRESSION:    1.  No acute intracranial abnormality.  2.  Moderate to severe right maxillary sinus disease.  3.  Multilevel cervical degenerative changes detailed above.        Electronically signed by:  Florencio Gonzalez DO  7/28/2023 7:54 PM CDT Workstation: [id number]                                     X-Ray Hand 3 view Left (Final result)  Result time 07/28/23 19:12:45      Final result by David Austin MD (07/28/23 19:12:45)                   Narrative:    EXAM DESCRIPTION: XR HAND COMPLETE 3 VIEW LEFT    CLINICAL HISTORY: 88 years Male, pain    COMPARISON: None.    FINDINGS: AP, lateral, and oblique views of  the left hand were obtained. No fractures are seen. There is narrowing of the distal interphalangeal joints. The metacarpophalangeal joints are maintained. Calcific type densities are noted in the soft tissues overlying the second through fourth digits. Vascular calcifications are seen.    IMPRESSION:    1. No fractures are identified.  2. Osteoarthritic changes are noted.    Electronically signed by:  David Austin MD  7/28/2023 7:12 PM CDT Workstation: [id number]                                     X-Ray Elbow Complete Left (Final result)  Result time 07/28/23 19:13:41      Final result by David Austin MD (07/28/23 19:13:41)                   Narrative:    EXAM DESCRIPTION: XR ELBOW COMPLETE 3 VIEW LEFT    CLINICAL HISTORY: 88 years Male,    COMPARISON: None.    FINDINGS: AP, lateral, and oblique views of the left elbow were obtained. No fractures are seen. The radial head is in good alignment and appears to be intact. No evidence of a joint effusion is seen. Vascular calcifications are seen in the soft tissues.    IMPRESSION:  No fractures are identified.    Electronically signed by:  David Austin MD  7/28/2023 7:13 PM CDT Workstation: [id number]                                     Medications   acetaminophen tablet 1,000 mg (1,000 mg Oral Given 7/28/23 1956)   mupirocin 2 % ointment (1 g Topical (Top) Given 7/28/23 1956)     Medical Decision Making:   History:   I obtained history from: someone other than patient.       <> Summary of History: Daughter at bedside reports mechanical ground level fall.  Initial Assessment:   A year old male presents emergency room today for evaluation mechanical ground level fall in which he injured his left elbow, left hand and left knee.  Did not lose consciousness but did scrape his head.  Imaging ordered.  Differential Diagnosis:   Skin tear versus fracture versus dislocation, intracranial abnormality, cervical spine fracture versus dislocation.  Independently Interpreted Test(s):    I have ordered and independently interpreted X-rays - see summary below.       <> Summary of X-Ray Reading(s): Ordered and reviewed plain films of the elbow and hand.  No evidence acute fracture dislocation on either.  This represents my personal interpretation.  Clinical Tests:   Radiological Study: Ordered and Reviewed  ED Management:  Plain films as well as CT head, CT C-spine were ordered and personally reviewed.  No evidence for acute fracture dislocation throughout, no evidence of intracranial abnormality.  Patient was given Tylenol in the emergency room.  He will be discharged home in stable condition with prescription for naproxen and recommendation for Tylenol p.r.n. breakthrough pain.  Strict return precautions given.      Disposition:  Improved, discharged    I discussed the findings and plan of care with this patient.  All questions were answered to the patient's satisfaction.  Disposition plan as above.      This note was written using the assistance of a dictation program and may contain grammatical errors.                         Clinical Impression:   Final diagnoses:  [R52] Pain  [W19.XXXA] Fall, initial encounter (Primary)  [S51.012A] Skin tear of left elbow without complication, initial encounter        ED Disposition Condition    Discharge Stable          ED Prescriptions       Medication Sig Dispense Start Date End Date Auth. Provider    naproxen (NAPROSYN) 500 MG tablet Take 1 tablet (500 mg total) by mouth 2 (two) times daily with meals. for 5 days 10 tablet 7/28/2023 8/2/2023 Trav Spain PA-C          Follow-up Information    None          Trav Spain PA-C  07/28/23 8582

## 2023-09-04 ENCOUNTER — OFFICE VISIT (OUTPATIENT)
Dept: URGENT CARE | Facility: CLINIC | Age: 88
End: 2023-09-04
Payer: MEDICARE

## 2023-09-04 VITALS
BODY MASS INDEX: 30.1 KG/M2 | HEIGHT: 67 IN | HEART RATE: 75 BPM | RESPIRATION RATE: 20 BRPM | WEIGHT: 191.81 LBS | DIASTOLIC BLOOD PRESSURE: 72 MMHG | SYSTOLIC BLOOD PRESSURE: 131 MMHG | OXYGEN SATURATION: 94 % | TEMPERATURE: 98 F

## 2023-09-04 DIAGNOSIS — J02.9 SORE THROAT: ICD-10-CM

## 2023-09-04 DIAGNOSIS — R51.9 NONINTRACTABLE HEADACHE, UNSPECIFIED CHRONICITY PATTERN, UNSPECIFIED HEADACHE TYPE: ICD-10-CM

## 2023-09-04 DIAGNOSIS — J06.9 VIRAL URI: ICD-10-CM

## 2023-09-04 DIAGNOSIS — U07.1 COVID-19 VIRUS INFECTION: Primary | ICD-10-CM

## 2023-09-04 LAB
CTP QC/QA: YES
CTP QC/QA: YES
S PYO RRNA THROAT QL PROBE: NEGATIVE
SARS-COV-2 AG RESP QL IA.RAPID: POSITIVE

## 2023-09-04 PROCEDURE — 99214 OFFICE O/P EST MOD 30 MIN: CPT | Mod: S$GLB,,, | Performed by: NURSE PRACTITIONER

## 2023-09-04 PROCEDURE — 87880 STREP A ASSAY W/OPTIC: CPT | Mod: QW,,, | Performed by: NURSE PRACTITIONER

## 2023-09-04 PROCEDURE — 87811 SARS-COV-2 COVID19 W/OPTIC: CPT | Mod: QW,S$GLB,, | Performed by: NURSE PRACTITIONER

## 2023-09-04 PROCEDURE — 99214 PR OFFICE/OUTPT VISIT, EST, LEVL IV, 30-39 MIN: ICD-10-PCS | Mod: S$GLB,,, | Performed by: NURSE PRACTITIONER

## 2023-09-04 PROCEDURE — 87880 POCT RAPID STREP A: ICD-10-PCS | Mod: QW,,, | Performed by: NURSE PRACTITIONER

## 2023-09-04 PROCEDURE — 87811 SARS CORONAVIRUS 2 ANTIGEN POCT, MANUAL READ: ICD-10-PCS | Mod: QW,S$GLB,, | Performed by: NURSE PRACTITIONER

## 2023-09-04 RX ORDER — PROMETHAZINE HYDROCHLORIDE AND DEXTROMETHORPHAN HYDROBROMIDE 6.25; 15 MG/5ML; MG/5ML
5 SYRUP ORAL NIGHTLY PRN
Qty: 50 ML | Refills: 0 | Status: SHIPPED | OUTPATIENT
Start: 2023-09-04 | End: 2023-09-11

## 2023-09-04 RX ORDER — AZELASTINE 1 MG/ML
1 SPRAY, METERED NASAL 2 TIMES DAILY PRN
Qty: 30 ML | Refills: 0 | Status: SHIPPED | OUTPATIENT
Start: 2023-09-04 | End: 2024-09-03

## 2023-09-04 RX ORDER — GUAIFENESIN AND DEXTROMETHORPHAN HYDROBROMIDE 20; 400 MG/1; MG/1
1 TABLET ORAL EVERY 4 HOURS PRN
Qty: 30 EACH | Refills: 0 | Status: SHIPPED | OUTPATIENT
Start: 2023-09-04 | End: 2023-09-14

## 2023-09-04 NOTE — PATIENT INSTRUCTIONS
Quarantine at home for 5 days from onset of symptoms, AND 24 hours fever free without the use of antipyretic medication. After returning to work, you must wear face coverings until 10 days from onset of symptoms  Tylenol/motrin otc for fever/pain.  You may alternate these every 4 hours as needed for close control of fever or pain  Stop all over the counter cold, sinus, flu medications  Increase clear fluid intake.  Goal of 3/4-1 gal of fluid daily  May use Astelin nasal spray as needed for nasal congestion and sinus pressure  Start Mucinex DM as needed for cough.  Take each dose of Mucinex with a large glass of water May take promethazine DM at as needed for excessive nighttime coughing  May use warm saltwater gargles 4 x daily and benzocaine anesthetic throat lozenges for sore throat  Follow up with PCP after quarantine for evaluation  Go to ER for shortness of breath, chest pain, or other emergent concern  Return to clinic for new, worse symptoms.

## 2023-09-04 NOTE — PROGRESS NOTES
"Subjective:      Patient ID: Frank Wyatt is a 88 y.o. male.    Vitals:  height is 5' 7" (1.702 m) and weight is 87 kg (191 lb 12.8 oz). His oral temperature is 98.3 °F (36.8 °C). His blood pressure is 131/72 and his pulse is 75. His respiration is 20 and oxygen saturation is 94% (abnormal).     Chief Complaint: Headache    88-year-old male seen today for sore throat and headache.  He states yesterday afternoon he started to have a sore throat and he awoke this morning with a headache and body aches.  He also reports nasal drainage x3 days.    Headache   This is a new problem. Episode onset: Lastnight. Associated symptoms include back pain, coughing and a sore throat. Pertinent negatives include no dizziness, ear pain, fever, nausea or vomiting. He has tried acetaminophen for the symptoms. The treatment provided no relief.       Constitution: Negative for chills and fever.   HENT:  Positive for congestion and sore throat. Negative for ear pain, trouble swallowing and voice change.    Cardiovascular:  Negative for chest pain, palpitations and sob on exertion.   Respiratory:  Positive for cough. Negative for sputum production, shortness of breath, stridor and wheezing.    Gastrointestinal:  Negative for nausea and vomiting.   Musculoskeletal:  Positive for back pain and muscle ache.   Skin:  Negative for rash.   Neurological:  Positive for headaches. Negative for dizziness, light-headedness, passing out, disorientation and altered mental status.   Psychiatric/Behavioral:  Negative for altered mental status, disorientation and confusion.       Objective:     Physical Exam   Constitutional: He is oriented to person, place, and time. He appears well-developed. He is cooperative.  Non-toxic appearance. He does not appear ill. No distress.   HENT:   Head: Normocephalic and atraumatic.   Ears:   Right Ear: Hearing and external ear normal.   Left Ear: Hearing and external ear normal.   Nose: Mucosal edema, rhinorrhea " and congestion present. No nasal deformity. No epistaxis. Right sinus exhibits frontal sinus tenderness. Right sinus exhibits no maxillary sinus tenderness. Left sinus exhibits frontal sinus tenderness. Left sinus exhibits no maxillary sinus tenderness.   Mouth/Throat: Uvula is midline and mucous membranes are normal. Mucous membranes are moist. No trismus in the jaw. Normal dentition. No uvula swelling. Posterior oropharyngeal erythema present. No oropharyngeal exudate, posterior oropharyngeal edema, tonsillar abscesses or cobblestoning. Tonsils are 1+ on the right. Tonsils are 1+ on the left. Tonsillar exudate.   Eyes: Conjunctivae and lids are normal. No scleral icterus.   Neck: Trachea normal and phonation normal. Neck supple. No edema present. No erythema present. No neck rigidity present.   Cardiovascular: Normal rate, regular rhythm, normal heart sounds and normal pulses.   Pulmonary/Chest: Effort normal and breath sounds normal. No stridor. No respiratory distress. He has no decreased breath sounds. He has no rhonchi.   Abdominal: Normal appearance.   Musculoskeletal: Normal range of motion.         General: No deformity. Normal range of motion.   Lymphadenopathy:     He has no cervical adenopathy.   Neurological: no focal deficit. He is alert and oriented to person, place, and time. He exhibits normal muscle tone. Coordination normal.   Skin: Skin is warm, dry, intact, not diaphoretic and not pale. Capillary refill takes 2 to 3 seconds.   Psychiatric: His speech is normal and behavior is normal. Judgment and thought content normal.   Nursing note and vitals reviewed.      Assessment:     1. COVID-19 virus infection    2. Sore throat    3. Nonintractable headache, unspecified chronicity pattern, unspecified headache type    4. Viral URI        Plan:       COVID-19 virus infection  -     azelastine (ASTELIN) 137 mcg (0.1 %) nasal spray; 1 spray (137 mcg total) by Nasal route 2 (two) times daily as needed for  Rhinitis.  Dispense: 30 mL; Refill: 0  -     benzocaine-menthoL 6-10 mg lozenge; Take 1 lozenge by mouth every 2 (two) hours as needed (Sore Throat).  Dispense: 18 tablet; Refill: 0  -     dextromethorphan-guaiFENesin  mg Tab; Take 1 tablet by mouth every 4 (four) hours as needed (cough/congestion).  Dispense: 30 each; Refill: 0  -     promethazine-dextromethorphan (PROMETHAZINE-DM) 6.25-15 mg/5 mL Syrp; Take 5 mLs by mouth nightly as needed (cough). Take as needed for nighttime coughing  Dispense: 50 mL; Refill: 0    Sore throat  -     SARS Coronavirus 2 Antigen, POCT Manual Read  -     POCT rapid strep A  -     benzocaine-menthoL 6-10 mg lozenge; Take 1 lozenge by mouth every 2 (two) hours as needed (Sore Throat).  Dispense: 18 tablet; Refill: 0    Nonintractable headache, unspecified chronicity pattern, unspecified headache type  -     SARS Coronavirus 2 Antigen, POCT Manual Read  -     POCT rapid strep A    Viral URI  -     azelastine (ASTELIN) 137 mcg (0.1 %) nasal spray; 1 spray (137 mcg total) by Nasal route 2 (two) times daily as needed for Rhinitis.  Dispense: 30 mL; Refill: 0  -     benzocaine-menthoL 6-10 mg lozenge; Take 1 lozenge by mouth every 2 (two) hours as needed (Sore Throat).  Dispense: 18 tablet; Refill: 0  -     dextromethorphan-guaiFENesin  mg Tab; Take 1 tablet by mouth every 4 (four) hours as needed (cough/congestion).  Dispense: 30 each; Refill: 0  -     promethazine-dextromethorphan (PROMETHAZINE-DM) 6.25-15 mg/5 mL Syrp; Take 5 mLs by mouth nightly as needed (cough). Take as needed for nighttime coughing  Dispense: 50 mL; Refill: 0    Strep negative      I have discussed the test results and physical exam findings with the patient. We discussed symptom monitoring, conservative care methods, medication use, and follow up orders. He verbalized understanding and agreement with the plan of care.

## 2023-09-19 ENCOUNTER — DOCUMENT SCAN (OUTPATIENT)
Dept: HOME HEALTH SERVICES | Facility: HOSPITAL | Age: 88
End: 2023-09-19
Payer: MEDICARE

## 2023-09-21 ENCOUNTER — HOSPITAL ENCOUNTER (OUTPATIENT)
Dept: RADIOLOGY | Facility: HOSPITAL | Age: 88
Discharge: HOME | End: 2023-09-21
Attending: SPECIALIST
Payer: MEDICARE

## 2023-09-21 DIAGNOSIS — N20.1 CALCULUS OF URETER: Primary | ICD-10-CM

## 2023-09-21 DIAGNOSIS — N20.1 CALCULUS OF URETER: ICD-10-CM

## 2023-09-21 PROCEDURE — 74018 RADEX ABDOMEN 1 VIEW: CPT | Mod: TC,PO

## 2024-02-09 ENCOUNTER — LAB VISIT (OUTPATIENT)
Dept: LAB | Facility: HOSPITAL | Age: 89
End: 2024-02-09
Attending: SPECIALIST
Payer: MEDICARE

## 2024-02-09 DIAGNOSIS — I25.10 CORONARY ATHEROSCLEROSIS OF NATIVE CORONARY ARTERY: ICD-10-CM

## 2024-02-09 DIAGNOSIS — I25.10 CORONARY ATHEROSCLEROSIS OF NATIVE CORONARY ARTERY: Primary | ICD-10-CM

## 2024-02-09 LAB
ANION GAP SERPL CALC-SCNC: 6 MMOL/L (ref 8–16)
BUN SERPL-MCNC: 32 MG/DL (ref 8–23)
CALCIUM SERPL-MCNC: 9 MG/DL (ref 8.7–10.5)
CHLORIDE SERPL-SCNC: 103 MMOL/L (ref 95–110)
CO2 SERPL-SCNC: 28 MMOL/L (ref 23–29)
CREAT SERPL-MCNC: 1.2 MG/DL (ref 0.5–1.4)
EST. GFR  (NO RACE VARIABLE): 57.8 ML/MIN/1.73 M^2
GLUCOSE SERPL-MCNC: 103 MG/DL (ref 70–110)
POTASSIUM SERPL-SCNC: 4.5 MMOL/L (ref 3.5–5.1)
SODIUM SERPL-SCNC: 137 MMOL/L (ref 136–145)

## 2024-02-09 PROCEDURE — 36415 COLL VENOUS BLD VENIPUNCTURE: CPT | Performed by: SPECIALIST

## 2024-02-09 PROCEDURE — 80048 BASIC METABOLIC PNL TOTAL CA: CPT | Performed by: SPECIALIST

## 2024-02-20 ENCOUNTER — HOSPITAL ENCOUNTER (OUTPATIENT)
Dept: RADIOLOGY | Facility: HOSPITAL | Age: 89
Discharge: HOME | End: 2024-02-20
Attending: NURSE PRACTITIONER
Payer: MEDICARE

## 2024-02-20 DIAGNOSIS — R53.1 WEAKNESS: ICD-10-CM

## 2024-02-20 DIAGNOSIS — I25.119 ATHEROSCLEROSIS OF NATIVE CORONARY ARTERY OF NATIVE HEART WITH ANGINA PECTORIS: ICD-10-CM

## 2024-02-20 DIAGNOSIS — R89.9 ABNORMAL LABORATORY TEST: ICD-10-CM

## 2024-02-20 DIAGNOSIS — R41.0 MENTAL CONFUSION: ICD-10-CM

## 2024-02-20 DIAGNOSIS — J44.9 CHRONIC OBSTRUCTIVE PULMONARY DISEASE, UNSPECIFIED COPD TYPE: ICD-10-CM

## 2024-02-20 DIAGNOSIS — I50.9 HEART FAILURE, UNSPECIFIED HF CHRONICITY, UNSPECIFIED HEART FAILURE TYPE: ICD-10-CM

## 2024-02-20 DIAGNOSIS — I25.119 ATHEROSCLEROSIS OF NATIVE CORONARY ARTERY OF NATIVE HEART WITH ANGINA PECTORIS: Primary | ICD-10-CM

## 2024-02-20 PROCEDURE — 71046 X-RAY EXAM CHEST 2 VIEWS: CPT | Mod: TC,PO

## 2024-02-22 ENCOUNTER — HOSPITAL ENCOUNTER (OUTPATIENT)
Dept: RADIOLOGY | Facility: HOSPITAL | Age: 89
Discharge: HOME | End: 2024-02-22
Attending: NURSE PRACTITIONER
Payer: MEDICARE

## 2024-02-22 DIAGNOSIS — I50.9 HEART FAILURE, UNSPECIFIED HF CHRONICITY, UNSPECIFIED HEART FAILURE TYPE: ICD-10-CM

## 2024-02-22 DIAGNOSIS — I25.119 ATHEROSCLEROSIS OF NATIVE CORONARY ARTERY OF NATIVE HEART WITH ANGINA PECTORIS: ICD-10-CM

## 2024-02-22 DIAGNOSIS — R41.0 MENTAL CONFUSION: ICD-10-CM

## 2024-02-22 DIAGNOSIS — R53.1 WEAKNESS: ICD-10-CM

## 2024-02-22 DIAGNOSIS — R89.9 ABNORMAL LABORATORY TEST: ICD-10-CM

## 2024-02-22 PROCEDURE — 70450 CT HEAD/BRAIN W/O DYE: CPT | Mod: TC,PO

## 2024-03-28 ENCOUNTER — HOSPITAL ENCOUNTER (EMERGENCY)
Facility: HOSPITAL | Age: 89
Discharge: HOME | End: 2024-03-28
Attending: STUDENT IN AN ORGANIZED HEALTH CARE EDUCATION/TRAINING PROGRAM
Payer: MEDICARE

## 2024-03-28 VITALS
DIASTOLIC BLOOD PRESSURE: 81 MMHG | TEMPERATURE: 98 F | SYSTOLIC BLOOD PRESSURE: 194 MMHG | RESPIRATION RATE: 18 BRPM | OXYGEN SATURATION: 96 % | WEIGHT: 180 LBS | HEIGHT: 67 IN | HEART RATE: 64 BPM | BODY MASS INDEX: 28.25 KG/M2

## 2024-03-28 DIAGNOSIS — S00.81XA ABRASION OF FACE, INITIAL ENCOUNTER: ICD-10-CM
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
## 2024-03-28 DIAGNOSIS — S09.90XA TRAUMATIC INJURY OF HEAD, INITIAL ENCOUNTER: Primary | ICD-10-CM

## 2024-03-28 PROCEDURE — 94760 N-INVAS EAR/PLS OXIMETRY 1: CPT

## 2024-03-28 PROCEDURE — 94761 N-INVAS EAR/PLS OXIMETRY MLT: CPT

## 2024-03-28 PROCEDURE — 99284 EMERGENCY DEPT VISIT MOD MDM: CPT | Mod: 25

## 2024-03-29 NOTE — ED NOTES
Pt aaox4 from assisted living facility for eval after rolling out of bed while asleep. Pt presents with small abrasion noted to right eyebrow. Pt states that he hit his head on the dresser. Pt take plavix. Pt states that he was dreaming that some one was chasing him. Pt denies any pain. Pt denies loc. nadn

## 2024-03-29 NOTE — ED PROVIDER NOTES
Encounter Date: 3/28/2024       History     Chief Complaint   Patient presents with    Fall     Fell out of bed and struck head. No LOC. Takes Plavix.     89-year-old male presents for evaluation of head trauma.  Patient reports he was having a dream as if someone was chasing him while he was in bed and next thing and now he hit his head on the table on-site the bed.  On Plavix, no associated motor or sensory deficit    The history is provided by the patient.     Review of patient's allergies indicates:   Allergen Reactions    Bactrim [sulfamethoxazole-trimethoprim] Hives     itched    Keflex [cephalexin] Rash    Morphine Rash     Patient had morphine and keflex at the same time, developed a rash, not sure which one caused it, or if it was a combination of the two meds.     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 2004    CORONARY ARTERY BYPASS GRAFT  1996    5-bypass     CYSTOSCOPY N/A 8/15/2019    Procedure: CYSTOSCOPY;  Surgeon: Dipak Sanchez MD;  Location: [facility] OR;  Service: Urology;  Laterality: N/A;    CYSTOSCOPY N/A 10/31/2019    Procedure: CYSTOSCOPY;  Surgeon: Dipak Sanchez MD;  Location: [facility] OR;  Service: Urology;  Laterality: N/A;    CYSTOSCOPY W/ URETERAL STENT REMOVAL Left 10/31/2019    Procedure: CYSTOSCOPY, WITH URETERAL STENT REMOVAL  URETEROSCOPY;  Surgeon: Dipak  MD Laura;  Location: [facility];  Service: Urology;  Laterality: Left;    EXTRACORPOREAL SHOCK WAVE LITHOTRIPSY Left 8/15/2019    Procedure: LITHOTRIPSY, ESWL;  Surgeon: Dipak Sanchez MD;  Location: [facility] OR;  Service: Urology;  Laterality: Left;    EXTRACORPOREAL SHOCK WAVE LITHOTRIPSY Left 2019    Procedure: LITHOTRIPSY, ESWL;  Surgeon: Dipak Sanchez MD;  Location: [facility] OR;  Service: Urology;  Laterality: Left;    EYE SURGERY Right     Cataract    EYE SURGERY Left 2017    Cataract    HERNIA REPAIR      Dr. Bailon - had to have mesh removed    INGUINAL HERNIA REPAIR Right 2019        LITHOTRIPSY      nephrostomy tube      PERCUTANEOUS NEPHROSTOMY      ROTATOR CUFF REPAIR      right shoulder    URETEROSCOPY Left 10/31/2019    Procedure: URETEROSCOPY;  Surgeon: Dipak Sanchez MD;  Location: [facility];  Service: Urology;  Laterality: Left;     Family History   Problem Relation Age of Onset    Heart disease Mother     Hypertension Mother     Hypertension Sister     Heart disease Brother     Hypertension Brother     Cancer Daughter     Diabetes Brother     Heart disease Brother     Hypertension Brother     Hypertension Sister     Heart disease Sister      Social History     Tobacco Use    Smoking status: Former     Current packs/day: 0.00     Average packs/day: 1 pack/day for 25.0 years (25.0 ttl pk-yrs)     Types: Cigarettes     Start date: 1947     Quit date: 1972     Years since quittin.2    Smokeless tobacco: Former     Quit date: 8/15/1972   Substance Use Topics    Alcohol use: No    Drug use: No     Review of Systems   All other systems reviewed and are negative.      Physical Exam     Initial Vitals [245]   BP Pulse Resp Temp SpO2   (!) 178/75 65 18 97.6 °F (36.4 °C) 96 %      MAP       --         Physical Exam    Nursing note and vitals reviewed.  Constitutional: Vital signs are normal.  Non-toxic appearance. No distress.   HENT:   Head:  Normocephalic.   Right supraorbital bruising and abrasion, no facial instability or trismus   Eyes: No scleral icterus.   Cardiovascular:  Normal rate.           Pulmonary/Chest: No stridor. No respiratory distress.   Bilateral chest rise   Abdominal: There is no guarding.   Musculoskeletal:         General: No tenderness.      Cervical back: No rigidity.      Comments: No significant CT or L-spine tenderness palpation, no trunk tenderness to palpation, no significant extremity deformity     Neurological: He is alert.   GCS 15 with no focal neurological deficit   Skin: Skin is warm and dry. No rash noted.   Small abrasion right supraorbital region   Psychiatric: His speech is normal. He is not actively hallucinating.   Not anxious  or agitated         ED Course   Procedures  Labs Reviewed - No data to display       Imaging Results              CT Cervical Spine Without Contrast (In process)                      CT Head Without Contrast (In process)                      Medications - No data to display  Medical Decision Making  89-year-old male presents for evaluation of head trauma.  From history likely mechanical fall as patient was having a dream somebody was chasing him and he fell and hit his head.  No chest pain shortness for breath, GCS 15 with no focal deficits.  Abrasion to right supraorbital region, no concern for any eye injury.  No wound needing primary closure.  Wound cleaned bedside by myself.  CT head and CT C-spine obtained with no clinically significant traumatic injury per [facility].  Will discharge back to facility.  Do not suspect central cord syndrome based off exam    Amount and/or Complexity of Data Reviewed  Radiology: ordered.               ED Course as of 03/28/24 2249   Thu Mar 28, 2024   2247 COMPARISON: CT HEAD WITHOUT CONTRAST 2/22/2024 7:54 AM FINDINGS: Brain: Moderate generalized cerebral atrophy. No intracranial mass, hemorrhage or evidence of acute ischemia. Cerebral ventricles: No  ventriculomegaly. Paranasal sinuses: Opacification of the right maxillary sinus with inspissated material compatible with chronic sinusitis. Mild mucosal thickening throughout the bilateral ethmoid air cells. Mastoid air cells: Visualized mastoid air cells are well aerated. Bones/joints: Unremarkable. No acute fracture. Soft tissues: Unremarkable. IMPRESSION: No acute intracranial abnormality. Findings of chronic paranasal sinusitis   [KB]   2525 COMPARISON: CT CERVICAL SPINE WITHOUT CONTRAST 7/28/2023 6:55 PM FINDINGS: Bones/joints: Bones are diffusely osteopenic. Osseous alignment is normal. No acute fracture. Severe arthritic disc changes at the C5-C6 and C6-C7 disc levels. More moderate degenerative changes of the other disc levels. Moderate to severe multilevel bilateral facet arthropathy. Moderate degenerative changes of the atlantodental joint. Lungs: Lung apices are normal. Vasculature: Bilateral carotid artery calcifications. Soft tissues: Unremarkable. IMPRESSION: No acute fracture. Significant multilevel arthritic changes    [KB]      ED Course User Index  [KB] Ramon Littlejohn Jr.,                            Clinical Impression:  Final diagnoses:  [S09.90XA] Traumatic injury of head, initial encounter (Primary)  [S00.81XA] Abrasion of face, initial encounter  [W19.XXXA] Fall, initial encounter          ED Disposition Condition    Discharge Stable          ED Prescriptions    None       Follow-up Information       Follow up With Specialties Details Why Contact Info Additional Information    [facility] Emergency Medicine In 1 day As needed, If symptoms worsen [address] [place] [address] [address] [facility] [id number] 1st floor             Ramon Littlejohn Jr.,   03/28/24 5245

## 2024-04-17 ENCOUNTER — LAB VISIT (OUTPATIENT)
Dept: LAB | Facility: HOSPITAL | Age: 89
End: 2024-04-17
Attending: NURSE PRACTITIONER
Payer: MEDICARE

## 2024-04-17 DIAGNOSIS — E66.9 OBESITY, DIABETES, AND HYPERTENSION SYNDROME: ICD-10-CM

## 2024-04-17 DIAGNOSIS — I05.0 MITRAL STENOSIS: ICD-10-CM

## 2024-04-17 DIAGNOSIS — E11.59 OBESITY, DIABETES, AND HYPERTENSION SYNDROME: ICD-10-CM

## 2024-04-17 DIAGNOSIS — J30.2 SEASONAL ALLERGIC RHINITIS: Primary | ICD-10-CM

## 2024-04-17 DIAGNOSIS — E11.69 OBESITY, DIABETES, AND HYPERTENSION SYNDROME: ICD-10-CM

## 2024-04-17 DIAGNOSIS — E03.9 MYXEDEMA HEART DISEASE: ICD-10-CM

## 2024-04-17 DIAGNOSIS — J44.9 VANISHING LUNG: ICD-10-CM

## 2024-04-17 DIAGNOSIS — I51.9 MYXEDEMA HEART DISEASE: ICD-10-CM

## 2024-04-17 DIAGNOSIS — I25.119 ATHEROSCLEROTIC HEART DISEASE OF NATIVE CORONARY ARTERY WITH ANGINA PECTORIS: ICD-10-CM

## 2024-04-17 DIAGNOSIS — R89.9 ABNORMAL PLEURAL FLUID: ICD-10-CM

## 2024-04-17 DIAGNOSIS — I15.2 OBESITY, DIABETES, AND HYPERTENSION SYNDROME: ICD-10-CM

## 2024-04-17 DIAGNOSIS — I73.9 PERIPHERAL VASCULAR DISEASE, UNSPECIFIED: ICD-10-CM

## 2024-04-17 DIAGNOSIS — E78.5 HYPERLIPEMIA: ICD-10-CM

## 2024-04-17 DIAGNOSIS — N40.0 BENIGN ENLARGEMENT OF PROSTATE: ICD-10-CM

## 2024-04-17 DIAGNOSIS — E11.69 DIABETES MELLITUS ASSOCIATED WITH HORMONAL ETIOLOGY: ICD-10-CM

## 2024-04-17 DIAGNOSIS — N18.31 CHRONIC KIDNEY DISEASE (CKD) STAGE G3A/A1, MODERATELY DECREASED GLOMERULAR FILTRATION RATE (GFR) BETWEEN 45-59 ML/MIN/1.73 SQUARE METER AND ALBUMINURIA CREATININE RATIO LESS THAN 30 MG/G: ICD-10-CM

## 2024-04-17 DIAGNOSIS — D69.2 AUTOERYTHROCYTE SENSITIVITY DISORDER: ICD-10-CM

## 2024-04-17 DIAGNOSIS — R80.9 PROTEINURIA: ICD-10-CM

## 2024-04-17 DIAGNOSIS — F33.0 MAJOR DEPRESSIVE DISORDER, RECURRENT EPISODE, MILD: ICD-10-CM

## 2024-04-17 DIAGNOSIS — J30.2 SEASONAL ALLERGIC RHINITIS: ICD-10-CM

## 2024-04-17 DIAGNOSIS — I50.9 HEART FAILURE, UNSPECIFIED: ICD-10-CM

## 2024-04-17 LAB
ALBUMIN SERPL BCP-MCNC: 3.6 G/DL (ref 3.5–5.2)
ALBUMIN SERPL BCP-MCNC: 3.6 G/DL (ref 3.5–5.2)
ALP SERPL-CCNC: 76 U/L (ref 55–135)
ALT SERPL W/O P-5'-P-CCNC: 21 U/L (ref 10–44)
ANION GAP SERPL CALC-SCNC: 7 MMOL/L (ref 8–16)
ANION GAP SERPL CALC-SCNC: 7 MMOL/L (ref 8–16)
AST SERPL-CCNC: 22 U/L (ref 10–40)
BASOPHILS # BLD AUTO: 0.05 K/UL (ref 0–0.2)
BASOPHILS NFR BLD: 0.5 % (ref 0–1.9)
BILIRUB SERPL-MCNC: 1 MG/DL (ref 0.1–1)
BUN SERPL-MCNC: 27 MG/DL (ref 8–23)
BUN SERPL-MCNC: 27 MG/DL (ref 8–23)
CALCIUM SERPL-MCNC: 9.2 MG/DL (ref 8.7–10.5)
CALCIUM SERPL-MCNC: 9.2 MG/DL (ref 8.7–10.5)
CHLORIDE SERPL-SCNC: 103 MMOL/L (ref 95–110)
CHLORIDE SERPL-SCNC: 103 MMOL/L (ref 95–110)
CHOLEST SERPL-MCNC: 102 MG/DL (ref 120–199)
CHOLEST/HDLC SERPL: 3.6 {RATIO} (ref 2–5)
CO2 SERPL-SCNC: 27 MMOL/L (ref 23–29)
CO2 SERPL-SCNC: 27 MMOL/L (ref 23–29)
COMPLEXED PSA SERPL-MCNC: 3.54 NG/ML (ref 0–4)
CREAT SERPL-MCNC: 1.1 MG/DL (ref 0.5–1.4)
CREAT SERPL-MCNC: 1.1 MG/DL (ref 0.5–1.4)
DIFFERENTIAL METHOD BLD: ABNORMAL
EOSINOPHIL # BLD AUTO: 0.4 K/UL (ref 0–0.5)
EOSINOPHIL NFR BLD: 4.8 % (ref 0–8)
ERYTHROCYTE [DISTWIDTH] IN BLOOD BY AUTOMATED COUNT: 14.9 % (ref 11.5–14.5)
EST. GFR  (NO RACE VARIABLE): >60 ML/MIN/1.73 M^2
EST. GFR  (NO RACE VARIABLE): >60 ML/MIN/1.73 M^2
GLUCOSE SERPL-MCNC: 91 MG/DL (ref 70–110)
GLUCOSE SERPL-MCNC: 91 MG/DL (ref 70–110)
HCT VFR BLD AUTO: 41.7 % (ref 40–54)
HDLC SERPL-MCNC: 28 MG/DL (ref 40–75)
HDLC SERPL: 27.5 % (ref 20–50)
HGB BLD-MCNC: 13.6 G/DL (ref 14–18)
IMM GRANULOCYTES # BLD AUTO: 0.03 K/UL (ref 0–0.04)
IMM GRANULOCYTES NFR BLD AUTO: 0.3 % (ref 0–0.5)
LDLC SERPL CALC-MCNC: 51.8 MG/DL (ref 63–159)
LYMPHOCYTES # BLD AUTO: 2.6 K/UL (ref 1–4.8)
LYMPHOCYTES NFR BLD: 28 % (ref 18–48)
MCH RBC QN AUTO: 30.2 PG (ref 27–31)
MCHC RBC AUTO-ENTMCNC: 32.6 G/DL (ref 32–36)
MCV RBC AUTO: 93 FL (ref 82–98)
MONOCYTES # BLD AUTO: 0.7 K/UL (ref 0.3–1)
MONOCYTES NFR BLD: 7.4 % (ref 4–15)
NEUTROPHILS # BLD AUTO: 5.5 K/UL (ref 1.8–7.7)
NEUTROPHILS NFR BLD: 59 % (ref 38–73)
NONHDLC SERPL-MCNC: 74 MG/DL
NRBC BLD-RTO: 0 /100 WBC
PHOSPHATE SERPL-MCNC: 3 MG/DL (ref 2.7–4.5)
PLATELET # BLD AUTO: 108 K/UL (ref 150–450)
PMV BLD AUTO: 10.3 FL (ref 9.2–12.9)
POTASSIUM SERPL-SCNC: 3.9 MMOL/L (ref 3.5–5.1)
POTASSIUM SERPL-SCNC: 3.9 MMOL/L (ref 3.5–5.1)
PROT SERPL-MCNC: 6.3 G/DL (ref 6–8.4)
RBC # BLD AUTO: 4.51 M/UL (ref 4.6–6.2)
SODIUM SERPL-SCNC: 137 MMOL/L (ref 136–145)
SODIUM SERPL-SCNC: 137 MMOL/L (ref 136–145)
TRIGL SERPL-MCNC: 111 MG/DL (ref 30–150)
WBC # BLD AUTO: 9.23 K/UL (ref 3.9–12.7)

## 2024-04-17 PROCEDURE — 84100 ASSAY OF PHOSPHORUS: CPT | Performed by: NURSE PRACTITIONER

## 2024-04-17 PROCEDURE — 85025 COMPLETE CBC W/AUTO DIFF WBC: CPT | Performed by: NURSE PRACTITIONER

## 2024-04-17 PROCEDURE — 83036 HEMOGLOBIN GLYCOSYLATED A1C: CPT | Performed by: NURSE PRACTITIONER

## 2024-04-17 PROCEDURE — 80053 COMPREHEN METABOLIC PANEL: CPT | Performed by: NURSE PRACTITIONER

## 2024-04-17 PROCEDURE — 80061 LIPID PANEL: CPT | Performed by: NURSE PRACTITIONER

## 2024-04-17 PROCEDURE — 84153 ASSAY OF PSA TOTAL: CPT | Performed by: NURSE PRACTITIONER

## 2024-04-17 PROCEDURE — 36415 COLL VENOUS BLD VENIPUNCTURE: CPT | Performed by: NURSE PRACTITIONER

## 2024-04-18 LAB
ESTIMATED AVG GLUCOSE: 134 MG/DL (ref 68–131)
HBA1C MFR BLD: 6.3 % (ref 4.5–6.2)

## 2024-06-07 ENCOUNTER — LAB VISIT (OUTPATIENT)
Dept: LAB | Facility: HOSPITAL | Age: 89
End: 2024-06-07
Attending: NURSE PRACTITIONER
Payer: MEDICARE

## 2024-06-07 DIAGNOSIS — D64.9 ANEMIA, UNSPECIFIED: ICD-10-CM

## 2024-06-07 DIAGNOSIS — D64.9 ANEMIA, UNSPECIFIED: Primary | ICD-10-CM

## 2024-06-07 LAB
BASOPHILS # BLD AUTO: 0.05 K/UL (ref 0–0.2)
BASOPHILS NFR BLD: 0.6 % (ref 0–1.9)
DIFFERENTIAL METHOD BLD: ABNORMAL
EOSINOPHIL # BLD AUTO: 0.4 K/UL (ref 0–0.5)
EOSINOPHIL NFR BLD: 4.8 % (ref 0–8)
ERYTHROCYTE [DISTWIDTH] IN BLOOD BY AUTOMATED COUNT: 14.9 % (ref 11.5–14.5)
HCT VFR BLD AUTO: 46.2 % (ref 40–54)
HGB BLD-MCNC: 14.7 G/DL (ref 14–18)
IMM GRANULOCYTES # BLD AUTO: 0.03 K/UL (ref 0–0.04)
IMM GRANULOCYTES NFR BLD AUTO: 0.3 % (ref 0–0.5)
LYMPHOCYTES # BLD AUTO: 2.4 K/UL (ref 1–4.8)
LYMPHOCYTES NFR BLD: 26.4 % (ref 18–48)
MCH RBC QN AUTO: 29.8 PG (ref 27–31)
MCHC RBC AUTO-ENTMCNC: 31.8 G/DL (ref 32–36)
MCV RBC AUTO: 94 FL (ref 82–98)
MONOCYTES # BLD AUTO: 0.7 K/UL (ref 0.3–1)
MONOCYTES NFR BLD: 7.9 % (ref 4–15)
NEUTROPHILS # BLD AUTO: 5.4 K/UL (ref 1.8–7.7)
NEUTROPHILS NFR BLD: 60 % (ref 38–73)
NRBC BLD-RTO: 0 /100 WBC
PLATELET # BLD AUTO: 107 K/UL (ref 150–450)
PMV BLD AUTO: 10.4 FL (ref 9.2–12.9)
RBC # BLD AUTO: 4.94 M/UL (ref 4.6–6.2)
WBC # BLD AUTO: 9.03 K/UL (ref 3.9–12.7)

## 2024-06-07 PROCEDURE — 85025 COMPLETE CBC W/AUTO DIFF WBC: CPT | Performed by: NURSE PRACTITIONER

## 2024-06-07 PROCEDURE — 36415 COLL VENOUS BLD VENIPUNCTURE: CPT | Performed by: NURSE PRACTITIONER

## 2024-07-09 DIAGNOSIS — R31.1 BENIGN MICROSCOPIC HEMATURIA: Primary | ICD-10-CM

## 2024-07-15 ENCOUNTER — HOSPITAL ENCOUNTER (OUTPATIENT)
Dept: RADIOLOGY | Facility: HOSPITAL | Age: 89
Discharge: HOME | End: 2024-07-15
Attending: SPECIALIST
Payer: MEDICARE

## 2024-07-15 DIAGNOSIS — R31.1 BENIGN MICROSCOPIC HEMATURIA: ICD-10-CM

## 2024-07-15 PROCEDURE — 76770 US EXAM ABDO BACK WALL COMP: CPT | Mod: 26,,, | Performed by: RADIOLOGY

## 2024-07-15 PROCEDURE — 76770 US EXAM ABDO BACK WALL COMP: CPT | Mod: TC,PO

## 2024-07-17 ENCOUNTER — HOSPITAL ENCOUNTER (INPATIENT)
Facility: HOSPITAL | Age: 89
LOS: 5 days | Discharge: HOME HEALTH | DRG: 862 | End: 2024-07-23
Attending: EMERGENCY MEDICINE | Admitting: HOSPITALIST
Payer: MEDICARE

## 2024-07-17 DIAGNOSIS — R50.9 FEVER: ICD-10-CM

## 2024-07-17 DIAGNOSIS — R65.21 SEPTIC SHOCK: Primary | ICD-10-CM

## 2024-07-17 DIAGNOSIS — R00.0 TACHYCARDIA: ICD-10-CM

## 2024-07-17 DIAGNOSIS — R06.02 SOB (SHORTNESS OF BREATH): ICD-10-CM

## 2024-07-17 DIAGNOSIS — R09.02 HYPOXIA: ICD-10-CM

## 2024-07-17 DIAGNOSIS — A41.9 SEPTIC SHOCK: Primary | ICD-10-CM

## 2024-07-17 DIAGNOSIS — R07.9 CHEST PAIN: ICD-10-CM

## 2024-07-17 LAB
ALBUMIN SERPL BCP-MCNC: 3.9 G/DL (ref 3.5–5.2)
ALP SERPL-CCNC: 92 U/L (ref 55–135)
ALT SERPL W/O P-5'-P-CCNC: 29 U/L (ref 10–44)
ANION GAP SERPL CALC-SCNC: 10 MMOL/L (ref 8–16)
AST SERPL-CCNC: 30 U/L (ref 10–40)
BACTERIA #/AREA URNS HPF: ABNORMAL /HPF
BASOPHILS # BLD AUTO: 0.03 K/UL (ref 0–0.2)
BASOPHILS NFR BLD: 0.3 % (ref 0–1.9)
BILIRUB SERPL-MCNC: 1.2 MG/DL (ref 0.1–1)
BILIRUB UR QL STRIP: NEGATIVE
BNP SERPL-MCNC: 314 PG/ML (ref 0–99)
BUN SERPL-MCNC: 31 MG/DL (ref 8–23)
CALCIUM SERPL-MCNC: 8.8 MG/DL (ref 8.7–10.5)
CHLORIDE SERPL-SCNC: 105 MMOL/L (ref 95–110)
CLARITY UR: ABNORMAL
CO2 SERPL-SCNC: 24 MMOL/L (ref 23–29)
COLOR UR: ABNORMAL
CREAT SERPL-MCNC: 1.2 MG/DL (ref 0.5–1.4)
DIFFERENTIAL METHOD BLD: ABNORMAL
EOSINOPHIL # BLD AUTO: 0.1 K/UL (ref 0–0.5)
EOSINOPHIL NFR BLD: 1 % (ref 0–8)
ERYTHROCYTE [DISTWIDTH] IN BLOOD BY AUTOMATED COUNT: 14.5 % (ref 11.5–14.5)
EST. GFR  (NO RACE VARIABLE): 57.8 ML/MIN/1.73 M^2
GLUCOSE SERPL-MCNC: 154 MG/DL (ref 70–110)
GLUCOSE UR QL STRIP: NEGATIVE
HCT VFR BLD AUTO: 42 % (ref 40–54)
HGB BLD-MCNC: 13.7 G/DL (ref 14–18)
HGB UR QL STRIP: ABNORMAL
HYALINE CASTS #/AREA URNS LPF: 2 /LPF
IMM GRANULOCYTES # BLD AUTO: 0.06 K/UL (ref 0–0.04)
IMM GRANULOCYTES NFR BLD AUTO: 0.5 % (ref 0–0.5)
KETONES UR QL STRIP: NEGATIVE
LACTATE SERPL-SCNC: 1.6 MMOL/L (ref 0.5–1.9)
LDH SERPL L TO P-CCNC: 1.25 MMOL/L (ref 0.5–2.2)
LEUKOCYTE ESTERASE UR QL STRIP: ABNORMAL
LYMPHOCYTES # BLD AUTO: 0.9 K/UL (ref 1–4.8)
LYMPHOCYTES NFR BLD: 7.4 % (ref 18–48)
MCH RBC QN AUTO: 30.6 PG (ref 27–31)
MCHC RBC AUTO-ENTMCNC: 32.6 G/DL (ref 32–36)
MCV RBC AUTO: 94 FL (ref 82–98)
MICROSCOPIC COMMENT: ABNORMAL
MONOCYTES # BLD AUTO: 0.6 K/UL (ref 0.3–1)
MONOCYTES NFR BLD: 4.8 % (ref 4–15)
NEUTROPHILS # BLD AUTO: 9.9 K/UL (ref 1.8–7.7)
NEUTROPHILS NFR BLD: 86 % (ref 38–73)
NITRITE UR QL STRIP: NEGATIVE
NRBC BLD-RTO: 0 /100 WBC
PH UR STRIP: 7 [PH] (ref 5–8)
PLATELET # BLD AUTO: 71 K/UL (ref 150–450)
PMV BLD AUTO: 10.2 FL (ref 9.2–12.9)
POTASSIUM SERPL-SCNC: 4.1 MMOL/L (ref 3.5–5.1)
PROT SERPL-MCNC: 6.5 G/DL (ref 6–8.4)
PROT UR QL STRIP: ABNORMAL
RBC # BLD AUTO: 4.48 M/UL (ref 4.6–6.2)
RBC #/AREA URNS HPF: >100 /HPF (ref 0–4)
SAMPLE: NORMAL
SODIUM SERPL-SCNC: 139 MMOL/L (ref 136–145)
SP GR UR STRIP: 1.01 (ref 1–1.03)
TROPONIN I SERPL HS-MCNC: 20.6 PG/ML (ref 0–14.9)
URN SPEC COLLECT METH UR: ABNORMAL
UROBILINOGEN UR STRIP-ACNC: NEGATIVE EU/DL
WBC # BLD AUTO: 11.54 K/UL (ref 3.9–12.7)
WBC #/AREA URNS HPF: >100 /HPF (ref 0–5)

## 2024-07-17 PROCEDURE — 25000003 PHARM REV CODE 250: Performed by: EMERGENCY MEDICINE

## 2024-07-17 PROCEDURE — 83880 ASSAY OF NATRIURETIC PEPTIDE: CPT | Performed by: EMERGENCY MEDICINE

## 2024-07-17 PROCEDURE — 99285 EMERGENCY DEPT VISIT HI MDM: CPT | Mod: 25

## 2024-07-17 PROCEDURE — 84484 ASSAY OF TROPONIN QUANT: CPT | Performed by: EMERGENCY MEDICINE

## 2024-07-17 PROCEDURE — 5A09357 ASSISTANCE WITH RESPIRATORY VENTILATION, LESS THAN 24 CONSECUTIVE HOURS, CONTINUOUS POSITIVE AIRWAY PRESSURE: ICD-10-PCS | Performed by: EMERGENCY MEDICINE

## 2024-07-17 PROCEDURE — 85025 COMPLETE CBC W/AUTO DIFF WBC: CPT | Performed by: EMERGENCY MEDICINE

## 2024-07-17 PROCEDURE — 83605 ASSAY OF LACTIC ACID: CPT | Performed by: EMERGENCY MEDICINE

## 2024-07-17 PROCEDURE — 96366 THER/PROPH/DIAG IV INF ADDON: CPT

## 2024-07-17 PROCEDURE — 63600175 PHARM REV CODE 636 W HCPCS: Performed by: EMERGENCY MEDICINE

## 2024-07-17 PROCEDURE — 80053 COMPREHEN METABOLIC PANEL: CPT | Performed by: EMERGENCY MEDICINE

## 2024-07-17 PROCEDURE — 81001 URINALYSIS AUTO W/SCOPE: CPT | Performed by: EMERGENCY MEDICINE

## 2024-07-17 PROCEDURE — 93010 ELECTROCARDIOGRAM REPORT: CPT | Mod: ,,, | Performed by: GENERAL PRACTICE

## 2024-07-17 PROCEDURE — 36415 COLL VENOUS BLD VENIPUNCTURE: CPT | Performed by: EMERGENCY MEDICINE

## 2024-07-17 PROCEDURE — 93005 ELECTROCARDIOGRAM TRACING: CPT | Performed by: GENERAL PRACTICE

## 2024-07-17 PROCEDURE — 87086 URINE CULTURE/COLONY COUNT: CPT | Performed by: EMERGENCY MEDICINE

## 2024-07-17 PROCEDURE — 96365 THER/PROPH/DIAG IV INF INIT: CPT

## 2024-07-17 PROCEDURE — 96367 TX/PROPH/DG ADDL SEQ IV INF: CPT

## 2024-07-17 PROCEDURE — 87186 SC STD MICRODIL/AGAR DIL: CPT | Performed by: EMERGENCY MEDICINE

## 2024-07-17 PROCEDURE — 87040 BLOOD CULTURE FOR BACTERIA: CPT | Performed by: EMERGENCY MEDICINE

## 2024-07-17 PROCEDURE — 96375 TX/PRO/DX INJ NEW DRUG ADDON: CPT

## 2024-07-17 RX ORDER — CEFUROXIME AXETIL 500 MG/1
500 TABLET ORAL 2 TIMES DAILY
Status: ON HOLD | COMMUNITY
Start: 2024-07-17 | End: 2024-07-23 | Stop reason: HOSPADM

## 2024-07-17 RX ORDER — HYDROCORTISONE 1 G/G
1 CREAM TOPICAL DAILY PRN
COMMUNITY

## 2024-07-17 RX ORDER — ACETAMINOPHEN 500 MG
1000 TABLET ORAL
Status: COMPLETED | OUTPATIENT
Start: 2024-07-17 | End: 2024-07-17

## 2024-07-17 RX ORDER — GLIPIZIDE 5 MG/1
5 TABLET ORAL
COMMUNITY

## 2024-07-17 RX ADMIN — VANCOMYCIN HYDROCHLORIDE 1750 MG: 500 INJECTION, POWDER, LYOPHILIZED, FOR SOLUTION INTRAVENOUS at 08:07

## 2024-07-17 RX ADMIN — SODIUM CHLORIDE, POTASSIUM CHLORIDE, SODIUM LACTATE AND CALCIUM CHLORIDE 500 ML: 600; 310; 30; 20 INJECTION, SOLUTION INTRAVENOUS at 08:07

## 2024-07-17 RX ADMIN — PIPERACILLIN SODIUM AND TAZOBACTAM SODIUM 4.5 G: 4; .5 INJECTION, POWDER, LYOPHILIZED, FOR SOLUTION INTRAVENOUS at 08:07

## 2024-07-17 RX ADMIN — ACETAMINOPHEN 1000 MG: 500 TABLET, FILM COATED ORAL at 07:07

## 2024-07-18 PROBLEM — J18.9 PNEUMONIA OF BOTH LOWER LOBES DUE TO INFECTIOUS ORGANISM: Status: RESOLVED | Noted: 2020-08-11 | Resolved: 2024-07-18

## 2024-07-18 PROBLEM — R65.21 SEPTIC SHOCK: Status: ACTIVE | Noted: 2024-07-18

## 2024-07-18 PROBLEM — N39.0 UTI (URINARY TRACT INFECTION): Status: RESOLVED | Noted: 2019-10-09 | Resolved: 2024-07-18

## 2024-07-18 PROBLEM — R79.89 TROPONIN LEVEL ELEVATED: Status: RESOLVED | Noted: 2020-08-11 | Resolved: 2024-07-18

## 2024-07-18 PROBLEM — J18.9 ATYPICAL PNEUMONIA: Status: RESOLVED | Noted: 2020-08-11 | Resolved: 2024-07-18

## 2024-07-18 PROBLEM — J18.9 PNEUMONIA: Status: ACTIVE | Noted: 2024-07-18

## 2024-07-18 PROBLEM — R00.0 SINUS TACHYCARDIA: Status: RESOLVED | Noted: 2019-10-09 | Resolved: 2024-07-18

## 2024-07-18 PROBLEM — A41.9 SEPTIC SHOCK: Status: ACTIVE | Noted: 2024-07-18

## 2024-07-18 PROBLEM — J96.01 ACUTE HYPOXIC RESPIRATORY FAILURE: Status: ACTIVE | Noted: 2024-07-18

## 2024-07-18 PROBLEM — N20.0 RENAL STONE: Status: RESOLVED | Noted: 2019-08-15 | Resolved: 2024-07-18

## 2024-07-18 LAB
ALBUMIN SERPL BCP-MCNC: 3.4 G/DL (ref 3.5–5.2)
ALLENS TEST: ABNORMAL
ALLENS TEST: ABNORMAL
ALP SERPL-CCNC: 83 U/L (ref 55–135)
ALT SERPL W/O P-5'-P-CCNC: 32 U/L (ref 10–44)
ANION GAP SERPL CALC-SCNC: 7 MMOL/L (ref 8–16)
AST SERPL-CCNC: 36 U/L (ref 10–40)
BASOPHILS # BLD AUTO: 0.04 K/UL (ref 0–0.2)
BASOPHILS NFR BLD: 0.3 % (ref 0–1.9)
BILIRUB SERPL-MCNC: 1.8 MG/DL (ref 0.1–1)
BUN SERPL-MCNC: 31 MG/DL (ref 8–23)
CALCIUM SERPL-MCNC: 8.3 MG/DL (ref 8.7–10.5)
CHLORIDE SERPL-SCNC: 104 MMOL/L (ref 95–110)
CO2 SERPL-SCNC: 26 MMOL/L (ref 23–29)
CREAT SERPL-MCNC: 1.3 MG/DL (ref 0.5–1.4)
DELSYS: ABNORMAL
DELSYS: ABNORMAL
DIFFERENTIAL METHOD BLD: ABNORMAL
EOSINOPHIL # BLD AUTO: 0 K/UL (ref 0–0.5)
EOSINOPHIL NFR BLD: 0.1 % (ref 0–8)
EP: 6
EP: 6
ERYTHROCYTE [DISTWIDTH] IN BLOOD BY AUTOMATED COUNT: 14.6 % (ref 11.5–14.5)
ERYTHROCYTE [SEDIMENTATION RATE] IN BLOOD BY WESTERGREN METHOD: 20 MM/H
ERYTHROCYTE [SEDIMENTATION RATE] IN BLOOD BY WESTERGREN METHOD: 20 MM/H
EST. GFR  (NO RACE VARIABLE): 52.5 ML/MIN/1.73 M^2
FIO2: 40
FIO2: 50
GLUCOSE SERPL-MCNC: 129 MG/DL (ref 70–110)
GLUCOSE SERPL-MCNC: 143 MG/DL (ref 70–110)
GLUCOSE SERPL-MCNC: 173 MG/DL (ref 70–110)
GLUCOSE SERPL-MCNC: 177 MG/DL (ref 70–110)
HCO3 UR-SCNC: 25.5 MMOL/L (ref 24–28)
HCO3 UR-SCNC: 28.6 MMOL/L (ref 24–28)
HCT VFR BLD AUTO: 41.2 % (ref 40–54)
HCT VFR BLD CALC: 41 %PCV (ref 36–54)
HGB BLD-MCNC: 13.5 G/DL (ref 14–18)
IMM GRANULOCYTES # BLD AUTO: 0.08 K/UL (ref 0–0.04)
IMM GRANULOCYTES NFR BLD AUTO: 0.6 % (ref 0–0.5)
IP: 12
IP: 12
LACTATE SERPL-SCNC: 1.4 MMOL/L (ref 0.5–1.9)
LYMPHOCYTES # BLD AUTO: 0.5 K/UL (ref 1–4.8)
LYMPHOCYTES NFR BLD: 4.1 % (ref 18–48)
MAGNESIUM SERPL-MCNC: 1.4 MG/DL (ref 1.6–2.6)
MAGNESIUM SERPL-MCNC: 2.2 MG/DL (ref 1.6–2.6)
MCH RBC QN AUTO: 30.5 PG (ref 27–31)
MCHC RBC AUTO-ENTMCNC: 32.8 G/DL (ref 32–36)
MCV RBC AUTO: 93 FL (ref 82–98)
MODE: ABNORMAL
MODE: ABNORMAL
MONOCYTES # BLD AUTO: 0.6 K/UL (ref 0.3–1)
MONOCYTES NFR BLD: 4.4 % (ref 4–15)
NEUTROPHILS # BLD AUTO: 12.1 K/UL (ref 1.8–7.7)
NEUTROPHILS NFR BLD: 90.5 % (ref 38–73)
NRBC BLD-RTO: 0 /100 WBC
OHS QRS DURATION: 126 MS
OHS QRS DURATION: 128 MS
OHS QRS DURATION: 128 MS
OHS QTC CALCULATION: 461 MS
OHS QTC CALCULATION: 464 MS
OHS QTC CALCULATION: 466 MS
PCO2 BLDA: 47.4 MMHG (ref 35–45)
PCO2 BLDA: 67.9 MMHG (ref 35–45)
PH SMN: 7.23 [PH] (ref 7.35–7.45)
PH SMN: 7.34 [PH] (ref 7.35–7.45)
PLATELET # BLD AUTO: 85 K/UL (ref 150–450)
PMV BLD AUTO: 9.9 FL (ref 9.2–12.9)
PO2 BLDA: 115 MMHG (ref 80–100)
PO2 BLDA: 19 MMHG (ref 40–60)
POC BE: 0 MMOL/L
POC BE: 1 MMOL/L
POC IONIZED CALCIUM: 1.19 MMOL/L (ref 1.06–1.42)
POC SATURATED O2: 21 % (ref 95–100)
POC SATURATED O2: 98 % (ref 95–100)
POC TCO2: 27 MMOL/L (ref 23–27)
POC TCO2: 31 MMOL/L (ref 24–29)
POTASSIUM BLD-SCNC: 3.6 MMOL/L (ref 3.5–5.1)
POTASSIUM SERPL-SCNC: 3.7 MMOL/L (ref 3.5–5.1)
POTASSIUM SERPL-SCNC: 5.5 MMOL/L (ref 3.5–5.1)
PROT SERPL-MCNC: 5.9 G/DL (ref 6–8.4)
RBC # BLD AUTO: 4.43 M/UL (ref 4.6–6.2)
SAMPLE: ABNORMAL
SAMPLE: ABNORMAL
SITE: ABNORMAL
SITE: ABNORMAL
SODIUM BLD-SCNC: 137 MMOL/L (ref 136–145)
SODIUM SERPL-SCNC: 137 MMOL/L (ref 136–145)
SP02: 99
SPONT RATE: 30
WBC # BLD AUTO: 13.31 K/UL (ref 3.9–12.7)

## 2024-07-18 PROCEDURE — 25000003 PHARM REV CODE 250: Performed by: EMERGENCY MEDICINE

## 2024-07-18 PROCEDURE — 93005 ELECTROCARDIOGRAM TRACING: CPT | Performed by: GENERAL PRACTICE

## 2024-07-18 PROCEDURE — 94761 N-INVAS EAR/PLS OXIMETRY MLT: CPT | Mod: XB

## 2024-07-18 PROCEDURE — 36415 COLL VENOUS BLD VENIPUNCTURE: CPT | Performed by: HOSPITALIST

## 2024-07-18 PROCEDURE — 63600175 PHARM REV CODE 636 W HCPCS: Performed by: HOSPITALIST

## 2024-07-18 PROCEDURE — 93010 ELECTROCARDIOGRAM REPORT: CPT | Mod: ,,, | Performed by: GENERAL PRACTICE

## 2024-07-18 PROCEDURE — 99900035 HC TECH TIME PER 15 MIN (STAT)

## 2024-07-18 PROCEDURE — C1751 CATH, INF, PER/CENT/MIDLINE: HCPCS

## 2024-07-18 PROCEDURE — 63600175 PHARM REV CODE 636 W HCPCS: Performed by: EMERGENCY MEDICINE

## 2024-07-18 PROCEDURE — 36410 VNPNXR 3YR/> PHY/QHP DX/THER: CPT

## 2024-07-18 PROCEDURE — 99900031 HC PATIENT EDUCATION (STAT)

## 2024-07-18 PROCEDURE — 82803 BLOOD GASES ANY COMBINATION: CPT

## 2024-07-18 PROCEDURE — 94799 UNLISTED PULMONARY SVC/PX: CPT | Mod: XB

## 2024-07-18 PROCEDURE — 83605 ASSAY OF LACTIC ACID: CPT | Performed by: HOSPITALIST

## 2024-07-18 PROCEDURE — 94640 AIRWAY INHALATION TREATMENT: CPT

## 2024-07-18 PROCEDURE — 63600175 PHARM REV CODE 636 W HCPCS: Performed by: INTERNAL MEDICINE

## 2024-07-18 PROCEDURE — 84295 ASSAY OF SERUM SODIUM: CPT

## 2024-07-18 PROCEDURE — 36600 WITHDRAWAL OF ARTERIAL BLOOD: CPT

## 2024-07-18 PROCEDURE — 25000003 PHARM REV CODE 250: Performed by: HOSPITALIST

## 2024-07-18 PROCEDURE — 94660 CPAP INITIATION&MGMT: CPT

## 2024-07-18 PROCEDURE — 85014 HEMATOCRIT: CPT

## 2024-07-18 PROCEDURE — 83735 ASSAY OF MAGNESIUM: CPT | Mod: 91 | Performed by: HOSPITALIST

## 2024-07-18 PROCEDURE — 94799 UNLISTED PULMONARY SVC/PX: CPT

## 2024-07-18 PROCEDURE — 76937 US GUIDE VASCULAR ACCESS: CPT

## 2024-07-18 PROCEDURE — 27100171 HC OXYGEN HIGH FLOW UP TO 24 HOURS

## 2024-07-18 PROCEDURE — 84132 ASSAY OF SERUM POTASSIUM: CPT | Performed by: HOSPITALIST

## 2024-07-18 PROCEDURE — 25000003 PHARM REV CODE 250

## 2024-07-18 PROCEDURE — 84132 ASSAY OF SERUM POTASSIUM: CPT

## 2024-07-18 PROCEDURE — 99900026 HC AIRWAY MAINTENANCE (STAT)

## 2024-07-18 PROCEDURE — 82962 GLUCOSE BLOOD TEST: CPT

## 2024-07-18 PROCEDURE — 82330 ASSAY OF CALCIUM: CPT

## 2024-07-18 PROCEDURE — 20000000 HC ICU ROOM

## 2024-07-18 PROCEDURE — 63600175 PHARM REV CODE 636 W HCPCS: Mod: JZ,JG | Performed by: HOSPITALIST

## 2024-07-18 PROCEDURE — 25000242 PHARM REV CODE 250 ALT 637 W/ HCPCS: Performed by: HOSPITALIST

## 2024-07-18 PROCEDURE — 85025 COMPLETE CBC W/AUTO DIFF WBC: CPT | Performed by: HOSPITALIST

## 2024-07-18 PROCEDURE — 93010 ELECTROCARDIOGRAM REPORT: CPT | Mod: 76,,, | Performed by: GENERAL PRACTICE

## 2024-07-18 PROCEDURE — 80053 COMPREHEN METABOLIC PANEL: CPT | Performed by: HOSPITALIST

## 2024-07-18 RX ORDER — ACETAMINOPHEN 325 MG/1
650 TABLET ORAL EVERY 6 HOURS PRN
Status: CANCELLED | OUTPATIENT
Start: 2024-07-18

## 2024-07-18 RX ORDER — FUROSEMIDE 10 MG/ML
40 INJECTION INTRAMUSCULAR; INTRAVENOUS EVERY 12 HOURS
Status: DISCONTINUED | OUTPATIENT
Start: 2024-07-18 | End: 2024-07-21

## 2024-07-18 RX ORDER — NALOXONE HCL 0.4 MG/ML
0.02 VIAL (ML) INJECTION
Status: DISCONTINUED | OUTPATIENT
Start: 2024-07-18 | End: 2024-07-23 | Stop reason: HOSPADM

## 2024-07-18 RX ORDER — POTASSIUM CHLORIDE 7.45 MG/ML
80 INJECTION INTRAVENOUS
Status: DISCONTINUED | OUTPATIENT
Start: 2024-07-18 | End: 2024-07-22

## 2024-07-18 RX ORDER — CALCIUM GLUCONATE 20 MG/ML
3 INJECTION, SOLUTION INTRAVENOUS
Status: DISCONTINUED | OUTPATIENT
Start: 2024-07-18 | End: 2024-07-22

## 2024-07-18 RX ORDER — SODIUM,POTASSIUM PHOSPHATES 280-250MG
2 POWDER IN PACKET (EA) ORAL
Status: DISCONTINUED | OUTPATIENT
Start: 2024-07-18 | End: 2024-07-22

## 2024-07-18 RX ORDER — SODIUM CHLORIDE 0.9 % (FLUSH) 0.9 %
10 SYRINGE (ML) INJECTION EVERY 12 HOURS PRN
Status: DISCONTINUED | OUTPATIENT
Start: 2024-07-18 | End: 2024-07-23 | Stop reason: HOSPADM

## 2024-07-18 RX ORDER — HYDROCODONE BITARTRATE AND ACETAMINOPHEN 5; 325 MG/1; MG/1
1 TABLET ORAL EVERY 6 HOURS PRN
Status: DISCONTINUED | OUTPATIENT
Start: 2024-07-18 | End: 2024-07-23 | Stop reason: HOSPADM

## 2024-07-18 RX ORDER — IBUPROFEN 200 MG
24 TABLET ORAL
Status: DISCONTINUED | OUTPATIENT
Start: 2024-07-18 | End: 2024-07-23 | Stop reason: HOSPADM

## 2024-07-18 RX ORDER — FUROSEMIDE 10 MG/ML
40 INJECTION INTRAMUSCULAR; INTRAVENOUS
Status: COMPLETED | OUTPATIENT
Start: 2024-07-18 | End: 2024-07-18

## 2024-07-18 RX ORDER — ALUMINUM HYDROXIDE, MAGNESIUM HYDROXIDE, AND SIMETHICONE 1200; 120; 1200 MG/30ML; MG/30ML; MG/30ML
30 SUSPENSION ORAL 4 TIMES DAILY PRN
Status: DISCONTINUED | OUTPATIENT
Start: 2024-07-18 | End: 2024-07-23 | Stop reason: HOSPADM

## 2024-07-18 RX ORDER — IPRATROPIUM BROMIDE AND ALBUTEROL SULFATE 2.5; .5 MG/3ML; MG/3ML
3 SOLUTION RESPIRATORY (INHALATION) EVERY 6 HOURS
Status: DISCONTINUED | OUTPATIENT
Start: 2024-07-18 | End: 2024-07-22

## 2024-07-18 RX ORDER — TALC
6 POWDER (GRAM) TOPICAL NIGHTLY PRN
Status: DISCONTINUED | OUTPATIENT
Start: 2024-07-18 | End: 2024-07-23 | Stop reason: HOSPADM

## 2024-07-18 RX ORDER — ACETAMINOPHEN 10 MG/ML
1000 INJECTION, SOLUTION INTRAVENOUS ONCE
Status: COMPLETED | OUTPATIENT
Start: 2024-07-18 | End: 2024-07-18

## 2024-07-18 RX ORDER — ACETAMINOPHEN 650 MG/1
650 SUPPOSITORY RECTAL
Status: COMPLETED | OUTPATIENT
Start: 2024-07-18 | End: 2024-07-18

## 2024-07-18 RX ORDER — NOREPINEPHRINE BITARTRATE/D5W 4MG/250ML
0-3 PLASTIC BAG, INJECTION (ML) INTRAVENOUS CONTINUOUS
Status: DISCONTINUED | OUTPATIENT
Start: 2024-07-18 | End: 2024-07-19

## 2024-07-18 RX ORDER — MAGNESIUM SULFATE HEPTAHYDRATE 40 MG/ML
2 INJECTION, SOLUTION INTRAVENOUS
Status: DISCONTINUED | OUTPATIENT
Start: 2024-07-18 | End: 2024-07-22

## 2024-07-18 RX ORDER — ACETAMINOPHEN 500 MG
1000 TABLET ORAL
Status: COMPLETED | OUTPATIENT
Start: 2024-07-18 | End: 2024-07-18

## 2024-07-18 RX ORDER — ATORVASTATIN CALCIUM 40 MG/1
40 TABLET, FILM COATED ORAL NIGHTLY
Status: DISCONTINUED | OUTPATIENT
Start: 2024-07-18 | End: 2024-07-23 | Stop reason: HOSPADM

## 2024-07-18 RX ORDER — CALCIUM GLUCONATE 20 MG/ML
2 INJECTION, SOLUTION INTRAVENOUS
Status: DISCONTINUED | OUTPATIENT
Start: 2024-07-18 | End: 2024-07-22

## 2024-07-18 RX ORDER — MUPIROCIN 20 MG/G
OINTMENT TOPICAL 2 TIMES DAILY
Status: COMPLETED | OUTPATIENT
Start: 2024-07-18 | End: 2024-07-22

## 2024-07-18 RX ORDER — MAGNESIUM SULFATE HEPTAHYDRATE 40 MG/ML
4 INJECTION, SOLUTION INTRAVENOUS
Status: DISCONTINUED | OUTPATIENT
Start: 2024-07-18 | End: 2024-07-22

## 2024-07-18 RX ORDER — CALCIUM GLUCONATE 20 MG/ML
1 INJECTION, SOLUTION INTRAVENOUS
Status: DISCONTINUED | OUTPATIENT
Start: 2024-07-18 | End: 2024-07-22

## 2024-07-18 RX ORDER — IBUPROFEN 200 MG
16 TABLET ORAL
Status: DISCONTINUED | OUTPATIENT
Start: 2024-07-18 | End: 2024-07-23 | Stop reason: HOSPADM

## 2024-07-18 RX ORDER — HEPARIN SODIUM 5000 [USP'U]/ML
5000 INJECTION, SOLUTION INTRAVENOUS; SUBCUTANEOUS EVERY 8 HOURS
Status: DISCONTINUED | OUTPATIENT
Start: 2024-07-18 | End: 2024-07-22

## 2024-07-18 RX ORDER — SODIUM CHLORIDE 9 MG/ML
INJECTION, SOLUTION INTRAVENOUS CONTINUOUS
Status: DISCONTINUED | OUTPATIENT
Start: 2024-07-18 | End: 2024-07-18

## 2024-07-18 RX ORDER — ACETAMINOPHEN 325 MG/1
650 TABLET ORAL EVERY 8 HOURS PRN
Status: DISCONTINUED | OUTPATIENT
Start: 2024-07-18 | End: 2024-07-23 | Stop reason: HOSPADM

## 2024-07-18 RX ORDER — LANOLIN ALCOHOL/MO/W.PET/CERES
800 CREAM (GRAM) TOPICAL
Status: DISCONTINUED | OUTPATIENT
Start: 2024-07-18 | End: 2024-07-22

## 2024-07-18 RX ORDER — GLUCAGON 1 MG
1 KIT INJECTION
Status: DISCONTINUED | OUTPATIENT
Start: 2024-07-18 | End: 2024-07-23 | Stop reason: HOSPADM

## 2024-07-18 RX ORDER — NOREPINEPHRINE BITARTRATE/D5W 4MG/250ML
PLASTIC BAG, INJECTION (ML) INTRAVENOUS
Status: COMPLETED
Start: 2024-07-18 | End: 2024-07-18

## 2024-07-18 RX ORDER — AMOXICILLIN 250 MG
1 CAPSULE ORAL 2 TIMES DAILY PRN
Status: DISCONTINUED | OUTPATIENT
Start: 2024-07-18 | End: 2024-07-23 | Stop reason: HOSPADM

## 2024-07-18 RX ORDER — GLUCAGON 1 MG
1 KIT INJECTION
Status: DISCONTINUED | OUTPATIENT
Start: 2024-07-18 | End: 2024-07-18

## 2024-07-18 RX ORDER — IBUPROFEN 200 MG
16 TABLET ORAL
Status: DISCONTINUED | OUTPATIENT
Start: 2024-07-18 | End: 2024-07-18

## 2024-07-18 RX ORDER — POTASSIUM CHLORIDE 7.45 MG/ML
60 INJECTION INTRAVENOUS
Status: DISCONTINUED | OUTPATIENT
Start: 2024-07-18 | End: 2024-07-22

## 2024-07-18 RX ORDER — LEVOTHYROXINE SODIUM 25 UG/1
25 TABLET ORAL
Status: DISCONTINUED | OUTPATIENT
Start: 2024-07-18 | End: 2024-07-23 | Stop reason: HOSPADM

## 2024-07-18 RX ORDER — IBUPROFEN 200 MG
24 TABLET ORAL
Status: DISCONTINUED | OUTPATIENT
Start: 2024-07-18 | End: 2024-07-18

## 2024-07-18 RX ORDER — TAMSULOSIN HYDROCHLORIDE 0.4 MG/1
0.4 CAPSULE ORAL DAILY
Status: DISCONTINUED | OUTPATIENT
Start: 2024-07-18 | End: 2024-07-23 | Stop reason: HOSPADM

## 2024-07-18 RX ORDER — ACETAMINOPHEN 325 MG/1
650 TABLET ORAL EVERY 4 HOURS PRN
Status: DISCONTINUED | OUTPATIENT
Start: 2024-07-18 | End: 2024-07-23 | Stop reason: HOSPADM

## 2024-07-18 RX ORDER — ONDANSETRON HYDROCHLORIDE 2 MG/ML
4 INJECTION, SOLUTION INTRAVENOUS EVERY 6 HOURS PRN
Status: DISCONTINUED | OUTPATIENT
Start: 2024-07-18 | End: 2024-07-23 | Stop reason: HOSPADM

## 2024-07-18 RX ORDER — CLOPIDOGREL BISULFATE 75 MG/1
75 TABLET ORAL DAILY
Status: DISCONTINUED | OUTPATIENT
Start: 2024-07-18 | End: 2024-07-23 | Stop reason: HOSPADM

## 2024-07-18 RX ORDER — POTASSIUM CHLORIDE 7.45 MG/ML
40 INJECTION INTRAVENOUS
Status: DISCONTINUED | OUTPATIENT
Start: 2024-07-18 | End: 2024-07-22

## 2024-07-18 RX ORDER — IBUPROFEN 200 MG
800 TABLET ORAL ONCE
Status: COMPLETED | OUTPATIENT
Start: 2024-07-18 | End: 2024-07-18

## 2024-07-18 RX ORDER — INSULIN ASPART 100 [IU]/ML
0-5 INJECTION, SOLUTION INTRAVENOUS; SUBCUTANEOUS
Status: DISCONTINUED | OUTPATIENT
Start: 2024-07-18 | End: 2024-07-23 | Stop reason: HOSPADM

## 2024-07-18 RX ADMIN — HEPARIN SODIUM 5000 UNITS: 5000 INJECTION, SOLUTION INTRAVENOUS; SUBCUTANEOUS at 05:07

## 2024-07-18 RX ADMIN — FUROSEMIDE 40 MG: 10 INJECTION, SOLUTION INTRAMUSCULAR; INTRAVENOUS at 08:07

## 2024-07-18 RX ADMIN — LEVOTHYROXINE SODIUM 25 MCG: 0.03 TABLET ORAL at 12:07

## 2024-07-18 RX ADMIN — PIPERACILLIN SODIUM AND TAZOBACTAM SODIUM 3.38 G: 3; .375 INJECTION, POWDER, LYOPHILIZED, FOR SOLUTION INTRAVENOUS at 12:07

## 2024-07-18 RX ADMIN — SODIUM CHLORIDE: 9 INJECTION, SOLUTION INTRAVENOUS at 03:07

## 2024-07-18 RX ADMIN — PIPERACILLIN SODIUM AND TAZOBACTAM SODIUM 3.38 G: 3; .375 INJECTION, POWDER, LYOPHILIZED, FOR SOLUTION INTRAVENOUS at 03:07

## 2024-07-18 RX ADMIN — ACETAMINOPHEN 1000 MG: 10 INJECTION INTRAVENOUS at 04:07

## 2024-07-18 RX ADMIN — MUPIROCIN 1 G: 20 OINTMENT TOPICAL at 08:07

## 2024-07-18 RX ADMIN — HEPARIN SODIUM 5000 UNITS: 5000 INJECTION, SOLUTION INTRAVENOUS; SUBCUTANEOUS at 02:07

## 2024-07-18 RX ADMIN — PIPERACILLIN SODIUM AND TAZOBACTAM SODIUM 3.38 G: 3; .375 INJECTION, POWDER, LYOPHILIZED, FOR SOLUTION INTRAVENOUS at 08:07

## 2024-07-18 RX ADMIN — HEPARIN SODIUM 5000 UNITS: 5000 INJECTION, SOLUTION INTRAVENOUS; SUBCUTANEOUS at 09:07

## 2024-07-18 RX ADMIN — CLOPIDOGREL BISULFATE 75 MG: 75 TABLET, FILM COATED ORAL at 12:07

## 2024-07-18 RX ADMIN — MAGNESIUM SULFATE 2 G: 2 INJECTION INTRAVENOUS at 08:07

## 2024-07-18 RX ADMIN — ATORVASTATIN CALCIUM 40 MG: 40 TABLET, FILM COATED ORAL at 08:07

## 2024-07-18 RX ADMIN — ACETAMINOPHEN 1000 MG: 500 TABLET, FILM COATED ORAL at 01:07

## 2024-07-18 RX ADMIN — VANCOMYCIN HYDROCHLORIDE 1250 MG: 1.25 INJECTION, POWDER, LYOPHILIZED, FOR SOLUTION INTRAVENOUS at 09:07

## 2024-07-18 RX ADMIN — IPRATROPIUM BROMIDE AND ALBUTEROL SULFATE 3 ML: 2.5; .5 SOLUTION RESPIRATORY (INHALATION) at 01:07

## 2024-07-18 RX ADMIN — NOREPINEPHRINE BITARTRATE 0.02 MCG/KG/MIN: 4 INJECTION, SOLUTION INTRAVENOUS at 03:07

## 2024-07-18 RX ADMIN — FUROSEMIDE 40 MG: 10 INJECTION, SOLUTION INTRAMUSCULAR; INTRAVENOUS at 01:07

## 2024-07-18 RX ADMIN — TAMSULOSIN HYDROCHLORIDE 0.4 MG: 0.4 CAPSULE ORAL at 12:07

## 2024-07-18 RX ADMIN — IPRATROPIUM BROMIDE AND ALBUTEROL SULFATE 3 ML: 2.5; .5 SOLUTION RESPIRATORY (INHALATION) at 07:07

## 2024-07-18 RX ADMIN — ACETAMINOPHEN 650 MG: 650 SUPPOSITORY RECTAL at 01:07

## 2024-07-18 RX ADMIN — IBUPROFEN 800 MG: 200 TABLET, FILM COATED ORAL at 04:07

## 2024-07-18 NOTE — H&P
[facility] - Emergency Dept  Hospital Medicine  History & Physical    Patient Name: Frank Wyatt  MRN: [id number]  Patient Class: IP- Inpatient  Admission Date: 7/17/2024  Attending Physician: Wallace Hawk MD  Primary Care Provider: Darci German MD         Patient information was obtained from patient and ER records.     Subjective:     Principal Problem:Pneumonia    Chief Complaint:   Chief Complaint   Patient presents with    Hematuria     BIB EMS from [facility] for hematuria s/p urethral procedure this morning.         HPI: History is taken from medical records.  This is an 89-year-old male who recently had a cystoscopy supposedly for stone removal presents to the hospital with fevers and chills.  Workup included abdomen and pelvis and showed new infiltrate in the right lung base.  Chest x-ray was also done which showed interstitial prominence with differential including edema, pneumonitis or chronic interstitial lung disease.  Given the high fever the patient is thought to have pneumonia and vancomycin and Zosyn was given for sepsis.  Patient also has acute respiratory failure.  IV Lasix has been provided.  Patient currently on a BiPAP.  Patient is arousable.  His laboratory workup otherwise is within normal limits.  Hospitalist will admit this patient for further care.    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 2004    CORONARY ARTERY BYPASS GRAFT  1996    5-bypass     CYSTOSCOPY N/A 8/15/2019    Procedure: CYSTOSCOPY;  Surgeon: Dipak Sanchez MD;  Location: [facility] OR;  Service: Urology;  Laterality: N/A;    CYSTOSCOPY N/A 10/31/2019    Procedure: CYSTOSCOPY;  Surgeon: Dipak Sanchez MD;  Location: [facility] OR;  Service: Urology;  Laterality: N/A;    CYSTOSCOPY W/ URETERAL STENT REMOVAL Left 10/31/2019    Procedure: CYSTOSCOPY, WITH URETERAL STENT REMOVAL  URETEROSCOPY;  Surgeon: Dipak Sanchez MD;  Location: [facility] OR;  Service: Urology;  Laterality: Left;    EXTRACORPOREAL SHOCK WAVE LITHOTRIPSY Left 8/15/2019    Procedure: LITHOTRIPSY, ESWL;  Surgeon: Dipak Sanchez MD;  Location: [facility] OR;  Service: Urology;  Laterality: Left;    EXTRACORPOREAL SHOCK WAVE LITHOTRIPSY Left 9/19/2019    Procedure: LITHOTRIPSY, ESWL;  Surgeon: Dipak Sanchez MD;  Location: [facility] OR;  Service: Urology;  Laterality: Left;    EYE SURGERY Right 2014    Cataract    EYE SURGERY Left 2017    Cataract    HERNIA REPAIR  2014    Dr. Bailon - had to have mesh removed    INGUINAL HERNIA REPAIR Right 03/25/2019        LITHOTRIPSY      nephrostomy tube      PERCUTANEOUS NEPHROSTOMY      ROTATOR CUFF REPAIR  2005    right shoulder    URETEROSCOPY Left 10/31/2019    Procedure: URETEROSCOPY;  Surgeon: Dipak Sanchez MD;  Location: [facility];  Service: Urology;  Laterality: Left;       Review of patient's allergies indicates:   Allergen Reactions    Bactrim [sulfamethoxazole-trimethoprim] Hives     itched    Keflex [cephalexin] Rash    Morphine Rash     Patient had morphine and keflex at the same time, developed a rash, not sure which one caused it, or if it was a combination of the two meds.       No current facility-administered medications on file prior to encounter.     Current Outpatient Medications on File Prior to Encounter   Medication Sig    albuterol  (PROVENTIL/VENTOLIN HFA) 90 mcg/actuation inhaler Inhale 2 puffs into the lungs every 4 (four) hours as needed for Wheezing. Rescue    atorvastatin (LIPITOR) 40 MG tablet Take 1 tablet (40 mg total) by mouth every evening.    azelastine (ASTELIN) 137 mcg (0.1 %) nasal spray 1 spray (137 mcg total) by Nasal route 2 (two) times daily as needed for Rhinitis.    benzocaine-menthoL 6-10 mg lozenge Take 1 lozenge by mouth every 2 (two) hours as needed (Sore Throat).    clopidogreL (PLAVIX) 75 mg tablet Take 1 tablet (75 mg total) by mouth once daily.    glipiZIDE (GLUCOTROL) 5 MG tablet Take 5 mg by mouth daily with breakfast.    hydrocortisone 1 % CrPk Apply 1 Application topically daily as needed.    isosorbide mononitrate (IMDUR) 30 MG 24 hr tablet Take 1 tablet (30 mg total) by mouth once daily.    levothyroxine (SYNTHROID) 25 MCG tablet Take 25 mcg by mouth once daily.     metoprolol tartrate (LOPRESSOR) 50 MG tablet Take 1 tablet (50 mg total) by mouth 2 (two) times daily.    pantoprazole (PROTONIX) 40 MG tablet Take 1 tablet (40 mg total) by mouth once daily.    SYMBICORT 160-4.5 mcg/actuation HFAA Inhale 2 puffs into the lungs every 12 (twelve) hours.     tamsulosin (FLOMAX) 0.4 mg Cap Take 0.4 mg by mouth once daily.     cefUROXime (CEFTIN) 500 MG tablet Take 500 mg by mouth 2 (two) times daily. (Patient not taking: Reported on 2024)    furosemide (LASIX) 40 MG tablet Take 1 tablet (40 mg total) by mouth once daily. (Patient not taking: Reported on 2024)     Family History       Problem Relation (Age of Onset)    Cancer Daughter    Diabetes Brother    Heart disease Mother, Brother, Brother, Sister    Hypertension Mother, Sister, Brother, Brother, Sister          Tobacco Use    Smoking status: Former     Current packs/day: 0.00     Average packs/day: 1 pack/day for 25.0 years (25.0 ttl pk-yrs)     Types: Cigarettes     Start date: 1947     Quit date: 1972     Years since quittin.5     Smokeless tobacco: Former     Quit date: 8/15/1972   Substance and Sexual Activity    Alcohol use: No    Drug use: No    Sexual activity: Not on file     Review of Systems   Reason unable to perform ROS: Unable to obtain because the patient is shivering and on a BiPAP.     Objective:     Vital Signs (Most Recent):  Temp: (!) 101.5 °F (38.6 °C) (07/18/24 0100)  Pulse: (!) 120 (07/18/24 0111)  Resp: (!) 32 (07/18/24 0100)  BP: (!) 263/137 (07/18/24 0111)  SpO2: 98 % (07/18/24 0111) Vital Signs (24h Range):  Temp:  [98.3 °F (36.8 °C)-103 °F (39.4 °C)] 101.5 °F (38.6 °C)  Pulse:  [] 120  Resp:  [20-32] 32  SpO2:  [94 %-98 %] 98 %  BP: (109-263)/() 263/137     Weight: 81.6 kg (179 lb 14.3 oz)  Body mass index is 28.18 kg/m².     Physical Exam  HENT:      Head: Normocephalic and atraumatic.      Nose: Nose normal.   Eyes:      Pupils: Pupils are equal, round, and reactive to light.   Cardiovascular:      Rate and Rhythm: Regular rhythm. Tachycardia present.   Pulmonary:      Effort: Pulmonary effort is normal.   Abdominal:      Palpations: Abdomen is soft.   Musculoskeletal:      Cervical back: Neck supple.   Skin:     General: Skin is warm.   Neurological:      General: No focal deficit present.      Mental Status: He is alert.              CRANIAL NERVES     CN III, IV, VI   Pupils are equal, round, and reactive to light.       Significant Labs: All pertinent labs within the past 24 hours have been reviewed.  Recent Lab Results  (Last 5 results in the past 24 hours)        07/18/24  0111 07/17/24 2254 07/17/24 1956 07/17/24 1943 07/17/24 1942        Albumin       3.9         ALP       92         Allens Test N/A               ALT       29         Anion Gap       10         Appearance, UA     Hazy           AST       30         Bacteria, UA     Few           Baso #       0.03         Basophil %       0.3         Bilirubin (UA)     Negative           BILIRUBIN TOTAL       1.2  Comment: For infants  and newborns, interpretation of results should be based  on gestational age, weight and in agreement with clinical  observations.    Premature Infant recommended reference ranges:  Up to 24 hours.............<8.0 mg/dL  Up to 48 hours............<12.0 mg/dL  3-5 days..................<15.0 mg/dL  6-29 days.................<15.0 mg/dL           BNP         314  Comment: Values of less than 100 pg/ml are consistent with non-CHF populations.       Site Other               BUN       31         Calcium       8.8         Chloride       105         CO2       24         Color, UA     Orange           Creatinine       1.2         DelSys CPAP/BiPAP               Differential Method       Automated         eGFR       57.8         Eos #       0.1         Eos %       1.0         EP 6               FiO2 50               Glucose       154         Glucose, UA     Negative           Gran # (ANC)       9.9         Gran %       86.0         Hematocrit       42.0         Hemoglobin       13.7         Hyaline Casts, UA     2           Immature Grans (Abs)       0.06  Comment: Mild elevation in immature granulocytes is non specific and   can be seen in a variety of conditions including stress response,   acute inflammation, trauma and pregnancy. Correlation with other   laboratory and clinical findings is essential.           Immature Granulocytes       0.5         IP 12               Ketones, UA     Negative           Lactic Acid Level   1.6  Comment: Falsely low lactic acid results can be found in samples   containing >=13.0 mg/dL total bilirubin and/or >=3.5 mg/dL   direct bilirubin.               Leukocyte Esterase, UA     3+           Lymph #       0.9         Lymph %       7.4         MCH       30.6         MCHC       32.6         MCV       94         Microscopic Comment     SEE COMMENT  Comment: Other formed elements not mentioned in the report are not   present in the microscopic examination.              Mode BiPAP                Mono #       0.6         Mono %       4.8         MPV       10.2         NITRITE UA     Negative           nRBC       0         Blood, UA     3+           pH, UA     7.0           Platelet Count       71         POC BE 1               POC HCO3 28.6               POC PCO2 67.9               POC PH 7.232               POC PO2 19               POC SATURATED O2 21               POC TCO2 31               Potassium       4.1         PROTEIN TOTAL       6.5         Protein, UA     1+  Comment: Recommend a 24 hour urine protein or a urine   protein/creatinine ratio if globulin induced proteinuria is  clinically suspected.             Rate 20               RBC       4.48         RBC, UA     >100           RDW       14.5         Sample VENOUS               Sodium       139         Spec Grav UA     1.015           Specimen UA     Urine, Clean Catch           Spont Rate 30               Troponin I High Sensitivity       20.6  Comment: Troponin results differ between methods. Do not use   results between Troponin methods interchangeably.    Alkaline Phospatase levels above 400 U/L may   cause false positive results.    Access hsTnI should not be used for patients taking   Asfotase maricarmen (Strensiq).           UROBILINOGEN UA     Negative           WBC, UA     >100           WBC       11.54                                Significant Imaging: I have reviewed all pertinent imaging results/findings within the past 24 hours.  Assessment/Plan:     * Pneumonia  Vancomycin  Zosyn   Follow up chest x-ray   Follow cultures   Repeat stat labs   Admit into the ICU         Acute hypoxic respiratory failure  Likely from pneumonia versus pulmonary edema   Continue antibiotics  IV Lasix given   Follow up chest x-ray in the morning   Obtain a BiPAP  Breathing treatments    BPH (benign prostatic hyperplasia)  Continue tamsulosin      Hypothyroidism  Continue levothyroxine      S/P CABG x 5  Atorvastatin   Plavix        VTE Risk Mitigation (From  admission, onward)           Ordered     heparin (porcine) injection 5,000 Units  Every 8 hours         07/18/24 0131     IP VTE HIGH RISK PATIENT  Once         07/18/24 0131     Place sequential compression device  Until discontinued         07/18/24 0131                               Pharmacist Renal Adjustment Note    Frank Wyatt is a 89 y.o. male being treated with Zosyn.     Patient Data:    Vital Signs (Most Recent):  Temp: (!) 101.5 °F (38.6 °C) (07/18/24 0100)  Pulse: (!) 120 (07/18/24 0111)  Resp: (!) 32 (07/18/24 0100)  BP: (!) 263/137 (07/18/24 0111)  SpO2: 98 % (07/18/24 0111) Vital Signs (72h Range):  Temp:  [98.3 °F (36.8 °C)-103 °F (39.4 °C)]   Pulse:  []   Resp:  [20-32]   BP: (109-263)/()   SpO2:  [94 %-98 %]      Recent Labs   Lab 07/17/24 1943   CREATININE 1.2     Serum creatinine: 1.2 mg/dL 07/17/24 1943  Estimated creatinine clearance: 42.7 mL/min    Zosyn 3.375 g every 12 hours will be changed to Zosyn 3.375 g every 8 hours per pharmacy protocol.     Pharmacist's Name: Mai Patel  Pharmacist's Extension: [phone]      Wallace Hawk MD  Department of Hospital Medicine  Formerly [facility] - Emergency Dept

## 2024-07-18 NOTE — ASSESSMENT & PLAN NOTE
Noted, secondary to Meniere's disease.  Make appropriate precautions.  Hearing aids need to be in place.

## 2024-07-18 NOTE — ASSESSMENT & PLAN NOTE
Vancomycin  Zosyn   Follow up chest x-ray   Follow cultures   Repeat stat labs   Admit into the ICU

## 2024-07-18 NOTE — ASSESSMENT & PLAN NOTE
Patient was found to have thrombocytopenia, the likely etiology is secondary to sepsis/infection, will monitor the platelets Daily. Will transfuse if platelet count is <10k. Hold DVT prophylaxis if platelets are <50k. The patient's platelet results have been reviewed and are listed below.  Recent Labs   Lab 07/18/24  0325   PLT 85*

## 2024-07-18 NOTE — PROGRESS NOTES
Pharmacist Renal Adjustment Note    Frank Wyatt is a 89 y.o. male being treated with Zosyn.     Patient Data:    Vital Signs (Most Recent):  Temp: (!) 101.5 °F (38.6 °C) (07/18/24 0100)  Pulse: (!) 120 (07/18/24 0111)  Resp: (!) 32 (07/18/24 0100)  BP: (!) 263/137 (07/18/24 0111)  SpO2: 98 % (07/18/24 0111) Vital Signs (72h Range):  Temp:  [98.3 °F (36.8 °C)-103 °F (39.4 °C)]   Pulse:  []   Resp:  [20-32]   BP: (109-263)/()   SpO2:  [94 %-98 %]      Recent Labs   Lab 07/17/24 1943   CREATININE 1.2     Serum creatinine: 1.2 mg/dL 07/17/24 1943  Estimated creatinine clearance: 42.7 mL/min    Zosyn 3.375 g every 12 hours will be changed to Zosyn 3.375 g every 8 hours per pharmacy protocol.     Pharmacist's Name: Mai Patel  Pharmacist's Extension: [phone]

## 2024-07-18 NOTE — HPI
History is taken from medical records.  This is an 89-year-old male who recently had a cystoscopy supposedly for stone removal presents to the hospital with fevers and chills.  Workup included abdomen and pelvis and showed new infiltrate in the right lung base.  Chest x-ray was also done which showed interstitial prominence with differential including edema, pneumonitis or chronic interstitial lung disease.  Given the high fever the patient is thought to have pneumonia and vancomycin and Zosyn was given for sepsis.  Patient also has acute respiratory failure.  IV Lasix has been provided.  Patient currently on a BiPAP.  Patient is arousable.  His laboratory workup otherwise is within normal limits.  Hospitalist will admit this patient for further care.

## 2024-07-18 NOTE — PHARMACY MED REC
"Admission Medication History     The home medication history was taken by Effie Harley.    You may go to "Admission" then "Reconcile Home Medications" tabs to review and/or act upon these items.     The home medication list has been updated by the Pharmacy department.   Please read ALL comments highlighted in yellow.   Please address this information as you see fit.    Feel free to contact us if you have any questions or require assistance.        Medications listed below were obtained from: Patient/family and Analytic software- [employer]  No current facility-administered medications on file prior to encounter.     Current Outpatient Medications on File Prior to Encounter   Medication Sig Dispense Refill    albuterol (PROVENTIL/VENTOLIN HFA) 90 mcg/actuation inhaler Inhale 2 puffs into the lungs every 4 (four) hours as needed for Wheezing. Rescue 1 Inhaler 0    atorvastatin (LIPITOR) 40 MG tablet Take 1 tablet (40 mg total) by mouth every evening. 90 tablet 0    azelastine (ASTELIN) 137 mcg (0.1 %) nasal spray 1 spray (137 mcg total) by Nasal route 2 (two) times daily as needed for Rhinitis. 30 mL 0    benzocaine-menthoL 6-10 mg lozenge Take 1 lozenge by mouth every 2 (two) hours as needed (Sore Throat). 18 tablet 0    clopidogreL (PLAVIX) 75 mg tablet Take 1 tablet (75 mg total) by mouth once daily. 30 tablet 0    glipiZIDE (GLUCOTROL) 5 MG tablet Take 5 mg by mouth daily with breakfast.      hydrocortisone 1 % CrPk Apply 1 Application topically daily as needed.      isosorbide mononitrate (IMDUR) 30 MG 24 hr tablet Take 1 tablet (30 mg total) by mouth once daily. 30 tablet 0    levothyroxine (SYNTHROID) 25 MCG tablet Take 25 mcg by mouth once daily.   1    metoprolol tartrate (LOPRESSOR) 50 MG tablet Take 1 tablet (50 mg total) by mouth 2 (two) times daily. 60 tablet 0    pantoprazole (PROTONIX) 40 MG tablet Take 1 tablet (40 mg total) by mouth once daily. 30 tablet 0    SYMBICORT 160-4.5 mcg/actuation HFAA " Inhale 2 puffs into the lungs every 12 (twelve) hours.       tamsulosin (FLOMAX) 0.4 mg Cap Take 0.4 mg by mouth once daily.       cefUROXime (CEFTIN) 500 MG tablet Take 500 mg by mouth 2 (two) times daily. (Patient not taking: Reported on 7/17/2024)      furosemide (LASIX) 40 MG tablet Take 1 tablet (40 mg total) by mouth once daily. (Patient not taking: Reported on 7/17/2024) 30 tablet 0    [DISCONTINUED] aspirin 81 MG Chew Take 1 tablet (81 mg total) by mouth once daily. 30 tablet 0       Potential issues to be addressed PRIOR TO DISCHARGE  Patient reported not taking the following medications: (Lasix & Ceftin). These medications remain on the home medication list. Please address accordingly.     Effie Harley  EXT [phone]          .

## 2024-07-18 NOTE — ASSESSMENT & PLAN NOTE
"This patient does have evidence of infective focus  My overall impression is septic shock due to SBP < 90.  Source: Respiratory and Urinary Tract  Antibiotics given-   Antibiotics (72h ago, onward)      Start     Stop Route Frequency Ordered    07/18/24 2100  vancomycin 1.25 g in dextrose 5% 250 mL IVPB (ready to mix)        Note to Pharmacy: Ht: 5' 7" (1.702 m)  Wt: 81.6 kg (179 lb 14.3 oz)  Estimated Creatinine Clearance: 42.7 mL/min (based on SCr of 1.2 mg/dL).  Body mass index is 28.18 kg/m².    -- IV Every 24 hours (non-standard times) 07/18/24 0405    07/18/24 0900  mupirocin 2 % ointment         07/23/24 0859 Nasl 2 times daily 07/18/24 0318    07/18/24 0400  piperacillin-tazobactam 3.375 g in dextrose 5 % 100 mL IVPB (ready to mix)        Note to Pharmacy: Ht: 5' 7" (1.702 m)  Wt: 81.6 kg (179 lb 14.3 oz)  Estimated Creatinine Clearance: 42.7 mL/min (based on SCr of 1.2 mg/dL).  Body mass index is 28.18 kg/m².    -- IV Every 8 hours (non-standard times) 07/18/24 0201    07/18/24 0257  vancomycin - pharmacy to dose  (vancomycin IVPB (PEDS and ADULTS))        Placed in "And" Linked Group    -- IV pharmacy to manage frequency 07/18/24 0157          Latest lactate reviewed-  Recent Labs   Lab 07/17/24  1941 07/17/24  2254 07/18/24  0325   LACTATE  --    < > 1.4   POCLAC 1.25  --   --     < > = values in this interval not displayed.     Organ dysfunction indicated by Acute respiratory failure    Fluid challenge Ideal Body Weight- The patient's ideal body weight is Ideal body weight: 66.1 kg (145 lb 11.6 oz) which will be used to calculate fluid bolus of 30 ml/kg for treatment of septic shock.      Post- resuscitation assessment Yes Perfusion exam was performed within 6 hours of septic shock presentation after bolus shows Inadequate tissue perfusion assessed by non-invasive monitoring     Will Start Pressors- Levophed for MAP of 65          "

## 2024-07-18 NOTE — ASSESSMENT & PLAN NOTE
Likely from pneumonia versus pulmonary edema   Continue antibiotics  IV Lasix given   Follow up chest x-ray in the morning   Obtain a BiPAP  Breathing treatments

## 2024-07-18 NOTE — ASSESSMENT & PLAN NOTE
Patient is identified as having Diastolic (HFpEF) heart failure that is Acute on chronic. CHF is currently uncontrolled due to Pulmonary edema/pleural effusion on CXR. Latest ECHO performed and demonstrates- Results for orders placed during the hospital encounter of 03/24/23    Echo    Interpretation Summary  · The left ventricle is normal in size with moderate concentric hypertrophy and low normal systolic function.  · Mild left atrial enlargement.  · The estimated ejection fraction is 50%.  · Indeterminate left ventricular diastolic function.  · RV is not well visualized. Mildly to moderately reduced right ventricular systolic function.  · The mean diastolic gradient across the mitral valve is 13 mmHg at a heart rate of 87 bpm.  · There is moderate to severe mitral stenosis.  · There is moderate-to-severe aortic valve stenosis.  · Aortic valve area is 1.06 cm2; peak velocity is 2.75 m/s; mean gradient is 17 mmHg. Indexed area is 0.45-0.5 cm/m2. Indexed stroke volume is 29 ml/m2.  · Normal central venous pressure (3 mmHg).    Monitor clinical status closely. Monitor on telemetry. Patient is off CHF pathway.  Monitor strict Is&Os and daily weights.  Place on fluid restriction of 1.5 L. Cardiology has not been consulted. Continue to stress to patient importance of self efficacy and  on diet for CHF.     Last BNP reviewed- and noted below   Recent Labs   Lab 07/17/24 1942   *

## 2024-07-18 NOTE — PROGRESS NOTES
VANCOMYCIN PHARMACOKINETIC NOTE:  Vancomycin Day # 1    Objective/Assessment:    Diagnosis/Indication for Vancomycin:Sepsis      89 y.o., male; Actual Body Weight = 81.6 kg (179 lb 14.3 oz).    The patient has the following labs:  7/18/2024 Estimated Creatinine Clearance: 42.7 mL/min (based on SCr of 1.2 mg/dL). Lab Results   Component Value Date    BUN 31 (H) 07/17/2024     Lab Results   Component Value Date    WBC 11.54 07/17/2024          Plan:  Adjust vancomycin dose and/or frequency based on the patient's actual weight and renal function:  Initiate Vancomycin 1250 mg IV every 24 hours following 1750 mg loading dose.  Orders have been entered into patient's chart.        Vancomycin trough level has been ordered for 2000 on 07/19.    Pharmacy will manage vancomycin therapy, monitor serum vancomycin levels, monitor renal function and adjust regimen as necessary.    Thank you for allowing us to participate in this patient's care.     Mai Patel 7/18/2024   Department of Pharmacy  Ext [phone]

## 2024-07-18 NOTE — ED PROVIDER NOTES
Encounter Date: 7/17/2024       History     Chief Complaint   Patient presents with    Hematuria     BIB EMS from [facility] for hematuria s/p urethral procedure this morning.      HPI    Seen and evaluated.  Presented with a chief complaint of febrile illness with hematuria.  Patient had a urological procedure this morning.  He was discharged home with a plan to start antibiotics.  He has not yet started his antibiotics.  He was found to be somewhat slow to respond and confused.  Of note he has chronic low oxygen.  He generally denies complaints but does appear to be somewhat confused.    Review of patient's allergies indicates:   Allergen Reactions    Bactrim [sulfamethoxazole-trimethoprim] Hives     itched    Keflex [cephalexin] Rash    Morphine Rash     Patient had morphine and keflex at the same time, developed a rash, not sure which one caused it, or if it was a combination of the two meds.     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 2004    CORONARY ARTERY BYPASS GRAFT  1996    5-bypass     CYSTOSCOPY N/A 8/15/2019    Procedure: CYSTOSCOPY;  Surgeon: Dipak Sanchez MD;  Location: [facility] OR;  Service: Urology;  Laterality: N/A;    CYSTOSCOPY N/A 10/31/2019    Procedure: CYSTOSCOPY;  Surgeon: Dipak Sanchez MD;  Location: [facility] OR;  Service: Urology;  Laterality: N/A;     CYSTOSCOPY W/ URETERAL STENT REMOVAL Left 10/31/2019    Procedure: CYSTOSCOPY, WITH URETERAL STENT REMOVAL  URETEROSCOPY;  Surgeon: Dipak Sanchez MD;  Location: [facility] OR;  Service: Urology;  Laterality: Left;    EXTRACORPOREAL SHOCK WAVE LITHOTRIPSY Left 8/15/2019    Procedure: LITHOTRIPSY, ESWL;  Surgeon: Dipak Sanchez MD;  Location: [facility] OR;  Service: Urology;  Laterality: Left;    EXTRACORPOREAL SHOCK WAVE LITHOTRIPSY Left 2019    Procedure: LITHOTRIPSY, ESWL;  Surgeon: Dipak Sanchez MD;  Location: [facility] OR;  Service: Urology;  Laterality: Left;    EYE SURGERY Right     Cataract    EYE SURGERY Left     Cataract    HERNIA REPAIR      Dr. Bailon - had to have mesh removed    INGUINAL HERNIA REPAIR Right 2019        LITHOTRIPSY      nephrostomy tube      PERCUTANEOUS NEPHROSTOMY      ROTATOR CUFF REPAIR      right shoulder    URETEROSCOPY Left 10/31/2019    Procedure: URETEROSCOPY;  Surgeon: Dipak Sanchez MD;  Location: [facility];  Service: Urology;  Laterality: Left;     Family History   Problem Relation Name Age of Onset    Heart disease Mother      Hypertension Mother      Hypertension Sister      Heart disease Brother      Hypertension Brother      Cancer Daughter      Diabetes Brother      Heart disease Brother      Hypertension Brother      Hypertension Sister      Heart disease Sister       Social History     Tobacco Use    Smoking status: Former     Current packs/day: 0.00     Average packs/day: 1 pack/day for 25.0 years (25.0 ttl pk-yrs)     Types: Cigarettes     Start date: 1947     Quit date: 1972     Years since quittin.5    Smokeless tobacco: Former     Quit date: 8/15/1972   Substance Use Topics    Alcohol use: No    Drug use: No     Review of Systems   Constitutional:  Positive for fever.   HENT:  Negative for sore throat.    Respiratory:  Negative for shortness of breath.    Cardiovascular:  Negative for chest pain.    Gastrointestinal:  Negative for nausea.   Genitourinary:  Positive for dysuria and hematuria.   Musculoskeletal:  Negative for back pain.   Neurological:  Positive for weakness.   Hematological:  Does not bruise/bleed easily.       Physical Exam     Initial Vitals   BP Pulse Resp Temp SpO2   07/17/24 1940 07/17/24 1928 07/17/24 1928 07/17/24 1928 07/17/24 1940   (!) 180/80 (!) 115 (!) 24 99.8 °F (37.7 °C) 96 %      MAP       --                Physical Exam    Nursing note and vitals reviewed.  Constitutional: He appears well-developed and well-nourished.   HENT:   Head: Normocephalic and atraumatic.   Eyes: Conjunctivae are normal.   Cardiovascular:            Tachycardia   Pulmonary/Chest: No respiratory distress.   Abdominal: Abdomen is soft.   Musculoskeletal:         General: Normal range of motion.     Neurological: He is alert and oriented to person, place, and time.   Skin: Skin is warm and dry.   Psychiatric: He has a normal mood and affect. His speech is normal.         ED Course   Critical Care    Date/Time: 7/17/2024 8:06 PM    Performed by: Maury Thompson Jr., MD  Authorized by: Maury Thompson Jr., MD  Total critical care time (exclusive of procedural time) : 0 minutes  Critical care time was exclusive of separately billable procedures and treating other patients.  Critical care was necessary to treat or prevent imminent or life-threatening deterioration of the following conditions: sepsis.  Critical care was time spent personally by me on the following activities: development of treatment plan with patient or surrogate, evaluation of patient's response to treatment, examination of patient, obtaining history from patient or surrogate, ordering and performing treatments and interventions, ordering and review of laboratory studies, ordering and review of radiographic studies, pulse oximetry, re-evaluation of patient's condition and review of old charts.        Labs Reviewed   CULTURE, BLOOD   CULTURE,  "BLOOD   TROPONIN I HIGH SENSITIVITY   B-TYPE NATRIURETIC PEPTIDE   CBC W/ AUTO DIFFERENTIAL   COMPREHENSIVE METABOLIC PANEL   URINALYSIS, REFLEX TO URINE CULTURE   B-TYPE NATRIURETIC PEPTIDE   TROPONIN I HIGH SENSITIVITY   ISTAT LACTATE   POCT LACTATE          Imaging Results              X-Ray Chest AP Portable (In process)                      Medications   vancomycin - pharmacy to dose (has no administration in time range)   piperacillin-tazobactam 4.5 g in dextrose 5 % 100 mL IVPB (ready to mix) (has no administration in time range)   lactated ringers bolus 500 mL (has no administration in time range)   acetaminophen tablet 1,000 mg (1,000 mg Oral Given 7/17/24 1959)     Medical Decision Making  This patient does have evidence of infective focus  My overall impression is sepsis.  Source: Urinary Tract  Antibiotics given- Antibiotics (72h ago, onward)    Start     Stop Route Frequency Ordered    07/17/24 2018  vancomycin - pharmacy to dose  (vancomycin IVPB (PEDS and   ADULTS))        Placed in "And" Linked Group    -- IV pharmacy to manage frequency 07/17/24 1954 07/17/24 2000  piperacillin-tazobactam 4.5 g in dextrose 5 % 100 mL IVPB   (ready to mix)         07/18/24 0759 IV ED 1 Time 07/17/24 1954      Latest lactate reviewed-  Lab             07/17/24 1941          POCLAC       1.25          Organ dysfunction indicated by Encephalopathy    Fluid challenge Contraindicated- Fluid bolus is contraindicated in this patient due to Congestive Heart Failure     Post- resuscitation assessment No - Post resuscitation assessment not needed       Will Not start Pressors- Levophed for MAP of 65  Source control achieved by:  Aravind and Di     Seen and evaluated presented with a chief complaint of hematuria and fever.  Had instrumentation with the urological procedure done today.  Covered broadly with vanc and Zosyn.  Chart review shows history of allergy to Keflex, but patient is known to tolerate " penicillins.  Did not give 30 mL/kilogram, instead gave 500 cc bolus as patient has evidence of heart failure in his not taking his diuretic today in his lower extremity edema and mild hypoxia.    Update recurrent fever.  Noted increased work of breathing.  Started on BiPAP.  Provided with Lasix 400 mg.  Improved diuresis.  Improved work of breathing.  Admitted to the ICU in guarded condition.    Maury Thompson MD MPH  07/18/2024 3:55 AM          Amount and/or Complexity of Data Reviewed  Labs: ordered.  Radiology: ordered.    Risk  OTC drugs.  Prescription drug management.  Decision regarding hospitalization.                                      Clinical Impression:  Final diagnoses:  [R50.9] Fever                 Maury Thompson Jr., MD  07/18/24 0355

## 2024-07-18 NOTE — SUBJECTIVE & OBJECTIVE
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 2004    CORONARY ARTERY BYPASS GRAFT  1996    5-bypass     CYSTOSCOPY N/A 8/15/2019    Procedure: CYSTOSCOPY;  Surgeon: Dipak Sanchez MD;  Location: [facility] OR;  Service: Urology;  Laterality: N/A;    CYSTOSCOPY N/A 10/31/2019    Procedure: CYSTOSCOPY;  Surgeon: Dipak Sanchez MD;  Location: [facility] OR;  Service: Urology;  Laterality: N/A;    CYSTOSCOPY W/ URETERAL STENT REMOVAL Left 10/31/2019    Procedure: CYSTOSCOPY, WITH URETERAL STENT REMOVAL  URETEROSCOPY;  Surgeon: Dipak Sanchez MD;  Location: [facility];  Service: Urology;  Laterality: Left;    EXTRACORPOREAL SHOCK WAVE LITHOTRIPSY Left 8/15/2019    Procedure: LITHOTRIPSY, ESWL;  Surgeon: Dipak Sanchez MD;  Location: [facility];  Service: Urology;  Laterality: Left;    EXTRACORPOREAL SHOCK WAVE LITHOTRIPSY Left 9/19/2019    Procedure: LITHOTRIPSY, ESWL;  Surgeon: Dipak Sanchez MD;  Location: [facility];  Service: Urology;  Laterality: Left;    EYE SURGERY Right 2014    Cataract    EYE SURGERY Left 2017    Cataract    HERNIA REPAIR  2014    Dr. Bailon - had to have mesh removed    INGUINAL HERNIA REPAIR Right 03/25/2019        LITHOTRIPSY      nephrostomy tube      PERCUTANEOUS NEPHROSTOMY      ROTATOR CUFF REPAIR  2005    right shoulder    URETEROSCOPY Left 10/31/2019     Procedure: URETEROSCOPY;  Surgeon: Dipak Sanchez MD;  Location: [facility];  Service: Urology;  Laterality: Left;       Review of patient's allergies indicates:   Allergen Reactions    Bactrim [sulfamethoxazole-trimethoprim] Hives     itched    Keflex [cephalexin] Rash    Morphine Rash     Patient had morphine and keflex at the same time, developed a rash, not sure which one caused it, or if it was a combination of the two meds.       No current facility-administered medications on file prior to encounter.     Current Outpatient Medications on File Prior to Encounter   Medication Sig    albuterol (PROVENTIL/VENTOLIN HFA) 90 mcg/actuation inhaler Inhale 2 puffs into the lungs every 4 (four) hours as needed for Wheezing. Rescue    atorvastatin (LIPITOR) 40 MG tablet Take 1 tablet (40 mg total) by mouth every evening.    azelastine (ASTELIN) 137 mcg (0.1 %) nasal spray 1 spray (137 mcg total) by Nasal route 2 (two) times daily as needed for Rhinitis.    benzocaine-menthoL 6-10 mg lozenge Take 1 lozenge by mouth every 2 (two) hours as needed (Sore Throat).    clopidogreL (PLAVIX) 75 mg tablet Take 1 tablet (75 mg total) by mouth once daily.    glipiZIDE (GLUCOTROL) 5 MG tablet Take 5 mg by mouth daily with breakfast.    hydrocortisone 1 % CrPk Apply 1 Application topically daily as needed.    isosorbide mononitrate (IMDUR) 30 MG 24 hr tablet Take 1 tablet (30 mg total) by mouth once daily.    levothyroxine (SYNTHROID) 25 MCG tablet Take 25 mcg by mouth once daily.     metoprolol tartrate (LOPRESSOR) 50 MG tablet Take 1 tablet (50 mg total) by mouth 2 (two) times daily.    pantoprazole (PROTONIX) 40 MG tablet Take 1 tablet (40 mg total) by mouth once daily.    SYMBICORT 160-4.5 mcg/actuation HFAA Inhale 2 puffs into the lungs every 12 (twelve) hours.     tamsulosin (FLOMAX) 0.4 mg Cap Take 0.4 mg by mouth once daily.     cefUROXime (CEFTIN) 500 MG tablet Take 500 mg by mouth 2 (two) times daily. (Patient not taking:  Reported on 2024)    furosemide (LASIX) 40 MG tablet Take 1 tablet (40 mg total) by mouth once daily. (Patient not taking: Reported on 2024)     Family History       Problem Relation (Age of Onset)    Cancer Daughter    Diabetes Brother    Heart disease Mother, Brother, Brother, Sister    Hypertension Mother, Sister, Brother, Brother, Sister          Tobacco Use    Smoking status: Former     Current packs/day: 0.00     Average packs/day: 1 pack/day for 25.0 years (25.0 ttl pk-yrs)     Types: Cigarettes     Start date: 1947     Quit date: 1972     Years since quittin.5    Smokeless tobacco: Former     Quit date: 8/15/1972   Substance and Sexual Activity    Alcohol use: No    Drug use: No    Sexual activity: Not on file     Review of Systems   Reason unable to perform ROS: Unable to obtain because the patient is shivering and on a BiPAP.     Objective:     Vital Signs (Most Recent):  Temp: (!) 101.5 °F (38.6 °C) (24 0100)  Pulse: (!) 120 (24 011)  Resp: (!) 32 (24 0100)  BP: (!) 263/137 (24 011)  SpO2: 98 % (24 011) Vital Signs (24h Range):  Temp:  [98.3 °F (36.8 °C)-103 °F (39.4 °C)] 101.5 °F (38.6 °C)  Pulse:  [] 120  Resp:  [20-32] 32  SpO2:  [94 %-98 %] 98 %  BP: (109-263)/() 263/137     Weight: 81.6 kg (179 lb 14.3 oz)  Body mass index is 28.18 kg/m².     Physical Exam  HENT:      Head: Normocephalic and atraumatic.      Nose: Nose normal.   Eyes:      Pupils: Pupils are equal, round, and reactive to light.   Cardiovascular:      Rate and Rhythm: Regular rhythm. Tachycardia present.   Pulmonary:      Effort: Pulmonary effort is normal.   Abdominal:      Palpations: Abdomen is soft.   Musculoskeletal:      Cervical back: Neck supple.   Skin:     General: Skin is warm.   Neurological:      General: No focal deficit present.      Mental Status: He is alert.              CRANIAL NERVES     CN III, IV, VI   Pupils are equal, round, and reactive to  light.       Significant Labs: All pertinent labs within the past 24 hours have been reviewed.  Recent Lab Results  (Last 5 results in the past 24 hours)        07/18/24  0111   07/17/24 2254   07/17/24 1956 07/17/24 1943 07/17/24 1942        Albumin       3.9         ALP       92         Allens Test N/A               ALT       29         Anion Gap       10         Appearance, UA     Hazy           AST       30         Bacteria, UA     Few           Baso #       0.03         Basophil %       0.3         Bilirubin (UA)     Negative           BILIRUBIN TOTAL       1.2  Comment: For infants and newborns, interpretation of results should be based  on gestational age, weight and in agreement with clinical  observations.    Premature Infant recommended reference ranges:  Up to 24 hours.............<8.0 mg/dL  Up to 48 hours............<12.0 mg/dL  3-5 days..................<15.0 mg/dL  6-29 days.................<15.0 mg/dL           BNP         314  Comment: Values of less than 100 pg/ml are consistent with non-CHF populations.       Site Other               BUN       31         Calcium       8.8         Chloride       105         CO2       24         Color, UA     Orange           Creatinine       1.2         DelSys CPAP/BiPAP               Differential Method       Automated         eGFR       57.8         Eos #       0.1         Eos %       1.0         EP 6               FiO2 50               Glucose       154         Glucose, UA     Negative           Gran # (ANC)       9.9         Gran %       86.0         Hematocrit       42.0         Hemoglobin       13.7         Hyaline Casts, UA     2           Immature Grans (Abs)       0.06  Comment: Mild elevation in immature granulocytes is non specific and   can be seen in a variety of conditions including stress response,   acute inflammation, trauma and pregnancy. Correlation with other   laboratory and clinical findings is essential.           Immature  Granulocytes       0.5         IP 12               Ketones, UA     Negative           Lactic Acid Level   1.6  Comment: Falsely low lactic acid results can be found in samples   containing >=13.0 mg/dL total bilirubin and/or >=3.5 mg/dL   direct bilirubin.               Leukocyte Esterase, UA     3+           Lymph #       0.9         Lymph %       7.4         MCH       30.6         MCHC       32.6         MCV       94         Microscopic Comment     SEE COMMENT  Comment: Other formed elements not mentioned in the report are not   present in the microscopic examination.              Mode BiPAP               Mono #       0.6         Mono %       4.8         MPV       10.2         NITRITE UA     Negative           nRBC       0         Blood, UA     3+           pH, UA     7.0           Platelet Count       71         POC BE 1               POC HCO3 28.6               POC PCO2 67.9               POC PH 7.232               POC PO2 19               POC SATURATED O2 21               POC TCO2 31               Potassium       4.1         PROTEIN TOTAL       6.5         Protein, UA     1+  Comment: Recommend a 24 hour urine protein or a urine   protein/creatinine ratio if globulin induced proteinuria is  clinically suspected.             Rate 20               RBC       4.48         RBC, UA     >100           RDW       14.5         Sample VENOUS               Sodium       139         Spec Grav UA     1.015           Specimen UA     Urine, Clean Catch           Spont Rate 30               Troponin I High Sensitivity       20.6  Comment: Troponin results differ between methods. Do not use   results between Troponin methods interchangeably.    Alkaline Phospatase levels above 400 U/L may   cause false positive results.    Access hsTnI should not be used for patients taking   Asfotase maricarmen (Strensiq).           UROBILINOGEN UA     Negative           WBC, UA     >100           WBC       11.54                                 Significant Imaging: I have reviewed all pertinent imaging results/findings within the past 24 hours.

## 2024-07-18 NOTE — CONSULTS
89-year-old male had urethral dilatation done in the office yesterday  For significant obstruction at the level of the distal urethra    3 or 4 hours later patient became lethargic with some fevers and chills  Admitted for urosepsis and possible respiratory failure    Today the patient is feeling much much better  He is urinating on his own and his urine is clear  Continue present care   Sent provider to advise on refill once back in the office?     Thank You

## 2024-07-18 NOTE — ASSESSMENT & PLAN NOTE
Patient with known CAD s/p CABG, which is controlled Will continue ASA, Plavix, and Statin and monitor for S/Sx of angina/ACS. Continue to monitor on telemetry.        Solaraze Counseling:  I discussed with the patient the risks of Solaraze including but not limited to erythema, scaling, itching, weeping, crusting, and pain.

## 2024-07-18 NOTE — NURSING
Unable to obtain additional IV access x2RNS. Electrolyte replacement delayed for this reason.     Daughter requested pt not be stuck again for IV access unless done with ultrasound guidance.

## 2024-07-18 NOTE — ASSESSMENT & PLAN NOTE
Creatine stable for now. BMP reviewed- noted Estimated Creatinine Clearance: 39.7 mL/min (based on SCr of 1.3 mg/dL). according to latest data. Based on current GFR, CKD stage is stage 3 - GFR 30-59.  Monitor UOP and serial BMP and adjust therapy as needed. Renally dose meds. Avoid nephrotoxic medications and procedures.

## 2024-07-18 NOTE — ASSESSMENT & PLAN NOTE
Patient with Hypoxic Respiratory failure which is Acute.  he is not on home oxygen. Supplemental oxygen was provided and noted- Oxygen Concentration (%):  [30-50] 30    .   Signs/symptoms of respiratory failure include- tachypnea, respiratory distress, and use of accessory muscles. Contributing diagnoses includes - ARDS and Pneumonia Labs and images were reviewed. Patient Has not had a recent ABG. Will treat underlying causes and adjust management of respiratory failure as follows- continue to wean oxygen and provide antibiotics per septic shock above.

## 2024-07-18 NOTE — PLAN OF CARE
[facility]  Initial Discharge Assessment       Primary Care Provider: Darci German MD    Admission Diagnosis: Fever [R50.9]    Admission Date: 7/17/2024  Expected Discharge Date: 7/20/2024    Transition of Care Barriers: None    Assessment completed at bedside.  Pt intends to discharge home to his [facility].  Pt denies hh, dme, dialysis, coumadin and services prior to admit.    Payor: [employer] MGD MCARE [facility] / Plan: [employer] CHOICES / Product Type: Medicare Advantage /     Extended Emergency Contact Information  Primary Emergency Contact: Jewell Whatley  Mobile Phone: [phone]  Relation: Daughter  Secondary Emergency Contact: LEXUS JOLLEY  Mobile Phone: [phone]  Relation: Daughter  Preferred language: English   needed? No    Discharge Plan A: Assisted Living  Discharge Plan B: Assisted Living      [facility] - [place] - [address]  [address] [address]  Phone: [phone] Fax: [phone]      Initial Assessment (most recent)       Adult Discharge Assessment - 07/18/24 1244          Discharge Assessment    Assessment Type Discharge Planning Assessment     Confirmed/corrected address, phone number and insurance Yes     Confirmed Demographics Correct on Facesheet     Source of Information patient;family     Communicated ALESSANDRA with patient/caregiver Yes     Reason For Admission septic shock     People in Home facility resident     Facility Arrived From: [facility]     Do you expect to return to your current living situation? No     Do you have help at home or someone to help you manage your care at home? No     Prior to hospitilization cognitive status: Alert/Oriented     Current cognitive status: Alert/Oriented     Walking or Climbing Stairs Difficulty no     Dressing/Bathing Difficulty no     Equipment Currently Used at Home none     Readmission within 30 days? No     Patient currently being followed by outpatient case  management? No     Do you currently have service(s) that help you manage your care at home? No     Do you take prescription medications? Yes     Do you have prescription coverage? Yes     Coverage PHN     Do you have any problems affording any of your prescribed medications? No     Is the patient taking medications as prescribed? yes     Who is going to help you get home at discharge? one of the girls     How do you get to doctors appointments? family or friend will provide     Are you on dialysis? No     Do you take coumadin? No     Discharge Plan A Assisted Living     Discharge Plan B Assisted Living     DME Needed Upon Discharge  none     Discharge Plan discussed with: Patient;Adult children     Transition of Care Barriers None

## 2024-07-18 NOTE — ASSESSMENT & PLAN NOTE
Patient has chronic hypothyroidism. TFTs reviewed-   Lab Results   Component Value Date    TSH 0.870 03/24/2023   . Will continue chronic levothyroxine and adjust for and clinical changes.

## 2024-07-18 NOTE — ASSESSMENT & PLAN NOTE
Echo reviewed- Results for orders placed during the hospital encounter of 03/24/23    Echo    Interpretation Summary  · The left ventricle is normal in size with moderate concentric hypertrophy and low normal systolic function.  · Mild left atrial enlargement.  · The estimated ejection fraction is 50%.  · Indeterminate left ventricular diastolic function.  · RV is not well visualized. Mildly to moderately reduced right ventricular systolic function.  · The mean diastolic gradient across the mitral valve is 13 mmHg at a heart rate of 87 bpm.  · There is moderate to severe mitral stenosis.  · There is moderate-to-severe aortic valve stenosis.  · Aortic valve area is 1.06 cm2; peak velocity is 2.75 m/s; mean gradient is 17 mmHg. Indexed area is 0.45-0.5 cm/m2. Indexed stroke volume is 29 ml/m2.  · Normal central venous pressure (3 mmHg).

## 2024-07-18 NOTE — PROGRESS NOTES
Former[facility] Medicine  Progress Note    Patient Name: Frank Wyatt  MRN: [id number]  Patient Class: IP- Inpatient   Admission Date: 7/17/2024  Length of Stay: 0 days  Attending Physician: Moses Harrington MD  Primary Care Provider: Darci German MD        Subjective:     Principal Problem:Septic shock        HPI:  History is taken from medical records.  This is an 89-year-old male who recently had a cystoscopy supposedly for stone removal presents to the hospital with fevers and chills.  Workup included abdomen and pelvis and showed new infiltrate in the right lung base.  Chest x-ray was also done which showed interstitial prominence with differential including edema, pneumonitis or chronic interstitial lung disease.  Given the high fever the patient is thought to have pneumonia and vancomycin and Zosyn was given for sepsis.  Patient also has acute respiratory failure.  IV Lasix has been provided.  Patient currently on a BiPAP.  Patient is arousable.  His laboratory workup otherwise is within normal limits.  Hospitalist will admit this patient for further care.    Overview/Hospital Course:  No notes on file    Interval History:  Patient seen and examined.  Blood pressure improved, but patient remains on pressors.  Plan of care reviewed with the nurse, the patient, and his family at the bedside in detail.    Review of Systems  Objective:     Vital Signs (Most Recent):  Temp: 98.9 °F (37.2 °C) (07/18/24 0701)  Pulse: 62 (07/18/24 1143)  Resp: (!) 22 (07/18/24 1143)  BP: 134/72 (07/18/24 0915)  SpO2: 100 % (07/18/24 1143) Vital Signs (24h Range):  Temp:  [98.3 °F (36.8 °C)-103 °F (39.4 °C)] 98.9 °F (37.2 °C)  Pulse:  [] 62  Resp:  [16-32] 22  SpO2:  [94 %-100 %] 100 %  BP: ()/() 134/72     Weight: 82.9 kg (182 lb 12.2 oz)  Body mass index is 28.62 kg/m².    Intake/Output Summary (Last 24 hours) at 7/18/2024 1150  Last data filed at 7/18/2024 0525  Gross per 24 hour  "  Intake 625 ml   Output 500 ml   Net 125 ml         Physical Exam  Vitals and nursing note reviewed.   Eyes:      Pupils: Pupils are equal, round, and reactive to light.   Neck:      Vascular: No JVD.   Cardiovascular:      Rate and Rhythm: Normal rate and regular rhythm.      Heart sounds: Normal heart sounds.   Pulmonary:      Effort: Pulmonary effort is normal.      Breath sounds: Normal breath sounds.   Abdominal:      General: Bowel sounds are normal. There is no distension.      Palpations: Abdomen is soft.      Tenderness: There is no abdominal tenderness.   Musculoskeletal:         General: No deformity.   Lymphadenopathy:      Cervical: No cervical adenopathy.   Skin:     Coloration: Skin is not pale.      Findings: No rash.             Significant Labs: All pertinent labs within the past 24 hours have been reviewed.    Significant Imaging: I have reviewed all pertinent imaging results/findings within the past 24 hours.    Assessment/Plan:      * Septic shock  This patient does have evidence of infective focus  My overall impression is septic shock due to SBP < 90.  Source: Respiratory and Urinary Tract  Antibiotics given-   Antibiotics (72h ago, onward)      Start     Stop Route Frequency Ordered    07/18/24 2100  vancomycin 1.25 g in dextrose 5% 250 mL IVPB (ready to mix)        Note to Pharmacy: Ht: 5' 7" (1.702 m)  Wt: 81.6 kg (179 lb 14.3 oz)  Estimated Creatinine Clearance: 42.7 mL/min (based on SCr of 1.2 mg/dL).  Body mass index is 28.18 kg/m².    -- IV Every 24 hours (non-standard times) 07/18/24 0405    07/18/24 0900  mupirocin 2 % ointment         07/23/24 0859 Nasl 2 times daily 07/18/24 0318    07/18/24 0400  piperacillin-tazobactam 3.375 g in dextrose 5 % 100 mL IVPB (ready to mix)        Note to Pharmacy: Ht: 5' 7" (1.702 m)  Wt: 81.6 kg (179 lb 14.3 oz)  Estimated Creatinine Clearance: 42.7 mL/min (based on SCr of 1.2 mg/dL).  Body mass index is 28.18 kg/m².    -- IV Every 8 hours " "(non-standard times) 07/18/24 0201    07/18/24 0257  vancomycin - pharmacy to dose  (vancomycin IVPB (PEDS and ADULTS))        Placed in "And" Linked Group    -- IV pharmacy to manage frequency 07/18/24 0157          Latest lactate reviewed-  Recent Labs   Lab 07/17/24  1941 07/17/24  2254 07/18/24  0325   LACTATE  --    < > 1.4   POCLAC 1.25  --   --     < > = values in this interval not displayed.     Organ dysfunction indicated by Acute respiratory failure    Fluid challenge Ideal Body Weight- The patient's ideal body weight is Ideal body weight: 66.1 kg (145 lb 11.6 oz) which will be used to calculate fluid bolus of 30 ml/kg for treatment of septic shock.      Post- resuscitation assessment Yes Perfusion exam was performed within 6 hours of septic shock presentation after bolus shows Inadequate tissue perfusion assessed by non-invasive monitoring     Will Start Pressors- Levophed for MAP of 65            Acute hypoxic respiratory failure  Patient with Hypoxic Respiratory failure which is Acute.  he is not on home oxygen. Supplemental oxygen was provided and noted- Oxygen Concentration (%):  [30-50] 30    .   Signs/symptoms of respiratory failure include- tachypnea, respiratory distress, and use of accessory muscles. Contributing diagnoses includes - ARDS and Pneumonia Labs and images were reviewed. Patient Has not had a recent ABG. Will treat underlying causes and adjust management of respiratory failure as follows- continue to wean oxygen and provide antibiotics per septic shock above.      Acute on chronic diastolic heart failure  Patient is identified as having Diastolic (HFpEF) heart failure that is Acute on chronic. CHF is currently uncontrolled due to Pulmonary edema/pleural effusion on CXR. Latest ECHO performed and demonstrates- Results for orders placed during the hospital encounter of 03/24/23    Echo    Interpretation Summary  · The left ventricle is normal in size with moderate concentric hypertrophy " and low normal systolic function.  · Mild left atrial enlargement.  · The estimated ejection fraction is 50%.  · Indeterminate left ventricular diastolic function.  · RV is not well visualized. Mildly to moderately reduced right ventricular systolic function.  · The mean diastolic gradient across the mitral valve is 13 mmHg at a heart rate of 87 bpm.  · There is moderate to severe mitral stenosis.  · There is moderate-to-severe aortic valve stenosis.  · Aortic valve area is 1.06 cm2; peak velocity is 2.75 m/s; mean gradient is 17 mmHg. Indexed area is 0.45-0.5 cm/m2. Indexed stroke volume is 29 ml/m2.  · Normal central venous pressure (3 mmHg).    Monitor clinical status closely. Monitor on telemetry. Patient is off CHF pathway.  Monitor strict Is&Os and daily weights.  Place on fluid restriction of 1.5 L. Cardiology has not been consulted. Continue to stress to patient importance of self efficacy and  on diet for CHF.     Last BNP reviewed- and noted below   Recent Labs   Lab 07/17/24  1942   *       BPH (benign prostatic hyperplasia)  Continue tamsulosin      Hypothyroidism  Patient has chronic hypothyroidism. TFTs reviewed-   Lab Results   Component Value Date    TSH 0.870 03/24/2023   . Will continue chronic levothyroxine and adjust for and clinical changes.        COPD (chronic obstructive pulmonary disease)  Patient's COPD is controlled currently.  Patient is currently off COPD Pathway. Continue scheduled inhalers Antibiotics and Supplemental oxygen and monitor respiratory status closely.     Thrombocytopenia  Patient was found to have thrombocytopenia, the likely etiology is secondary to sepsis/infection, will monitor the platelets Daily. Will transfuse if platelet count is <10k. Hold DVT prophylaxis if platelets are <50k. The patient's platelet results have been reviewed and are listed below.  Recent Labs   Lab 07/18/24  0325   PLT 85*         S/P CABG x 5  Patient with known CAD s/p CABG,  which is controlled Will continue ASA, Plavix, and Statin and monitor for S/Sx of angina/ACS. Continue to monitor on telemetry.         CKD (chronic kidney disease), stage III  Creatine stable for now. BMP reviewed- noted Estimated Creatinine Clearance: 39.7 mL/min (based on SCr of 1.3 mg/dL). according to latest data. Based on current GFR, CKD stage is stage 3 - GFR 30-59.  Monitor UOP and serial BMP and adjust therapy as needed. Renally dose meds. Avoid nephrotoxic medications and procedures.    [place] (hard of hearing)  Noted, secondary to Meniere's disease.  Make appropriate precautions.  Hearing aids need to be in place.      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  · Mild left atrial enlargement.  · The estimated ejection fraction is 50%.  · Indeterminate left ventricular diastolic function.  · RV is not well visualized. Mildly to moderately reduced right ventricular systolic function.  · The mean diastolic gradient across the mitral valve is 13 mmHg at a heart rate of 87 bpm.  · There is moderate to severe mitral stenosis.  · There is moderate-to-severe aortic valve stenosis.  · Aortic valve area is 1.06 cm2; peak velocity is 2.75 m/s; mean gradient is 17 mmHg. Indexed area is 0.45-0.5 cm/m2. Indexed stroke volume is 29 ml/m2.  · Normal central venous pressure (3 mmHg).          VTE Risk Mitigation (From admission, onward)           Ordered     heparin (porcine) injection 5,000 Units  Every 8 hours         07/18/24 0131     IP VTE HIGH RISK PATIENT  Once         07/18/24 0131     Place sequential compression device  Until discontinued         07/18/24 0131                    Discharge Planning   ALESSANDRA: 7/20/2024     Code Status: DNR   Is the patient medically ready for discharge?:      Reason for patient still in hospital (select all that apply): Treatment               Critical care time spent on the evaluation and treatment of severe organ dysfunction, review of pertinent labs and imaging studies, discussions with consulting providers and discussions with patient/family: 38 minutes.      Moses Harrington MD  Department of Hospital Medicine   formerly [facility]

## 2024-07-18 NOTE — SUBJECTIVE & OBJECTIVE
Interval History:  Patient seen and examined.  Blood pressure improved, but patient remains on pressors.  Plan of care reviewed with the nurse, the patient, and his family at the bedside in detail.    Review of Systems  Objective:     Vital Signs (Most Recent):  Temp: 98.9 °F (37.2 °C) (07/18/24 0701)  Pulse: 62 (07/18/24 1143)  Resp: (!) 22 (07/18/24 1143)  BP: 134/72 (07/18/24 0915)  SpO2: 100 % (07/18/24 1143) Vital Signs (24h Range):  Temp:  [98.3 °F (36.8 °C)-103 °F (39.4 °C)] 98.9 °F (37.2 °C)  Pulse:  [] 62  Resp:  [16-32] 22  SpO2:  [94 %-100 %] 100 %  BP: ()/() 134/72     Weight: 82.9 kg (182 lb 12.2 oz)  Body mass index is 28.62 kg/m².    Intake/Output Summary (Last 24 hours) at 7/18/2024 1150  Last data filed at 7/18/2024 0525  Gross per 24 hour   Intake 625 ml   Output 500 ml   Net 125 ml         Physical Exam  Vitals and nursing note reviewed.   Eyes:      Pupils: Pupils are equal, round, and reactive to light.   Neck:      Vascular: No JVD.   Cardiovascular:      Rate and Rhythm: Normal rate and regular rhythm.      Heart sounds: Normal heart sounds.   Pulmonary:      Effort: Pulmonary effort is normal.      Breath sounds: Normal breath sounds.   Abdominal:      General: Bowel sounds are normal. There is no distension.      Palpations: Abdomen is soft.      Tenderness: There is no abdominal tenderness.   Musculoskeletal:         General: No deformity.   Lymphadenopathy:      Cervical: No cervical adenopathy.   Skin:     Coloration: Skin is not pale.      Findings: No rash.             Significant Labs: All pertinent labs within the past 24 hours have been reviewed.    Significant Imaging: I have reviewed all pertinent imaging results/findings within the past 24 hours.

## 2024-07-18 NOTE — PLAN OF CARE
Problem: Adult Inpatient Plan of Care  Goal: Plan of Care Review  Outcome: Progressing  Goal: Patient-Specific Goal (Individualized)  Outcome: Progressing  Goal: Absence of Hospital-Acquired Illness or Injury  Outcome: Progressing  Goal: Optimal Comfort and Wellbeing  Outcome: Progressing     Problem: Pneumonia  Goal: Resolution of Infection Signs and Symptoms  Outcome: Progressing  Goal: Effective Oxygenation and Ventilation  Outcome: Progressing     Problem: Fall Injury Risk  Goal: Absence of Fall and Fall-Related Injury  Outcome: Progressing     Problem: Sepsis/Septic Shock  Goal: Optimal Coping  Outcome: Progressing  Goal: Absence of Bleeding  Outcome: Progressing  Goal: Blood Glucose Level Within Targeted Range  Outcome: Progressing  Goal: Optimal Nutrition Intake  Outcome: Progressing

## 2024-07-18 NOTE — CARE UPDATE
07/18/24 0758   Patient Assessment/Suction   Level of Consciousness (AVPU) responds to voice   Respiratory Effort Unlabored   Skin Integrity   $ Wound Care Tech Time 15 min   Area Observed Left;Right;Cheek;Bridge of nose   Skin Appearance without discoloration   PRE-TX-O2   Device (Oxygen Therapy) BIPAP   $ Is the patient on High Flow Oxygen? Yes   $ Noninvasive Daily Charge Noninvasive Daily   Oxygen Concentration (%) 40  (Decreased o2 to 30% at this time.)   SpO2 99 %   Pulse (!) 55   Resp (!) 24   Ready to Wean/Extubation Screen   FIO2<=50 (chart decimal) 0.4   Preset CPAP/BiPAP Settings   Mode Of Delivery BiPAP S/T   $ CPAP/BiPAP Daily Charge BiPAP/CPAP Daily   Size of Mask Medium/Large   Equipment Type V60   Ipap 12   EPAP (cm H2O) 6   Pressure Support (cm H2O) 6   ITime (sec) 1   Rise Time (sec) 3   Patient CPAP/BiPAP Settings   RR Total (Breaths/Min) 24   Tidal Volume (mL) 379   VE Minute Ventilation (L/min) 9.2 L/min   Peak Inspiratory Pressure (cm H2O) 12   TiTOT (%) 39   Total Leak (L/Min) 9   Patient Trigger - ST Mode Only (%) 50   CPAP/BiPAP Backup Settings   Backup Rate 24 breaths per minute (bpm)   CPAP/BiPAP Alarms   High Pressure (cm H2O) 40   Low Pressure (cm H2O) 5   Minute Ventilation (L/Min) 3   High RR (breaths/min) 40   Low RR (breaths/min) 8   Apnea (Sec) 20   Education   $ Education BiPAP;15 min

## 2024-07-19 PROBLEM — J96.01 ACUTE HYPOXIC RESPIRATORY FAILURE: Status: RESOLVED | Noted: 2024-07-18 | Resolved: 2024-07-19

## 2024-07-19 PROBLEM — N17.9 AKI (ACUTE KIDNEY INJURY): Status: ACTIVE | Noted: 2024-07-19

## 2024-07-19 LAB
BASOPHILS # BLD AUTO: 0.03 K/UL (ref 0–0.2)
BASOPHILS NFR BLD: 0.6 % (ref 0–1.9)
CREAT SERPL-MCNC: 1.6 MG/DL (ref 0.5–1.4)
DIFFERENTIAL METHOD BLD: ABNORMAL
EOSINOPHIL # BLD AUTO: 0 K/UL (ref 0–0.5)
EOSINOPHIL NFR BLD: 0.6 % (ref 0–8)
ERYTHROCYTE [DISTWIDTH] IN BLOOD BY AUTOMATED COUNT: 15 % (ref 11.5–14.5)
EST. GFR  (NO RACE VARIABLE): 40.9 ML/MIN/1.73 M^2
GLUCOSE SERPL-MCNC: 129 MG/DL (ref 70–110)
HCT VFR BLD AUTO: 40.4 % (ref 40–54)
HGB BLD-MCNC: 13 G/DL (ref 14–18)
IMM GRANULOCYTES # BLD AUTO: 0.02 K/UL (ref 0–0.04)
IMM GRANULOCYTES NFR BLD AUTO: 0.4 % (ref 0–0.5)
LYMPHOCYTES # BLD AUTO: 0.8 K/UL (ref 1–4.8)
LYMPHOCYTES NFR BLD: 16.7 % (ref 18–48)
MAGNESIUM SERPL-MCNC: 2.1 MG/DL (ref 1.6–2.6)
MCH RBC QN AUTO: 30.2 PG (ref 27–31)
MCHC RBC AUTO-ENTMCNC: 32.2 G/DL (ref 32–36)
MCV RBC AUTO: 94 FL (ref 82–98)
MONOCYTES # BLD AUTO: 0.3 K/UL (ref 0.3–1)
MONOCYTES NFR BLD: 5.8 % (ref 4–15)
NEUTROPHILS # BLD AUTO: 3.6 K/UL (ref 1.8–7.7)
NEUTROPHILS NFR BLD: 75.9 % (ref 38–73)
NRBC BLD-RTO: 0 /100 WBC
OHS QRS DURATION: 128 MS
OHS QTC CALCULATION: 503 MS
PLATELET # BLD AUTO: 58 K/UL (ref 150–450)
PMV BLD AUTO: 10.2 FL (ref 9.2–12.9)
POTASSIUM SERPL-SCNC: 3.8 MMOL/L (ref 3.5–5.1)
RBC # BLD AUTO: 4.3 M/UL (ref 4.6–6.2)
WBC # BLD AUTO: 4.79 K/UL (ref 3.9–12.7)

## 2024-07-19 PROCEDURE — 85025 COMPLETE CBC W/AUTO DIFF WBC: CPT | Performed by: HOSPITALIST

## 2024-07-19 PROCEDURE — 25000242 PHARM REV CODE 250 ALT 637 W/ HCPCS: Performed by: HOSPITALIST

## 2024-07-19 PROCEDURE — 94640 AIRWAY INHALATION TREATMENT: CPT

## 2024-07-19 PROCEDURE — 99900031 HC PATIENT EDUCATION (STAT)

## 2024-07-19 PROCEDURE — 25000003 PHARM REV CODE 250: Performed by: HOSPITALIST

## 2024-07-19 PROCEDURE — 63600175 PHARM REV CODE 636 W HCPCS: Performed by: HOSPITALIST

## 2024-07-19 PROCEDURE — 83735 ASSAY OF MAGNESIUM: CPT | Performed by: HOSPITALIST

## 2024-07-19 PROCEDURE — 82565 ASSAY OF CREATININE: CPT | Performed by: HOSPITALIST

## 2024-07-19 PROCEDURE — 94761 N-INVAS EAR/PLS OXIMETRY MLT: CPT

## 2024-07-19 PROCEDURE — 93005 ELECTROCARDIOGRAM TRACING: CPT | Performed by: GENERAL PRACTICE

## 2024-07-19 PROCEDURE — 36415 COLL VENOUS BLD VENIPUNCTURE: CPT | Performed by: HOSPITALIST

## 2024-07-19 PROCEDURE — 94660 CPAP INITIATION&MGMT: CPT

## 2024-07-19 PROCEDURE — 93010 ELECTROCARDIOGRAM REPORT: CPT | Mod: ,,, | Performed by: GENERAL PRACTICE

## 2024-07-19 PROCEDURE — 84132 ASSAY OF SERUM POTASSIUM: CPT | Performed by: HOSPITALIST

## 2024-07-19 PROCEDURE — 20000000 HC ICU ROOM

## 2024-07-19 PROCEDURE — 99900035 HC TECH TIME PER 15 MIN (STAT)

## 2024-07-19 PROCEDURE — 27000221 HC OXYGEN, UP TO 24 HOURS

## 2024-07-19 RX ADMIN — PIPERACILLIN SODIUM AND TAZOBACTAM SODIUM 3.38 G: 3; .375 INJECTION, POWDER, LYOPHILIZED, FOR SOLUTION INTRAVENOUS at 08:07

## 2024-07-19 RX ADMIN — NOREPINEPHRINE BITARTRATE 0.02 MCG/KG/MIN: 4 INJECTION, SOLUTION INTRAVENOUS at 01:07

## 2024-07-19 RX ADMIN — PIPERACILLIN SODIUM AND TAZOBACTAM SODIUM 3.38 G: 3; .375 INJECTION, POWDER, LYOPHILIZED, FOR SOLUTION INTRAVENOUS at 11:07

## 2024-07-19 RX ADMIN — FUROSEMIDE 40 MG: 10 INJECTION, SOLUTION INTRAMUSCULAR; INTRAVENOUS at 08:07

## 2024-07-19 RX ADMIN — TAMSULOSIN HYDROCHLORIDE 0.4 MG: 0.4 CAPSULE ORAL at 08:07

## 2024-07-19 RX ADMIN — IPRATROPIUM BROMIDE AND ALBUTEROL SULFATE 3 ML: 2.5; .5 SOLUTION RESPIRATORY (INHALATION) at 07:07

## 2024-07-19 RX ADMIN — MUPIROCIN 1 G: 20 OINTMENT TOPICAL at 08:07

## 2024-07-19 RX ADMIN — HEPARIN SODIUM 5000 UNITS: 5000 INJECTION, SOLUTION INTRAVENOUS; SUBCUTANEOUS at 10:07

## 2024-07-19 RX ADMIN — HEPARIN SODIUM 5000 UNITS: 5000 INJECTION, SOLUTION INTRAVENOUS; SUBCUTANEOUS at 02:07

## 2024-07-19 RX ADMIN — IPRATROPIUM BROMIDE AND ALBUTEROL SULFATE 3 ML: 2.5; .5 SOLUTION RESPIRATORY (INHALATION) at 02:07

## 2024-07-19 RX ADMIN — CLOPIDOGREL BISULFATE 75 MG: 75 TABLET, FILM COATED ORAL at 08:07

## 2024-07-19 RX ADMIN — ACETAMINOPHEN 650 MG: 325 TABLET ORAL at 11:07

## 2024-07-19 RX ADMIN — IPRATROPIUM BROMIDE AND ALBUTEROL SULFATE 3 ML: 2.5; .5 SOLUTION RESPIRATORY (INHALATION) at 01:07

## 2024-07-19 RX ADMIN — HEPARIN SODIUM 5000 UNITS: 5000 INJECTION, SOLUTION INTRAVENOUS; SUBCUTANEOUS at 05:07

## 2024-07-19 RX ADMIN — PIPERACILLIN SODIUM AND TAZOBACTAM SODIUM 3.38 G: 3; .375 INJECTION, POWDER, LYOPHILIZED, FOR SOLUTION INTRAVENOUS at 05:07

## 2024-07-19 RX ADMIN — ATORVASTATIN CALCIUM 40 MG: 40 TABLET, FILM COATED ORAL at 08:07

## 2024-07-19 RX ADMIN — LEVOTHYROXINE SODIUM 25 MCG: 0.03 TABLET ORAL at 05:07

## 2024-07-19 RX ADMIN — DOXYCYCLINE 100 MG: 100 INJECTION, POWDER, LYOPHILIZED, FOR SOLUTION INTRAVENOUS at 02:07

## 2024-07-19 NOTE — HOSPITAL COURSE
Patient admitted with septic shock.  He was started on broad-spectrum IV antibiotics and improved.  Blood and urine cultures were obtained and were currently negative.  Patient was weaned down off of Levophed, and still had intermittent fevers.  He did have mild episode of acute kidney injury on 07/19.  Patient apparently has septic shock most likely secondary to urinary tract infection, patient is placed on IV Zosyn, patient was successfully weaned off Levophed.  Patient also has significant pulmonary edema, patient has severe valvular heart disease pudding moderate to severe AS, moderately severe MS, he follow up Dr. Kim outpatient, and recommended conservative management, he received IV Lasix aggressively, negative 5 L since admission, feel much better, transferred to the floor.  Urine culture showed PROTEUS MIRABILIS with pan sensitivity.  Patient will be discharged home with home health.  Patient does not need home oxygen upon discharge.  Patient also has significant sinus tachycardia after breathing treatment, we have not resumed his home medication metoprolol yet.  Improved after resumed metoprolol.  Patient is cleared for discharge.

## 2024-07-19 NOTE — ASSESSMENT & PLAN NOTE
Patient with known CAD s/p CABG, which is controlled Will continue ASA, Plavix, and Statin and monitor for S/Sx of angina/ACS. Continue to monitor on telemetry.

## 2024-07-19 NOTE — PROGRESS NOTES
[facility] Medicine  Progress Note    Patient Name: Frank Wyatt  MRN: [id number]  Patient Class: IP- Inpatient   Admission Date: 7/17/2024  Length of Stay: 1 days  Attending Physician: Moses Harrington MD  Primary Care Provider: Darci German MD        Subjective:     Principal Problem:Septic shock        HPI:  History is taken from medical records.  This is an 89-year-old male who recently had a cystoscopy supposedly for stone removal presents to the hospital with fevers and chills.  Workup included abdomen and pelvis and showed new infiltrate in the right lung base.  Chest x-ray was also done which showed interstitial prominence with differential including edema, pneumonitis or chronic interstitial lung disease.  Given the high fever the patient is thought to have pneumonia and vancomycin and Zosyn was given for sepsis.  Patient also has acute respiratory failure.  IV Lasix has been provided.  Patient currently on a BiPAP.  Patient is arousable.  His laboratory workup otherwise is within normal limits.  Hospitalist will admit this patient for further care.    Overview/Hospital Course:  Patient admitted with septic shock.  He was started on broad-spectrum IV antibiotics and improved.  Blood and urine cultures were obtained and were currently negative.  Patient was weaned down off of Levophed, and still had intermittent fevers.  He did have mild episode of acute kidney injury on 07/19.    Interval History:  Patient seen and examined.  Blood pressure improved, but patient remains on pressors.  Plan of care reviewed with the nurse, the patient, and his family at the bedside in detail.  Had some mild tachycardia.  Otherwise asymptomatic.    Review of Systems  Objective:     Vital Signs (Most Recent):  Temp: 99.5 °F (37.5 °C) (07/19/24 1106)  Pulse: (!) 125 (07/19/24 0901)  Resp: (!) 33 (07/19/24 0901)  BP: (!) 119/56 (07/19/24 0901)  SpO2: 95 % (07/19/24 0901) Vital Signs (24h  Range):  Temp:  [97.5 °F (36.4 °C)-99.5 °F (37.5 °C)] 99.5 °F (37.5 °C)  Pulse:  [] 125  Resp:  [15-42] 33  SpO2:  [90 %-100 %] 95 %  BP: ()/(48-65) 119/56     Weight: 82.9 kg (182 lb 12.2 oz)  Body mass index is 28.62 kg/m².    Intake/Output Summary (Last 24 hours) at 7/19/2024 1242  Last data filed at 7/19/2024 0504  Gross per 24 hour   Intake 950 ml   Output 1250 ml   Net -300 ml         Physical Exam  Vitals and nursing note reviewed.   Eyes:      Pupils: Pupils are equal, round, and reactive to light.   Neck:      Vascular: No JVD.   Cardiovascular:      Rate and Rhythm: Regular rhythm. Tachycardia present.      Heart sounds: Normal heart sounds.   Pulmonary:      Effort: Pulmonary effort is normal.      Breath sounds: Normal breath sounds.   Abdominal:      General: Bowel sounds are normal. There is no distension.      Palpations: Abdomen is soft.      Tenderness: There is no abdominal tenderness.   Musculoskeletal:         General: No deformity.   Lymphadenopathy:      Cervical: No cervical adenopathy.   Skin:     Coloration: Skin is not pale.      Findings: No rash.             Significant Labs: All pertinent labs within the past 24 hours have been reviewed.    Significant Imaging: I have reviewed all pertinent imaging results/findings within the past 24 hours.    Assessment/Plan:      * Septic shock  This patient does have evidence of infective focus  My overall impression is septic shock due to SBP < 90.  Source: Respiratory and Urinary Tract  Antibiotics given-   Antibiotics (72h ago, onward)      Start     Stop Route Frequency Ordered    07/19/24 1400  doxycycline (VIBRAMYCIN) 100 mg in dextrose 5% 100 mL IVPB (ready to mix)         -- IV Every 12 hours (non-standard times) 07/19/24 1104    07/18/24 0900  mupirocin 2 % ointment         07/23/24 0859 Nasl 2 times daily 07/18/24 0318    07/18/24 0400  piperacillin-tazobactam 3.375 g in dextrose 5 % 100 mL IVPB (ready to mix)        Note to  "Pharmacy: Ht: 5' 7" (1.702 m)  Wt: 81.6 kg (179 lb 14.3 oz)  Estimated Creatinine Clearance: 42.7 mL/min (based on SCr of 1.2 mg/dL).  Body mass index is 28.18 kg/m².    -- IV Every 8 hours (non-standard times) 07/18/24 0201          Latest lactate reviewed-  Recent Labs   Lab 07/17/24  1941 07/17/24  2254 07/18/24  0325   LACTATE  --    < > 1.4   POCLAC 1.25  --   --     < > = values in this interval not displayed.       Organ dysfunction indicated by Acute respiratory failure    Fluid challenge Ideal Body Weight- The patient's ideal body weight is Ideal body weight: 66.1 kg (145 lb 11.6 oz) which will be used to calculate fluid bolus of 30 ml/kg for treatment of septic shock.      Post- resuscitation assessment Yes Perfusion exam was performed within 6 hours of septic shock presentation after bolus shows Inadequate tissue perfusion assessed by non-invasive monitoring     Will Start Pressors- Levophed for MAP of 65            HANSA (acute kidney injury)  Patient with acute kidney injury/acute renal failure likely due to acute tubular necrosis caused by sepsis  HANSA is currently worsening. Baseline creatinine  1.3  - Labs reviewed- Renal function/electrolytes with Estimated Creatinine Clearance: 32.2 mL/min (A) (based on SCr of 1.6 mg/dL (H)). according to latest data. Monitor urine output and serial BMP and adjust therapy as needed. Avoid nephrotoxins and renally dose meds for GFR listed above.    Acute on chronic diastolic heart failure  Patient is identified as having Diastolic (HFpEF) heart failure that is Acute on chronic. CHF is currently uncontrolled due to Pulmonary edema/pleural effusion on CXR. Latest ECHO performed and demonstrates- Results for orders placed during the hospital encounter of 03/24/23    Echo    Interpretation Summary  · The left ventricle is normal in size with moderate concentric hypertrophy and low normal systolic function.  · Mild left atrial enlargement.  · The estimated ejection fraction " is 50%.  · Indeterminate left ventricular diastolic function.  · RV is not well visualized. Mildly to moderately reduced right ventricular systolic function.  · The mean diastolic gradient across the mitral valve is 13 mmHg at a heart rate of 87 bpm.  · There is moderate to severe mitral stenosis.  · There is moderate-to-severe aortic valve stenosis.  · Aortic valve area is 1.06 cm2; peak velocity is 2.75 m/s; mean gradient is 17 mmHg. Indexed area is 0.45-0.5 cm/m2. Indexed stroke volume is 29 ml/m2.  · Normal central venous pressure (3 mmHg).    Monitor clinical status closely. Monitor on telemetry. Patient is off CHF pathway.  Monitor strict Is&Os and daily weights.  Place on fluid restriction of 1.5 L. Cardiology has not been consulted. Continue to stress to patient importance of self efficacy and  on diet for CHF.     Last BNP reviewed- and noted below   Recent Labs   Lab 07/17/24 1942   *         BPH (benign prostatic hyperplasia)  Continue tamsulosin      Hypothyroidism  Patient has chronic hypothyroidism. TFTs reviewed-   Lab Results   Component Value Date    TSH 0.870 03/24/2023   . Will continue chronic levothyroxine and adjust for and clinical changes.        COPD (chronic obstructive pulmonary disease)  Patient's COPD is controlled currently.  Patient is currently off COPD Pathway. Continue scheduled inhalers Antibiotics and Supplemental oxygen and monitor respiratory status closely.     Thrombocytopenia  Patient was found to have thrombocytopenia, the likely etiology is secondary to sepsis/infection, will monitor the platelets Daily. Will transfuse if platelet count is <10k. Hold DVT prophylaxis if platelets are <50k. The patient's platelet results have been reviewed and are listed below.  Recent Labs   Lab 07/19/24  0459   PLT 58*           S/P CABG x 5  Patient with known CAD s/p CABG, which is controlled Will continue ASA, Plavix, and Statin and monitor for S/Sx of angina/ACS. Continue  to monitor on telemetry.         CKD (chronic kidney disease), stage III  Creatine stable for now. BMP reviewed- noted Estimated Creatinine Clearance: 32.2 mL/min (A) (based on SCr of 1.6 mg/dL (H)). according to latest data. Based on current GFR, CKD stage is stage 3 - GFR 30-59.  Monitor UOP and serial BMP and adjust therapy as needed. Renally dose meds. Avoid nephrotoxic medications and procedures.    [place] (hard of hearing)  Noted, secondary to Meniere's disease.  Make appropriate precautions.  Hearing aids need to be in place.      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  · Mild left atrial enlargement.  · The estimated ejection fraction is 50%.  · Indeterminate left ventricular diastolic function.  · RV is not well visualized. Mildly to moderately reduced right ventricular systolic function.  · The mean diastolic gradient across the mitral valve is 13 mmHg at a heart rate of 87 bpm.  · There is moderate to severe mitral stenosis.  · There is moderate-to-severe aortic valve stenosis.  · Aortic valve area is 1.06 cm2; peak velocity is 2.75 m/s; mean gradient is 17 mmHg. Indexed area is 0.45-0.5 cm/m2. Indexed stroke volume is 29 ml/m2.  · Normal central venous pressure (3 mmHg).          VTE Risk Mitigation (From admission, onward)           Ordered     heparin (porcine) injection 5,000 Units  Every 8 hours         07/18/24 0131     IP VTE HIGH RISK PATIENT  Once         07/18/24 0131     Place sequential compression device  Until discontinued         07/18/24 0131                    Discharge Planning   ALESSANDRA: 7/22/2024     Code Status: DNR   Is the patient medically ready for discharge?:     Reason for patient still in hospital (select all that apply): Treatment  Discharge Plan A:  Assisted Living            Critical care time spent on the evaluation and treatment of severe organ dysfunction, review of pertinent labs and imaging studies, discussions with consulting providers and discussions with patient/family: 41 minutes.      Moses Harrington MD  Department of Hospital Medicine   [facility]

## 2024-07-19 NOTE — PLAN OF CARE
Problem: Adult Inpatient Plan of Care  Goal: Plan of Care Review  Outcome: Progressing  Goal: Patient-Specific Goal (Individualized)  Outcome: Progressing  Goal: Absence of Hospital-Acquired Illness or Injury  Outcome: Progressing  Goal: Optimal Comfort and Wellbeing  Outcome: Progressing  Goal: Readiness for Transition of Care  Outcome: Progressing     Problem: Pneumonia  Goal: Fluid Balance  Outcome: Progressing  Goal: Resolution of Infection Signs and Symptoms  Outcome: Progressing  Goal: Effective Oxygenation and Ventilation  Outcome: Progressing     Problem: Infection  Goal: Absence of Infection Signs and Symptoms  Outcome: Progressing     Problem: Skin Injury Risk Increased  Goal: Skin Health and Integrity  Outcome: Progressing     Problem: Fall Injury Risk  Goal: Absence of Fall and Fall-Related Injury  Outcome: Progressing     Problem: Sepsis/Septic Shock  Goal: Optimal Coping  Outcome: Progressing  Goal: Absence of Bleeding  Outcome: Progressing  Goal: Blood Glucose Level Within Targeted Range  Outcome: Progressing  Goal: Absence of Infection Signs and Symptoms  Outcome: Progressing  Goal: Optimal Nutrition Intake  Outcome: Progressing

## 2024-07-19 NOTE — ASSESSMENT & PLAN NOTE
Creatine stable for now. BMP reviewed- noted Estimated Creatinine Clearance: 32.2 mL/min (A) (based on SCr of 1.6 mg/dL (H)). according to latest data. Based on current GFR, CKD stage is stage 3 - GFR 30-59.  Monitor UOP and serial BMP and adjust therapy as needed. Renally dose meds. Avoid nephrotoxic medications and procedures.

## 2024-07-19 NOTE — ASSESSMENT & PLAN NOTE
Patient with acute kidney injury/acute renal failure likely due to acute tubular necrosis caused by sepsis  HANSA is currently worsening. Baseline creatinine  1.3  - Labs reviewed- Renal function/electrolytes with Estimated Creatinine Clearance: 32.2 mL/min (A) (based on SCr of 1.6 mg/dL (H)). according to latest data. Monitor urine output and serial BMP and adjust therapy as needed. Avoid nephrotoxins and renally dose meds for GFR listed above.

## 2024-07-19 NOTE — PLAN OF CARE
07/18/24 1940   Patient Assessment/Suction   Level of Consciousness (AVPU) alert   Respiratory Effort Normal;Unlabored   All Lung Fields Breath Sounds diminished;clear   Rhythm/Pattern, Respiratory pattern regular;unlabored;depth regular   Cough Frequency infrequent   Skin Integrity   $ Wound Care Tech Time 15 min   Area Observed Left;Right;Cheek;Bridge of nose   Skin Appearance without discoloration   PRE-TX-O2   Device (Oxygen Therapy) nasal cannula   $ Is the patient on Low Flow Oxygen? Yes   Flow (L/min) (Oxygen Therapy) 5   SpO2 97 %   Pulse Oximetry Type Continuous   $ Pulse Oximetry - Multiple Charge Pulse Oximetry - Multiple   Pulse 92   Resp 18   /61   Aerosol Therapy   $ Aerosol Therapy Charges Aerosol Treatment   Daily Review of Necessity (SVN) completed   Respiratory Treatment Status (SVN) given   Treatment Route (SVN) mask;oxygen   Patient Position HOB elevated   Post Treatment Assessment (SVN) increased aeration;breath sounds improved   Signs of Intolerance (SVN) none   Preset CPAP/BiPAP Settings   Mode Of Delivery Standby;BiPAP   $ Initial CPAP/BiPAP Setup? No   $ Is patient using? Yes   Equipment Type V60   Ipap 12   EPAP (cm H2O) 6   Pressure Support (cm H2O) 6   Set Rate (Breaths/Min) 20   ITime (sec) 1   Rise Time (sec) 3   Education   $ Education Bronchodilator;15 min   Respiratory Evaluation   $ Care Plan Tech Time 15 min

## 2024-07-19 NOTE — NURSING
Nurses Note -- 4 Eyes      7/18/2024   7:35 PM      Skin assessed during: Q Shift Change      [] No Altered Skin Integrity Present    []Prevention Measures Documented      [x] Yes- Altered Skin Integrity Present or Discovered   [x] LDA Added if Not in Epic (Describe Wound)   [x] New Altered Skin Integrity was Present on Admit and Documented in LDA   [x] Wound Image Taken    Wound Care Consulted? Yes    Attending Nurse:  Shy Reich RN/Staff Member:  Rosa

## 2024-07-19 NOTE — ASSESSMENT & PLAN NOTE
Patient was found to have thrombocytopenia, the likely etiology is secondary to sepsis/infection, will monitor the platelets Daily. Will transfuse if platelet count is <10k. Hold DVT prophylaxis if platelets are <50k. The patient's platelet results have been reviewed and are listed below.  Recent Labs   Lab 07/19/24  0459   PLT 58*

## 2024-07-19 NOTE — ASSESSMENT & PLAN NOTE
Patient with Hypoxic Respiratory failure which is Acute.  he is not on home oxygen. Supplemental oxygen was provided and noted- Oxygen Concentration (%):  [30] 30    .   Signs/symptoms of respiratory failure include- tachypnea, respiratory distress, and use of accessory muscles. Contributing diagnoses includes - ARDS and Pneumonia Labs and images were reviewed. Patient Has not had a recent ABG. Will treat underlying causes and adjust management of respiratory failure as follows- continue to wean oxygen and provide antibiotics per septic shock above.

## 2024-07-19 NOTE — CARE UPDATE
07/19/24 0707   Patient Assessment/Suction   Level of Consciousness (AVPU) alert   Respiratory Effort Normal;Unlabored   All Lung Fields Breath Sounds clear   Rhythm/Pattern, Respiratory pattern regular   Skin Integrity   $ Wound Care Tech Time 15 min   Area Observed Bridge of nose   Skin Appearance without discoloration   Barrier used? Liquid Filled Cushion   PRE-TX-O2   Device (Oxygen Therapy) room air   SpO2 97 %   Pulse Oximetry Type Continuous   $ Pulse Oximetry - Multiple Charge Pulse Oximetry - Multiple   Pulse 99   Resp 18   Aerosol Therapy   $ Aerosol Therapy Charges Aerosol Treatment   Daily Review of Necessity (SVN) completed   Respiratory Treatment Status (SVN) given   Treatment Route (SVN) mask;oxygen   Patient Position HOB elevated   Post Treatment Assessment (SVN) breath sounds unchanged;vital signs unchanged   Signs of Intolerance (SVN) none   Preset CPAP/BiPAP Settings   Mode Of Delivery Standby   $ CPAP/BiPAP Daily Charge BiPAP/CPAP Daily   $ Initial CPAP/BiPAP Setup? No   $ Is patient using? Yes

## 2024-07-19 NOTE — PLAN OF CARE
Problem: Adult Inpatient Plan of Care  Goal: Plan of Care Review  Outcome: Progressing  Goal: Patient-Specific Goal (Individualized)  Outcome: Progressing  Goal: Absence of Hospital-Acquired Illness or Injury  Outcome: Progressing  Goal: Optimal Comfort and Wellbeing  Outcome: Progressing     Problem: Pneumonia  Goal: Resolution of Infection Signs and Symptoms  Outcome: Progressing  Goal: Effective Oxygenation and Ventilation  Outcome: Progressing     Problem: Infection  Goal: Absence of Infection Signs and Symptoms  Outcome: Progressing     Problem: Fall Injury Risk  Goal: Absence of Fall and Fall-Related Injury  Outcome: Progressing     Problem: Sepsis/Septic Shock  Goal: Optimal Coping  Outcome: Progressing  Goal: Blood Glucose Level Within Targeted Range  Outcome: Progressing  Goal: Absence of Infection Signs and Symptoms  Outcome: Progressing  Goal: Optimal Nutrition Intake  Outcome: Progressing     Problem: Acute Kidney Injury/Impairment  Goal: Fluid and Electrolyte Balance  Outcome: Progressing  Goal: Improved Oral Intake  Outcome: Progressing  Goal: Effective Renal Function  Outcome: Progressing      ED Dizziness/Weakness





- General


Chief Complaint: Dizziness


Stated Complaint: DIZZINESS


Time Seen by Provider: 20 11:29


Notes: 





20 11:36 - ED Nursing Note by ELIZABETH RDZ


   [facility] Num: [id number]  : 1983  Patient Age: 37





Pt C/O dizziness possibly passing out at home after using the bathroom this 

morning. Pt states his urine was brown and he has burning in his upper left 

quadrant of his abdomen pt denies NVDC NAD 





Initialized on 20 11:36 - END OF NOTE





my notes


37-year-old  male who works for the [employer] and 

[employer]. Patient reports he has been burning wood scrub and debris around [address] 

ac[place] over 3 days and has felt extremely dehydrated and awoke this morning with 

dark brown urine and cramping all night.  Patient reports she has had similar 

symptoms in the past.  Patient usually does not run a severely low blood 

pressure but arrives with a 115 systolic pressure.


TRAVEL OUTSIDE OF THE [place] IN LAST 30 DAYS: No - Patient did receive a 

coronavirus test that was negative on Monday





- HPI


Patient complains to provider of: Dizziness, Weakness


Onset: This morning


Onset/Duration: Sudden


Quality of pain: Other - cramps


Severity: Moderate


Pain Level: 3


Associated symptoms: Weak all over


Baseline gait: Walks w/o assistance





- Related Data


Allergies/Adverse Reactions: 


                                        





No Known Allergies Allergy (Verified 20 11:48)


   








Home Medications: omeprazole





Past Medical History





- General


Information source: Patient





- Social History


Smoking Status: Never Smoker


Cigarette use (# per day): No


Chew tobacco use (# tins/day): No


Smoking Education Provided: No


Frequency of alcohol use: Social


Drug Abuse: None


Lives with: Family


Family History: Reviewed & Not Pertinent


Patient has suicidal ideation: No


Patient has homicidal ideation: No





Review of Systems





- Review of Systems


Constitutional: See HPI, Weakness


EENT: No symptoms reported


Cardiovascular: No symptoms reported


Respiratory: No symptoms reported


Gastrointestinal: No symptoms reported


Genitourinary: No symptoms reported


Male Genitourinary: No symptoms reported


Musculoskeletal: No symptoms reported


Skin: No symptoms reported


Hematologic/Lymphatic: No symptoms reported


Neurological/Psychological: See HPI, Weakness





Physical Exam





- Vital signs


Vitals: 


                                        











Temp Pulse Resp BP Pulse Ox


 


 98.0 F   87   18   113/91 H  98 


 


 20 11:26  20 11:26  20 11:26  20 11:26  20 11:26











Interpretation: Hypotensive, Tachypneic





- General


General appearance: Alert





- HEENT


Head: Normocephalic, Atraumatic


Eyes: Normal


Pupils: PERRL


Nasal: Normal


Mouth/Lips: Normal


Mucous membranes: Dry


Pharynx: Normal


Neck: Normal





- Respiratory


Respiratory status: No respiratory distress


Chest status: Nontender


Breath sounds: Normal


Chest palpation: Normal





- Cardiovascular


Rhythm: Regular


Heart sounds: Normal auscultation


Murmur: No





- Abdominal


Inspection: Normal


Distension: No distension


Bowel sounds: Normal


Tenderness: Nontender


Organomegaly: No organomegaly





- Rectal


Prostate: Other - deferred





- Genitourinary


Scrotum: Other - deferred





- Back


Back: Normal





- Extremities


General upper extremity: Normal inspection


General lower extremity: Normal inspection





- Neurological


Neuro grossly intact: Yes


Cognition: Normal


Orientation: AAOx4


Michelle Coma Scale Eye Opening: Spontaneous


Michelle Coma Scale Verbal: Oriented


Michelle Coma Scale Motor: Obeys Commands


Michelle Coma Scale Total: 15


Speech: Normal


Motor strength normal: LUE, RUE, LLE, RLE


Sensory: Normal





- Psychological


Associated symptoms: Normal affect





- Skin


Skin Moisture: Dry





Course





- Vital Signs


Vital signs: 


                                        











Temp Pulse Resp BP Pulse Ox


 


 98.0 F   87   24 H  137/89 H  98 


 


 20 11:26  20 11:26  20 15:46  20 15:51  20 15:46














- Laboratory


Result Diagrams: 


                                 20 11:46





                                 20 11:46


Laboratory results interpreted by me: 


                                        











  20





  11:46 16:56


 


D-Dimer   0.65 H


 


Sodium  134.2 L 














- Diagnostic Test


Radiology reviewed: Reports reviewed


Radiology results interpreted by me: 





20 20:54


CT abdomen was negative and also VQ scan for pulmonary embolism was negative.





- EKG Interpretation by Me


EKG shows normal: Sinus rhythm - 85 hr


Rate: Normal


Rhythm: NSR - Without any axis deviation or any ST inc or ST depression or T 

wave elevation or depression





Critical Care Note





- Critical Care Note


Comments: 





Patient continues to have abdominal pain which is vague and around suprapubic 

area.





Discharge





- Discharge


Clinical Impression: 


 Dehydration





Heat exhaustion


Qualifiers:


 Encounter type: initial encounter Qualified Code(s): T67.5XXA - Heat 

exhaustion, unspecified, initial encounter





Abdominal pain


Qualifiers:


 Abdominal location: unspecified location Qualified Code(s): R10.9 - Unspecified

abdominal pain





Condition: Good


Disposition: HOME, SELF-CARE


Additional Instructions: 


Follow-up with personal doctor return to ER as needed off work as directed; if 

your abdominal pain worsens or continues return to ER.  Take pain medicines as 

directed encourage fluids


Forms:  Return to Work

## 2024-07-19 NOTE — ASSESSMENT & PLAN NOTE
"This patient does have evidence of infective focus  My overall impression is septic shock due to SBP < 90.  Source: Respiratory and Urinary Tract  Antibiotics given-   Antibiotics (72h ago, onward)    Start     Stop Route Frequency Ordered    07/19/24 1400  doxycycline (VIBRAMYCIN) 100 mg in dextrose 5% 100 mL IVPB (ready to mix)         -- IV Every 12 hours (non-standard times) 07/19/24 1104    07/18/24 0900  mupirocin 2 % ointment         07/23/24 0859 Nasl 2 times daily 07/18/24 0318    07/18/24 0400  piperacillin-tazobactam 3.375 g in dextrose 5 % 100 mL IVPB (ready to mix)        Note to Pharmacy: Ht: 5' 7" (1.702 m)  Wt: 81.6 kg (179 lb 14.3 oz)  Estimated Creatinine Clearance: 42.7 mL/min (based on SCr of 1.2 mg/dL).  Body mass index is 28.18 kg/m².    -- IV Every 8 hours (non-standard times) 07/18/24 0201        Latest lactate reviewed-  Recent Labs   Lab 07/17/24  1941 07/17/24  2254 07/18/24  0325   LACTATE  --    < > 1.4   POCLAC 1.25  --   --     < > = values in this interval not displayed.       Organ dysfunction indicated by Acute respiratory failure    Fluid challenge Ideal Body Weight- The patient's ideal body weight is Ideal body weight: 66.1 kg (145 lb 11.6 oz) which will be used to calculate fluid bolus of 30 ml/kg for treatment of septic shock.      Post- resuscitation assessment Yes Perfusion exam was performed within 6 hours of septic shock presentation after bolus shows Inadequate tissue perfusion assessed by non-invasive monitoring     Will Start Pressors- Levophed for MAP of 65          "

## 2024-07-19 NOTE — SUBJECTIVE & OBJECTIVE
Interval History:  Patient seen and examined.  Blood pressure improved, but patient remains on pressors.  Plan of care reviewed with the nurse, the patient, and his family at the bedside in detail.  Had some mild tachycardia.  Otherwise asymptomatic.    Review of Systems  Objective:     Vital Signs (Most Recent):  Temp: 99.5 °F (37.5 °C) (07/19/24 1106)  Pulse: (!) 125 (07/19/24 0901)  Resp: (!) 33 (07/19/24 0901)  BP: (!) 119/56 (07/19/24 0901)  SpO2: 95 % (07/19/24 0901) Vital Signs (24h Range):  Temp:  [97.5 °F (36.4 °C)-99.5 °F (37.5 °C)] 99.5 °F (37.5 °C)  Pulse:  [] 125  Resp:  [15-42] 33  SpO2:  [90 %-100 %] 95 %  BP: ()/(48-65) 119/56     Weight: 82.9 kg (182 lb 12.2 oz)  Body mass index is 28.62 kg/m².    Intake/Output Summary (Last 24 hours) at 7/19/2024 1242  Last data filed at 7/19/2024 0504  Gross per 24 hour   Intake 950 ml   Output 1250 ml   Net -300 ml         Physical Exam  Vitals and nursing note reviewed.   Eyes:      Pupils: Pupils are equal, round, and reactive to light.   Neck:      Vascular: No JVD.   Cardiovascular:      Rate and Rhythm: Regular rhythm. Tachycardia present.      Heart sounds: Normal heart sounds.   Pulmonary:      Effort: Pulmonary effort is normal.      Breath sounds: Normal breath sounds.   Abdominal:      General: Bowel sounds are normal. There is no distension.      Palpations: Abdomen is soft.      Tenderness: There is no abdominal tenderness.   Musculoskeletal:         General: No deformity.   Lymphadenopathy:      Cervical: No cervical adenopathy.   Skin:     Coloration: Skin is not pale.      Findings: No rash.             Significant Labs: All pertinent labs within the past 24 hours have been reviewed.    Significant Imaging: I have reviewed all pertinent imaging results/findings within the past 24 hours.

## 2024-07-20 LAB
ALBUMIN SERPL BCP-MCNC: 3 G/DL (ref 3.5–5.2)
ALP SERPL-CCNC: 67 U/L (ref 55–135)
ALT SERPL W/O P-5'-P-CCNC: 36 U/L (ref 10–44)
ANION GAP SERPL CALC-SCNC: 6 MMOL/L (ref 8–16)
AST SERPL-CCNC: 38 U/L (ref 10–40)
BACTERIA UR CULT: ABNORMAL
BASOPHILS # BLD AUTO: 0.02 K/UL (ref 0–0.2)
BASOPHILS NFR BLD: 0.5 % (ref 0–1.9)
BILIRUB SERPL-MCNC: 1 MG/DL (ref 0.1–1)
BUN SERPL-MCNC: 27 MG/DL (ref 8–23)
CALCIUM SERPL-MCNC: 8.2 MG/DL (ref 8.7–10.5)
CHLORIDE SERPL-SCNC: 98 MMOL/L (ref 95–110)
CO2 SERPL-SCNC: 30 MMOL/L (ref 23–29)
CREAT SERPL-MCNC: 1.4 MG/DL (ref 0.5–1.4)
DIFFERENTIAL METHOD BLD: ABNORMAL
EOSINOPHIL # BLD AUTO: 0.1 K/UL (ref 0–0.5)
EOSINOPHIL NFR BLD: 2 % (ref 0–8)
ERYTHROCYTE [DISTWIDTH] IN BLOOD BY AUTOMATED COUNT: 14.8 % (ref 11.5–14.5)
EST. GFR  (NO RACE VARIABLE): 48 ML/MIN/1.73 M^2
GLUCOSE SERPL-MCNC: 187 MG/DL (ref 70–110)
GLUCOSE SERPL-MCNC: 235 MG/DL (ref 70–110)
HCT VFR BLD AUTO: 36.4 % (ref 40–54)
HGB BLD-MCNC: 12.2 G/DL (ref 14–18)
IMM GRANULOCYTES # BLD AUTO: 0.02 K/UL (ref 0–0.04)
IMM GRANULOCYTES NFR BLD AUTO: 0.5 % (ref 0–0.5)
LYMPHOCYTES # BLD AUTO: 0.8 K/UL (ref 1–4.8)
LYMPHOCYTES NFR BLD: 21 % (ref 18–48)
MAGNESIUM SERPL-MCNC: 1.6 MG/DL (ref 1.6–2.6)
MAGNESIUM SERPL-MCNC: 1.7 MG/DL (ref 1.6–2.6)
MCH RBC QN AUTO: 30.6 PG (ref 27–31)
MCHC RBC AUTO-ENTMCNC: 33.5 G/DL (ref 32–36)
MCV RBC AUTO: 91 FL (ref 82–98)
MONOCYTES # BLD AUTO: 0.3 K/UL (ref 0.3–1)
MONOCYTES NFR BLD: 8.1 % (ref 4–15)
NEUTROPHILS # BLD AUTO: 2.7 K/UL (ref 1.8–7.7)
NEUTROPHILS NFR BLD: 67.9 % (ref 38–73)
NRBC BLD-RTO: 0 /100 WBC
PLATELET # BLD AUTO: 53 K/UL (ref 150–450)
PMV BLD AUTO: 10.6 FL (ref 9.2–12.9)
POTASSIUM SERPL-SCNC: 3.5 MMOL/L (ref 3.5–5.1)
POTASSIUM SERPL-SCNC: 3.9 MMOL/L (ref 3.5–5.1)
PROT SERPL-MCNC: 5.6 G/DL (ref 6–8.4)
RBC # BLD AUTO: 3.99 M/UL (ref 4.6–6.2)
SODIUM SERPL-SCNC: 134 MMOL/L (ref 136–145)
WBC # BLD AUTO: 3.95 K/UL (ref 3.9–12.7)

## 2024-07-20 PROCEDURE — 12000002 HC ACUTE/MED SURGE SEMI-PRIVATE ROOM

## 2024-07-20 PROCEDURE — 94640 AIRWAY INHALATION TREATMENT: CPT

## 2024-07-20 PROCEDURE — 97535 SELF CARE MNGMENT TRAINING: CPT

## 2024-07-20 PROCEDURE — 25000242 PHARM REV CODE 250 ALT 637 W/ HCPCS: Performed by: HOSPITALIST

## 2024-07-20 PROCEDURE — 25000003 PHARM REV CODE 250: Performed by: HOSPITALIST

## 2024-07-20 PROCEDURE — 80053 COMPREHEN METABOLIC PANEL: CPT | Performed by: HOSPITALIST

## 2024-07-20 PROCEDURE — 97161 PT EVAL LOW COMPLEX 20 MIN: CPT

## 2024-07-20 PROCEDURE — 85025 COMPLETE CBC W/AUTO DIFF WBC: CPT | Performed by: HOSPITALIST

## 2024-07-20 PROCEDURE — 83735 ASSAY OF MAGNESIUM: CPT | Performed by: HOSPITALIST

## 2024-07-20 PROCEDURE — 99900035 HC TECH TIME PER 15 MIN (STAT)

## 2024-07-20 PROCEDURE — 83735 ASSAY OF MAGNESIUM: CPT | Mod: 91 | Performed by: HOSPITALIST

## 2024-07-20 PROCEDURE — 97530 THERAPEUTIC ACTIVITIES: CPT

## 2024-07-20 PROCEDURE — 94761 N-INVAS EAR/PLS OXIMETRY MLT: CPT

## 2024-07-20 PROCEDURE — 36415 COLL VENOUS BLD VENIPUNCTURE: CPT | Performed by: HOSPITALIST

## 2024-07-20 PROCEDURE — 99900031 HC PATIENT EDUCATION (STAT)

## 2024-07-20 PROCEDURE — 63600175 PHARM REV CODE 636 W HCPCS: Performed by: HOSPITALIST

## 2024-07-20 PROCEDURE — 94660 CPAP INITIATION&MGMT: CPT

## 2024-07-20 PROCEDURE — C1751 CATH, INF, PER/CENT/MIDLINE: HCPCS

## 2024-07-20 PROCEDURE — 97165 OT EVAL LOW COMPLEX 30 MIN: CPT

## 2024-07-20 PROCEDURE — 84132 ASSAY OF SERUM POTASSIUM: CPT | Performed by: HOSPITALIST

## 2024-07-20 RX ADMIN — FUROSEMIDE 40 MG: 10 INJECTION, SOLUTION INTRAMUSCULAR; INTRAVENOUS at 09:07

## 2024-07-20 RX ADMIN — IPRATROPIUM BROMIDE AND ALBUTEROL SULFATE 3 ML: 2.5; .5 SOLUTION RESPIRATORY (INHALATION) at 12:07

## 2024-07-20 RX ADMIN — MUPIROCIN 1 G: 20 OINTMENT TOPICAL at 08:07

## 2024-07-20 RX ADMIN — HEPARIN SODIUM 5000 UNITS: 5000 INJECTION, SOLUTION INTRAVENOUS; SUBCUTANEOUS at 02:07

## 2024-07-20 RX ADMIN — PIPERACILLIN SODIUM AND TAZOBACTAM SODIUM 3.38 G: 3; .375 INJECTION, POWDER, LYOPHILIZED, FOR SOLUTION INTRAVENOUS at 04:07

## 2024-07-20 RX ADMIN — DOXYCYCLINE 100 MG: 100 INJECTION, POWDER, LYOPHILIZED, FOR SOLUTION INTRAVENOUS at 01:07

## 2024-07-20 RX ADMIN — IPRATROPIUM BROMIDE AND ALBUTEROL SULFATE 3 ML: 2.5; .5 SOLUTION RESPIRATORY (INHALATION) at 07:07

## 2024-07-20 RX ADMIN — Medication 800 MG: at 05:07

## 2024-07-20 RX ADMIN — POTASSIUM BICARBONATE 50 MEQ: 977.5 TABLET, EFFERVESCENT ORAL at 04:07

## 2024-07-20 RX ADMIN — IPRATROPIUM BROMIDE AND ALBUTEROL SULFATE 3 ML: 2.5; .5 SOLUTION RESPIRATORY (INHALATION) at 01:07

## 2024-07-20 RX ADMIN — HEPARIN SODIUM 5000 UNITS: 5000 INJECTION, SOLUTION INTRAVENOUS; SUBCUTANEOUS at 04:07

## 2024-07-20 RX ADMIN — LEVOTHYROXINE SODIUM 25 MCG: 0.03 TABLET ORAL at 04:07

## 2024-07-20 RX ADMIN — TAMSULOSIN HYDROCHLORIDE 0.4 MG: 0.4 CAPSULE ORAL at 09:07

## 2024-07-20 RX ADMIN — HEPARIN SODIUM 5000 UNITS: 5000 INJECTION, SOLUTION INTRAVENOUS; SUBCUTANEOUS at 09:07

## 2024-07-20 RX ADMIN — Medication 800 MG: at 09:07

## 2024-07-20 RX ADMIN — MUPIROCIN 1 G: 20 OINTMENT TOPICAL at 09:07

## 2024-07-20 RX ADMIN — PIPERACILLIN SODIUM AND TAZOBACTAM SODIUM 3.38 G: 3; .375 INJECTION, POWDER, LYOPHILIZED, FOR SOLUTION INTRAVENOUS at 07:07

## 2024-07-20 RX ADMIN — CLOPIDOGREL BISULFATE 75 MG: 75 TABLET, FILM COATED ORAL at 09:07

## 2024-07-20 RX ADMIN — FUROSEMIDE 40 MG: 10 INJECTION, SOLUTION INTRAMUSCULAR; INTRAVENOUS at 07:07

## 2024-07-20 RX ADMIN — PIPERACILLIN SODIUM AND TAZOBACTAM SODIUM 3.38 G: 3; .375 INJECTION, POWDER, LYOPHILIZED, FOR SOLUTION INTRAVENOUS at 01:07

## 2024-07-20 RX ADMIN — ATORVASTATIN CALCIUM 40 MG: 40 TABLET, FILM COATED ORAL at 07:07

## 2024-07-20 RX ADMIN — ACETAMINOPHEN 650 MG: 325 TABLET ORAL at 09:07

## 2024-07-20 NOTE — PT/OT/SLP EVAL
"Physical Therapy Evaluation    Patient Name:  Frank Wyatt   MRN:  [id number]    Recommendations:     Discharge Recommendations: Low Intensity Therapy (HHPT)   Discharge Equipment Recommendations: none   Barriers to discharge: None    Assessment:     Frank Wyatt is a 89 y.o. male admitted with a medical diagnosis of Septic shock.  He presents with the following impairments/functional limitations: weakness, impaired self care skills, impaired functional mobility, gait instability, impaired cardiopulmonary response to activity Pt found awake in bed. Agreeable to PT. Pt soiled. Performed rolling left and right without assistance for cleaning and diaper change. Required min A for supine to sit. Pt normally uses a SPC for gait. Today he ambulated with RW x 90 ft with CGA and one standing rest period. Max  bpm, SPO2 on RA 93%. Pt declined OOB to chair following gait.    Rehab Prognosis: Good; patient would benefit from acute skilled PT services to address these deficits and reach maximum level of function.    Recent Surgery: * No surgery found *      Plan:     During this hospitalization, patient to be seen daily to address the identified rehab impairments via gait training, therapeutic activities, therapeutic exercises and progress toward the following goals:    Plan of Care Expires:  08/20/24    Subjective     Chief Complaint: weakness "I haven't been OOB in a couple of days"  Patient/Family Comments/goals: return to [facility]  Pain/Comfort:  Pain Rating 1: 0/10  Pain Rating Post-Intervention 1: 0/10    Patients cultural, spiritual, Pentecostal conflicts given the current situation: no    Living Environment:  Lives in [facility] . Prior to admission, patients level of function was No physical assistance needed for mobility or ADL's. Equipment used at home: walker, rolling, cane, straight.  DME owned (not currently used): none.  Upon discharge, patient will have assistance from staff.    Objective:     Communicated " with nurse prior to session.  Patient found HOB elevated with blood pressure cuff, telemetry, pulse ox (continuous), bed alarm, PureWick  upon PT entry to room.    General Precautions: Standard, fall, hearing impaired  Orthopedic Precautions:N/A   Braces: N/A  Respiratory Status: Room air    Exams:  Cognitive Exam:  Patient is oriented to Person, Place, Time, and Situation  Gross Motor Coordination:  WFL  RUE Strength: WFL  LUE Strength: WFL  RLE Strength: WFL  LLE Strength: WFL    Functional Mobility:  Bed Mobility:     Rolling Left:  supervision  Rolling Right: supervision  Supine to Sit: minimum assistance  Sit to Supine: stand by assistance  Transfers:     Sit to Stand:  contact guard assistance with rolling walker  Gait: 90 ft with RW CGA, wide PARAG, 1 standing rest period      AM-PAC 6 CLICK MOBILITY  Total Score:19       Treatment & Education:  Pt and daughter educated in PT roles and goals, DC recommendations, fall prevention, use of call light, safety with RW for transfers and gait.    Patient left HOB elevated with all lines intact, call button in reach, bed alarm on, and daughter present.    GOALS:   Multidisciplinary Problems       Physical Therapy Goals          Problem: Physical Therapy    Goal Priority Disciplines Outcome Goal Variances Interventions   Physical Therapy Goal     PT, PT/OT Progressing     Description: Goals to be met by: 24     Patient will increase functional independence with mobility by performin. Supine to sit with Modified [place]  2. Sit to supine with Modified [place]  3. Sit to stand transfer with Modified [place]  4. Bed to chair transfer with Modified [place] using Rolling Walker  5. Gait  x 300 feet with Modified [place] using Rolling Walker.                          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 2004    CORONARY ARTERY BYPASS GRAFT  1996    5-bypass     CYSTOSCOPY N/A 8/15/2019    Procedure: CYSTOSCOPY;  Surgeon: Dipak Sanchez MD;  Location: [facility] OR;  Service: Urology;  Laterality: N/A;    CYSTOSCOPY N/A 10/31/2019    Procedure: CYSTOSCOPY;  Surgeon: Dipak Sanchez MD;  Location: [facility] OR;  Service: Urology;  Laterality: N/A;    CYSTOSCOPY W/ URETERAL STENT REMOVAL Left 10/31/2019    Procedure: CYSTOSCOPY, WITH URETERAL STENT REMOVAL  URETEROSCOPY;  Surgeon: Dipak Sanchez MD;  Location: [facility] OR;  Service: Urology;  Laterality: Left;    EXTRACORPOREAL SHOCK WAVE LITHOTRIPSY Left 8/15/2019    Procedure: LITHOTRIPSY, ESWL;  Surgeon: Dipak Sanchez MD;  Location: [facility] OR;  Service: Urology;  Laterality: Left;    EXTRACORPOREAL SHOCK WAVE LITHOTRIPSY Left 9/19/2019    Procedure: LITHOTRIPSY, ESWL;  Surgeon: Dipak Sanchez MD;  Location: [facility];  Service: Urology;  Laterality: Left;    EYE SURGERY Right 2014    Cataract    EYE SURGERY Left 2017    Cataract    HERNIA REPAIR  2014    Dr. Bailon - had to have mesh removed    INGUINAL HERNIA REPAIR Right 03/25/2019        LITHOTRIPSY      nephrostomy tube      PERCUTANEOUS NEPHROSTOMY      ROTATOR CUFF REPAIR  2005    right shoulder    URETEROSCOPY Left 10/31/2019    Procedure: URETEROSCOPY;  Surgeon: Dipak Sanchez MD;  Location: [facility];  Service: Urology;  Laterality: Left;       Time Tracking:     PT Received On:  07/20/24  PT Start Time: 0934     PT Stop Time: 0953  PT Total Time (min): 19 min     Billable Minutes: Evaluation 11 and Therapeutic Activity 8      07/20/2024

## 2024-07-20 NOTE — ASSESSMENT & PLAN NOTE
Creatine stable for now. BMP reviewed- noted Estimated Creatinine Clearance: 33.4 mL/min (based on SCr of 1.4 mg/dL). according to latest data. Based on current GFR, CKD stage is stage 3 - GFR 30-59.  Monitor UOP and serial BMP and adjust therapy as needed. Renally dose meds. Avoid nephrotoxic medications and procedures.

## 2024-07-20 NOTE — PLAN OF CARE
Problem: Physical Therapy  Goal: Physical Therapy Goal  Description: Goals to be met by: 24     Patient will increase functional independence with mobility by performin. Supine to sit with Modified [place]  2. Sit to supine with Modified [place]  3. Sit to stand transfer with Modified [place]  4. Bed to chair transfer with Modified [place] using Rolling Walker  5. Gait  x 300 feet with Modified [place] using Rolling Walker.     Outcome: Progressing

## 2024-07-20 NOTE — PLAN OF CARE
Problem: Adult Inpatient Plan of Care  Goal: Plan of Care Review  Outcome: Progressing  Goal: Patient-Specific Goal (Individualized)  Outcome: Progressing  Goal: Absence of Hospital-Acquired Illness or Injury  Outcome: Progressing  Goal: Optimal Comfort and Wellbeing  Outcome: Progressing  Goal: Readiness for Transition of Care  Outcome: Progressing     Problem: Pneumonia  Goal: Fluid Balance  Outcome: Progressing  Goal: Resolution of Infection Signs and Symptoms  Outcome: Progressing  Goal: Effective Oxygenation and Ventilation  Outcome: Progressing     Problem: Infection  Goal: Absence of Infection Signs and Symptoms  Outcome: Progressing     Problem: Skin Injury Risk Increased  Goal: Skin Health and Integrity  Outcome: Progressing     Problem: Fall Injury Risk  Goal: Absence of Fall and Fall-Related Injury  Outcome: Progressing     Problem: Sepsis/Septic Shock  Goal: Optimal Coping  Outcome: Progressing  Goal: Absence of Bleeding  Outcome: Progressing  Goal: Blood Glucose Level Within Targeted Range  Outcome: Progressing  Goal: Absence of Infection Signs and Symptoms  Outcome: Progressing  Goal: Optimal Nutrition Intake  Outcome: Progressing     Problem: Acute Kidney Injury/Impairment  Goal: Fluid and Electrolyte Balance  Outcome: Progressing  Goal: Improved Oral Intake  Outcome: Progressing  Goal: Effective Renal Function  Outcome: Progressing

## 2024-07-20 NOTE — SUBJECTIVE & OBJECTIVE
Interval History:  Patient seen and examined.  Remains off of pressors.  Urine culture with Proteus.  Continue Zosyn, discontinue doxycycline.  Stable for transfer out of ICU today.  Discussed plan with patient, daughter, and bedside nurse in detail.    Review of Systems   Constitutional:  Negative for fever.   Respiratory:  Negative for shortness of breath.    Cardiovascular:  Negative for chest pain.     Objective:     Vital Signs (Most Recent):  Temp: 98.3 °F (36.8 °C) (07/20/24 0940)  Pulse: 102 (07/20/24 1101)  Resp: (!) 22 (07/20/24 1101)  BP: (!) 120/59 (07/20/24 1101)  SpO2: (!) 93 % (07/20/24 1101) Vital Signs (24h Range):  Temp:  [98.1 °F (36.7 °C)-99.4 °F (37.4 °C)] 98.3 °F (36.8 °C)  Pulse:  [] 102  Resp:  [14-30] 22  SpO2:  [92 %-100 %] 93 %  BP: ()/(49-70) 120/59     Weight: 79.3 kg (174 lb 12.8 oz)  Body mass index is 27.38 kg/m².    Intake/Output Summary (Last 24 hours) at 7/20/2024 1303  Last data filed at 7/20/2024 0434  Gross per 24 hour   Intake 480 ml   Output 2200 ml   Net -1720 ml         Physical Exam  Vitals and nursing note reviewed.   Eyes:      Pupils: Pupils are equal, round, and reactive to light.   Neck:      Vascular: No JVD.   Cardiovascular:      Rate and Rhythm: Regular rhythm. Tachycardia present.      Heart sounds: Normal heart sounds.   Pulmonary:      Effort: Pulmonary effort is normal.      Breath sounds: Normal breath sounds.   Abdominal:      General: Bowel sounds are normal. There is no distension.      Palpations: Abdomen is soft.      Tenderness: There is no abdominal tenderness.   Musculoskeletal:         General: No deformity.   Lymphadenopathy:      Cervical: No cervical adenopathy.   Skin:     Coloration: Skin is not pale.      Findings: No rash.             Significant Labs: All pertinent labs within the past 24 hours have been reviewed.    Significant Imaging: I have reviewed all pertinent imaging results/findings within the past 24 hours.

## 2024-07-20 NOTE — CARE UPDATE
07/19/24 1955   Patient Assessment/Suction   Level of Consciousness (AVPU) alert   Respiratory Effort Normal;Unlabored   Expansion/Accessory Muscles/Retractions no use of accessory muscles   All Lung Fields Breath Sounds diminished   Rhythm/Pattern, Respiratory unlabored;pattern regular   PRE-TX-O2   Device (Oxygen Therapy) room air   SpO2 100 %   Pulse Oximetry Type Continuous   $ Pulse Oximetry - Multiple Charge Pulse Oximetry - Multiple   Pulse 100   Resp (!) 26   /63   Aerosol Therapy   $ Aerosol Therapy Charges Aerosol Treatment   Daily Review of Necessity (SVN) completed   Respiratory Treatment Status (SVN) given   Treatment Route (SVN) mask;oxygen   Patient Position HOB elevated   Post Treatment Assessment (SVN) breath sounds unchanged   Signs of Intolerance (SVN) none   Breath Sounds Post-Respiratory Treatment   Throughout All Fields Post-Treatment All Fields   Throughout All Fields Post-Treatment no change   Post-treatment Heart Rate (beats/min) 98   Post-treatment Resp Rate (breaths/min) 18   Preset CPAP/BiPAP Settings   Mode Of Delivery BiPAP;Standby   Education   $ Education Bronchodilator;15 min

## 2024-07-20 NOTE — ASSESSMENT & PLAN NOTE
"This patient does have evidence of infective focus  My overall impression is septic shock due to SBP < 90.  Now off pressors.  Source: Respiratory and Urinary Tract  Antibiotics given-   Antibiotics (72h ago, onward)      Start     Stop Route Frequency Ordered    07/18/24 0900  mupirocin 2 % ointment         07/23/24 0859 Nasl 2 times daily 07/18/24 0318    07/18/24 0400  piperacillin-tazobactam 3.375 g in dextrose 5 % 100 mL IVPB (ready to mix)        Note to Pharmacy: Ht: 5' 7" (1.702 m)  Wt: 81.6 kg (179 lb 14.3 oz)  Estimated Creatinine Clearance: 42.7 mL/min (based on SCr of 1.2 mg/dL).  Body mass index is 28.18 kg/m².    -- IV Every 8 hours (non-standard times) 07/18/24 0201          Latest lactate reviewed-  Recent Labs   Lab 07/17/24  1941 07/17/24  2254 07/18/24  0325   LACTATE  --    < > 1.4   POCLAC 1.25  --   --     < > = values in this interval not displayed.       Organ dysfunction indicated by Acute respiratory failure              "

## 2024-07-20 NOTE — PT/OT/SLP EVAL
Occupational Therapy   Evaluation    Name: Frank Wyatt  MRN: [id number]  Admitting Diagnosis: Septic shock  Recent Surgery: * No surgery found *      Recommendations:     Discharge Recommendations: Low Intensity Therapy HHOT  Discharge Equipment Recommendations:  none  Barriers to discharge:   (increased assistance with ADLs, fall risk)    Assessment:     Frank Wyatt is a 89 y.o. male with a medical diagnosis of Septic shock.  Performance deficits affecting function: impaired endurance, weakness, impaired self care skills, impaired functional mobility, gait instability, impaired balance, impaired cardiopulmonary response to activity.      Rehab Prognosis: Good; patient would benefit from acute skilled OT services to address these deficits and reach maximum level of function.       Plan:     Patient to be seen 5 x/week to address the above listed problems via self-care/home management, therapeutic activities, therapeutic exercises  Plan of Care Expires: 08/20/24  Plan of Care Reviewed with: patient, daughter    Subjective     Chief Complaint: He was cooperative and agreeable during OT evaluation.  No complaints  Patient/Family Comments/goals: He would like to return home to [facility] with HHOT.    Occupational Profile:  Living Environment:  [facility]   Previous level of function: Independent with self care, no longer driving and light housework is performed by facility staff.  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   Roles and Routines: active  Equipment Used at Home: walker, rolling, cane, straight He was using the cane prior to his admission. He has a walker but only uses is for a week or two when he has gotten discharged from the hospital and when he feels stronger he goes back to the cane.  Assistance upon Discharge: He will  have assistance from facility staff.    Pain/Comfort:  Pain Rating 1:  0/10    Patients cultural, spiritual, Yarsani conflicts given the current situation: no    Objective:     Communicated with: nurse prior to session.  Patient found supine with blood pressure cuff, Condom Catheter, peripheral IV (midline catheter) upon OT entry to room.    General Precautions: Standard,    Orthopedic Precautions: N/A  Braces: N/A  Respiratory Status: Room air    Occupational Performance:    Bed Mobility:    Patient completed Rolling/Turning to Left with  minimum assistance  Patient completed Supine to Sit with minimum assistance  Patient completed Sit to Supine with minimum assistance  He was able to scoot to the [facility] with CGA while seated EOB.    Activities of Daily Living:  Grooming: seated EOB with set up assistance for oral hygiene using dentitip and face washing      Cognitive/Visual Perceptual:  Cognitive/Psychosocial Skills:     -       Oriented to: Person, Place, and Situation   -       Follows Commands/attention:Follows one-step commands  -       Communication: clear/fluent  -       Memory: No Deficits noted  -       Safety awareness/insight to disability: intact   -       Mood/Affect/Coping skills/emotional control: Appropriate to situation    Physical Exam:  Balance:    -       good static sitting  Dominant hand:    -       ambidextrous   Upper Extremity Range of Motion:     -       Right Upper Extremity: WFL except shoulder flexion and abduction 90 due to rotator cuff impairments  -       Left Upper Extremity: WFL except shoulder flexion and abduction 90 due to rotator cuff impairments  Upper Extremity Strength:    -       Right Upper Extremity: WFL except shoulder  -       Left Upper Extremity: WFL except shoulder   Strength:    -       Right Upper Extremity: WNL  -       Left Upper Extremity: WNL  Fine Motor Coordination:    -       Intact  Gross motor coordination:   WFL    AMPAC 6 Click ADL:  AMPAC Total Score: 20    Treatment & Education:  He was educated on Role of  Occupational Therapy, goals and plan of care.  Discussed importance of OOB activity and functional mobility to negate the negative effects of prolonged bedrest during this hospitalization, safe transfers and d/c planning recommendations. HE performed bed mobility and g/h seated EOB.    Patient left supine with all lines intact, call button in reach, and daughter present    GOALS:   Multidisciplinary Problems       Occupational Therapy Goals          Problem: Occupational Therapy    Goal Priority Disciplines Outcome Interventions   Occupational Therapy Goal     OT, PT/OT     Description: Goals to be met by: 08/20/2024     Patient will increase functional independence with ADLs by performing:    Grooming while standing at sink with Supervision.  Toileting from toilet with Modified [place] for hygiene and clothing management.   Supine to sit with Supervision.  Step transfer with Supervision                         History:     Past Medical History:   Diagnosis Date    Arthritis     COPD (chronic obstructive pulmonary disease)     WHEEZES    Coronary artery disease     Dermatographism 03/2019    Diverticulosis 2004    Full dentures     [place] (hard of hearing)     left ear    Hypertension     Kidney stones     Meniere's disease     MRSA infection 03/25/2019    Skin writing     dermatiographism    Small bowel obstruction due to adhesions 10/2019    Thyroid disease     Wears glasses          Past Surgical History:   Procedure Laterality Date    APPENDECTOMY      CARDIAC SURGERY  1997    CABG 5 vessel    CHOLECYSTECTOMY  2013    COLON SURGERY      Colon resection with colostomy; colostomy reversal. 2004    CORONARY ARTERY BYPASS GRAFT  1996    5-bypass     CYSTOSCOPY N/A 8/15/2019    Procedure: CYSTOSCOPY;  Surgeon: Dipak Sanchez MD;  Location: [facility] OR;  Service: Urology;  Laterality: N/A;    CYSTOSCOPY N/A 10/31/2019    Procedure: CYSTOSCOPY;  Surgeon: Dipak Sanchez MD;  Location: [facility] OR;  Service: Urology;   Laterality: N/A;    CYSTOSCOPY W/ URETERAL STENT REMOVAL Left 10/31/2019    Procedure: CYSTOSCOPY, WITH URETERAL STENT REMOVAL  URETEROSCOPY;  Surgeon: Dipak Sanchez MD;  Location: [facility];  Service: Urology;  Laterality: Left;    EXTRACORPOREAL SHOCK WAVE LITHOTRIPSY Left 8/15/2019    Procedure: LITHOTRIPSY, ESWL;  Surgeon: Dipak Sanchez MD;  Location: [facility];  Service: Urology;  Laterality: Left;    EXTRACORPOREAL SHOCK WAVE LITHOTRIPSY Left 9/19/2019    Procedure: LITHOTRIPSY, ESWL;  Surgeon: Dipak Sanchez MD;  Location: [facility];  Service: Urology;  Laterality: Left;    EYE SURGERY Right 2014    Cataract    EYE SURGERY Left 2017    Cataract    HERNIA REPAIR  2014    Dr. Bailon - had to have mesh removed    INGUINAL HERNIA REPAIR Right 03/25/2019        LITHOTRIPSY      nephrostomy tube      PERCUTANEOUS NEPHROSTOMY      ROTATOR CUFF REPAIR  2005    right shoulder    URETEROSCOPY Left 10/31/2019    Procedure: URETEROSCOPY;  Surgeon: Dipak Sanchez MD;  Location: [facility];  Service: Urology;  Laterality: Left;       Time Tracking:     OT Date of Treatment: 07/20/24  OT Start Time: 1107  OT Stop Time: 1135  OT Total Time (min): 28 min    Billable Minutes:Evaluation 15  Self Care/Home Management 13    7/20/2024

## 2024-07-20 NOTE — PLAN OF CARE
Problem: Adult Inpatient Plan of Care  Goal: Plan of Care Review  Outcome: Progressing  Goal: Patient-Specific Goal (Individualized)  Outcome: Progressing  Goal: Absence of Hospital-Acquired Illness or Injury  Outcome: Progressing  Goal: Optimal Comfort and Wellbeing  Outcome: Progressing     Problem: Infection  Goal: Absence of Infection Signs and Symptoms  Outcome: Progressing     Problem: Skin Injury Risk Increased  Goal: Skin Health and Integrity  Outcome: Progressing     Problem: Sepsis/Septic Shock  Goal: Absence of Bleeding  Outcome: Progressing  Goal: Blood Glucose Level Within Targeted Range  Outcome: Progressing  Goal: Absence of Infection Signs and Symptoms  Outcome: Progressing     Problem: Acute Kidney Injury/Impairment  Goal: Improved Oral Intake  Outcome: Progressing  Goal: Effective Renal Function  Outcome: Progressing

## 2024-07-20 NOTE — PROGRESS NOTES
[facility]  Progress Note    Patient Name: Frank Wyatt  MRN: [id number]  Patient Class: IP- Inpatient   Admission Date: 7/17/2024  Length of Stay: 2 days  Attending Physician: Leila Hewitt MD  Primary Care Provider: Darci German MD        Subjective:     Principal Problem:Septic shock        HPI:  History is taken from medical records.  This is an 89-year-old male who recently had a cystoscopy supposedly for stone removal presents to the hospital with fevers and chills.  Workup included abdomen and pelvis and showed new infiltrate in the right lung base.  Chest x-ray was also done which showed interstitial prominence with differential including edema, pneumonitis or chronic interstitial lung disease.  Given the high fever the patient is thought to have pneumonia and vancomycin and Zosyn was given for sepsis.  Patient also has acute respiratory failure.  IV Lasix has been provided.  Patient currently on a BiPAP.  Patient is arousable.  His laboratory workup otherwise is within normal limits.  Hospitalist will admit this patient for further care.    Overview/Hospital Course:  Patient admitted with septic shock.  He was started on broad-spectrum IV antibiotics and improved.  Blood and urine cultures were obtained and were currently negative.  Patient was weaned down off of Levophed, and still had intermittent fevers.  He did have mild episode of acute kidney injury on 07/19.    Interval History:  Patient seen and examined.  Remains off of pressors.  Urine culture with Proteus.  Continue Zosyn, discontinue doxycycline.  Stable for transfer out of ICU today.  Discussed plan with patient, daughter, and bedside nurse in detail.    Review of Systems   Constitutional:  Negative for fever.   Respiratory:  Negative for shortness of breath.    Cardiovascular:  Negative for chest pain.     Objective:     Vital Signs (Most Recent):  Temp: 98.3 °F (36.8 °C) (07/20/24 0940)  Pulse:  102 (07/20/24 1101)  Resp: (!) 22 (07/20/24 1101)  BP: (!) 120/59 (07/20/24 1101)  SpO2: (!) 93 % (07/20/24 1101) Vital Signs (24h Range):  Temp:  [98.1 °F (36.7 °C)-99.4 °F (37.4 °C)] 98.3 °F (36.8 °C)  Pulse:  [] 102  Resp:  [14-30] 22  SpO2:  [92 %-100 %] 93 %  BP: ()/(49-70) 120/59     Weight: 79.3 kg (174 lb 12.8 oz)  Body mass index is 27.38 kg/m².    Intake/Output Summary (Last 24 hours) at 7/20/2024 1303  Last data filed at 7/20/2024 0434  Gross per 24 hour   Intake 480 ml   Output 2200 ml   Net -1720 ml         Physical Exam  Vitals and nursing note reviewed.   Eyes:      Pupils: Pupils are equal, round, and reactive to light.   Neck:      Vascular: No JVD.   Cardiovascular:      Rate and Rhythm: Regular rhythm. Tachycardia present.      Heart sounds: Normal heart sounds.   Pulmonary:      Effort: Pulmonary effort is normal.      Breath sounds: Normal breath sounds.   Abdominal:      General: Bowel sounds are normal. There is no distension.      Palpations: Abdomen is soft.      Tenderness: There is no abdominal tenderness.   Musculoskeletal:         General: No deformity.   Lymphadenopathy:      Cervical: No cervical adenopathy.   Skin:     Coloration: Skin is not pale.      Findings: No rash.             Significant Labs: All pertinent labs within the past 24 hours have been reviewed.    Significant Imaging: I have reviewed all pertinent imaging results/findings within the past 24 hours.    Assessment/Plan:      * Septic shock  This patient does have evidence of infective focus  My overall impression is septic shock due to SBP < 90.  Now off pressors.  Source: Respiratory and Urinary Tract  Antibiotics given-   Antibiotics (72h ago, onward)      Start     Stop Route Frequency Ordered    07/18/24 0900  mupirocin 2 % ointment         07/23/24 0859 Nasl 2 times daily 07/18/24 0318    07/18/24 0400  piperacillin-tazobactam 3.375 g in dextrose 5 % 100 mL IVPB (ready to mix)        Note to Pharmacy:  "Ht: 5' 7" (1.702 m)  Wt: 81.6 kg (179 lb 14.3 oz)  Estimated Creatinine Clearance: 42.7 mL/min (based on SCr of 1.2 mg/dL).  Body mass index is 28.18 kg/m².    -- IV Every 8 hours (non-standard times) 07/18/24 0201          Latest lactate reviewed-  Recent Labs   Lab 07/17/24  1941 07/17/24  2254 07/18/24  0325   LACTATE  --    < > 1.4   POCLAC 1.25  --   --     < > = values in this interval not displayed.       Organ dysfunction indicated by Acute respiratory failure                HANSA (acute kidney injury)  Patient with acute kidney injury/acute renal failure likely due to acute tubular necrosis caused by sepsis  HANSA is currently worsening. Baseline creatinine  1.3  - Labs reviewed- Renal function/electrolytes with Estimated Creatinine Clearance: 33.4 mL/min (based on SCr of 1.4 mg/dL). according to latest data. Monitor urine output and serial BMP and adjust therapy as needed. Avoid nephrotoxins and renally dose meds for GFR listed above.    Acute on chronic diastolic heart failure  Patient is identified as having Diastolic (HFpEF) heart failure that is Acute on chronic. CHF is currently uncontrolled due to Pulmonary edema/pleural effusion on CXR. Latest ECHO performed and demonstrates- Results for orders placed during the hospital encounter of 03/24/23    Echo    Interpretation Summary  · The left ventricle is normal in size with moderate concentric hypertrophy and low normal systolic function.  · Mild left atrial enlargement.  · The estimated ejection fraction is 50%.  · Indeterminate left ventricular diastolic function.  · RV is not well visualized. Mildly to moderately reduced right ventricular systolic function.  · The mean diastolic gradient across the mitral valve is 13 mmHg at a heart rate of 87 bpm.  · There is moderate to severe mitral stenosis.  · There is moderate-to-severe aortic valve stenosis.  · Aortic valve area is 1.06 cm2; peak velocity is 2.75 m/s; mean gradient is 17 mmHg. Indexed area is " 0.45-0.5 cm/m2. Indexed stroke volume is 29 ml/m2.  · Normal central venous pressure (3 mmHg).    Monitor clinical status closely. Monitor on telemetry. Patient is off CHF pathway.  Monitor strict Is&Os and daily weights.  Place on fluid restriction of 1.5 L. Cardiology has not been consulted. Continue to stress to patient importance of self efficacy and  on diet for CHF.     Last BNP reviewed- and noted below   Recent Labs   Lab 07/17/24  1942   *         BPH (benign prostatic hyperplasia)  Continue tamsulosin      Hypothyroidism  Patient has chronic hypothyroidism. TFTs reviewed-   Lab Results   Component Value Date    TSH 0.870 03/24/2023   . Will continue chronic levothyroxine and adjust for and clinical changes.        COPD (chronic obstructive pulmonary disease)  Patient's COPD is controlled currently.  Patient is currently off COPD Pathway. Continue scheduled inhalers Antibiotics and Supplemental oxygen and monitor respiratory status closely.     Thrombocytopenia  Patient was found to have thrombocytopenia, the likely etiology is secondary to sepsis/infection, will monitor the platelets Daily. Will transfuse if platelet count is <10k. Hold DVT prophylaxis if platelets are <50k. The patient's platelet results have been reviewed and are listed below.  Recent Labs   Lab 07/20/24  0353   PLT 53*           S/P CABG x 5  Patient with known CAD s/p CABG, which is controlled Will continue ASA, Plavix, and Statin and monitor for S/Sx of angina/ACS. Continue to monitor on telemetry.         CKD (chronic kidney disease), stage III  Creatine stable for now. BMP reviewed- noted Estimated Creatinine Clearance: 33.4 mL/min (based on SCr of 1.4 mg/dL). according to latest data. Based on current GFR, CKD stage is stage 3 - GFR 30-59.  Monitor UOP and serial BMP and adjust therapy as needed. Renally dose meds. Avoid nephrotoxic medications and procedures.    [place] (hard of hearing)  Noted, secondary to Meniere's  disease.  Make appropriate precautions.  Hearing aids need to be in place.      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  · Mild left atrial enlargement.  · The estimated ejection fraction is 50%.  · Indeterminate left ventricular diastolic function.  · RV is not well visualized. Mildly to moderately reduced right ventricular systolic function.  · The mean diastolic gradient across the mitral valve is 13 mmHg at a heart rate of 87 bpm.  · There is moderate to severe mitral stenosis.  · There is moderate-to-severe aortic valve stenosis.  · Aortic valve area is 1.06 cm2; peak velocity is 2.75 m/s; mean gradient is 17 mmHg. Indexed area is 0.45-0.5 cm/m2. Indexed stroke volume is 29 ml/m2.  · Normal central venous pressure (3 mmHg).          VTE Risk Mitigation (From admission, onward)           Ordered     heparin (porcine) injection 5,000 Units  Every 8 hours         07/18/24 0131     IP VTE HIGH RISK PATIENT  Once         07/18/24 0131     Place sequential compression device  Until discontinued         07/18/24 0131                    Discharge Planning   ALESSANDRA: 7/22/2024     Code Status: DNR   Is the patient medically ready for discharge?:     Reason for patient still in hospital (select all that apply): Patient trending condition and Treatment  Discharge Plan A: Assisted Living                Leila Hewitt MD  Department of Hospital Medicine   [facility]

## 2024-07-20 NOTE — CARE UPDATE
07/20/24 0709   Patient Assessment/Suction   Level of Consciousness (AVPU) alert   Respiratory Effort Normal;Unlabored   Expansion/Accessory Muscles/Retractions no use of accessory muscles   All Lung Fields Breath Sounds clear   Rhythm/Pattern, Respiratory depth regular;unlabored   Skin Integrity   $ Wound Care Tech Time 15 min   Area Observed Bridge of nose   Skin Appearance without discoloration   Barrier used? Liquid Filled Cushion   PRE-TX-O2   Device (Oxygen Therapy) room air   SpO2 95 %   Pulse Oximetry Type Continuous   $ Pulse Oximetry - Multiple Charge Pulse Oximetry - Multiple   Pulse 83   Resp (!) 24   Aerosol Therapy   $ Aerosol Therapy Charges Aerosol Treatment   Daily Review of Necessity (SVN) completed   Respiratory Treatment Status (SVN) given   Treatment Route (SVN) mask;oxygen   Patient Position HOB elevated   Post Treatment Assessment (SVN) breath sounds unchanged;vital signs unchanged   Signs of Intolerance (SVN) none   Preset CPAP/BiPAP Settings   Mode Of Delivery Standby   $ CPAP/BiPAP Daily Charge BiPAP/CPAP Daily   $ Initial CPAP/BiPAP Setup? No   $ Is patient using? Yes

## 2024-07-20 NOTE — ASSESSMENT & PLAN NOTE
Patient was found to have thrombocytopenia, the likely etiology is secondary to sepsis/infection, will monitor the platelets Daily. Will transfuse if platelet count is <10k. Hold DVT prophylaxis if platelets are <50k. The patient's platelet results have been reviewed and are listed below.  Recent Labs   Lab 07/20/24  0353   PLT 53*

## 2024-07-20 NOTE — NURSING
Nurses Note -- 4 Eyes      7/19/24   19:30 PM      Skin assessed during: Q Shift Change      [] No Altered Skin Integrity Present    []Prevention Measures Documented      [x] Yes- Altered Skin Integrity Present or Discovered   [] LDA Added if Not in Epic (Describe Wound)   [] New Altered Skin Integrity was Present on Admit and Documented in LDA   [] Wound Image Taken    Wound Care Consulted? Yes    Attending Nurse:  Shy Reich RN/Staff Member:  Rosa

## 2024-07-20 NOTE — ASSESSMENT & PLAN NOTE
Patient with acute kidney injury/acute renal failure likely due to acute tubular necrosis caused by sepsis  HANSA is currently worsening. Baseline creatinine  1.3  - Labs reviewed- Renal function/electrolytes with Estimated Creatinine Clearance: 33.4 mL/min (based on SCr of 1.4 mg/dL). according to latest data. Monitor urine output and serial BMP and adjust therapy as needed. Avoid nephrotoxins and renally dose meds for GFR listed above.

## 2024-07-21 LAB
ALBUMIN SERPL BCP-MCNC: 3.3 G/DL (ref 3.5–5.2)
ALP SERPL-CCNC: 94 U/L (ref 55–135)
ALT SERPL W/O P-5'-P-CCNC: 41 U/L (ref 10–44)
ANION GAP SERPL CALC-SCNC: 8 MMOL/L (ref 8–16)
AST SERPL-CCNC: 44 U/L (ref 10–40)
BASOPHILS # BLD AUTO: 0.02 K/UL (ref 0–0.2)
BASOPHILS NFR BLD: 0.5 % (ref 0–1.9)
BILIRUB SERPL-MCNC: 1.1 MG/DL (ref 0.1–1)
BUN SERPL-MCNC: 27 MG/DL (ref 8–23)
CALCIUM SERPL-MCNC: 8.4 MG/DL (ref 8.7–10.5)
CHLORIDE SERPL-SCNC: 98 MMOL/L (ref 95–110)
CO2 SERPL-SCNC: 31 MMOL/L (ref 23–29)
CREAT SERPL-MCNC: 1.4 MG/DL (ref 0.5–1.4)
DIFFERENTIAL METHOD BLD: ABNORMAL
EOSINOPHIL # BLD AUTO: 0.2 K/UL (ref 0–0.5)
EOSINOPHIL NFR BLD: 4.9 % (ref 0–8)
ERYTHROCYTE [DISTWIDTH] IN BLOOD BY AUTOMATED COUNT: 14.9 % (ref 11.5–14.5)
EST. GFR  (NO RACE VARIABLE): 48 ML/MIN/1.73 M^2
GLUCOSE SERPL-MCNC: 124 MG/DL (ref 70–110)
GLUCOSE SERPL-MCNC: 126 MG/DL (ref 70–110)
GLUCOSE SERPL-MCNC: 158 MG/DL (ref 70–110)
GLUCOSE SERPL-MCNC: 162 MG/DL (ref 70–110)
GLUCOSE SERPL-MCNC: 170 MG/DL (ref 70–110)
HCT VFR BLD AUTO: 38.1 % (ref 40–54)
HGB BLD-MCNC: 12.5 G/DL (ref 14–18)
IMM GRANULOCYTES # BLD AUTO: 0.01 K/UL (ref 0–0.04)
IMM GRANULOCYTES NFR BLD AUTO: 0.2 % (ref 0–0.5)
LYMPHOCYTES # BLD AUTO: 1 K/UL (ref 1–4.8)
LYMPHOCYTES NFR BLD: 24 % (ref 18–48)
MAGNESIUM SERPL-MCNC: 1.9 MG/DL (ref 1.6–2.6)
MCH RBC QN AUTO: 29.7 PG (ref 27–31)
MCHC RBC AUTO-ENTMCNC: 32.8 G/DL (ref 32–36)
MCV RBC AUTO: 91 FL (ref 82–98)
MONOCYTES # BLD AUTO: 0.4 K/UL (ref 0.3–1)
MONOCYTES NFR BLD: 9.5 % (ref 4–15)
NEUTROPHILS # BLD AUTO: 2.5 K/UL (ref 1.8–7.7)
NEUTROPHILS NFR BLD: 60.9 % (ref 38–73)
NRBC BLD-RTO: 0 /100 WBC
PLATELET # BLD AUTO: 64 K/UL (ref 150–450)
PMV BLD AUTO: 11.1 FL (ref 9.2–12.9)
POTASSIUM SERPL-SCNC: 3.8 MMOL/L (ref 3.5–5.1)
PROT SERPL-MCNC: 6 G/DL (ref 6–8.4)
RBC # BLD AUTO: 4.21 M/UL (ref 4.6–6.2)
SODIUM SERPL-SCNC: 137 MMOL/L (ref 136–145)
WBC # BLD AUTO: 4.12 K/UL (ref 3.9–12.7)

## 2024-07-21 PROCEDURE — 63600175 PHARM REV CODE 636 W HCPCS: Performed by: HOSPITALIST

## 2024-07-21 PROCEDURE — 25000003 PHARM REV CODE 250: Performed by: HOSPITALIST

## 2024-07-21 PROCEDURE — 83735 ASSAY OF MAGNESIUM: CPT | Performed by: HOSPITALIST

## 2024-07-21 PROCEDURE — 97116 GAIT TRAINING THERAPY: CPT

## 2024-07-21 PROCEDURE — 36415 COLL VENOUS BLD VENIPUNCTURE: CPT | Performed by: HOSPITALIST

## 2024-07-21 PROCEDURE — 80053 COMPREHEN METABOLIC PANEL: CPT | Performed by: HOSPITALIST

## 2024-07-21 PROCEDURE — 99900031 HC PATIENT EDUCATION (STAT)

## 2024-07-21 PROCEDURE — 99900035 HC TECH TIME PER 15 MIN (STAT)

## 2024-07-21 PROCEDURE — 85025 COMPLETE CBC W/AUTO DIFF WBC: CPT | Performed by: HOSPITALIST

## 2024-07-21 PROCEDURE — 25000242 PHARM REV CODE 250 ALT 637 W/ HCPCS: Performed by: HOSPITALIST

## 2024-07-21 PROCEDURE — 94640 AIRWAY INHALATION TREATMENT: CPT

## 2024-07-21 PROCEDURE — 94761 N-INVAS EAR/PLS OXIMETRY MLT: CPT

## 2024-07-21 PROCEDURE — 12000002 HC ACUTE/MED SURGE SEMI-PRIVATE ROOM

## 2024-07-21 RX ORDER — FUROSEMIDE 40 MG/1
40 TABLET ORAL DAILY
Status: DISCONTINUED | OUTPATIENT
Start: 2024-07-23 | End: 2024-07-21

## 2024-07-21 RX ORDER — FUROSEMIDE 40 MG/1
40 TABLET ORAL 2 TIMES DAILY
Status: DISCONTINUED | OUTPATIENT
Start: 2024-07-22 | End: 2024-07-21

## 2024-07-21 RX ORDER — FUROSEMIDE 40 MG/1
40 TABLET ORAL 2 TIMES DAILY
Status: DISCONTINUED | OUTPATIENT
Start: 2024-07-21 | End: 2024-07-21

## 2024-07-21 RX ORDER — FUROSEMIDE 40 MG/1
40 TABLET ORAL DAILY
Status: DISCONTINUED | OUTPATIENT
Start: 2024-07-22 | End: 2024-07-23 | Stop reason: HOSPADM

## 2024-07-21 RX ADMIN — PIPERACILLIN SODIUM AND TAZOBACTAM SODIUM 3.38 G: 3; .375 INJECTION, POWDER, LYOPHILIZED, FOR SOLUTION INTRAVENOUS at 08:07

## 2024-07-21 RX ADMIN — HEPARIN SODIUM 5000 UNITS: 5000 INJECTION, SOLUTION INTRAVENOUS; SUBCUTANEOUS at 02:07

## 2024-07-21 RX ADMIN — IPRATROPIUM BROMIDE AND ALBUTEROL SULFATE 3 ML: 2.5; .5 SOLUTION RESPIRATORY (INHALATION) at 08:07

## 2024-07-21 RX ADMIN — MUPIROCIN 1 G: 20 OINTMENT TOPICAL at 08:07

## 2024-07-21 RX ADMIN — PIPERACILLIN SODIUM AND TAZOBACTAM SODIUM 3.38 G: 3; .375 INJECTION, POWDER, LYOPHILIZED, FOR SOLUTION INTRAVENOUS at 03:07

## 2024-07-21 RX ADMIN — HEPARIN SODIUM 5000 UNITS: 5000 INJECTION, SOLUTION INTRAVENOUS; SUBCUTANEOUS at 05:07

## 2024-07-21 RX ADMIN — TAMSULOSIN HYDROCHLORIDE 0.4 MG: 0.4 CAPSULE ORAL at 08:07

## 2024-07-21 RX ADMIN — LEVOTHYROXINE SODIUM 25 MCG: 0.03 TABLET ORAL at 05:07

## 2024-07-21 RX ADMIN — IPRATROPIUM BROMIDE AND ALBUTEROL SULFATE 3 ML: 2.5; .5 SOLUTION RESPIRATORY (INHALATION) at 02:07

## 2024-07-21 RX ADMIN — HEPARIN SODIUM 5000 UNITS: 5000 INJECTION, SOLUTION INTRAVENOUS; SUBCUTANEOUS at 09:07

## 2024-07-21 RX ADMIN — CLOPIDOGREL BISULFATE 75 MG: 75 TABLET, FILM COATED ORAL at 08:07

## 2024-07-21 RX ADMIN — FUROSEMIDE 40 MG: 10 INJECTION, SOLUTION INTRAMUSCULAR; INTRAVENOUS at 08:07

## 2024-07-21 RX ADMIN — PIPERACILLIN SODIUM AND TAZOBACTAM SODIUM 3.38 G: 3; .375 INJECTION, POWDER, LYOPHILIZED, FOR SOLUTION INTRAVENOUS at 11:07

## 2024-07-21 RX ADMIN — IPRATROPIUM BROMIDE AND ALBUTEROL SULFATE 3 ML: 2.5; .5 SOLUTION RESPIRATORY (INHALATION) at 12:07

## 2024-07-21 RX ADMIN — ATORVASTATIN CALCIUM 40 MG: 40 TABLET, FILM COATED ORAL at 08:07

## 2024-07-21 RX ADMIN — IPRATROPIUM BROMIDE AND ALBUTEROL SULFATE 3 ML: 2.5; .5 SOLUTION RESPIRATORY (INHALATION) at 07:07

## 2024-07-21 NOTE — NURSING
Shift report received from Rosa NESS. Attempted to call report for transfer no answer will attempt again.    Pt placed on tele box [id number]. Daughter notified of transfer status and what the new room number will be.    Safety intact: call light within reach, bed alarm set, personal items within reach, side rails upx2. Room free of clutter.     Nurses Note -- 4 Eyes      7/20/2024   7:09 PM      Skin assessed during: Q Shift Change      [] No Altered Skin Integrity Present    []Prevention Measures Documented      [x] Yes- Altered Skin Integrity Present or Discovered   [] LDA Added if Not in Epic (Describe Wound)   [] New Altered Skin Integrity was Present on Admit and Documented in LDA   [] Wound Image Taken    Wound Care Consulted? Yes    Attending Nurse:  Shy Reich RN/Staff Member:  Rosa

## 2024-07-21 NOTE — ASSESSMENT & PLAN NOTE
"This patient does have evidence of infective focus  My overall impression is septic shock due to SBP < 90.  Now off pressors.  Source: Respiratory and Urinary Tract  Antibiotics given-   Antibiotics (72h ago, onward)      Start     Stop Route Frequency Ordered    07/18/24 0900  mupirocin 2 % ointment         07/23/24 0859 Nasl 2 times daily 07/18/24 0318    07/18/24 0400  piperacillin-tazobactam 3.375 g in dextrose 5 % 100 mL IVPB (ready to mix)        Note to Pharmacy: Ht: 5' 7" (1.702 m)  Wt: 81.6 kg (179 lb 14.3 oz)  Estimated Creatinine Clearance: 42.7 mL/min (based on SCr of 1.2 mg/dL).  Body mass index is 28.18 kg/m².    -- IV Every 8 hours (non-standard times) 07/18/24 0201          Latest lactate reviewed-  No results for input(s): "LACTATE", "POCLAC" in the last 72 hours.    Organ dysfunction indicated by Acute respiratory failure    -last fever spike on 07/18/2024 so far   -monitor leukocytosis = resolved   -chest x-ray on 07/17/2024 =Diffuse interstitial prominence may relate to the edema, pneumonitis, or chronic interstitial lung disease/scarring. Normal heart size.   -urine culture on 07/17/2024 = Proteus mirabilis (sensitive to Zosyn, Rocephin, Merrem, cefepime.)  -blood cultures x2 on 07/17/2024 = negative so far  -CT abdomen and pelvis without contrast on 07/18/2024 =Stable CT of the abdomen and pelvis since 01/25/2023.Anterior abdominal wall hernia containing bowel loops at the umbilicus without evidence of obstruction.New consolidation in the right lung base.  Correlate for pneumonia.  Follow-up until clearing.Multiple other nonacute findings above as described.  -Zosyn IV based on sensitivities to Proteus.  Ordered on 07/17/2024  -transition to oral antibiotics prior to discharge          "

## 2024-07-21 NOTE — ASSESSMENT & PLAN NOTE
Echo reviewed- Results for orders placed during the hospital encounter of 03/24/23    Echo    Interpretation Summary  · The left ventricle is normal in size with moderate concentric hypertrophy and low normal systolic function.  · Mild left atrial enlargement.  · The estimated ejection fraction is 50%.  · Indeterminate left ventricular diastolic function.  · RV is not well visualized. Mildly to moderately reduced right ventricular systolic function.  · The mean diastolic gradient across the mitral valve is 13 mmHg at a heart rate of 87 bpm.  · There is moderate to severe mitral stenosis.  · There is moderate-to-severe aortic valve stenosis.  · Aortic valve area is 1.06 cm2; peak velocity is 2.75 m/s; mean gradient is 17 mmHg. Indexed area is 0.45-0.5 cm/m2. Indexed stroke volume is 29 ml/m2.  · Normal central venous pressure (3 mmHg).    -patient is followed by Dr. Kim for severe aortic stenosis.  Patient is to follow-up in September for this with Cardiology per prior cardiology notes

## 2024-07-21 NOTE — ASSESSMENT & PLAN NOTE
Patient was found to have thrombocytopenia, the likely etiology is secondary to sepsis/infection, will monitor the platelets Daily. Will transfuse if platelet count is <10k. Hold DVT prophylaxis if platelets are <50k. The patient's platelet results have been reviewed and are listed below.  Recent Labs   Lab 07/21/24  0442   PLT 64*

## 2024-07-21 NOTE — ASSESSMENT & PLAN NOTE
Patient is identified as having Diastolic (HFpEF) heart failure that is Acute on chronic. CHF is currently uncontrolled due to Pulmonary edema/pleural effusion on CXR. Latest ECHO performed and demonstrates- Results for orders placed during the hospital encounter of 03/24/23    Echo    Interpretation Summary  · The left ventricle is normal in size with moderate concentric hypertrophy and low normal systolic function.  · Mild left atrial enlargement.  · The estimated ejection fraction is 50%.  · Indeterminate left ventricular diastolic function.  · RV is not well visualized. Mildly to moderately reduced right ventricular systolic function.  · The mean diastolic gradient across the mitral valve is 13 mmHg at a heart rate of 87 bpm.  · There is moderate to severe mitral stenosis.  · There is moderate-to-severe aortic valve stenosis.  · Aortic valve area is 1.06 cm2; peak velocity is 2.75 m/s; mean gradient is 17 mmHg. Indexed area is 0.45-0.5 cm/m2. Indexed stroke volume is 29 ml/m2.  · Normal central venous pressure (3 mmHg).    Monitor clinical status closely. Monitor on telemetry. Patient is off CHF pathway.  Monitor strict Is&Os and daily weights.  Place on fluid restriction of 1.5 L. Cardiology has not been consulted. Continue to stress to patient importance of self efficacy and  on diet for CHF.     Last BNP reviewed- and noted below   Recent Labs   Lab 07/17/24 1942   *     -Lasix 40 mg IV b.i.d. switched to Lasix 40 mg p.o. daily (home dose)  -patient is negative 4.8 L so far

## 2024-07-21 NOTE — PT/OT/SLP PROGRESS
Physical Therapy Treatment    Patient Name:  Frank Wyatt   MRN:  [id number]    Recommendations:     Discharge Recommendations: Low Intensity Therapy  Discharge Equipment Recommendations: none  Barriers to discharge: None    Assessment:     Frank Wyatt is a 89 y.o. male admitted with a medical diagnosis of Septic shock.  He presents with the following impairments/functional limitations: weakness, impaired self care skills, impaired functional mobility, gait instability, impaired cardiopulmonary response to activity     Found in bed, just got cleaned up;  agreeable to go walking;  bed mobs with SBA;  transfers with CGA RW; stood x 1 minute to urinate before removing suction from purewick;  amb x 110' RW min/CGA; asst to bedside chair, dtr in room to supervise    Rehab Prognosis: Good; patient would benefit from acute skilled PT services to address these deficits and reach maximum level of function.    Recent Surgery: * No surgery found *      Plan:     During this hospitalization, patient to be seen daily to address the identified rehab impairments via gait training, therapeutic activities, therapeutic exercises, neuromuscular re-education and progress toward the following goals:    Plan of Care Expires:  08/20/24    Subjective     Chief Complaint: L knee and hip 7-8/10 stiffness  Patient/Family Comments/goals: return back home  Pain/Comfort:  Pain Rating 1: 7/10  Location - Side 1: Left  Location 1: knee  Pain Addressed 1: Reposition, Distraction  Pain Addressed 2: Reposition, Distraction      Objective:     Communicated with patient and dtr prior to session.  Patient found HOB elevated with bed alarm, PureWick, peripheral IV, telemetry upon PT entry to room.     General Precautions: Standard, fall, hearing impaired  Orthopedic Precautions: N/A  Braces: N/A  Respiratory Status: Room air     Functional Mobility:  Bed Mobility:     Rolling Left:  independence  Rolling Right: independence  Scooting: stand  by assistance  Supine to Sit: stand by assistance  Sit to Supine: stand by assistance  Transfers:     Sit to Stand:  contact guard assistance with rolling walker  Gait: x 110 feet RW CGA RW  Balance: good sitting st, poor+ dyn;  stand st good; dyn fair+      AM-PAC 6 CLICK MOBILITY  Turning over in bed (including adjusting bedclothes, sheets and blankets)?: 4  Sitting down on and standing up from a chair with arms (e.g., wheelchair, bedside commode, etc.): 3  Moving from lying on back to sitting on the side of the bed?: 3  Moving to and from a bed to a chair (including a wheelchair)?: 3  Need to walk in hospital room?: 3  Climbing 3-5 steps with a railing?: 3  Basic Mobility Total Score: 19       Treatment & Education:  Edu on safety, fall prec and pacing his activities    Patient left up in chair with all lines intact, call button in reach, nurse notified, and dtr present..    GOALS:   Multidisciplinary Problems       Physical Therapy Goals          Problem: Physical Therapy    Goal Priority Disciplines Outcome Goal Variances Interventions   Physical Therapy Goal     PT, PT/OT Progressing     Description: Goals to be met by: 24     Patient will increase functional independence with mobility by performin. Supine to sit with Modified [place]  2. Sit to supine with Modified [place]  3. Sit to stand transfer with Modified [place]  4. Bed to chair transfer with Modified [place] using Rolling Walker  5. Gait  x 300 feet with Modified [place] using Rolling Walker.                          Time Tracking:     PT Received On: 24  PT Start Time: 908     PT Stop Time: 923  PT Total Time (min): 15 min     Billable Minutes: Gait Training 15    Treatment Type: Treatment  PT/PTA: PT     Number of PTA visits since last PT visit: 0     2024

## 2024-07-21 NOTE — NURSING TRANSFER
Nursing Transfer Note      7/20/2024   8:00 PM    Nurse giving handoff: Shy  Nurse receiving handoff:Rubi  Reason patient is being transferred: lower level of care    Transfer To: 1208    Transfer via wheelchair    Transfer with  to O2, cardiac monitoring    Transported by Shy NESS      Telemetry: Box Number [id number]  Order for Tele Monitor? Yes    Additional Lines: Oxygen    4eyes on Skin: yes    Medicines sent: yes    Any special needs or follow-up needed: SW    Patient belongings transferred with patient: Yes    Chart send with patient: Yes    Notified: daughter      Upon arrival to floor: cardiac monitor applied, patient oriented to room, call bell in reach, and bed in lowest position

## 2024-07-21 NOTE — SUBJECTIVE & OBJECTIVE
Interval History:  Patient awake alert in no acute distress.  Remains afebrile.  Denies chest pain shortness on breath.  No acute complaints.  He is chronically hard of hearing.  Left ear hears better than his right ear.  Hearing aid is utilized.  Patient's daughter by bedside.  Patient worked with PT a little bit yesterday and is waiting to work with PT today as well.  Daughter would like the patient discharge back to assisted living facility as patient was independent prior to this.  Patient apparently ambulated with PT little bit yesterday and would like to work with PT today and hoping to discharge back to assisted living tomorrow.  Patient seen and assessed along with patient's nurse by bedside as well.  Patient is diuresing well and has diuresed 4.8 L so far.  We will transition IV Lasix to oral Lasix today    Review of Systems   Constitutional: Negative.    HENT:  Positive for hearing loss.    Eyes: Negative.    Respiratory: Negative.     Cardiovascular: Negative.    Gastrointestinal: Negative.    Endocrine: Negative.    Genitourinary: Negative.    Musculoskeletal: Negative.    Skin: Negative.    Allergic/Immunologic: Negative.    Neurological: Negative.    Hematological: Negative.    Psychiatric/Behavioral: Negative.     All other systems reviewed and are negative.    Objective:     Vital Signs (Most Recent):  Temp: 98.2 °F (36.8 °C) (07/21/24 0420)  Pulse: (!) 113 (07/21/24 0731)  Resp: 16 (07/21/24 0731)  BP: 132/66 (07/21/24 0420)  SpO2: 95 % (07/21/24 0731) Vital Signs (24h Range):  Temp:  [98.2 °F (36.8 °C)-98.7 °F (37.1 °C)] 98.2 °F (36.8 °C)  Pulse:  [102-134] 113  Resp:  [16-33] 16  SpO2:  [89 %-99 %] 95 %  BP: (102-138)/(56-81) 132/66     Weight: 79.3 kg (174 lb 12.8 oz)  Body mass index is 27.38 kg/m².    Intake/Output Summary (Last 24 hours) at 7/21/2024 0803  Last data filed at 7/21/2024 0419  Gross per 24 hour   Intake 100 ml   Output 2250 ml   Net -2150 ml         Physical Exam  Vitals and  nursing note reviewed.   Constitutional:       General: He is not in acute distress.     Appearance: Normal appearance.   HENT:      Head: Normocephalic and atraumatic.      Nose: Nose normal.      Mouth/Throat:      Mouth: Mucous membranes are moist.   Eyes:      Extraocular Movements: Extraocular movements intact.      Pupils: Pupils are equal, round, and reactive to light.   Cardiovascular:      Rate and Rhythm: Normal rate and regular rhythm.      Pulses: Normal pulses.      Heart sounds: Normal heart sounds.   Pulmonary:      Effort: Pulmonary effort is normal. No respiratory distress.      Breath sounds: Normal breath sounds.   Abdominal:      General: Bowel sounds are normal. There is no distension.      Palpations: Abdomen is soft.      Tenderness: There is no abdominal tenderness.   Musculoskeletal:         General: Normal range of motion.      Cervical back: Normal range of motion and neck supple.   Skin:     General: Skin is warm and dry.   Neurological:      General: No focal deficit present.      Mental Status: He is alert and oriented to person, place, and time.   Psychiatric:         Mood and Affect: Mood normal.             Significant Labs: All pertinent labs within the past 24 hours have been reviewed.    Significant Imaging: I have reviewed all pertinent imaging results/findings within the past 24 hours.

## 2024-07-21 NOTE — PROGRESS NOTES
[facility] Medicine  Progress Note    Patient Name: Frank Wyatt  MRN: [id number]  Patient Class: IP- Inpatient   Admission Date: 7/17/2024  Length of Stay: 3 days  Attending Physician: Shaina Jiménez MD  Primary Care Provider: Darci German MD        Subjective:     Principal Problem:Septic shock        HPI:  History is taken from medical records.  This is an 89-year-old male who recently had a cystoscopy supposedly for stone removal presents to the hospital with fevers and chills.  Workup included abdomen and pelvis and showed new infiltrate in the right lung base.  Chest x-ray was also done which showed interstitial prominence with differential including edema, pneumonitis or chronic interstitial lung disease.  Given the high fever the patient is thought to have pneumonia and vancomycin and Zosyn was given for sepsis.  Patient also has acute respiratory failure.  IV Lasix has been provided.  Patient currently on a BiPAP.  Patient is arousable.  His laboratory workup otherwise is within normal limits.  Hospitalist will admit this patient for further care.    Overview/Hospital Course:  Patient admitted with septic shock.  He was started on broad-spectrum IV antibiotics and improved.  Blood and urine cultures were obtained and were currently negative.  Patient was weaned down off of Levophed, and still had intermittent fevers.  He did have mild episode of acute kidney injury on 07/19.    Interval History:  Patient awake alert in no acute distress.  Remains afebrile.  Denies chest pain shortness on breath.  No acute complaints.  He is chronically hard of hearing.  Left ear hears better than his right ear.  Hearing aid is utilized.  Patient's daughter by bedside.  Patient worked with PT a little bit yesterday and is waiting to work with PT today as well.  Daughter would like the patient discharge back to assisted living facility as patient was independent prior to this.  Patient  apparently ambulated with PT little bit yesterday and would like to work with PT today and hoping to discharge back to assisted living tomorrow.  Patient seen and assessed along with patient's nurse by bedside as well.  Patient is diuresing well and has diuresed 4.8 L so far.  We will transition IV Lasix to oral Lasix today    Review of Systems   Constitutional: Negative.    HENT:  Positive for hearing loss.    Eyes: Negative.    Respiratory: Negative.     Cardiovascular: Negative.    Gastrointestinal: Negative.    Endocrine: Negative.    Genitourinary: Negative.    Musculoskeletal: Negative.    Skin: Negative.    Allergic/Immunologic: Negative.    Neurological: Negative.    Hematological: Negative.    Psychiatric/Behavioral: Negative.     All other systems reviewed and are negative.    Objective:     Vital Signs (Most Recent):  Temp: 98.2 °F (36.8 °C) (07/21/24 0420)  Pulse: (!) 113 (07/21/24 0731)  Resp: 16 (07/21/24 0731)  BP: 132/66 (07/21/24 0420)  SpO2: 95 % (07/21/24 0731) Vital Signs (24h Range):  Temp:  [98.2 °F (36.8 °C)-98.7 °F (37.1 °C)] 98.2 °F (36.8 °C)  Pulse:  [102-134] 113  Resp:  [16-33] 16  SpO2:  [89 %-99 %] 95 %  BP: (102-138)/(56-81) 132/66     Weight: 79.3 kg (174 lb 12.8 oz)  Body mass index is 27.38 kg/m².    Intake/Output Summary (Last 24 hours) at 7/21/2024 0803  Last data filed at 7/21/2024 0419  Gross per 24 hour   Intake 100 ml   Output 2250 ml   Net -2150 ml         Physical Exam  Vitals and nursing note reviewed.   Constitutional:       General: He is not in acute distress.     Appearance: Normal appearance.   HENT:      Head: Normocephalic and atraumatic.      Nose: Nose normal.      Mouth/Throat:      Mouth: Mucous membranes are moist.   Eyes:      Extraocular Movements: Extraocular movements intact.      Pupils: Pupils are equal, round, and reactive to light.   Cardiovascular:      Rate and Rhythm: Normal rate and regular rhythm.      Pulses: Normal pulses.      Heart sounds:  "Normal heart sounds.   Pulmonary:      Effort: Pulmonary effort is normal. No respiratory distress.      Breath sounds: Normal breath sounds.   Abdominal:      General: Bowel sounds are normal. There is no distension.      Palpations: Abdomen is soft.      Tenderness: There is no abdominal tenderness.   Musculoskeletal:         General: Normal range of motion.      Cervical back: Normal range of motion and neck supple.   Skin:     General: Skin is warm and dry.   Neurological:      General: No focal deficit present.      Mental Status: He is alert and oriented to person, place, and time.   Psychiatric:         Mood and Affect: Mood normal.             Significant Labs: All pertinent labs within the past 24 hours have been reviewed.    Significant Imaging: I have reviewed all pertinent imaging results/findings within the past 24 hours.    Assessment/Plan:      * Septic shock  This patient does have evidence of infective focus  My overall impression is septic shock due to SBP < 90.  Now off pressors.  Source: Respiratory and Urinary Tract  Antibiotics given-   Antibiotics (72h ago, onward)      Start     Stop Route Frequency Ordered    07/18/24 0900  mupirocin 2 % ointment         07/23/24 0859 Nasl 2 times daily 07/18/24 0318    07/18/24 0400  piperacillin-tazobactam 3.375 g in dextrose 5 % 100 mL IVPB (ready to mix)        Note to Pharmacy: Ht: 5' 7" (1.702 m)  Wt: 81.6 kg (179 lb 14.3 oz)  Estimated Creatinine Clearance: 42.7 mL/min (based on SCr of 1.2 mg/dL).  Body mass index is 28.18 kg/m².    -- IV Every 8 hours (non-standard times) 07/18/24 0201          Latest lactate reviewed-  No results for input(s): "LACTATE", "POCLAC" in the last 72 hours.    Organ dysfunction indicated by Acute respiratory failure    -last fever spike on 07/18/2024 so far   -monitor leukocytosis = resolved   -chest x-ray on 07/17/2024 =Diffuse interstitial prominence may relate to the edema, pneumonitis, or chronic interstitial lung " disease/scarring. Normal heart size.   -urine culture on 07/17/2024 = Proteus mirabilis (sensitive to Zosyn, Rocephin, Merrem, cefepime.)  -blood cultures x2 on 07/17/2024 = negative so far  -CT abdomen and pelvis without contrast on 07/18/2024 =Stable CT of the abdomen and pelvis since 01/25/2023.Anterior abdominal wall hernia containing bowel loops at the umbilicus without evidence of obstruction.New consolidation in the right lung base.  Correlate for pneumonia.  Follow-up until clearing.Multiple other nonacute findings above as described.  -Zosyn IV based on sensitivities to Proteus.  Ordered on 07/17/2024  -transition to oral antibiotics prior to discharge            Acute on chronic diastolic heart failure  Patient is identified as having Diastolic (HFpEF) heart failure that is Acute on chronic. CHF is currently uncontrolled due to Pulmonary edema/pleural effusion on CXR. Latest ECHO performed and demonstrates- Results for orders placed during the hospital encounter of 03/24/23    Echo    Interpretation Summary  · The left ventricle is normal in size with moderate concentric hypertrophy and low normal systolic function.  · Mild left atrial enlargement.  · The estimated ejection fraction is 50%.  · Indeterminate left ventricular diastolic function.  · RV is not well visualized. Mildly to moderately reduced right ventricular systolic function.  · The mean diastolic gradient across the mitral valve is 13 mmHg at a heart rate of 87 bpm.  · There is moderate to severe mitral stenosis.  · There is moderate-to-severe aortic valve stenosis.  · Aortic valve area is 1.06 cm2; peak velocity is 2.75 m/s; mean gradient is 17 mmHg. Indexed area is 0.45-0.5 cm/m2. Indexed stroke volume is 29 ml/m2.  · Normal central venous pressure (3 mmHg).    Monitor clinical status closely. Monitor on telemetry. Patient is off CHF pathway.  Monitor strict Is&Os and daily weights.  Place on fluid restriction of 1.5 L. Cardiology has not  been consulted. Continue to stress to patient importance of self efficacy and  on diet for CHF.     Last BNP reviewed- and noted below   Recent Labs   Lab 07/17/24 1942   *     -Lasix 40 mg IV b.i.d. switched to Lasix 40 mg p.o. daily (home dose)  -patient is negative 4.8 L so far    S/P CABG x 5  Patient with known CAD s/p CABG, which is controlled Will continue ASA, Plavix, and Statin and monitor for S/Sx of angina/ACS. Continue to monitor on telemetry.         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  · Mild left atrial enlargement.  · The estimated ejection fraction is 50%.  · Indeterminate left ventricular diastolic function.  · RV is not well visualized. Mildly to moderately reduced right ventricular systolic function.  · The mean diastolic gradient across the mitral valve is 13 mmHg at a heart rate of 87 bpm.  · There is moderate to severe mitral stenosis.  · There is moderate-to-severe aortic valve stenosis.  · Aortic valve area is 1.06 cm2; peak velocity is 2.75 m/s; mean gradient is 17 mmHg. Indexed area is 0.45-0.5 cm/m2. Indexed stroke volume is 29 ml/m2.  · Normal central venous pressure (3 mmHg).    -patient is followed by Dr. Kim for severe aortic stenosis.  Patient is to follow-up in September for this with Cardiology per prior cardiology notes    HANSA (acute kidney injury)  Patient with acute kidney injury/acute renal failure likely due to acute tubular necrosis caused by sepsis  HANSA is currently worsening. Baseline creatinine  1.3  - Labs reviewed- Renal function/electrolytes with Estimated Creatinine Clearance: 33.4 mL/min (based on SCr of 1.4 mg/dL). according to latest data. Monitor urine output and serial BMP and adjust therapy as needed. Avoid  nephrotoxins and renally dose meds for GFR listed above.    COPD (chronic obstructive pulmonary disease)  Patient's COPD is controlled currently.  Patient is currently off COPD Pathway. Continue scheduled inhalers Antibiotics and Supplemental oxygen and monitor respiratory status closely.     CKD (chronic kidney disease), stage III  Creatine stable for now. BMP reviewed- noted Estimated Creatinine Clearance: 33.4 mL/min (based on SCr of 1.4 mg/dL). according to latest data. Based on current GFR, CKD stage is stage 3 - GFR 30-59.  Monitor UOP and serial BMP and adjust therapy as needed. Renally dose meds. Avoid nephrotoxic medications and procedures.    Thrombocytopenia  Patient was found to have thrombocytopenia, the likely etiology is secondary to sepsis/infection, will monitor the platelets Daily. Will transfuse if platelet count is <10k. Hold DVT prophylaxis if platelets are <50k. The patient's platelet results have been reviewed and are listed below.  Recent Labs   Lab 07/21/24  0442   PLT 64*           Hypothyroidism  Patient has chronic hypothyroidism. TFTs reviewed-   Lab Results   Component Value Date    TSH 0.870 03/24/2023   . Will continue chronic levothyroxine and adjust for and clinical changes.        BPH (benign prostatic hyperplasia)  Continue tamsulosin      [place] (hard of hearing)  Noted, secondary to Meniere's disease.  Make appropriate precautions.  Hearing aids need to be in place.        VTE Risk Mitigation (From admission, onward)           Ordered     heparin (porcine) injection 5,000 Units  Every 8 hours         07/18/24 0131     IP VTE HIGH RISK PATIENT  Once         07/18/24 0131     Place sequential compression device  Until discontinued         07/18/24 0131                    Discharge Planning   ALESSANDRA: 7/22/2024     Code Status: DNR   Is the patient medically ready for discharge?:     Reason for patient still in hospital (select all that apply): Patient trending condition and  Treatment  Discharge Plan A: Assisted Living        Possible discharge in a.m. back to assisted living facility after working with PT today and if patient tolerates oral Lasix and renal function remained stable           Shaina Jiménez MD  Department of Hospital Medicine   [facility]

## 2024-07-21 NOTE — CARE UPDATE
07/21/24 0731   Patient Assessment/Suction   Level of Consciousness (AVPU) alert   Respiratory Effort Normal;Unlabored   Expansion/Accessory Muscles/Retractions no use of accessory muscles;no retractions;expansion symmetric   All Lung Fields Breath Sounds clear   Rhythm/Pattern, Respiratory pattern regular;unlabored;depth regular   Cough Frequency no cough   PRE-TX-O2   Device (Oxygen Therapy) room air   SpO2 95 %   Pulse Oximetry Type Intermittent   $ Pulse Oximetry - Multiple Charge Pulse Oximetry - Multiple   Pulse (!) 113   Resp 16   Aerosol Therapy   $ Aerosol Therapy Charges Aerosol Treatment   Daily Review of Necessity (SVN) completed   Respiratory Treatment Status (SVN) given   Treatment Route (SVN) oxygen;mask   Patient Position HOB elevated   Post Treatment Assessment (SVN) breath sounds unchanged   Signs of Intolerance (SVN) none

## 2024-07-22 LAB
ALBUMIN SERPL BCP-MCNC: 3.3 G/DL (ref 3.5–5.2)
ALP SERPL-CCNC: 111 U/L (ref 55–135)
ALT SERPL W/O P-5'-P-CCNC: 87 U/L (ref 10–44)
ANION GAP SERPL CALC-SCNC: 8 MMOL/L (ref 8–16)
AST SERPL-CCNC: 99 U/L (ref 10–40)
BACTERIA BLD CULT: NORMAL
BACTERIA BLD CULT: NORMAL
BASOPHILS # BLD AUTO: 0.01 K/UL (ref 0–0.2)
BASOPHILS NFR BLD: 0.2 % (ref 0–1.9)
BILIRUB SERPL-MCNC: 1.1 MG/DL (ref 0.1–1)
BUN SERPL-MCNC: 27 MG/DL (ref 8–23)
CALCIUM SERPL-MCNC: 9 MG/DL (ref 8.7–10.5)
CHLORIDE SERPL-SCNC: 98 MMOL/L (ref 95–110)
CO2 SERPL-SCNC: 30 MMOL/L (ref 23–29)
CREAT SERPL-MCNC: 1.3 MG/DL (ref 0.5–1.4)
DIFFERENTIAL METHOD BLD: ABNORMAL
EOSINOPHIL # BLD AUTO: 0.3 K/UL (ref 0–0.5)
EOSINOPHIL NFR BLD: 7.1 % (ref 0–8)
ERYTHROCYTE [DISTWIDTH] IN BLOOD BY AUTOMATED COUNT: 14.9 % (ref 11.5–14.5)
EST. GFR  (NO RACE VARIABLE): 52.5 ML/MIN/1.73 M^2
GLUCOSE SERPL-MCNC: 125 MG/DL (ref 70–110)
GLUCOSE SERPL-MCNC: 132 MG/DL (ref 70–110)
GLUCOSE SERPL-MCNC: 133 MG/DL (ref 70–110)
GLUCOSE SERPL-MCNC: 139 MG/DL (ref 70–110)
GLUCOSE SERPL-MCNC: 167 MG/DL (ref 70–110)
HCT VFR BLD AUTO: 38.3 % (ref 40–54)
HGB BLD-MCNC: 12.5 G/DL (ref 14–18)
IMM GRANULOCYTES # BLD AUTO: 0.01 K/UL (ref 0–0.04)
IMM GRANULOCYTES NFR BLD AUTO: 0.2 % (ref 0–0.5)
LYMPHOCYTES # BLD AUTO: 1.1 K/UL (ref 1–4.8)
LYMPHOCYTES NFR BLD: 23.7 % (ref 18–48)
MAGNESIUM SERPL-MCNC: 2 MG/DL (ref 1.6–2.6)
MCH RBC QN AUTO: 30.3 PG (ref 27–31)
MCHC RBC AUTO-ENTMCNC: 32.6 G/DL (ref 32–36)
MCV RBC AUTO: 93 FL (ref 82–98)
MONOCYTES # BLD AUTO: 0.4 K/UL (ref 0.3–1)
MONOCYTES NFR BLD: 9.3 % (ref 4–15)
NEUTROPHILS # BLD AUTO: 2.8 K/UL (ref 1.8–7.7)
NEUTROPHILS NFR BLD: 59.5 % (ref 38–73)
NRBC BLD-RTO: 0 /100 WBC
PLATELET # BLD AUTO: 77 K/UL (ref 150–450)
PMV BLD AUTO: 10.5 FL (ref 9.2–12.9)
POTASSIUM SERPL-SCNC: 3.9 MMOL/L (ref 3.5–5.1)
PROT SERPL-MCNC: 6.2 G/DL (ref 6–8.4)
RBC # BLD AUTO: 4.13 M/UL (ref 4.6–6.2)
SODIUM SERPL-SCNC: 136 MMOL/L (ref 136–145)
WBC # BLD AUTO: 4.64 K/UL (ref 3.9–12.7)

## 2024-07-22 PROCEDURE — 94640 AIRWAY INHALATION TREATMENT: CPT

## 2024-07-22 PROCEDURE — 94761 N-INVAS EAR/PLS OXIMETRY MLT: CPT

## 2024-07-22 PROCEDURE — 94660 CPAP INITIATION&MGMT: CPT

## 2024-07-22 PROCEDURE — 63600175 PHARM REV CODE 636 W HCPCS: Performed by: HOSPITALIST

## 2024-07-22 PROCEDURE — 99900035 HC TECH TIME PER 15 MIN (STAT)

## 2024-07-22 PROCEDURE — 97110 THERAPEUTIC EXERCISES: CPT

## 2024-07-22 PROCEDURE — 25000003 PHARM REV CODE 250: Performed by: INTERNAL MEDICINE

## 2024-07-22 PROCEDURE — 27000221 HC OXYGEN, UP TO 24 HOURS

## 2024-07-22 PROCEDURE — 27100171 HC OXYGEN HIGH FLOW UP TO 24 HOURS

## 2024-07-22 PROCEDURE — 80053 COMPREHEN METABOLIC PANEL: CPT | Performed by: HOSPITALIST

## 2024-07-22 PROCEDURE — 12000002 HC ACUTE/MED SURGE SEMI-PRIVATE ROOM

## 2024-07-22 PROCEDURE — 25000003 PHARM REV CODE 250: Performed by: HOSPITALIST

## 2024-07-22 PROCEDURE — 25000242 PHARM REV CODE 250 ALT 637 W/ HCPCS: Performed by: HOSPITALIST

## 2024-07-22 PROCEDURE — 36415 COLL VENOUS BLD VENIPUNCTURE: CPT | Performed by: HOSPITALIST

## 2024-07-22 PROCEDURE — 85025 COMPLETE CBC W/AUTO DIFF WBC: CPT | Performed by: HOSPITALIST

## 2024-07-22 PROCEDURE — 83735 ASSAY OF MAGNESIUM: CPT | Performed by: HOSPITALIST

## 2024-07-22 PROCEDURE — 97116 GAIT TRAINING THERAPY: CPT | Mod: CQ

## 2024-07-22 PROCEDURE — 99900031 HC PATIENT EDUCATION (STAT)

## 2024-07-22 RX ORDER — ENOXAPARIN SODIUM 100 MG/ML
40 INJECTION SUBCUTANEOUS EVERY 24 HOURS
Status: DISCONTINUED | OUTPATIENT
Start: 2024-07-22 | End: 2024-07-23 | Stop reason: HOSPADM

## 2024-07-22 RX ORDER — POTASSIUM CHLORIDE 20 MEQ/1
20 TABLET, EXTENDED RELEASE ORAL DAILY
Status: DISCONTINUED | OUTPATIENT
Start: 2024-07-22 | End: 2024-07-23 | Stop reason: HOSPADM

## 2024-07-22 RX ORDER — AMOXICILLIN AND CLAVULANATE POTASSIUM 875; 125 MG/1; MG/1
1 TABLET, FILM COATED ORAL EVERY 12 HOURS
Status: DISCONTINUED | OUTPATIENT
Start: 2024-07-22 | End: 2024-07-23 | Stop reason: HOSPADM

## 2024-07-22 RX ADMIN — POTASSIUM CHLORIDE 20 MEQ: 1500 TABLET, EXTENDED RELEASE ORAL at 02:07

## 2024-07-22 RX ADMIN — IPRATROPIUM BROMIDE AND ALBUTEROL SULFATE 3 ML: 2.5; .5 SOLUTION RESPIRATORY (INHALATION) at 12:07

## 2024-07-22 RX ADMIN — CLOPIDOGREL BISULFATE 75 MG: 75 TABLET, FILM COATED ORAL at 08:07

## 2024-07-22 RX ADMIN — AMOXICILLIN AND CLAVULANATE POTASSIUM 1 TABLET: 875; 125 TABLET, FILM COATED ORAL at 08:07

## 2024-07-22 RX ADMIN — MUPIROCIN 1 G: 20 OINTMENT TOPICAL at 08:07

## 2024-07-22 RX ADMIN — LEVOTHYROXINE SODIUM 25 MCG: 0.03 TABLET ORAL at 05:07

## 2024-07-22 RX ADMIN — HEPARIN SODIUM 5000 UNITS: 5000 INJECTION, SOLUTION INTRAVENOUS; SUBCUTANEOUS at 05:07

## 2024-07-22 RX ADMIN — ENOXAPARIN SODIUM 40 MG: 40 INJECTION SUBCUTANEOUS at 05:07

## 2024-07-22 RX ADMIN — IPRATROPIUM BROMIDE AND ALBUTEROL SULFATE 3 ML: 2.5; .5 SOLUTION RESPIRATORY (INHALATION) at 07:07

## 2024-07-22 RX ADMIN — FUROSEMIDE 40 MG: 40 TABLET ORAL at 08:07

## 2024-07-22 RX ADMIN — ACETAMINOPHEN 650 MG: 325 TABLET ORAL at 06:07

## 2024-07-22 RX ADMIN — ATORVASTATIN CALCIUM 40 MG: 40 TABLET, FILM COATED ORAL at 08:07

## 2024-07-22 RX ADMIN — PIPERACILLIN SODIUM AND TAZOBACTAM SODIUM 3.38 G: 3; .375 INJECTION, POWDER, LYOPHILIZED, FOR SOLUTION INTRAVENOUS at 03:07

## 2024-07-22 RX ADMIN — TAMSULOSIN HYDROCHLORIDE 0.4 MG: 0.4 CAPSULE ORAL at 08:07

## 2024-07-22 NOTE — PT/OT/SLP PROGRESS
Occupational Therapy   Treatment    Name: Frank Wyatt  MRN: [id number]  Admitting Diagnosis:  Septic shock       Recommendations:     Discharge Recommendations: Low Intensity Therapy  Discharge Equipment Recommendations:  none  Barriers to discharge:  Other (Comment) (increased assistance with ADLs, fall risk)    Assessment:     Frank Wyatt is a 89 y.o. male with a medical diagnosis of Septic shock. Pt was agreeable to OT this AM. Performance deficits affecting function are weakness, impaired self care skills, impaired functional mobility, gait instability, decreased safety awareness, decreased upper extremity function, decreased lower extremity function, impaired cardiopulmonary response to activity.     Rehab Prognosis:  Good; patient would benefit from acute skilled OT services to address these deficits and reach maximum level of function.       Plan:     Patient to be seen 5 x/week to address the above listed problems via self-care/home management, therapeutic activities, therapeutic exercises  Plan of Care Expires: 08/20/24  Plan of Care Reviewed with: patient, daughter    Subjective     Chief Complaint: none stated  Patient/Family Comments/goals: return to PLOF  Pain/Comfort:  Pain Rating 1: 0/10    Objective:     Communicated with: nurse prior to session.  Patient found up in chair with chair check, PureWick, peripheral IV, telemetry upon OT entry to room.    General Precautions: Standard,      Orthopedic Precautions:N/A  Braces: N/A  Respiratory Status: Room air     Occupational Performance:     Bed Mobility:    Patient completed Scooting/Bridging with stand by assistance and to reach HOB       Functional Mobility/Transfers:  Patient completed Sit <> Stand Transfer with contact guard assistance  with  rolling walker   Patient completed Bed <> Chair Transfer using Step Transfer technique with contact guard assistance with rolling walker  Functional Mobility: Pt completed step transfer chair>bed  with CGA and no AD. Pt did not experience any LOB/SOB.     Activities of Daily Living:  Toileting: purewick      Therapeutic Exercise:  Pt completed BUE AROM exercises X10 with dowel in all major joint planes excluding shoulder due to RTC injuries while seated with HOB elevated in order to increase ROM/function needed for ADLs.  Pt completed 5 assisted sit ups to reach towards toes while seated in bed with HOB elevated in order to increase trunk strength/mobility for ADLs.    Treatment & Education:  Pt educated on role of OT/POC, importance of OOB/EOB activity, use of call bell, and safety during ADLs, transfers, and functional mobility.      Patient left HOB elevated with all lines intact, call button in reach, bed alarm on, and daughter present    GOALS:   Multidisciplinary Problems       Occupational Therapy Goals          Problem: Occupational Therapy    Goal Priority Disciplines Outcome Interventions   Occupational Therapy Goal     OT, PT/OT     Description: Goals to be met by: 08/20/2024     Patient will increase functional independence with ADLs by performing:    Grooming while standing at sink with Supervision.  Toileting from toilet with Modified [place] for hygiene and clothing management.   Supine to sit with Supervision.  Step transfer with Supervision                         Time Tracking:     OT Date of Treatment: 07/22/24  OT Start Time: 1104  OT Stop Time: 1118  OT Total Time (min): 14 min    Billable Minutes:Therapeutic Exercise 14               7/22/2024

## 2024-07-22 NOTE — CARE UPDATE
07/22/24 0712   Patient Assessment/Suction   Level of Consciousness (AVPU) alert   Respiratory Effort Normal;Unlabored   Expansion/Accessory Muscles/Retractions no use of accessory muscles;no retractions;expansion symmetric   All Lung Fields Breath Sounds diminished   Rhythm/Pattern, Respiratory unlabored;pattern regular;depth regular;chest wiggle adequate;no shortness of breath reported   Cough Frequency infrequent   Cough Type good   Skin Integrity   $ Wound Care Tech Time 15 min   Area Observed Bridge of nose   Skin Appearance without discoloration   Barrier used? Liquid Filled Cushion   PRE-TX-O2   Device (Oxygen Therapy) room air   $ Is the patient on Low Flow Oxygen? Yes   $ Is the patient on High Flow Oxygen? Yes   SpO2 99 %   Pulse Oximetry Type Intermittent   $ Pulse Oximetry - Multiple Charge Pulse Oximetry - Multiple   Pulse 89   Resp 19   Aerosol Therapy   $ Aerosol Therapy Charges Aerosol Treatment   Daily Review of Necessity (SVN) completed   Respiratory Treatment Status (SVN) given   Treatment Route (SVN) oxygen;mask   Patient Position HOB elevated   Post Treatment Assessment (SVN) increased aeration   Signs of Intolerance (SVN) none   Breath Sounds Post-Respiratory Treatment   Throughout All Fields Post-Treatment All Fields   Throughout All Fields Post-Treatment aeration increased   Post-treatment Heart Rate (beats/min) 80   Post-treatment Resp Rate (breaths/min) 20   Preset CPAP/BiPAP Settings   Mode Of Delivery BiPAP S/T;Standby   $ CPAP/BiPAP Daily Charge BiPAP/CPAP Daily   Equipment Type V60   Education   $ Education Bronchodilator;15 min

## 2024-07-22 NOTE — PT/OT/SLP PROGRESS
Physical Therapy Treatment    Patient Name:  Frank Wyatt   MRN:  [id number]    Recommendations:     Discharge Recommendations: Low Intensity Therapy  Discharge Equipment Recommendations: none  Barriers to discharge:  weakness, impaired self care skills, impaired functional mobility, gait instability, impaired activity tolerance.     Assessment:     Frank Wyatt is a 89 y.o. male admitted with a medical diagnosis of Septic shock.  He presents with the following impairments/functional limitations: weakness, impaired self care skills, impaired functional mobility, gait instability, impaired cardiopulmonary response to activity.    Pt found resting in supine with daughter at bedside. Pt agreeable to skilled physical therapy session today.     Cyndi required to transfer from supine to sitting EOB. Once EOB, pt able to scoot to EOB with SBA to place feet flat on the floor.    He performed sit to stand from EOB with RW and CGA. He then ambulated two bouts of 75ft with RW and CGA. He demonstrated slow margie throughout gait but otherwise had no other deficits at this time.     He ambulated back to his room and was agreeable to sitting up in his bed side chair. Pt left sitting up in chair, chair alarm on, call light within reach, all needs met, and RN notified.     Rehab Prognosis: Fair; patient would benefit from acute skilled PT services to address these deficits and reach maximum level of function.    Recent Surgery: * No surgery found *      Plan:     During this hospitalization, patient to be seen daily to address the identified rehab impairments via gait training, therapeutic activities, therapeutic exercises, neuromuscular re-education and progress toward the following goals:    Plan of Care Expires:  08/20/24    Subjective     Chief Complaint: none  Patient/Family Comments/goals: to get better  Pain/Comfort:  Pain Rating 1: 0/10      Objective:     Communicated with RN prior to session.  Patient found  supine with bed alarm, PureWick, peripheral IV, telemetry upon PT entry to room.     General Precautions: Standard, fall, hearing impaired  Orthopedic Precautions: N/A  Braces: N/A  Respiratory Status: Room air     Functional Mobility:  Bed Mobility:     Supine to Sit: minimum assistance  Transfers:     Sit to Stand:  contact guard assistance with rolling walker  Gait: 2x75ft with RW and CGA.       AM-PAC 6 CLICK MOBILITY          Treatment & Education:  Pt educated on importance of time OOB, importance of intermittent mobility, safe techniques for transfers/ambulation, discharge recommendations/options, and use of call light for assistance and fall prevention.      Patient left up in chair with all lines intact, call button in reach, and chair alarm on..    GOALS:   Multidisciplinary Problems       Physical Therapy Goals          Problem: Physical Therapy    Goal Priority Disciplines Outcome Goal Variances Interventions   Physical Therapy Goal     PT, PT/OT Progressing     Description: Goals to be met by: 24     Patient will increase functional independence with mobility by performin. Supine to sit with Modified [place]  2. Sit to supine with Modified [place]  3. Sit to stand transfer with Modified [place]  4. Bed to chair transfer with Modified [place] using Rolling Walker  5. Gait  x 300 feet with Modified [place] using Rolling Walker.                          Time Tracking:     PT Received On: 24  PT Start Time: 1002     PT Stop Time: 1020  PT Total Time (min): 18 min     Billable Minutes: Gait Training 18    Treatment Type: Treatment  PT/PTA: PTA     Number of PTA visits since last PT visit: 2024

## 2024-07-22 NOTE — PROGRESS NOTES
[facility] Medicine  Progress Note    Patient Name: Frank Wyatt  MRN: [id number]  Patient Class: IP- Inpatient   Admission Date: 7/17/2024  Length of Stay: 4 days  Attending Physician: Miguel Gray MD  Primary Care Provider: Darci German MD        Subjective:     Principal Problem:Septic shock        HPI:  History is taken from medical records.  This is an 89-year-old male who recently had a cystoscopy supposedly for stone removal presents to the hospital with fevers and chills.  Workup included abdomen and pelvis and showed new infiltrate in the right lung base.  Chest x-ray was also done which showed interstitial prominence with differential including edema, pneumonitis or chronic interstitial lung disease.  Given the high fever the patient is thought to have pneumonia and vancomycin and Zosyn was given for sepsis.  Patient also has acute respiratory failure.  IV Lasix has been provided.  Patient currently on a BiPAP.  Patient is arousable.  His laboratory workup otherwise is within normal limits.  Hospitalist will admit this patient for further care.    Overview/Hospital Course:  Patient admitted with septic shock.  He was started on broad-spectrum IV antibiotics and improved.  Blood and urine cultures were obtained and were currently negative.  Patient was weaned down off of Levophed, and still had intermittent fevers.  He did have mild episode of acute kidney injury on 07/19.    Interval History:    Seen and examined in the multidisciplinary rounds, no fever or chills, good urine output, -860 cc, no chest pain or SOB, very hard hearing, no dysuria.  Hemodynamically stable.  We will discontinue IV Zosyn and started on p.o. Augmentin.     Review of Systems   Constitutional: Negative.    HENT:  Positive for hearing loss.    Eyes: Negative.    Respiratory: Negative.     Cardiovascular: Negative.    Gastrointestinal: Negative.    Endocrine: Negative.    Genitourinary: Negative.     Musculoskeletal: Negative.    Skin: Negative.    Allergic/Immunologic: Negative.    Neurological: Negative.    Hematological: Negative.    Psychiatric/Behavioral: Negative.     All other systems reviewed and are negative.    Objective:     Vital Signs (Most Recent):  Temp: 98.4 °F (36.9 °C) (07/22/24 1149)  Pulse: 97 (07/22/24 1149)  Resp: 17 (07/22/24 1149)  BP: (!) 156/78 (07/22/24 1149)  SpO2: (!) 92 % (07/22/24 1149) Vital Signs (24h Range):  Temp:  [98.2 °F (36.8 °C)-98.7 °F (37.1 °C)] 98.4 °F (36.9 °C)  Pulse:  [] 97  Resp:  [16-24] 17  SpO2:  [91 %-100 %] 92 %  BP: (118-156)/(59-78) 156/78     Weight: 79.3 kg (174 lb 12.8 oz)  Body mass index is 27.38 kg/m².    Intake/Output Summary (Last 24 hours) at 7/22/2024 1217  Last data filed at 7/22/2024 0408  Gross per 24 hour   Intake 240 ml   Output 1100 ml   Net -860 ml         Physical Exam  Vitals and nursing note reviewed.   Constitutional:       General: He is not in acute distress.     Appearance: Normal appearance.   HENT:      Head: Normocephalic and atraumatic.      Nose: Nose normal.      Mouth/Throat:      Mouth: Mucous membranes are moist.   Eyes:      Extraocular Movements: Extraocular movements intact.      Pupils: Pupils are equal, round, and reactive to light.   Cardiovascular:      Rate and Rhythm: Normal rate and regular rhythm.      Pulses: Normal pulses.      Heart sounds: Normal heart sounds.   Pulmonary:      Effort: Pulmonary effort is normal. No respiratory distress.      Breath sounds: Normal breath sounds.   Abdominal:      General: Bowel sounds are normal. There is no distension.      Palpations: Abdomen is soft.      Tenderness: There is no abdominal tenderness.   Musculoskeletal:         General: Normal range of motion.      Cervical back: Normal range of motion and neck supple.   Skin:     General: Skin is warm and dry.   Neurological:      General: No focal deficit present.      Mental Status: He is alert and oriented to  person, place, and time.   Psychiatric:         Mood and Affect: Mood normal.             Significant Labs: All pertinent labs within the past 24 hours have been reviewed.    Significant Imaging: I have reviewed all pertinent imaging results/findings within the past 24 hours.    X-Ray Chest 1 View    Result Date: 7/18/2024  EXAMINATION: XR CHEST 1 VIEW CLINICAL HISTORY: Shortness of breaths; COMPARISON: July 17 FINDINGS: Heart size is normal.  Post sternotomy changes are noted.  The aortic arch is calcified. Bilateral abnormal interstitial prominence is unchanged.  No pleural effusion or pneumothorax is identified.  No acute osseous abnormality is demonstrated.  Chronic bilateral rotator cuff tears are incidentally noted.     No significant interval changes compared to July 17. Electronically signed by: Shahzad Garzon Date:    07/18/2024 Time:    07:07    CT Abdomen Pelvis  Without Contrast    Result Date: 7/17/2024  EXAMINATION: CT ABDOMEN PELVIS WITHOUT CONTRAST CLINICAL HISTORY: Hematuria, gross/macroscopic; TECHNIQUE: Low dose axial images, sagittal and coronal reformations were obtained from the lung bases to the pubic symphysis, no oral contrast. COMPARISON: 01/25/2023 CT FINDINGS: Heart: Normal in size without evidence of effusion.  Severe vascular calcifications throughout all 3 coronary arteries and in the mitral and aortic valve plane.  Median sternotomy wires. Lung Bases: Patchy consolidation right lung base. Liver: Normal in size and attenuation, with no focal hepatic lesions. Gallbladder: No calcified gallstones. Bile Ducts: No evidence of dilated ducts. Pancreas: No mass or peripancreatic fat stranding. Spleen: Unremarkable. Adrenals: Unremarkable. Kidneys/ Ureters: Multiple nonobstructing renal calculi and vascular calcifications.  Renal cysts largest in the right kidney measuring 4.7 cm..  A solid-appearing exophytic lesion off of the anterior cortex of the mid left kidney with 40 3 Hounsfield  units measures 16 mm.  This appears stable. Bladder: No evidence of wall thickening. Reproductive organs: Prostate is enlarged.  Seminal vesicles unremarkable. GI Tract/Mesentery: No evidence of bowel obstruction or inflammation. Peritoneal Space: No ascites. No free air. Retroperitoneum: No significant adenopathy. Abdominal wall: Multiple anterior abdominal wall hernias with bowel within a midline hernia exiting to the right near the umbilicus without evidence obstruction or inflammation. Vasculature: Severe atherosclerotic calcifications throughout nondilated abdominal aorta, iliac and femoral vessels as well as SMA and renal arteries and celiac axis. Bones: No acute fracture.  Severe multilevel lumbar stenosis with spondylosis throughout the spine.     Stable CT of the abdomen and pelvis since 01/25/2023. Anterior abdominal wall hernia containing bowel loops at the umbilicus without evidence of obstruction. New consolidation in the right lung base.  Correlate for pneumonia.  Follow-up until clearing. Multiple other nonacute findings above as described. Electronically signed by: Demian Robles Date:    07/17/2024 Time:    22:22    X-Ray Chest AP Portable    Result Date: 7/17/2024  EXAMINATION: XR CHEST AP PORTABLE CLINICAL HISTORY: Sepsis; TECHNIQUE: Single frontal view of the chest was performed. COMPARISON: 02/20/2024 FINDINGS: Diffuse interstitial prominence may relate to the edema, pneumonitis, or chronic interstitial lung disease/scarring.  Normal heart size.     As above. Electronically signed by: Sofia Sinclair Date:    07/17/2024 Time:    20:32    US Retroperitoneal Complete    Result Date: 7/15/2024  EXAMINATION: US RETROPERITONEAL COMPLETE CLINICAL HISTORY: Benign essential microscopic hematuria TECHNIQUE: Ultrasound of the kidneys and urinary bladder was performed including color flow and Doppler evaluation of the kidneys. COMPARISON: CT dated 01/25/2023 FINDINGS: Right kidney: The right kidney  "measures 11.7 x 6.4 x 7.5 cm.  There is cortical thinning.  There is no hydronephrosis.  There are stable simple renal cysts the largest measuring 4.2 cm. There are nonobstructing stones within the mid to lower pole. Left kidney: The left kidney measures 12.9 x 6.8 x 6.7 cm with mild cortical thinning.  There is no hydronephrosis.  There are stable simple cysts the largest measuring 2.2 cm.  There are nonobstructing left renal stones.. The visualized aorta and IVC are normal in caliber. The bladder is partially collapsed.  The prostate gland is enlarged measuring 53.39 cc.     Stable simple renal cysts and nonobstructing renal stones Bilateral cortical thinning Prostatic hypertrophy Electronically signed by: Yady Villavicencio Date:    07/15/2024 Time:    16:53  - pulls last radiology orders      Assessment/Plan:      * Septic shock  This patient does have evidence of infective focus  My overall impression is septic shock due to SBP < 90.  Now off pressors.  Source: Respiratory and Urinary Tract  Antibiotics given-   Antibiotics (72h ago, onward)      Start     Stop Route Frequency Ordered    07/22/24 0915  amoxicillin-clavulanate 875-125mg per tablet 1 tablet         07/25/24 0859 Oral Every 12 hours 07/22/24 0812    07/18/24 0900  mupirocin 2 % ointment         07/23/24 0859 Nasl 2 times daily 07/18/24 0318          Latest lactate reviewed-  No results for input(s): "LACTATE", "POCLAC" in the last 72 hours.    Organ dysfunction indicated by Acute respiratory failure    -last fever spike on 07/18/2024 so far   -monitor leukocytosis = resolved   -chest x-ray on 07/17/2024 =Diffuse interstitial prominence may relate to the edema, pneumonitis, or chronic interstitial lung disease/scarring. Normal heart size.   -urine culture on 07/17/2024 = Proteus mirabilis (sensitive to Zosyn, Rocephin, Merrem, cefepime.)  -blood cultures x2 on 07/17/2024 = negative so far  -CT abdomen and pelvis without contrast on 07/18/2024 =Stable " CT of the abdomen and pelvis since 01/25/2023.Anterior abdominal wall hernia containing bowel loops at the umbilicus without evidence of obstruction.New consolidation in the right lung base.  Correlate for pneumonia.  Follow-up until clearing.Multiple other nonacute findings above as described.  -Zosyn IV based on sensitivities to Proteus.  Ordered on 07/17/2024  Discontinue IV Zosyn, started on p.o. Augmentin 7/22  Likely discharge in a.m. to assistant living           HANSA (acute kidney injury)  Patient with acute kidney injury/acute renal failure likely due to acute tubular necrosis caused by sepsis  HANSA is currently worsening. Baseline creatinine  1.3  - Labs reviewed- Renal function/electrolytes with Estimated Creatinine Clearance: 33.4 mL/min (based on SCr of 1.4 mg/dL). according to latest data. Monitor urine output and serial BMP and adjust therapy as needed. Avoid nephrotoxins and renally dose meds for GFR listed above.    Acute on chronic diastolic heart failure  Patient is identified as having Diastolic (HFpEF) heart failure that is Acute on chronic. CHF is currently uncontrolled due to Pulmonary edema/pleural effusion on CXR. Latest ECHO performed and demonstrates- Results for orders placed during the hospital encounter of 03/24/23    Echo    Interpretation Summary  · The left ventricle is normal in size with moderate concentric hypertrophy and low normal systolic function.  · Mild left atrial enlargement.  · The estimated ejection fraction is 50%.  · Indeterminate left ventricular diastolic function.  · RV is not well visualized. Mildly to moderately reduced right ventricular systolic function.  · The mean diastolic gradient across the mitral valve is 13 mmHg at a heart rate of 87 bpm.  · There is moderate to severe mitral stenosis.  · There is moderate-to-severe aortic valve stenosis.  · Aortic valve area is 1.06 cm2; peak velocity is 2.75 m/s; mean gradient is 17 mmHg. Indexed area is 0.45-0.5 cm/m2.  Indexed stroke volume is 29 ml/m2.  · Normal central venous pressure (3 mmHg).    Monitor clinical status closely. Monitor on telemetry. Patient is off CHF pathway.  Monitor strict Is&Os and daily weights.  Place on fluid restriction of 1.5 L. Cardiology has not been consulted. Continue to stress to patient importance of self efficacy and  on diet for CHF.     Last BNP reviewed- and noted below   Recent Labs   Lab 07/17/24 1942   *       Patient apparently has acute CHF secondary to severe valvular heart disease, patient has moderate to severe AS/ MS, patient has follow up with cardiologist Dr. Kim outpatient, as per daughter bedside, family is fully aware of this patient and family are not interested as pursuing any surgical intervention at this moment or in the future.       -Lasix 40 mg IV b.i.d. switched to Lasix 40 mg p.o. daily (home dose)  -patient is negative 4.8 L so far  - looks euvolemic now, continue Lasix add on [place].    BPH (benign prostatic hyperplasia)  Continue tamsulosin      Hypothyroidism  Patient has chronic hypothyroidism. TFTs reviewed-   Lab Results   Component Value Date    TSH 0.870 03/24/2023   . Will continue chronic levothyroxine and adjust for and clinical changes.        COPD (chronic obstructive pulmonary disease)  Patient's COPD is controlled currently.  Patient is currently off COPD Pathway. Continue scheduled inhalers Antibiotics and Supplemental oxygen and monitor respiratory status closely.     Thrombocytopenia  Patient was found to have thrombocytopenia, the likely etiology is secondary to sepsis/infection, will monitor the platelets Daily. Will transfuse if platelet count is <10k. Hold DVT prophylaxis if platelets are <50k. The patient's platelet results have been reviewed and are listed below.  Recent Labs   Lab 07/21/24  0442   PLT 64*           S/P CABG x 5  Patient with known CAD s/p CABG, which is controlled Will continue ASA, Plavix, and Statin and  monitor for S/Sx of angina/ACS. Continue to monitor on telemetry.         CKD (chronic kidney disease), stage III  Creatine stable for now. BMP reviewed- noted Estimated Creatinine Clearance: 33.4 mL/min (based on SCr of 1.4 mg/dL). according to latest data. Based on current GFR, CKD stage is stage 3 - GFR 30-59.  Monitor UOP and serial BMP and adjust therapy as needed. Renally dose meds. Avoid nephrotoxic medications and procedures.    [place] (hard of hearing)  Noted, secondary to Meniere's disease.  Make appropriate precautions.  Hearing aids need to be in place.      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  · Mild left atrial enlargement.  · The estimated ejection fraction is 50%.  · Indeterminate left ventricular diastolic function.  · RV is not well visualized. Mildly to moderately reduced right ventricular systolic function.  · The mean diastolic gradient across the mitral valve is 13 mmHg at a heart rate of 87 bpm.  · There is moderate to severe mitral stenosis.  · There is moderate-to-severe aortic valve stenosis.  · Aortic valve area is 1.06 cm2; peak velocity is 2.75 m/s; mean gradient is 17 mmHg. Indexed area is 0.45-0.5 cm/m2. Indexed stroke volume is 29 ml/m2.  · Normal central venous pressure (3 mmHg).    -patient is followed by Dr. Kim for severe aortic stenosis.  Patient is to follow-up in September for this with Cardiology per prior cardiology notes      VTE Risk Mitigation (From admission, onward)           Ordered     enoxaparin injection 40 mg  Every 24 hours        Note to Pharmacy: Estimated Creatinine Clearance: 36 mL/min (based on SCr of 1.3 mg/dL).  Body mass index is 27.38 kg/m².    07/22/24 0812     IP VTE HIGH RISK PATIENT  Once         07/18/24 0132      Place sequential compression device  Until discontinued         07/18/24 0131                    Discharge Planning   ALESSANDRA: 7/23/2024     Code Status: DNR   Is the patient medically ready for discharge?:     Reason for patient still in hospital (select all that apply): Patient trending condition and Treatment  Discharge Plan A: Assisted Living                  Miguel Gray MD  Department of Hospital Medicine   [facility]

## 2024-07-22 NOTE — SUBJECTIVE & OBJECTIVE
Interval History:    Seen and examined in the multidisciplinary rounds, no fever or chills, good urine output, -860 cc, no chest pain or SOB, very hard hearing, no dysuria.  Hemodynamically stable.  We will discontinue IV Zosyn and started on p.o. Augmentin.     Review of Systems   Constitutional: Negative.    HENT:  Positive for hearing loss.    Eyes: Negative.    Respiratory: Negative.     Cardiovascular: Negative.    Gastrointestinal: Negative.    Endocrine: Negative.    Genitourinary: Negative.    Musculoskeletal: Negative.    Skin: Negative.    Allergic/Immunologic: Negative.    Neurological: Negative.    Hematological: Negative.    Psychiatric/Behavioral: Negative.     All other systems reviewed and are negative.    Objective:     Vital Signs (Most Recent):  Temp: 98.4 °F (36.9 °C) (07/22/24 1149)  Pulse: 97 (07/22/24 1149)  Resp: 17 (07/22/24 1149)  BP: (!) 156/78 (07/22/24 1149)  SpO2: (!) 92 % (07/22/24 1149) Vital Signs (24h Range):  Temp:  [98.2 °F (36.8 °C)-98.7 °F (37.1 °C)] 98.4 °F (36.9 °C)  Pulse:  [] 97  Resp:  [16-24] 17  SpO2:  [91 %-100 %] 92 %  BP: (118-156)/(59-78) 156/78     Weight: 79.3 kg (174 lb 12.8 oz)  Body mass index is 27.38 kg/m².    Intake/Output Summary (Last 24 hours) at 7/22/2024 1217  Last data filed at 7/22/2024 0408  Gross per 24 hour   Intake 240 ml   Output 1100 ml   Net -860 ml         Physical Exam  Vitals and nursing note reviewed.   Constitutional:       General: He is not in acute distress.     Appearance: Normal appearance.   HENT:      Head: Normocephalic and atraumatic.      Nose: Nose normal.      Mouth/Throat:      Mouth: Mucous membranes are moist.   Eyes:      Extraocular Movements: Extraocular movements intact.      Pupils: Pupils are equal, round, and reactive to light.   Cardiovascular:      Rate and Rhythm: Normal rate and regular rhythm.      Pulses: Normal pulses.      Heart sounds: Normal heart sounds.   Pulmonary:      Effort: Pulmonary effort is  normal. No respiratory distress.      Breath sounds: Normal breath sounds.   Abdominal:      General: Bowel sounds are normal. There is no distension.      Palpations: Abdomen is soft.      Tenderness: There is no abdominal tenderness.   Musculoskeletal:         General: Normal range of motion.      Cervical back: Normal range of motion and neck supple.   Skin:     General: Skin is warm and dry.   Neurological:      General: No focal deficit present.      Mental Status: He is alert and oriented to person, place, and time.   Psychiatric:         Mood and Affect: Mood normal.             Significant Labs: All pertinent labs within the past 24 hours have been reviewed.    Significant Imaging: I have reviewed all pertinent imaging results/findings within the past 24 hours.    X-Ray Chest 1 View    Result Date: 7/18/2024  EXAMINATION: XR CHEST 1 VIEW CLINICAL HISTORY: Shortness of breaths; COMPARISON: July 17 FINDINGS: Heart size is normal.  Post sternotomy changes are noted.  The aortic arch is calcified. Bilateral abnormal interstitial prominence is unchanged.  No pleural effusion or pneumothorax is identified.  No acute osseous abnormality is demonstrated.  Chronic bilateral rotator cuff tears are incidentally noted.     No significant interval changes compared to July 17. Electronically signed by: Shahzad Garzon Date:    07/18/2024 Time:    07:07    CT Abdomen Pelvis  Without Contrast    Result Date: 7/17/2024  EXAMINATION: CT ABDOMEN PELVIS WITHOUT CONTRAST CLINICAL HISTORY: Hematuria, gross/macroscopic; TECHNIQUE: Low dose axial images, sagittal and coronal reformations were obtained from the lung bases to the pubic symphysis, no oral contrast. COMPARISON: 01/25/2023 CT FINDINGS: Heart: Normal in size without evidence of effusion.  Severe vascular calcifications throughout all 3 coronary arteries and in the mitral and aortic valve plane.  Median sternotomy wires. Lung Bases: Patchy consolidation right lung base.  Liver: Normal in size and attenuation, with no focal hepatic lesions. Gallbladder: No calcified gallstones. Bile Ducts: No evidence of dilated ducts. Pancreas: No mass or peripancreatic fat stranding. Spleen: Unremarkable. Adrenals: Unremarkable. Kidneys/ Ureters: Multiple nonobstructing renal calculi and vascular calcifications.  Renal cysts largest in the right kidney measuring 4.7 cm..  A solid-appearing exophytic lesion off of the anterior cortex of the mid left kidney with 40 3 Hounsfield units measures 16 mm.  This appears stable. Bladder: No evidence of wall thickening. Reproductive organs: Prostate is enlarged.  Seminal vesicles unremarkable. GI Tract/Mesentery: No evidence of bowel obstruction or inflammation. Peritoneal Space: No ascites. No free air. Retroperitoneum: No significant adenopathy. Abdominal wall: Multiple anterior abdominal wall hernias with bowel within a midline hernia exiting to the right near the umbilicus without evidence obstruction or inflammation. Vasculature: Severe atherosclerotic calcifications throughout nondilated abdominal aorta, iliac and femoral vessels as well as SMA and renal arteries and celiac axis. Bones: No acute fracture.  Severe multilevel lumbar stenosis with spondylosis throughout the spine.     Stable CT of the abdomen and pelvis since 01/25/2023. Anterior abdominal wall hernia containing bowel loops at the umbilicus without evidence of obstruction. New consolidation in the right lung base.  Correlate for pneumonia.  Follow-up until clearing. Multiple other nonacute findings above as described. Electronically signed by: Demian Robles Date:    07/17/2024 Time:    22:22    X-Ray Chest AP Portable    Result Date: 7/17/2024  EXAMINATION: XR CHEST AP PORTABLE CLINICAL HISTORY: Sepsis; TECHNIQUE: Single frontal view of the chest was performed. COMPARISON: 02/20/2024 FINDINGS: Diffuse interstitial prominence may relate to the edema, pneumonitis, or chronic interstitial  lung disease/scarring.  Normal heart size.     As above. Electronically signed by: Sofia Sinclair Date:    07/17/2024 Time:    20:32    US Retroperitoneal Complete    Result Date: 7/15/2024  EXAMINATION: US RETROPERITONEAL COMPLETE CLINICAL HISTORY: Benign essential microscopic hematuria TECHNIQUE: Ultrasound of the kidneys and urinary bladder was performed including color flow and Doppler evaluation of the kidneys. COMPARISON: CT dated 01/25/2023 FINDINGS: Right kidney: The right kidney measures 11.7 x 6.4 x 7.5 cm.  There is cortical thinning.  There is no hydronephrosis.  There are stable simple renal cysts the largest measuring 4.2 cm. There are nonobstructing stones within the mid to lower pole. Left kidney: The left kidney measures 12.9 x 6.8 x 6.7 cm with mild cortical thinning.  There is no hydronephrosis.  There are stable simple cysts the largest measuring 2.2 cm.  There are nonobstructing left renal stones.. The visualized aorta and IVC are normal in caliber. The bladder is partially collapsed.  The prostate gland is enlarged measuring 53.39 cc.     Stable simple renal cysts and nonobstructing renal stones Bilateral cortical thinning Prostatic hypertrophy Electronically signed by: Yady Villavicencio Date:    07/15/2024 Time:    16:53  - pulls last radiology orders

## 2024-07-22 NOTE — ASSESSMENT & PLAN NOTE
"This patient does have evidence of infective focus  My overall impression is septic shock due to SBP < 90.  Now off pressors.  Source: Respiratory and Urinary Tract  Antibiotics given-   Antibiotics (72h ago, onward)      Start     Stop Route Frequency Ordered    07/22/24 0915  amoxicillin-clavulanate 875-125mg per tablet 1 tablet         07/25/24 0859 Oral Every 12 hours 07/22/24 0812    07/18/24 0900  mupirocin 2 % ointment         07/23/24 0859 Nasl 2 times daily 07/18/24 0318          Latest lactate reviewed-  No results for input(s): "LACTATE", "POCLAC" in the last 72 hours.    Organ dysfunction indicated by Acute respiratory failure    -last fever spike on 07/18/2024 so far   -monitor leukocytosis = resolved   -chest x-ray on 07/17/2024 =Diffuse interstitial prominence may relate to the edema, pneumonitis, or chronic interstitial lung disease/scarring. Normal heart size.   -urine culture on 07/17/2024 = Proteus mirabilis (sensitive to Zosyn, Rocephin, Merrem, cefepime.)  -blood cultures x2 on 07/17/2024 = negative so far  -CT abdomen and pelvis without contrast on 07/18/2024 =Stable CT of the abdomen and pelvis since 01/25/2023.Anterior abdominal wall hernia containing bowel loops at the umbilicus without evidence of obstruction.New consolidation in the right lung base.  Correlate for pneumonia.  Follow-up until clearing.Multiple other nonacute findings above as described.  -Zosyn IV based on sensitivities to Proteus.  Ordered on 07/17/2024  Discontinue IV Zosyn, started on p.o. Augmentin 7/22  Likely discharge in a.m. to [facility] living         "

## 2024-07-22 NOTE — ASSESSMENT & PLAN NOTE
Patient is identified as having Diastolic (HFpEF) heart failure that is Acute on chronic. CHF is currently uncontrolled due to Pulmonary edema/pleural effusion on CXR. Latest ECHO performed and demonstrates- Results for orders placed during the hospital encounter of 03/24/23    Echo    Interpretation Summary  · The left ventricle is normal in size with moderate concentric hypertrophy and low normal systolic function.  · Mild left atrial enlargement.  · The estimated ejection fraction is 50%.  · Indeterminate left ventricular diastolic function.  · RV is not well visualized. Mildly to moderately reduced right ventricular systolic function.  · The mean diastolic gradient across the mitral valve is 13 mmHg at a heart rate of 87 bpm.  · There is moderate to severe mitral stenosis.  · There is moderate-to-severe aortic valve stenosis.  · Aortic valve area is 1.06 cm2; peak velocity is 2.75 m/s; mean gradient is 17 mmHg. Indexed area is 0.45-0.5 cm/m2. Indexed stroke volume is 29 ml/m2.  · Normal central venous pressure (3 mmHg).    Monitor clinical status closely. Monitor on telemetry. Patient is off CHF pathway.  Monitor strict Is&Os and daily weights.  Place on fluid restriction of 1.5 L. Cardiology has not been consulted. Continue to stress to patient importance of self efficacy and  on diet for CHF.     Last BNP reviewed- and noted below   Recent Labs   Lab 07/17/24 1942   *       Patient apparently has acute CHF secondary to severe valvular heart disease, patient has moderate to severe AS/ MS, patient has follow up with cardiologist Dr. Kim outpatient, as per daughter bedside, family is fully aware of this patient and family are not interested as pursuing any surgical intervention at this moment or in the future.       -Lasix 40 mg IV b.i.d. switched to Lasix 40 mg p.o. daily (home dose)  -patient is negative 4.8 L so far  - looks euvolemic now, continue Lasix add on [place].

## 2024-07-22 NOTE — PLAN OF CARE
Problem: Adult Inpatient Plan of Care  Goal: Plan of Care Review  Outcome: Progressing     Problem: Skin Injury Risk Increased  Goal: Skin Health and Integrity  Outcome: Progressing

## 2024-07-22 NOTE — PLAN OF CARE
1610 - CM attempted to call [facility] again to notify of anticipated discharge back on tomorrow - phone number to facility still not working at this time. CM to re-attempt in the AM     1525 - PCP appointment obtained and added to AVS. CM attempted to call [facility] to notify of anticipated discharge on tomorrow, number not working at this time. CM to re-attempt    1342 - CM met with patient and daughter at bedside during MD rounds. Patient and daughter agreeable to home health, requesting previous agency. SW notified of above, SW to arrange home health    CM anticipating hospital discharge on tomorrow. Daughter to transport home     07/22/24 1341   Discharge Reassessment   Assessment Type Discharge Planning Reassessment   Did the patient's condition or plan change since previous assessment? Yes   Discharge Plan discussed with: Patient;Adult children   Communicated ALESSANDRA with patient/caregiver Yes   Discharge Plan A Assisted Living;Home Health

## 2024-07-22 NOTE — CONSULTS
Appears to be DTI 0.5x0.5cm purple area of deep tissue charlotte fold no open wounds.  Cleaned and dried and applied Triad and new Mepilex notified Dr. Gray and Dr. Nichols

## 2024-07-23 VITALS
DIASTOLIC BLOOD PRESSURE: 74 MMHG | TEMPERATURE: 98 F | RESPIRATION RATE: 19 BRPM | WEIGHT: 174.81 LBS | SYSTOLIC BLOOD PRESSURE: 168 MMHG | OXYGEN SATURATION: 97 % | HEIGHT: 67 IN | HEART RATE: 110 BPM | BODY MASS INDEX: 27.44 KG/M2

## 2024-07-23 LAB
GLUCOSE SERPL-MCNC: 132 MG/DL (ref 70–110)
GLUCOSE SERPL-MCNC: 137 MG/DL (ref 70–110)

## 2024-07-23 PROCEDURE — 94618 PULMONARY STRESS TESTING: CPT

## 2024-07-23 PROCEDURE — 27100171 HC OXYGEN HIGH FLOW UP TO 24 HOURS

## 2024-07-23 PROCEDURE — 25000242 PHARM REV CODE 250 ALT 637 W/ HCPCS: Performed by: HOSPITALIST

## 2024-07-23 PROCEDURE — 25000003 PHARM REV CODE 250: Performed by: HOSPITALIST

## 2024-07-23 PROCEDURE — 93005 ELECTROCARDIOGRAM TRACING: CPT | Performed by: GENERAL PRACTICE

## 2024-07-23 PROCEDURE — 93010 ELECTROCARDIOGRAM REPORT: CPT | Mod: ,,, | Performed by: GENERAL PRACTICE

## 2024-07-23 PROCEDURE — 94660 CPAP INITIATION&MGMT: CPT

## 2024-07-23 PROCEDURE — 97116 GAIT TRAINING THERAPY: CPT | Mod: CQ

## 2024-07-23 PROCEDURE — 94761 N-INVAS EAR/PLS OXIMETRY MLT: CPT

## 2024-07-23 PROCEDURE — 99900031 HC PATIENT EDUCATION (STAT)

## 2024-07-23 PROCEDURE — 94640 AIRWAY INHALATION TREATMENT: CPT

## 2024-07-23 PROCEDURE — 25000003 PHARM REV CODE 250: Performed by: INTERNAL MEDICINE

## 2024-07-23 PROCEDURE — 99900035 HC TECH TIME PER 15 MIN (STAT)

## 2024-07-23 PROCEDURE — 99221 1ST HOSP IP/OBS SF/LOW 40: CPT | Mod: ,,, | Performed by: FAMILY MEDICINE

## 2024-07-23 RX ORDER — IPRATROPIUM BROMIDE AND ALBUTEROL SULFATE 2.5; .5 MG/3ML; MG/3ML
3 SOLUTION RESPIRATORY (INHALATION) ONCE
Status: COMPLETED | OUTPATIENT
Start: 2024-07-23 | End: 2024-07-23

## 2024-07-23 RX ORDER — DILTIAZEM HYDROCHLORIDE 30 MG/1
30 TABLET, FILM COATED ORAL EVERY 6 HOURS
Status: DISCONTINUED | OUTPATIENT
Start: 2024-07-23 | End: 2024-07-23 | Stop reason: HOSPADM

## 2024-07-23 RX ORDER — AMOXICILLIN AND CLAVULANATE POTASSIUM 875; 125 MG/1; MG/1
1 TABLET, FILM COATED ORAL 2 TIMES DAILY
Qty: 6 TABLET | Refills: 0 | Status: SHIPPED | OUTPATIENT
Start: 2024-07-23 | End: 2024-07-26

## 2024-07-23 RX ORDER — FUROSEMIDE 40 MG/1
40 TABLET ORAL DAILY
Qty: 30 TABLET | Refills: 0 | Status: SHIPPED | OUTPATIENT
Start: 2024-07-23 | End: 2024-08-22

## 2024-07-23 RX ORDER — POTASSIUM CHLORIDE 20 MEQ/1
20 TABLET, EXTENDED RELEASE ORAL DAILY
Qty: 30 TABLET | Refills: 0 | Status: SHIPPED | OUTPATIENT
Start: 2024-07-23 | End: 2024-08-22

## 2024-07-23 RX ADMIN — TAMSULOSIN HYDROCHLORIDE 0.4 MG: 0.4 CAPSULE ORAL at 10:07

## 2024-07-23 RX ADMIN — POTASSIUM CHLORIDE 20 MEQ: 1500 TABLET, EXTENDED RELEASE ORAL at 10:07

## 2024-07-23 RX ADMIN — IPRATROPIUM BROMIDE AND ALBUTEROL SULFATE 3 ML: 2.5; .5 SOLUTION RESPIRATORY (INHALATION) at 10:07

## 2024-07-23 RX ADMIN — LEVOTHYROXINE SODIUM 25 MCG: 0.03 TABLET ORAL at 05:07

## 2024-07-23 RX ADMIN — FUROSEMIDE 40 MG: 40 TABLET ORAL at 10:07

## 2024-07-23 RX ADMIN — AMOXICILLIN AND CLAVULANATE POTASSIUM 1 TABLET: 875; 125 TABLET, FILM COATED ORAL at 10:07

## 2024-07-23 RX ADMIN — CLOPIDOGREL BISULFATE 75 MG: 75 TABLET, FILM COATED ORAL at 10:07

## 2024-07-23 NOTE — PLAN OF CARE
CM awaiting home oxygen eval - CM following     07/23/24 1129   Post-Acute Status   Discharge Delays (!) Procedure Scheduling (IR, OR, Labs, Echo, Cath, Echo, EEG)

## 2024-07-23 NOTE — ASSESSMENT & PLAN NOTE
Patient with acute kidney injury/acute renal failure likely due to acute tubular necrosis caused by sepsis  HANSA is currently worsening. Baseline creatinine  1.3  - Labs reviewed- Renal function/electrolytes with Estimated Creatinine Clearance: 36 mL/min (based on SCr of 1.3 mg/dL). according to latest data. Monitor urine output and serial BMP and adjust therapy as needed. Avoid nephrotoxins and renally dose meds for GFR listed above.

## 2024-07-23 NOTE — ASSESSMENT & PLAN NOTE
Creatine stable for now. BMP reviewed- noted Estimated Creatinine Clearance: 36 mL/min (based on SCr of 1.3 mg/dL). according to latest data. Based on current GFR, CKD stage is stage 3 - GFR 30-59.  Monitor UOP and serial BMP and adjust therapy as needed. Renally dose meds. Avoid nephrotoxic medications and procedures.

## 2024-07-23 NOTE — CARE UPDATE
07/23/24 0700   Patient Assessment/Suction   Level of Consciousness (AVPU) alert   Respiratory Effort Normal;Unlabored   Expansion/Accessory Muscles/Retractions no use of accessory muscles;no retractions;expansion symmetric   All Lung Fields Breath Sounds clear;diminished   Rhythm/Pattern, Respiratory unlabored;pattern regular;depth regular;chest wiggle adequate;no shortness of breath reported   Cough Frequency infrequent   Cough Type good   Skin Integrity   $ Wound Care Tech Time 15 min   Area Observed Bridge of nose   Skin Appearance without discoloration   Barrier used? Liquid Filled Cushion   PRE-TX-O2   Device (Oxygen Therapy) room air   $ Is the patient on Low Flow Oxygen? Yes   $ Is the patient on High Flow Oxygen? Yes   SpO2 97 %   Pulse Oximetry Type Intermittent   $ Pulse Oximetry - Multiple Charge Pulse Oximetry - Multiple   Pulse 88   Resp 17   Preset CPAP/BiPAP Settings   Mode Of Delivery BiPAP;Standby   $ CPAP/BiPAP Daily Charge BiPAP/CPAP Daily   Equipment Type V60   Education   $ Education Bronchodilator;15 min

## 2024-07-23 NOTE — CARE UPDATE
07/23/24 1139   Home Oxygen Qualification   $ Home O2 Qualification Pulmonary Stress Test/6 min walk   Room Air SpO2 At Rest 96 %   Room Air SpO2 During Ambulation 98 %   Heart Rate on O2 141 bpm  (with room air)   SpO2 Post Ambulation 95 %   Post Ambulation Heart Rate 138 bpm   Home O2 Eval Comments Patient does not need home oxygen.

## 2024-07-23 NOTE — CONSULTS
Chief complaint:  Hematuria (BIB EMS from [facility] for hematuria s/p urethral procedure this morning. )      HPI:  Frank Wyatt is a 89 y.o. male presenting with a DTI of the  fold, appears to be HA. Pt is with daughter at bedside, and reports it is uncomfortable. Pt has no other complaints today.    PMH:  As per HPI and below:  Past Medical History:   Diagnosis Date    Arthritis     COPD (chronic obstructive pulmonary disease)     WHEEZES    Coronary artery disease     Dermatographism 2019    Diverticulosis     Full dentures     [place] (hard of hearing)     left ear    Hypertension     Kidney stones     Meniere's disease     MRSA infection 2019    Skin writing     dermatiographism    Small bowel obstruction due to adhesions 10/2019    Thyroid disease     Wears glasses        Social History     Socioeconomic History    Marital status:    Tobacco Use    Smoking status: Former     Current packs/day: 0.00     Average packs/day: 1 pack/day for 25.0 years (25.0 ttl pk-yrs)     Types: Cigarettes     Start date: 1947     Quit date: 1972     Years since quittin.5    Smokeless tobacco: Former     Quit date: 8/15/1972   Substance and Sexual Activity    Alcohol use: No    Drug use: No       Past Surgical History:   Procedure Laterality Date    APPENDECTOMY      CARDIAC SURGERY      CABG 5 vessel    CHOLECYSTECTOMY      COLON SURGERY      Colon resection with colostomy; colostomy reversal.     CORONARY ARTERY BYPASS GRAFT      5-bypass     CYSTOSCOPY N/A 8/15/2019    Procedure: CYSTOSCOPY;  Surgeon: Dipak Sanchez MD;  Location: [facility] OR;  Service: Urology;  Laterality: N/A;    CYSTOSCOPY N/A 10/31/2019    Procedure: CYSTOSCOPY;  Surgeon: Dipak Sanchez MD;  Location: [facility] OR;  Service: Urology;  Laterality: N/A;    CYSTOSCOPY W/ URETERAL STENT REMOVAL Left 10/31/2019    Procedure: CYSTOSCOPY, WITH URETERAL STENT REMOVAL  URETEROSCOPY;  Surgeon: Dipak  MD Laura;  Location: [facility];  Service: Urology;  Laterality: Left;    EXTRACORPOREAL SHOCK WAVE LITHOTRIPSY Left 8/15/2019    Procedure: LITHOTRIPSY, ESWL;  Surgeon: Dipak Sanchez MD;  Location: [facility] OR;  Service: Urology;  Laterality: Left;    EXTRACORPOREAL SHOCK WAVE LITHOTRIPSY Left 9/19/2019    Procedure: LITHOTRIPSY, ESWL;  Surgeon: Dipak Sanchez MD;  Location: [facility] OR;  Service: Urology;  Laterality: Left;    EYE SURGERY Right 2014    Cataract    EYE SURGERY Left 2017    Cataract    HERNIA REPAIR  2014    Dr. Bailon - had to have mesh removed    INGUINAL HERNIA REPAIR Right 03/25/2019        LITHOTRIPSY      nephrostomy tube      PERCUTANEOUS NEPHROSTOMY      ROTATOR CUFF REPAIR  2005    right shoulder    URETEROSCOPY Left 10/31/2019    Procedure: URETEROSCOPY;  Surgeon: Dipak Sanchez MD;  Location: [facility];  Service: Urology;  Laterality: Left;       Family History   Problem Relation Name Age of Onset    Heart disease Mother      Hypertension Mother      Hypertension Sister      Heart disease Brother      Hypertension Brother      Cancer Daughter      Diabetes Brother      Heart disease Brother      Hypertension Brother      Hypertension Sister      Heart disease Sister         Review of patient's allergies indicates:   Allergen Reactions    Bactrim [sulfamethoxazole-trimethoprim] Hives     itched    Keflex [cephalexin] Rash    Morphine Rash     Patient had morphine and keflex at the same time, developed a rash, not sure which one caused it, or if it was a combination of the two meds.       No current facility-administered medications on file prior to encounter.     Current Outpatient Medications on File Prior to Encounter   Medication Sig Dispense Refill    albuterol (PROVENTIL/VENTOLIN HFA) 90 mcg/actuation inhaler Inhale 2 puffs into the lungs every 4 (four) hours as needed for Wheezing. Rescue 1 Inhaler 0    atorvastatin (LIPITOR) 40 MG tablet Take 1 tablet (40 mg  total) by mouth every evening. 90 tablet 0    azelastine (ASTELIN) 137 mcg (0.1 %) nasal spray 1 spray (137 mcg total) by Nasal route 2 (two) times daily as needed for Rhinitis. 30 mL 0    benzocaine-menthoL 6-10 mg lozenge Take 1 lozenge by mouth every 2 (two) hours as needed (Sore Throat). 18 tablet 0    clopidogreL (PLAVIX) 75 mg tablet Take 1 tablet (75 mg total) by mouth once daily. 30 tablet 0    glipiZIDE (GLUCOTROL) 5 MG tablet Take 5 mg by mouth daily with breakfast.      hydrocortisone 1 % CrPk Apply 1 Application topically daily as needed.      isosorbide mononitrate (IMDUR) 30 MG 24 hr tablet Take 1 tablet (30 mg total) by mouth once daily. 30 tablet 0    levothyroxine (SYNTHROID) 25 MCG tablet Take 25 mcg by mouth once daily.   1    metoprolol tartrate (LOPRESSOR) 50 MG tablet Take 1 tablet (50 mg total) by mouth 2 (two) times daily. 60 tablet 0    pantoprazole (PROTONIX) 40 MG tablet Take 1 tablet (40 mg total) by mouth once daily. 30 tablet 0    SYMBICORT 160-4.5 mcg/actuation HFAA Inhale 2 puffs into the lungs every 12 (twelve) hours.       tamsulosin (FLOMAX) 0.4 mg Cap Take 0.4 mg by mouth once daily.       [DISCONTINUED] cefUROXime (CEFTIN) 500 MG tablet Take 500 mg by mouth 2 (two) times daily. (Patient not taking: Reported on 2024)      [DISCONTINUED] furosemide (LASIX) 40 MG tablet Take 1 tablet (40 mg total) by mouth once daily. (Patient not taking: Reported on 2024) 30 tablet 0       ROS: As per HPI and below:  Pertinent items are noted in HPI.      Physical Exam:     Vitals:    24 0945 24 1009 24 1042 24 1215   BP: (!) 156/70  (!) 156/70 (!) 168/74   Pulse: 102 (!) 114  110   Resp: 19 18  19   Temp: 97.7 °F (36.5 °C)   97.6 °F (36.4 °C)   TempSrc: Oral   Oral   SpO2: (!) 94% (!) 94%  97%   Weight:       Height:           BP  Min: 72/34  Max: 263/137  Temp  Av °F (37.2 °C)  Min: 97.5 °F (36.4 °C)  Max: 103 °F (39.4 °C)  Pulse  Av.7  Min: 54  Max:  "134  Resp  Av.3  Min: 14  Max: 42  SpO2  Av.9 %  Min: 89 %  Max: 100 %  Height  Av' 7" (170.2 cm)  Min: 5' 7" (170.2 cm)  Max: 5' 7" (170.2 cm)  Weight  Av.3 kg (179 lb 2.4 oz)  Min: 79.3 kg (174 lb 12.8 oz)  Max: 82.9 kg (182 lb 12.2 oz)    Body mass index is 27.38 kg/m².          General:             Well developed, well nourished, no apparent distress  HEENT:              NCAT, no JVD, mucous membranes moist, EOM intact  Cardiovascular:  Regular rate and rhythm, normal S1, normal S2, No murmurs, rubs, or gallops  Respiratory:        Normal breath sounds, no wheezes, no rales, no rhonchi  Abdomen:           Bowel sounds present, non tender, non distended, no masses, no hepatojugular reflux  Extremities:        No clubbing, no cyanosis, no edema  Vascular:            2+ b/l radial.  Peripheral pulses intact.  No carotid bruits.  Neurological:      No focal deficits  Skin:                   No obvious rashes or erythema, HA DTI of the  fold      Lab Results   Component Value Date    WBC 4.64 2024    HGB 12.5 (L) 2024    HCT 38.3 (L) 2024    MCV 93 2024    PLT 77 (L) 2024     Lab Results   Component Value Date    CHOL 102 (L) 2024    CHOL 156 2015     Lab Results   Component Value Date    HDL 28 (L) 2024    HDL 20 (L) 2015     Lab Results   Component Value Date    LDLCALC 51.8 (L) 2024    LDLCALC 87.2 2015     Lab Results   Component Value Date    TRIG 111 2024    TRIG 244 (H) 2015    TRIG 198 (H) 2015     Lab Results   Component Value Date    CHOLHDL 27.5 2024    CHOLHDL 12.8 (L) 2015     CMP  No results for input(s): "GLU", "CALCIUM", "ALBUMIN", "PROT", "NA", "K", "CO2", "CL", "BUN", "CREATININE", "ALKPHOS", "ALT", "AST", "BILITOT" in the last 24 hours.   Lab Results   Component Value Date    TSH 0.870 2023     Assessment and Recommendations       Diagnoses:    1. DTI of the  fold, " HA    Plan:  1. Triad + mepilex daily  2. Gave daughter card with wound clinic information if pt needs OP FU    Complexity:    moderate

## 2024-07-23 NOTE — DISCHARGE SUMMARY
[facility] Medicine  Discharge Summary      Patient Name: Frank Wyatt  MRN: [id number]  [facility]: [id number]  Patient Class: IP- Inpatient  Admission Date: 7/17/2024  Hospital Length of Stay: 5 days  Discharge Date and Time: 7/23/2024  1:47 PM  Attending Physician: No att. providers found   Discharging Provider: Miguel Gray MD  Primary Care Provider: Darci German MD    Primary Care Team: Networked reference to record PCT     HPI:   History is taken from medical records.  This is an 89-year-old male who recently had a cystoscopy supposedly for stone removal presents to the hospital with fevers and chills.  Workup included abdomen and pelvis and showed new infiltrate in the right lung base.  Chest x-ray was also done which showed interstitial prominence with differential including edema, pneumonitis or chronic interstitial lung disease.  Given the high fever the patient is thought to have pneumonia and vancomycin and Zosyn was given for sepsis.  Patient also has acute respiratory failure.  IV Lasix has been provided.  Patient currently on a BiPAP.  Patient is arousable.  His laboratory workup otherwise is within normal limits.  Hospitalist will admit this patient for further care.    * No surgery found *      Hospital Course:   Patient admitted with septic shock.  He was started on broad-spectrum IV antibiotics and improved.  Blood and urine cultures were obtained and were currently negative.  Patient was weaned down off of Levophed, and still had intermittent fevers.  He did have mild episode of acute kidney injury on 07/19.  Patient apparently has septic shock most likely secondary to urinary tract infection, patient is placed on IV Zosyn, patient was successfully weaned off Levophed.  Patient also has significant pulmonary edema, patient has severe valvular heart disease pudding moderate to severe AS, moderately severe MS, he follow up Dr. Kim outpatient, and recommended  conservative management, he received IV Lasix aggressively, negative 5 L since admission, feel much better, transferred to the floor.  Urine culture showed PROTEUS MIRABILIS with pan sensitivity.  Patient will be discharged home with home health.  Patient does not need home oxygen upon discharge.  Patient also has significant sinus tachycardia after breathing treatment, we have not resumed his home medication metoprolol yet.  Improved after resumed metoprolol.  Patient is cleared for discharge.     Goals of Care Treatment Preferences:  Code Status: DNR    Living Will: Yes              Consults:   Consults (From admission, onward)          Status Ordering Provider     Inpatient consult to   Once        Provider:  (Not yet assigned)    Completed MARIVEL CLEVELAND            Pulmonary  COPD (chronic obstructive pulmonary disease)  Patient's COPD is controlled currently.  Patient is currently off COPD Pathway. Continue scheduled inhalers Antibiotics and Supplemental oxygen and monitor respiratory status closely.     Cardiac/Vascular  Acute on chronic diastolic heart failure  Patient is identified as having Diastolic (HFpEF) heart failure that is Acute on chronic. CHF is currently uncontrolled due to Pulmonary edema/pleural effusion on CXR. Latest ECHO performed and demonstrates- Results for orders placed during the hospital encounter of 03/24/23    Echo    Interpretation Summary  · The left ventricle is normal in size with moderate concentric hypertrophy and low normal systolic function.  · Mild left atrial enlargement.  · The estimated ejection fraction is 50%.  · Indeterminate left ventricular diastolic function.  · RV is not well visualized. Mildly to moderately reduced right ventricular systolic function.  · The mean diastolic gradient across the mitral valve is 13 mmHg at a heart rate of 87 bpm.  · There is moderate to severe mitral stenosis.  · There is moderate-to-severe aortic valve stenosis.  ·  Aortic valve area is 1.06 cm2; peak velocity is 2.75 m/s; mean gradient is 17 mmHg. Indexed area is 0.45-0.5 cm/m2. Indexed stroke volume is 29 ml/m2.  · Normal central venous pressure (3 mmHg).    Monitor clinical status closely. Monitor on telemetry. Patient is off CHF pathway.  Monitor strict Is&Os and daily weights.  Place on fluid restriction of 1.5 L. Cardiology has not been consulted. Continue to stress to patient importance of self efficacy and  on diet for CHF.     Last BNP reviewed- and noted below   Recent Labs   Lab 07/17/24 1942   *       Patient apparently has acute CHF secondary to severe valvular heart disease, patient has moderate to severe AS/ MS, patient has follow up with cardiologist Dr. Kim outpatient, as per daughter bedside, family is fully aware of this patient and family are not interested as pursuing any surgical intervention at this moment or in the future.       -Lasix 40 mg IV b.i.d. switched to Lasix 40 mg p.o. daily (home dose)  -patient is negative 4.8 L so far  - looks euvolemic now, continue Lasix add on [place].    S/P CABG x 5  Patient with known CAD s/p CABG, which is controlled Will continue ASA, Plavix, and Statin and monitor for S/Sx of angina/ACS. Continue to monitor on telemetry.         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  · Mild left atrial enlargement.  · The estimated ejection fraction is 50%.  · Indeterminate left ventricular diastolic function.  · RV is not well visualized. Mildly to moderately reduced right ventricular systolic function.  · The mean diastolic gradient across the mitral valve is 13 mmHg at a heart rate of 87 bpm.  · There is moderate to severe mitral stenosis.  · There is moderate-to-severe aortic  "valve stenosis.  · Aortic valve area is 1.06 cm2; peak velocity is 2.75 m/s; mean gradient is 17 mmHg. Indexed area is 0.45-0.5 cm/m2. Indexed stroke volume is 29 ml/m2.  · Normal central venous pressure (3 mmHg).    -patient is followed by Dr. Kim for severe aortic stenosis.  Patient is to follow-up in September for this with Cardiology per prior cardiology notes    Renal/  HANSA (acute kidney injury)  Patient with acute kidney injury/acute renal failure likely due to acute tubular necrosis caused by sepsis  HANSA is currently worsening. Baseline creatinine  1.3  - Labs reviewed- Renal function/electrolytes with Estimated Creatinine Clearance: 36 mL/min (based on SCr of 1.3 mg/dL). according to latest data. Monitor urine output and serial BMP and adjust therapy as needed. Avoid nephrotoxins and renally dose meds for GFR listed above.    CKD (chronic kidney disease), stage III  Creatine stable for now. BMP reviewed- noted Estimated Creatinine Clearance: 36 mL/min (based on SCr of 1.3 mg/dL). according to latest data. Based on current GFR, CKD stage is stage 3 - GFR 30-59.  Monitor UOP and serial BMP and adjust therapy as needed. Renally dose meds. Avoid nephrotoxic medications and procedures.    ID  * Septic shock  This patient does have evidence of infective focus  My overall impression is septic shock due to SBP < 90.  Now off pressors.  Source: Respiratory and Urinary Tract  Antibiotics given-   Antibiotics (72h ago, onward)      None          Latest lactate reviewed-  No results for input(s): "LACTATE", "POCLAC" in the last 72 hours.    Organ dysfunction indicated by Acute respiratory failure    -last fever spike on 07/18/2024 so far   -monitor leukocytosis = resolved   -chest x-ray on 07/17/2024 =Diffuse interstitial prominence may relate to the edema, pneumonitis, or chronic interstitial lung disease/scarring. Normal heart size.   -urine culture on 07/17/2024 = Proteus mirabilis (sensitive to Zosyn, " "Rocephin, Merrem, cefepime.)  -blood cultures x2 on 07/17/2024 = negative so far  -CT abdomen and pelvis without contrast on 07/18/2024 =Stable CT of the abdomen and pelvis since 01/25/2023.Anterior abdominal wall hernia containing bowel loops at the umbilicus without evidence of obstruction.New consolidation in the right lung base.  Correlate for pneumonia.  Follow-up until clearing.Multiple other nonacute findings above as described.  -Zosyn IV based on sensitivities to Proteus.  Ordered on 07/17/2024  Discontinue IV Zosyn, started on p.o. Augmentin 7/22  Likely discharge in a.m. to assistant living           Endocrine  Hypothyroidism  Patient has chronic hypothyroidism. TFTs reviewed-   Lab Results   Component Value Date    TSH 0.870 03/24/2023   . Will continue chronic levothyroxine and adjust for and clinical changes.        Other  Thrombocytopenia  Patient was found to have thrombocytopenia, the likely etiology is secondary to sepsis/infection, will monitor the platelets Daily. Will transfuse if platelet count is <10k. Hold DVT prophylaxis if platelets are <50k. The patient's platelet results have been reviewed and are listed below.  No results for input(s): "PLT" in the last 24 hours.        [place] (hard of hearing)  Noted, secondary to Meniere's disease.  Make appropriate precautions.  Hearing aids need to be in place.        Final Active Diagnoses:    Diagnosis Date Noted POA    PRINCIPAL PROBLEM:  Septic shock [A41.9, R65.21] 07/18/2024 Yes    HANSA (acute kidney injury) [N17.9] 07/19/2024 Yes    Acute on chronic diastolic heart failure [I50.9] 03/27/2023 Yes    Hypothyroidism [E03.8] 03/25/2023 Yes     Chronic    BPH (benign prostatic hyperplasia) [N40.0] 03/25/2023 Yes     Chronic    S/P CABG x 5 [Z95.1] 03/25/2023 Not Applicable     Chronic    Valvular heart disease, moderate to severe MS/AS [I38] 03/25/2023 Yes     Chronic    [place] (hard of hearing) [H91.90] 03/25/2023 Yes     Chronic    CKD (chronic kidney " disease), stage III [N18.30] 03/25/2023 Yes     Chronic    Thrombocytopenia [D69.6] 03/25/2023 Yes     Chronic    COPD (chronic obstructive pulmonary disease) [J44.9] 03/25/2023 Yes     Chronic      Problems Resolved During this Admission:    Diagnosis Date Noted Date Resolved POA    Acute hypoxic respiratory failure [J96.01] 07/18/2024 07/19/2024 Yes       Discharged Condition: stable    Disposition: Home-Health Care [facility]    Follow Up:   Follow-up Information       Darci German MD. Go on 7/25/2024.    Specialty: Family Medicine  Why: hospital follow up appointment scheduled at 1 PM  Contact information:  [address] [facility] [address]  [phone]               [facility] Follow up.    Why: [facility]  Contact information:  [address] [facility] [address]  [phone]                         Patient Instructions:      Ambulatory referral/consult to Home Health   Standing Status: Future   Referral Priority: Routine Referral Type: Home Health   Referral Reason: Specialty Services Required   Requested Specialty: Home Health Services   Number of Visits Requested: 1     Home O2 eval (desaturation screen)       Significant Diagnostic Studies: Labs: CMP   Recent Labs   Lab 07/22/24  0359      K 3.9   CL 98   CO2 30*   *   BUN 27*   CREATININE 1.3   CALCIUM 9.0   PROT 6.2   ALBUMIN 3.3*   BILITOT 1.1*   ALKPHOS 111   AST 99*   ALT 87*   ANIONGAP 8    and CBC   Recent Labs   Lab 07/22/24  0359   WBC 4.64   HGB 12.5*   HCT 38.3*   PLT 77*       Pending Diagnostic Studies:       Procedure Component Value Units Date/Time    EKG 12-lead [[id number]] Collected: 07/23/24 0947    Order Status: Sent Lab Status: In process Updated: 07/23/24 1305     QRS Duration 124 ms      OHS QTC Calculation 503 ms     Narrative:      Test Reason : R07.9,    Vent. Rate : 116 BPM     Atrial Rate : 116 BPM     P-R Int : 176 ms          QRS Dur : 124 ms      QT Int : 362 ms       P-R-T Axes :  002 048 165 degrees     QTc Int : 503 ms    Sinus tachycardia  Left bundle branch block  Abnormal ECG  When compared with ECG of 19-JUL-2024 12:17,  No significant change was found    Referred By: MARY   SELF           Confirmed By:            Medications:  Reconciled Home Medications:      Medication List        START taking these medications      amoxicillin-clavulanate 875-125mg 875-125 mg per tablet  Commonly known as: AUGMENTIN  Take 1 tablet by mouth 2 (two) times daily. for 3 days     potassium chloride SA 20 MEQ tablet  Commonly known as: KLOR-CON M20  Take 1 tablet (20 mEq total) by mouth once daily.            CONTINUE taking these medications      albuterol 90 mcg/actuation inhaler  Commonly known as: PROVENTIL/VENTOLIN HFA  Inhale 2 puffs into the lungs every 4 (four) hours as needed for Wheezing. Rescue     atorvastatin 40 MG tablet  Commonly known as: LIPITOR  Take 1 tablet (40 mg total) by mouth every evening.     azelastine 137 mcg (0.1 %) nasal spray  Commonly known as: ASTELIN  1 spray (137 mcg total) by Nasal route 2 (two) times daily as needed for Rhinitis.     benzocaine-menthoL 6-10 mg lozenge  Take 1 lozenge by mouth every 2 (two) hours as needed (Sore Throat).     clopidogreL 75 mg tablet  Commonly known as: PLAVIX  Take 1 tablet (75 mg total) by mouth once daily.     furosemide 40 MG tablet  Commonly known as: LASIX  Take 1 tablet (40 mg total) by mouth once daily.     glipiZIDE 5 MG tablet  Commonly known as: GLUCOTROL  Take 5 mg by mouth daily with breakfast.     hydrocortisone 1 % Crpk  Apply 1 Application topically daily as needed.     isosorbide mononitrate 30 MG 24 hr tablet  Commonly known as: IMDUR  Take 1 tablet (30 mg total) by mouth once daily.     levothyroxine 25 MCG tablet  Commonly known as: SYNTHROID  Take 25 mcg by mouth once daily.     metoprolol tartrate 50 MG tablet  Commonly known as: LOPRESSOR  Take 1 tablet (50 mg total) by mouth 2 (two) times daily.      pantoprazole 40 MG tablet  Commonly known as: PROTONIX  Take 1 tablet (40 mg total) by mouth once daily.     SYMBICORT 160-4.5 mcg/actuation aa  Generic drug: budesonide-formoterol 160-4.5 mcg  Inhale 2 puffs into the lungs every 12 (twelve) hours.     tamsulosin 0.4 mg Cap  Commonly known as: FLOMAX  Take 0.4 mg by mouth once daily.            STOP taking these medications      cefUROXime 500 MG tablet  Commonly known as: CEFTIN            X-Ray Chest 1 View    Result Date: 7/18/2024  EXAMINATION: XR CHEST 1 VIEW CLINICAL HISTORY: Shortness of breaths; COMPARISON: July 17 FINDINGS: Heart size is normal.  Post sternotomy changes are noted.  The aortic arch is calcified. Bilateral abnormal interstitial prominence is unchanged.  No pleural effusion or pneumothorax is identified.  No acute osseous abnormality is demonstrated.  Chronic bilateral rotator cuff tears are incidentally noted.     No significant interval changes compared to July 17. Electronically signed by: Shahzad Garzon Date:    07/18/2024 Time:    07:07    CT Abdomen Pelvis  Without Contrast    Result Date: 7/17/2024  EXAMINATION: CT ABDOMEN PELVIS WITHOUT CONTRAST CLINICAL HISTORY: Hematuria, gross/macroscopic; TECHNIQUE: Low dose axial images, sagittal and coronal reformations were obtained from the lung bases to the pubic symphysis, no oral contrast. COMPARISON: 01/25/2023 CT FINDINGS: Heart: Normal in size without evidence of effusion.  Severe vascular calcifications throughout all 3 coronary arteries and in the mitral and aortic valve plane.  Median sternotomy wires. Lung Bases: Patchy consolidation right lung base. Liver: Normal in size and attenuation, with no focal hepatic lesions. Gallbladder: No calcified gallstones. Bile Ducts: No evidence of dilated ducts. Pancreas: No mass or peripancreatic fat stranding. Spleen: Unremarkable. Adrenals: Unremarkable. Kidneys/ Ureters: Multiple nonobstructing renal calculi and vascular calcifications.   Renal cysts largest in the right kidney measuring 4.7 cm..  A solid-appearing exophytic lesion off of the anterior cortex of the mid left kidney with 40 3 Hounsfield units measures 16 mm.  This appears stable. Bladder: No evidence of wall thickening. Reproductive organs: Prostate is enlarged.  Seminal vesicles unremarkable. GI Tract/Mesentery: No evidence of bowel obstruction or inflammation. Peritoneal Space: No ascites. No free air. Retroperitoneum: No significant adenopathy. Abdominal wall: Multiple anterior abdominal wall hernias with bowel within a midline hernia exiting to the right near the umbilicus without evidence obstruction or inflammation. Vasculature: Severe atherosclerotic calcifications throughout nondilated abdominal aorta, iliac and femoral vessels as well as SMA and renal arteries and celiac axis. Bones: No acute fracture.  Severe multilevel lumbar stenosis with spondylosis throughout the spine.     Stable CT of the abdomen and pelvis since 01/25/2023. Anterior abdominal wall hernia containing bowel loops at the umbilicus without evidence of obstruction. New consolidation in the right lung base.  Correlate for pneumonia.  Follow-up until clearing. Multiple other nonacute findings above as described. Electronically signed by: Demian Robles Date:    07/17/2024 Time:    22:22    X-Ray Chest AP Portable    Result Date: 7/17/2024  EXAMINATION: XR CHEST AP PORTABLE CLINICAL HISTORY: Sepsis; TECHNIQUE: Single frontal view of the chest was performed. COMPARISON: 02/20/2024 FINDINGS: Diffuse interstitial prominence may relate to the edema, pneumonitis, or chronic interstitial lung disease/scarring.  Normal heart size.     As above. Electronically signed by: Sofia Sinclair Date:    07/17/2024 Time:    20:32    US Retroperitoneal Complete    Result Date: 7/15/2024  EXAMINATION: US RETROPERITONEAL COMPLETE CLINICAL HISTORY: Benign essential microscopic hematuria TECHNIQUE: Ultrasound of the kidneys and  urinary bladder was performed including color flow and Doppler evaluation of the kidneys. COMPARISON: CT dated 01/25/2023 FINDINGS: Right kidney: The right kidney measures 11.7 x 6.4 x 7.5 cm.  There is cortical thinning.  There is no hydronephrosis.  There are stable simple renal cysts the largest measuring 4.2 cm. There are nonobstructing stones within the mid to lower pole. Left kidney: The left kidney measures 12.9 x 6.8 x 6.7 cm with mild cortical thinning.  There is no hydronephrosis.  There are stable simple cysts the largest measuring 2.2 cm.  There are nonobstructing left renal stones.. The visualized aorta and IVC are normal in caliber. The bladder is partially collapsed.  The prostate gland is enlarged measuring 53.39 cc.     Stable simple renal cysts and nonobstructing renal stones Bilateral cortical thinning Prostatic hypertrophy Electronically signed by: Yady Villavicencio Date:    07/15/2024 Time:    16:53  - pulls last radiology orders    Indwelling Lines/Drains at time of discharge:   Lines/Drains/Airways       Drain  Duration             Male External Urinary Catheter 07/19/24 1915 3 days                    Time spent on the discharge of patient: 35 minutes         Miguel Gray MD  Department of Hospital Medicine  [facility]

## 2024-07-23 NOTE — ASSESSMENT & PLAN NOTE
"This patient does have evidence of infective focus  My overall impression is septic shock due to SBP < 90.  Now off pressors.  Source: Respiratory and Urinary Tract  Antibiotics given-   Antibiotics (72h ago, onward)    None        Latest lactate reviewed-  No results for input(s): "LACTATE", "POCLAC" in the last 72 hours.    Organ dysfunction indicated by Acute respiratory failure    -last fever spike on 07/18/2024 so far   -monitor leukocytosis = resolved   -chest x-ray on 07/17/2024 =Diffuse interstitial prominence may relate to the edema, pneumonitis, or chronic interstitial lung disease/scarring. Normal heart size.   -urine culture on 07/17/2024 = Proteus mirabilis (sensitive to Zosyn, Rocephin, Merrem, cefepime.)  -blood cultures x2 on 07/17/2024 = negative so far  -CT abdomen and pelvis without contrast on 07/18/2024 =Stable CT of the abdomen and pelvis since 01/25/2023.Anterior abdominal wall hernia containing bowel loops at the umbilicus without evidence of obstruction.New consolidation in the right lung base.  Correlate for pneumonia.  Follow-up until clearing.Multiple other nonacute findings above as described.  -Zosyn IV based on sensitivities to Proteus.  Ordered on 07/17/2024  Discontinue IV Zosyn, started on p.o. Augmentin 7/22  Likely discharge in a.m. to assistant living         " (1) body pink, extremities blue

## 2024-07-23 NOTE — PLAN OF CARE
Problem: Adult Inpatient Plan of Care  Goal: Plan of Care Review  Outcome: Met  Goal: Patient-Specific Goal (Individualized)  Outcome: Met  Goal: Absence of Hospital-Acquired Illness or Injury  Outcome: Met  Goal: Optimal Comfort and Wellbeing  Outcome: Met  Goal: Readiness for Transition of Care  Outcome: Met     Problem: Pneumonia  Goal: Fluid Balance  Outcome: Met  Goal: Resolution of Infection Signs and Symptoms  Outcome: Met  Goal: Effective Oxygenation and Ventilation  Outcome: Met     Problem: Infection  Goal: Absence of Infection Signs and Symptoms  Outcome: Met     Problem: Skin Injury Risk Increased  Goal: Skin Health and Integrity  Outcome: Met     Problem: Fall Injury Risk  Goal: Absence of Fall and Fall-Related Injury  Outcome: Met     Problem: Sepsis/Septic Shock  Goal: Optimal Coping  Outcome: Met  Goal: Absence of Bleeding  Outcome: Met  Goal: Blood Glucose Level Within Targeted Range  Outcome: Met  Goal: Absence of Infection Signs and Symptoms  Outcome: Met  Goal: Optimal Nutrition Intake  Outcome: Met     Problem: Acute Kidney Injury/Impairment  Goal: Fluid and Electrolyte Balance  Outcome: Met  Goal: Improved Oral Intake  Outcome: Met  Goal: Effective Renal Function  Outcome: Met

## 2024-07-23 NOTE — PLAN OF CARE
"7:57  SW obtained Pt choice. Scanned into media.  SW called [facility] to confirm the home health used. The number available is not a working number. There is a recording stating "We're sorry, your call cannot be completed at this time."    8:03  SW called Pt's daughter and asked if she had a cell number of anyone at [facility] because we are having issues contacting the facility. She stated "I am busy and I do not have time to deal with that right now, you'll have to just keep calling." SW attempted to call [phone] once more, and received the same message.  SW sent referral to [facility] in [facility].    8:24  Roxie Cee [phone] at [facility] called to state that there have been no issues with people contacting the facility all morning. MOISÉS explained that when called, there is a message, and SW also called on her cell phone and received the same message. MOISÉS asked if the number she called is a good number to contact the facility in the future,she said no, that this number is her personal cell number. MOISÉS asked if there is an alternate number to call idf the main number is having issues, she stated no. She said she would check the phones and see if anyone has theirs set to off.      "

## 2024-07-23 NOTE — PT/OT/SLP PROGRESS
Physical Therapy Treatment    Patient Name:  Frank Wyatt   MRN:  [id number]    Recommendations:     Discharge Recommendations: Low Intensity Therapy  Discharge Equipment Recommendations: none  Barriers to discharge: None    Assessment:     Frank Wyatt is a 89 y.o. male admitted with a medical diagnosis of Septic shock.  He presents with the following impairments/functional limitations: weakness, impaired endurance, gait instability, impaired balance, impaired cardiopulmonary response to activity . Pt completed gait trial ambulating 200' CGA with rw, while ambulating the pt's HR elevated into the 140's per RN. Pt returned to bed side chair with EKG ordered.     Rehab Prognosis: Good; patient would benefit from acute skilled PT services to address these deficits and reach maximum level of function.    Recent Surgery: * No surgery found *      Plan:     During this hospitalization, patient to be seen daily to address the identified rehab impairments via gait training, therapeutic activities, therapeutic exercises, neuromuscular re-education and progress toward the following goals:    Plan of Care Expires:  08/20/24    Subjective     Chief Complaint: none stated  Patient/Family Comments/goals: Pt agreeable to PT  Pain/Comfort:  Pain Rating 1: 0/10  Pain Rating Post-Intervention 1: 0/10      Objective:     Communicated with RN prior to session.  Patient found HOB elevated with bed alarm, peripheral IV, telemetry upon PT entry to room.     General Precautions: Standard, fall, hearing impaired  Orthopedic Precautions: N/A  Braces: N/A  Respiratory Status: Room air     Functional Mobility:  Bed Mobility:     Supine to Sit: supervision  Transfers:     Sit to Stand:  stand by assistance with rolling walker  Gait: 200' CGA with rw      AM-PAC 6 CLICK MOBILITY  Turning over in bed (including adjusting bedclothes, sheets and blankets)?: 4  Sitting down on and standing up from a chair with arms (e.g., wheelchair,  bedside commode, etc.): 3  Moving from lying on back to sitting on the side of the bed?: 3  Moving to and from a bed to a chair (including a wheelchair)?: 3  Need to walk in hospital room?: 3  Climbing 3-5 steps with a railing?: 3  Basic Mobility Total Score: 19       Treatment & Education:  Pt was educated on the following: call light use, importance of OOB activity and functional mobility to negate the negative effects of prolonged bed rest during this hospitalization, safe transfers/ambulation and discharge planning recommendations/options.     Patient left up in chair with all lines intact, call button in reach, chair alarm on, and RN present..    GOALS:   Multidisciplinary Problems       Physical Therapy Goals          Problem: Physical Therapy    Goal Priority Disciplines Outcome Goal Variances Interventions   Physical Therapy Goal     PT, PT/OT Progressing     Description: Goals to be met by: 24     Patient will increase functional independence with mobility by performin. Supine to sit with Modified [place]  2. Sit to supine with Modified [place]  3. Sit to stand transfer with Modified [place]  4. Bed to chair transfer with Modified [place] using Rolling Walker  5. Gait  x 300 feet with Modified [place] using Rolling Walker.                          Time Tracking:     PT Received On: 24  PT Start Time: 919     PT Stop Time: 942  PT Total Time (min): 23 min     Billable Minutes: Gait Training 23    Treatment Type: Treatment  PT/PTA: PTA     Number of PTA visits since last PT visit: 2     2024

## 2024-07-23 NOTE — PT/OT/SLP PROGRESS
Occupational Therapy      Patient Name:  Frank Wyatt   MRN:  [id number]    Patient not seen today secondary to Patient fatigue, Other (Comment) (Pt's daughter requested he rest secondary to recently finishing PT and pending oxygen walk test). Will follow-up next service date.    7/23/2024

## 2024-07-23 NOTE — PLAN OF CARE
Problem: Physical Therapy  Goal: Physical Therapy Goal  Description: Goals to be met by: 24     Patient will increase functional independence with mobility by performin. Supine to sit with Modified [place]  2. Sit to supine with Modified [place]  3. Sit to stand transfer with Modified [place]  4. Bed to chair transfer with Modified [place] using Rolling Walker  5. Gait  x 300 feet with Modified [place] using Rolling Walker.     Outcome: Progressing

## 2024-07-23 NOTE — PLAN OF CARE
Discharge orders and chart reviewed. No other discharge needs noted at this time. Pt is clear for discharge from case management. Pt is discharging to [facility] with [facility].    CM awaiting SOC for home health at this time - Mili to call this CM once obtained    PCP appointment scheduled and added to AVS    Daughter to transport home     07/23/24 1203   Final Note   Assessment Type Final Discharge Note   Anticipated Discharge Disposition Home-Health   What phone number can be called within the next 1-3 days to see how you are doing after discharge? [phone]   Hospital Resources/Appts/Education Provided Appointments scheduled and added to AVS;Post-Acute resouces added to AVS   Post-Acute Status   Post-Acute Authorization Home Health   Home Health Status Set-up Complete/Auth obtained   Discharge Delays (!) Personal Transportation

## 2024-07-23 NOTE — PLAN OF CARE
[facility] called and stated they accepted Pt. Start of care date is 7/24/24.  Discharge orders sent to [facility] in [facility].  Supervisor (Yvonne) will call [place] and alert them to Pt's discharge.     07/23/24 1021   Post-Acute Status   Post-Acute Authorization Home [facility]   Home Health Status Set-up Complete/Auth obtained   Coverage [facility] CHOICES   Discharge Delays None known at this time   Discharge Plan   Discharge Plan A Assisted Living;Home Health   Discharge Plan B [facility]

## 2024-07-30 LAB
OHS QRS DURATION: 124 MS
OHS QTC CALCULATION: 503 MS

## 2024-07-30 NOTE — PHYSICIAN QUERY
Please clarify Sepsis as it relates to the Cystoscope with stone removal:  Complication of the procedure

## 2024-08-13 ENCOUNTER — LAB VISIT (OUTPATIENT)
Dept: LAB | Facility: HOSPITAL | Age: 89
End: 2024-08-13
Attending: NURSE PRACTITIONER
Payer: MEDICARE

## 2024-08-13 DIAGNOSIS — I10 ESSENTIAL HYPERTENSION, MALIGNANT: Primary | ICD-10-CM

## 2024-08-13 DIAGNOSIS — I10 ESSENTIAL HYPERTENSION, MALIGNANT: ICD-10-CM

## 2024-08-13 LAB
ANION GAP SERPL CALC-SCNC: 9 MMOL/L (ref 8–16)
BUN SERPL-MCNC: 28 MG/DL (ref 8–23)
CALCIUM SERPL-MCNC: 9.4 MG/DL (ref 8.7–10.5)
CHLORIDE SERPL-SCNC: 103 MMOL/L (ref 95–110)
CO2 SERPL-SCNC: 26 MMOL/L (ref 23–29)
CREAT SERPL-MCNC: 1 MG/DL (ref 0.5–1.4)
EST. GFR  (NO RACE VARIABLE): >60 ML/MIN/1.73 M^2
GLUCOSE SERPL-MCNC: 107 MG/DL (ref 70–110)
POTASSIUM SERPL-SCNC: 4.4 MMOL/L (ref 3.5–5.1)
SODIUM SERPL-SCNC: 138 MMOL/L (ref 136–145)

## 2024-08-13 PROCEDURE — 80048 BASIC METABOLIC PNL TOTAL CA: CPT | Performed by: NURSE PRACTITIONER

## 2024-08-13 PROCEDURE — 36415 COLL VENOUS BLD VENIPUNCTURE: CPT | Performed by: NURSE PRACTITIONER

## 2024-10-21 PROBLEM — A41.9 SEPTIC SHOCK: Status: RESOLVED | Noted: 2024-07-18 | Resolved: 2024-10-21

## 2024-10-21 PROBLEM — R65.21 SEPTIC SHOCK: Status: RESOLVED | Noted: 2024-07-18 | Resolved: 2024-10-21

## 2024-10-21 PROBLEM — N17.9 AKI (ACUTE KIDNEY INJURY): Status: RESOLVED | Noted: 2024-07-19 | Resolved: 2024-10-21

## 2025-01-29 ENCOUNTER — HOSPITAL ENCOUNTER (INPATIENT)
Facility: HOSPITAL | Age: OVER 89
LOS: 4 days | Discharge: HOME HEALTH | DRG: 871 | End: 2025-02-02
Attending: EMERGENCY MEDICINE | Admitting: INTERNAL MEDICINE
Payer: MEDICARE

## 2025-01-29 ENCOUNTER — CLINICAL SUPPORT (OUTPATIENT)
Dept: CARDIOLOGY | Facility: HOSPITAL | Age: OVER 89
End: 2025-01-29
Attending: EMERGENCY MEDICINE
Payer: MEDICARE

## 2025-01-29 VITALS — WEIGHT: 195 LBS | BODY MASS INDEX: 30.61 KG/M2 | HEIGHT: 67 IN

## 2025-01-29 DIAGNOSIS — I50.9 CONGESTIVE HEART FAILURE, UNSPECIFIED HF CHRONICITY, UNSPECIFIED HEART FAILURE TYPE: ICD-10-CM

## 2025-01-29 DIAGNOSIS — R06.02 SHORTNESS OF BREATH: Primary | ICD-10-CM

## 2025-01-29 DIAGNOSIS — I50.9 ACUTE CONGESTIVE HEART FAILURE, UNSPECIFIED HEART FAILURE TYPE: ICD-10-CM

## 2025-01-29 DIAGNOSIS — J96.21 ACUTE ON CHRONIC RESPIRATORY FAILURE WITH HYPOXIA AND HYPERCAPNIA: ICD-10-CM

## 2025-01-29 DIAGNOSIS — J96.22 ACUTE ON CHRONIC RESPIRATORY FAILURE WITH HYPOXIA AND HYPERCAPNIA: ICD-10-CM

## 2025-01-29 DIAGNOSIS — I50.9 ACUTE ON CHRONIC HEART FAILURE: ICD-10-CM

## 2025-01-29 DIAGNOSIS — J96.02 ACUTE HYPERCAPNIC RESPIRATORY FAILURE: ICD-10-CM

## 2025-01-29 DIAGNOSIS — J81.0 ACUTE PULMONARY EDEMA: ICD-10-CM

## 2025-01-29 PROBLEM — A41.9 SEPSIS: Status: ACTIVE | Noted: 2025-01-29

## 2025-01-29 LAB
ALBUMIN SERPL BCP-MCNC: 4.5 G/DL (ref 3.5–5.2)
ALLENS TEST: ABNORMAL
ALLENS TEST: ABNORMAL
ALP SERPL-CCNC: 103 U/L (ref 55–135)
ALT SERPL W/O P-5'-P-CCNC: 23 U/L (ref 10–44)
ANION GAP SERPL CALC-SCNC: 8 MMOL/L (ref 8–16)
AORTIC ROOT ANNULUS: 3.7 CM
AORTIC VALVE CUSP SEPERATION: 1.8 CM
APICAL FOUR CHAMBER EJECTION FRACTION: 54 %
APICAL TWO CHAMBER EJECTION FRACTION: 51 %
AST SERPL-CCNC: 27 U/L (ref 10–40)
AV INDEX (PROSTH): 0.38
AV MEAN GRADIENT: 17 MMHG
AV PEAK GRADIENT: 29 MMHG
AV VALVE AREA BY VELOCITY RATIO: 1 CM²
AV VALVE AREA: 1.1 CM²
AV VELOCITY RATIO: 0.37
BACTERIA #/AREA URNS HPF: ABNORMAL /HPF
BASOPHILS # BLD AUTO: 0.04 K/UL (ref 0–0.2)
BASOPHILS NFR BLD: 0.2 % (ref 0–1.9)
BILIRUB SERPL-MCNC: 1.5 MG/DL (ref 0.1–1)
BILIRUB UR QL STRIP: NEGATIVE
BNP SERPL-MCNC: 388 PG/ML (ref 0–99)
BSA FOR ECHO PROCEDURE: 2.04 M2
BUN SERPL-MCNC: 21 MG/DL (ref 8–23)
CALCIUM SERPL-MCNC: 9.5 MG/DL (ref 8.7–10.5)
CHLORIDE SERPL-SCNC: 100 MMOL/L (ref 95–110)
CLARITY UR: ABNORMAL
CO2 SERPL-SCNC: 28 MMOL/L (ref 23–29)
COLOR UR: YELLOW
CREAT SERPL-MCNC: 1.1 MG/DL (ref 0.5–1.4)
CV ECHO LV RWT: 0.57 CM
DELSYS: ABNORMAL
DELSYS: ABNORMAL
DIFFERENTIAL METHOD BLD: ABNORMAL
DOP CALC AO PEAK VEL: 2.7 M/S
DOP CALC AO VTI: 54.3 CM
DOP CALC LVOT AREA: 2.8 CM2
DOP CALC LVOT DIAMETER: 1.9 CM
DOP CALC LVOT PEAK VEL: 1 M/S
DOP CALC LVOT STROKE VOLUME: 58.9 CM3
DOP CALC MV VTI: 66.4 CM
DOP CALCLVOT PEAK VEL VTI: 20.8 CM
E WAVE DECELERATION TIME: 377 MSEC
E/A RATIO: 0.81
E/E' RATIO: 30 M/S
ECHO LV POSTERIOR WALL: 1.4 CM (ref 0.6–1.1)
EOSINOPHIL # BLD AUTO: 0.4 K/UL (ref 0–0.5)
EOSINOPHIL NFR BLD: 2 % (ref 0–8)
EP: 6
EP: 6
ERYTHROCYTE [DISTWIDTH] IN BLOOD BY AUTOMATED COUNT: 15.7 % (ref 11.5–14.5)
ERYTHROCYTE [SEDIMENTATION RATE] IN BLOOD BY WESTERGREN METHOD: 12 MM/H
ERYTHROCYTE [SEDIMENTATION RATE] IN BLOOD BY WESTERGREN METHOD: 12 MM/H
EST. GFR  (NO RACE VARIABLE): >60 ML/MIN/1.73 M^2
ESTIMATED AVG GLUCOSE: 134 MG/DL (ref 68–131)
FIO2: 35
FIO2: 60
FRACTIONAL SHORTENING: 36.7 % (ref 28–44)
GLUCOSE SERPL-MCNC: 178 MG/DL (ref 70–110)
GLUCOSE SERPL-MCNC: 248 MG/DL (ref 70–110)
GLUCOSE UR QL STRIP: NEGATIVE
HBA1C MFR BLD: 6.3 % (ref 4.5–6.2)
HCO3 UR-SCNC: 26.5 MMOL/L (ref 24–28)
HCO3 UR-SCNC: 28.7 MMOL/L (ref 24–28)
HCT VFR BLD AUTO: 51.6 % (ref 40–54)
HCT VFR BLD CALC: 53 %PCV (ref 36–54)
HGB BLD-MCNC: 16.3 G/DL (ref 14–18)
HGB UR QL STRIP: ABNORMAL
HR MV ECHO: 72 BPM
HYALINE CASTS #/AREA URNS LPF: 0 /LPF
IMM GRANULOCYTES # BLD AUTO: 0.08 K/UL (ref 0–0.04)
IMM GRANULOCYTES NFR BLD AUTO: 0.4 % (ref 0–0.5)
INFLUENZA A, MOLECULAR: NEGATIVE
INFLUENZA B, MOLECULAR: NEGATIVE
INTERVENTRICULAR SEPTUM: 1.3 CM (ref 0.6–1.1)
IP: 12
IP: 12
IVC DIAMETER: 1.69 CM
KETONES UR QL STRIP: NEGATIVE
LACTATE SERPL-SCNC: 1.2 MMOL/L (ref 0.5–1.9)
LEFT ATRIUM AREA SYSTOLIC (APICAL 2 CHAMBER): 25.4 CM2
LEFT ATRIUM AREA SYSTOLIC (APICAL 4 CHAMBER): 19.6 CM2
LEFT ATRIUM VOLUME INDEX MOD: 36 ML/M2
LEFT ATRIUM VOLUME MOD: 72 ML
LEFT INTERNAL DIMENSION IN SYSTOLE: 3.1 CM (ref 2.1–4)
LEFT VENTRICLE DIASTOLIC VOLUME INDEX: 56 ML/M2
LEFT VENTRICLE DIASTOLIC VOLUME: 112 ML
LEFT VENTRICLE END DIASTOLIC VOLUME APICAL 2 CHAMBER: 91.8 ML
LEFT VENTRICLE END DIASTOLIC VOLUME APICAL 4 CHAMBER: 128 ML
LEFT VENTRICLE END SYSTOLIC VOLUME APICAL 2 CHAMBER: 79.8 ML
LEFT VENTRICLE END SYSTOLIC VOLUME APICAL 4 CHAMBER: 55.5 ML
LEFT VENTRICLE MASS INDEX: 134 G/M2
LEFT VENTRICLE SYSTOLIC VOLUME INDEX: 18.4 ML/M2
LEFT VENTRICLE SYSTOLIC VOLUME: 36.7 ML
LEFT VENTRICULAR INTERNAL DIMENSION IN DIASTOLE: 4.9 CM (ref 3.5–6)
LEFT VENTRICULAR MASS: 267.9 G
LEUKOCYTE ESTERASE UR QL STRIP: ABNORMAL
LV LATERAL E/E' RATIO: 27.4 M/S
LV SEPTAL E/E' RATIO: 34.3 M/S
LVED V (TEICH): 112 ML
LVES V (TEICH): 36.7 ML
LVOT MG: 2 MMHG
LVOT MV: 0.66 CM/S
LYMPHOCYTES # BLD AUTO: 2.7 K/UL (ref 1–4.8)
LYMPHOCYTES NFR BLD: 13.4 % (ref 18–48)
MAGNESIUM SERPL-MCNC: 1.8 MG/DL (ref 1.6–2.6)
MCH RBC QN AUTO: 29.5 PG (ref 27–31)
MCHC RBC AUTO-ENTMCNC: 31.6 G/DL (ref 32–36)
MCV RBC AUTO: 93 FL (ref 82–98)
MICROSCOPIC COMMENT: ABNORMAL
MIN VOL: 9.5
MODE: ABNORMAL
MODE: ABNORMAL
MONOCYTES # BLD AUTO: 1 K/UL (ref 0.3–1)
MONOCYTES NFR BLD: 4.7 % (ref 4–15)
MRSA SCREEN BY PCR: NOT DETECTED
MV MEAN GRADIENT: 8 MMHG
MV PEAK A VEL: 1.7 M/S
MV PEAK E VEL: 1.37 M/S
MV PEAK GRADIENT: 18 MMHG
MV STENOSIS PRESSURE HALF TIME: 116 MS
MV VALVE AREA BY CONTINUITY EQUATION: 0.89 CM2
MV VALVE AREA P 1/2 METHOD: 1.9 CM2
NEUTROPHILS # BLD AUTO: 16.1 K/UL (ref 1.8–7.7)
NEUTROPHILS NFR BLD: 79.3 % (ref 38–73)
NITRITE UR QL STRIP: NEGATIVE
NRBC BLD-RTO: 0 /100 WBC
OHS LV EJECTION FRACTION SIMPSONS BIPLANE MOD: 52 %
PCO2 BLDA: 44.5 MMHG (ref 35–45)
PCO2 BLDA: 66.7 MMHG (ref 35–45)
PH SMN: 7.24 [PH] (ref 7.35–7.45)
PH SMN: 7.38 [PH] (ref 7.35–7.45)
PH UR STRIP: 7 [PH] (ref 5–8)
PISA MRMAX VEL: 5.19 M/S
PISA TR MAX VEL: 2.8 M/S
PLATELET # BLD AUTO: 146 K/UL (ref 150–450)
PMV BLD AUTO: 10.3 FL (ref 9.2–12.9)
PO2 BLDA: 136 MMHG (ref 80–100)
PO2 BLDA: 77 MMHG (ref 80–100)
POC BE: 1 MMOL/L
POC BE: 1 MMOL/L
POC IONIZED CALCIUM: 1.25 MMOL/L (ref 1.06–1.42)
POC SATURATED O2: 95 % (ref 95–100)
POC SATURATED O2: 98 % (ref 95–100)
POC TCO2: 28 MMOL/L (ref 23–27)
POC TCO2: 31 MMOL/L (ref 23–27)
POCT GLUCOSE: 281 MG/DL (ref 70–110)
POTASSIUM BLD-SCNC: 4.3 MMOL/L (ref 3.5–5.1)
POTASSIUM SERPL-SCNC: 3.7 MMOL/L (ref 3.5–5.1)
PROCALCITONIN SERPL IA-MCNC: 10.22 NG/ML (ref 0–0.5)
PROT SERPL-MCNC: 7.8 G/DL (ref 6–8.4)
PROT UR QL STRIP: ABNORMAL
PV MV: 0.8 M/S
PV PEAK GRADIENT: 6 MMHG
PV PEAK VELOCITY: 1.2 M/S
RA PRESSURE ESTIMATED: 3 MMHG
RBC # BLD AUTO: 5.53 M/UL (ref 4.6–6.2)
RBC #/AREA URNS HPF: >100 /HPF (ref 0–4)
RIGHT ATRIUM VOLUME AREA LENGTH APICAL 4 CHAMBER: 46.7 ML
RV TB RVSP: 6 MMHG
RV TISSUE DOPPLER FREE WALL SYSTOLIC VELOCITY 1 (APICAL 4 CHAMBER VIEW): 9.14 CM/S
SAMPLE: ABNORMAL
SAMPLE: ABNORMAL
SARS-COV-2 RDRP RESP QL NAA+PROBE: NEGATIVE
SITE: ABNORMAL
SITE: ABNORMAL
SODIUM BLD-SCNC: 138 MMOL/L (ref 136–145)
SODIUM SERPL-SCNC: 136 MMOL/L (ref 136–145)
SP GR UR STRIP: 1.01 (ref 1–1.03)
SP02: 97
SPECIMEN SOURCE: NORMAL
SPONT RATE: 19
SPONT RATE: 33
TDI LATERAL: 0.05 M/S
TDI SEPTAL: 0.04 M/S
TDI: 0.05 M/S
TR MAX PG: 31 MMHG
TROPONIN I SERPL HS-MCNC: 14.5 PG/ML (ref 0–14.9)
TV REST PULMONARY ARTERY PRESSURE: 34 MMHG
UNIDENT CRYS URNS QL MICRO: 7
URN SPEC COLLECT METH UR: ABNORMAL
UROBILINOGEN UR STRIP-ACNC: NEGATIVE EU/DL
WBC # BLD AUTO: 20.25 K/UL (ref 3.9–12.7)
WBC #/AREA URNS HPF: >100 /HPF (ref 0–5)
Z-SCORE OF LEFT VENTRICULAR DIMENSION IN END DIASTOLE: -1.74
Z-SCORE OF LEFT VENTRICULAR DIMENSION IN END SYSTOLE: -1.14

## 2025-01-29 PROCEDURE — 87040 BLOOD CULTURE FOR BACTERIA: CPT | Performed by: EMERGENCY MEDICINE

## 2025-01-29 PROCEDURE — 99900031 HC PATIENT EDUCATION (STAT)

## 2025-01-29 PROCEDURE — 99900035 HC TECH TIME PER 15 MIN (STAT)

## 2025-01-29 PROCEDURE — 36600 WITHDRAWAL OF ARTERIAL BLOOD: CPT

## 2025-01-29 PROCEDURE — 84295 ASSAY OF SERUM SODIUM: CPT

## 2025-01-29 PROCEDURE — 27100171 HC OXYGEN HIGH FLOW UP TO 24 HOURS

## 2025-01-29 PROCEDURE — 85014 HEMATOCRIT: CPT

## 2025-01-29 PROCEDURE — 83605 ASSAY OF LACTIC ACID: CPT | Performed by: NURSE PRACTITIONER

## 2025-01-29 PROCEDURE — 25000003 PHARM REV CODE 250: Performed by: NURSE PRACTITIONER

## 2025-01-29 PROCEDURE — 87641 MR-STAPH DNA AMP PROBE: CPT | Performed by: INTERNAL MEDICINE

## 2025-01-29 PROCEDURE — 63600175 PHARM REV CODE 636 W HCPCS: Performed by: EMERGENCY MEDICINE

## 2025-01-29 PROCEDURE — 63600175 PHARM REV CODE 636 W HCPCS: Performed by: NURSE PRACTITIONER

## 2025-01-29 PROCEDURE — 83880 ASSAY OF NATRIURETIC PEPTIDE: CPT | Performed by: EMERGENCY MEDICINE

## 2025-01-29 PROCEDURE — 87086 URINE CULTURE/COLONY COUNT: CPT | Performed by: NURSE PRACTITIONER

## 2025-01-29 PROCEDURE — 25000242 PHARM REV CODE 250 ALT 637 W/ HCPCS: Performed by: INTERNAL MEDICINE

## 2025-01-29 PROCEDURE — 25000003 PHARM REV CODE 250: Performed by: INTERNAL MEDICINE

## 2025-01-29 PROCEDURE — 81001 URINALYSIS AUTO W/SCOPE: CPT | Performed by: NURSE PRACTITIONER

## 2025-01-29 PROCEDURE — 82330 ASSAY OF CALCIUM: CPT

## 2025-01-29 PROCEDURE — 82803 BLOOD GASES ANY COMBINATION: CPT

## 2025-01-29 PROCEDURE — 99291 CRITICAL CARE FIRST HOUR: CPT

## 2025-01-29 PROCEDURE — 94660 CPAP INITIATION&MGMT: CPT

## 2025-01-29 PROCEDURE — 84484 ASSAY OF TROPONIN QUANT: CPT | Performed by: EMERGENCY MEDICINE

## 2025-01-29 PROCEDURE — 21400001 HC TELEMETRY ROOM

## 2025-01-29 PROCEDURE — 93306 TTE W/DOPPLER COMPLETE: CPT

## 2025-01-29 PROCEDURE — 96374 THER/PROPH/DIAG INJ IV PUSH: CPT

## 2025-01-29 PROCEDURE — 36415 COLL VENOUS BLD VENIPUNCTURE: CPT | Performed by: EMERGENCY MEDICINE

## 2025-01-29 PROCEDURE — 83735 ASSAY OF MAGNESIUM: CPT | Performed by: EMERGENCY MEDICINE

## 2025-01-29 PROCEDURE — 5A09357 ASSISTANCE WITH RESPIRATORY VENTILATION, LESS THAN 24 CONSECUTIVE HOURS, CONTINUOUS POSITIVE AIRWAY PRESSURE: ICD-10-PCS | Performed by: EMERGENCY MEDICINE

## 2025-01-29 PROCEDURE — 87502 INFLUENZA DNA AMP PROBE: CPT | Performed by: EMERGENCY MEDICINE

## 2025-01-29 PROCEDURE — 80053 COMPREHEN METABOLIC PANEL: CPT | Performed by: EMERGENCY MEDICINE

## 2025-01-29 PROCEDURE — 25000242 PHARM REV CODE 250 ALT 637 W/ HCPCS: Performed by: NURSE PRACTITIONER

## 2025-01-29 PROCEDURE — 94640 AIRWAY INHALATION TREATMENT: CPT

## 2025-01-29 PROCEDURE — 84145 PROCALCITONIN (PCT): CPT | Performed by: NURSE PRACTITIONER

## 2025-01-29 PROCEDURE — 25000003 PHARM REV CODE 250: Performed by: EMERGENCY MEDICINE

## 2025-01-29 PROCEDURE — 93306 TTE W/DOPPLER COMPLETE: CPT | Mod: 26,,, | Performed by: INTERNAL MEDICINE

## 2025-01-29 PROCEDURE — 84132 ASSAY OF SERUM POTASSIUM: CPT

## 2025-01-29 PROCEDURE — 94799 UNLISTED PULMONARY SVC/PX: CPT

## 2025-01-29 PROCEDURE — 94761 N-INVAS EAR/PLS OXIMETRY MLT: CPT | Mod: XB

## 2025-01-29 PROCEDURE — 83036 HEMOGLOBIN GLYCOSYLATED A1C: CPT | Performed by: NURSE PRACTITIONER

## 2025-01-29 PROCEDURE — 85025 COMPLETE CBC W/AUTO DIFF WBC: CPT | Performed by: EMERGENCY MEDICINE

## 2025-01-29 PROCEDURE — 12000002 HC ACUTE/MED SURGE SEMI-PRIVATE ROOM

## 2025-01-29 PROCEDURE — 87635 SARS-COV-2 COVID-19 AMP PRB: CPT | Performed by: EMERGENCY MEDICINE

## 2025-01-29 PROCEDURE — 93005 ELECTROCARDIOGRAM TRACING: CPT | Performed by: INTERNAL MEDICINE

## 2025-01-29 RX ORDER — ARFORMOTEROL TARTRATE 15 UG/2ML
15 SOLUTION RESPIRATORY (INHALATION) 2 TIMES DAILY
Status: DISCONTINUED | OUTPATIENT
Start: 2025-01-29 | End: 2025-02-02 | Stop reason: HOSPADM

## 2025-01-29 RX ORDER — ATORVASTATIN CALCIUM 40 MG/1
40 TABLET, FILM COATED ORAL NIGHTLY
Status: DISCONTINUED | OUTPATIENT
Start: 2025-01-29 | End: 2025-02-02 | Stop reason: HOSPADM

## 2025-01-29 RX ORDER — ARFORMOTEROL TARTRATE 15 UG/2ML
15 SOLUTION RESPIRATORY (INHALATION) 2 TIMES DAILY
Status: DISCONTINUED | OUTPATIENT
Start: 2025-01-29 | End: 2025-01-29

## 2025-01-29 RX ORDER — MAGNESIUM SULFATE HEPTAHYDRATE 40 MG/ML
2 INJECTION, SOLUTION INTRAVENOUS ONCE
Status: COMPLETED | OUTPATIENT
Start: 2025-01-29 | End: 2025-01-29

## 2025-01-29 RX ORDER — FLUTICASONE FUROATE AND VILANTEROL 100; 25 UG/1; UG/1
1 POWDER RESPIRATORY (INHALATION) DAILY
Status: DISCONTINUED | OUTPATIENT
Start: 2025-01-29 | End: 2025-01-29

## 2025-01-29 RX ORDER — FUROSEMIDE 10 MG/ML
40 INJECTION INTRAMUSCULAR; INTRAVENOUS
Status: COMPLETED | OUTPATIENT
Start: 2025-01-29 | End: 2025-01-29

## 2025-01-29 RX ORDER — ISOSORBIDE MONONITRATE 30 MG/1
30 TABLET, EXTENDED RELEASE ORAL DAILY
Status: DISCONTINUED | OUTPATIENT
Start: 2025-01-29 | End: 2025-02-02 | Stop reason: HOSPADM

## 2025-01-29 RX ORDER — FUROSEMIDE 10 MG/ML
40 INJECTION INTRAMUSCULAR; INTRAVENOUS EVERY 12 HOURS
Status: DISCONTINUED | OUTPATIENT
Start: 2025-01-29 | End: 2025-02-01

## 2025-01-29 RX ORDER — PANTOPRAZOLE SODIUM 40 MG/1
40 TABLET, DELAYED RELEASE ORAL DAILY
Status: DISCONTINUED | OUTPATIENT
Start: 2025-01-29 | End: 2025-02-02 | Stop reason: HOSPADM

## 2025-01-29 RX ORDER — POLYETHYLENE GLYCOL 3350 17 G/17G
17 POWDER, FOR SOLUTION ORAL 2 TIMES DAILY PRN
Status: DISCONTINUED | OUTPATIENT
Start: 2025-01-29 | End: 2025-02-02 | Stop reason: HOSPADM

## 2025-01-29 RX ORDER — LINEZOLID 2 MG/ML
600 INJECTION, SOLUTION INTRAVENOUS
Status: DISCONTINUED | OUTPATIENT
Start: 2025-01-29 | End: 2025-01-30

## 2025-01-29 RX ORDER — LANOLIN ALCOHOL/MO/W.PET/CERES
800 CREAM (GRAM) TOPICAL
Status: DISCONTINUED | OUTPATIENT
Start: 2025-01-29 | End: 2025-02-02 | Stop reason: HOSPADM

## 2025-01-29 RX ORDER — IBUPROFEN 200 MG
16 TABLET ORAL
Status: DISCONTINUED | OUTPATIENT
Start: 2025-01-29 | End: 2025-02-02 | Stop reason: HOSPADM

## 2025-01-29 RX ORDER — ACETAMINOPHEN 325 MG/1
650 TABLET ORAL EVERY 4 HOURS PRN
Status: DISCONTINUED | OUTPATIENT
Start: 2025-01-29 | End: 2025-02-02 | Stop reason: HOSPADM

## 2025-01-29 RX ORDER — BUDESONIDE 0.5 MG/2ML
0.5 INHALANT ORAL 2 TIMES DAILY
Status: DISCONTINUED | OUTPATIENT
Start: 2025-01-29 | End: 2025-02-02 | Stop reason: HOSPADM

## 2025-01-29 RX ORDER — ONDANSETRON HYDROCHLORIDE 2 MG/ML
4 INJECTION, SOLUTION INTRAVENOUS EVERY 8 HOURS PRN
Status: DISCONTINUED | OUTPATIENT
Start: 2025-01-29 | End: 2025-02-02 | Stop reason: HOSPADM

## 2025-01-29 RX ORDER — GLUCAGON 1 MG
1 KIT INJECTION
Status: DISCONTINUED | OUTPATIENT
Start: 2025-01-29 | End: 2025-02-02 | Stop reason: HOSPADM

## 2025-01-29 RX ORDER — MUPIROCIN 20 MG/G
OINTMENT TOPICAL 2 TIMES DAILY
Status: DISCONTINUED | OUTPATIENT
Start: 2025-01-29 | End: 2025-02-02 | Stop reason: HOSPADM

## 2025-01-29 RX ORDER — SODIUM CHLORIDE 0.9 % (FLUSH) 0.9 %
10 SYRINGE (ML) INJECTION
Status: DISCONTINUED | OUTPATIENT
Start: 2025-01-29 | End: 2025-02-02 | Stop reason: HOSPADM

## 2025-01-29 RX ORDER — METOPROLOL TARTRATE 50 MG/1
50 TABLET ORAL 2 TIMES DAILY
Status: DISCONTINUED | OUTPATIENT
Start: 2025-01-29 | End: 2025-02-02 | Stop reason: HOSPADM

## 2025-01-29 RX ORDER — ENOXAPARIN SODIUM 100 MG/ML
40 INJECTION SUBCUTANEOUS EVERY 24 HOURS
Status: DISCONTINUED | OUTPATIENT
Start: 2025-01-29 | End: 2025-02-02 | Stop reason: HOSPADM

## 2025-01-29 RX ORDER — LEVOTHYROXINE SODIUM 25 UG/1
25 TABLET ORAL DAILY
Status: DISCONTINUED | OUTPATIENT
Start: 2025-01-29 | End: 2025-02-02 | Stop reason: HOSPADM

## 2025-01-29 RX ORDER — INSULIN ASPART 100 [IU]/ML
0-5 INJECTION, SOLUTION INTRAVENOUS; SUBCUTANEOUS
Status: DISCONTINUED | OUTPATIENT
Start: 2025-01-29 | End: 2025-02-02 | Stop reason: HOSPADM

## 2025-01-29 RX ORDER — AZELASTINE 1 MG/ML
1 SPRAY, METERED NASAL 2 TIMES DAILY PRN
Status: DISCONTINUED | OUTPATIENT
Start: 2025-01-29 | End: 2025-02-02 | Stop reason: HOSPADM

## 2025-01-29 RX ORDER — TAMSULOSIN HYDROCHLORIDE 0.4 MG/1
0.4 CAPSULE ORAL DAILY
Status: DISCONTINUED | OUTPATIENT
Start: 2025-01-29 | End: 2025-02-02 | Stop reason: HOSPADM

## 2025-01-29 RX ORDER — BUDESONIDE 0.5 MG/2ML
0.5 INHALANT ORAL EVERY 12 HOURS
Status: DISCONTINUED | OUTPATIENT
Start: 2025-01-29 | End: 2025-01-29

## 2025-01-29 RX ORDER — HYDROCODONE BITARTRATE AND ACETAMINOPHEN 5; 325 MG/1; MG/1
1 TABLET ORAL EVERY 6 HOURS PRN
Status: DISCONTINUED | OUTPATIENT
Start: 2025-01-29 | End: 2025-02-02 | Stop reason: HOSPADM

## 2025-01-29 RX ORDER — IBUPROFEN 200 MG
24 TABLET ORAL
Status: DISCONTINUED | OUTPATIENT
Start: 2025-01-29 | End: 2025-02-02 | Stop reason: HOSPADM

## 2025-01-29 RX ORDER — LEVALBUTEROL INHALATION SOLUTION 1.25 MG/3ML
1.25 SOLUTION RESPIRATORY (INHALATION)
Status: DISCONTINUED | OUTPATIENT
Start: 2025-01-29 | End: 2025-02-02 | Stop reason: HOSPADM

## 2025-01-29 RX ORDER — TALC
6 POWDER (GRAM) TOPICAL NIGHTLY PRN
Status: DISCONTINUED | OUTPATIENT
Start: 2025-01-29 | End: 2025-02-02 | Stop reason: HOSPADM

## 2025-01-29 RX ORDER — CLOPIDOGREL BISULFATE 75 MG/1
75 TABLET ORAL DAILY
Status: DISCONTINUED | OUTPATIENT
Start: 2025-01-29 | End: 2025-02-02 | Stop reason: HOSPADM

## 2025-01-29 RX ORDER — BUDESONIDE 0.5 MG/2ML
0.5 INHALANT ORAL 2 TIMES DAILY
Status: DISCONTINUED | OUTPATIENT
Start: 2025-01-29 | End: 2025-01-29

## 2025-01-29 RX ORDER — IPRATROPIUM BROMIDE 0.5 MG/2.5ML
0.5 SOLUTION RESPIRATORY (INHALATION)
Status: DISCONTINUED | OUTPATIENT
Start: 2025-01-29 | End: 2025-02-02 | Stop reason: HOSPADM

## 2025-01-29 RX ADMIN — IPRATROPIUM BROMIDE 0.5 MG: 0.5 SOLUTION RESPIRATORY (INHALATION) at 02:01

## 2025-01-29 RX ADMIN — LINEZOLID 600 MG: 600 INJECTION, SOLUTION INTRAVENOUS at 03:01

## 2025-01-29 RX ADMIN — METOPROLOL TARTRATE 50 MG: 50 TABLET, FILM COATED ORAL at 10:01

## 2025-01-29 RX ADMIN — LEVOTHYROXINE SODIUM 25 MCG: 0.03 TABLET ORAL at 10:01

## 2025-01-29 RX ADMIN — FUROSEMIDE 40 MG: 10 INJECTION, SOLUTION INTRAMUSCULAR; INTRAVENOUS at 10:01

## 2025-01-29 RX ADMIN — ACETAMINOPHEN 650 MG: 325 TABLET ORAL at 04:01

## 2025-01-29 RX ADMIN — BUDESONIDE INHALATION 0.5 MG: 0.5 SUSPENSION RESPIRATORY (INHALATION) at 09:01

## 2025-01-29 RX ADMIN — METOPROLOL TARTRATE 50 MG: 50 TABLET, FILM COATED ORAL at 09:01

## 2025-01-29 RX ADMIN — PIPERACILLIN AND TAZOBACTAM 4.5 G: 4; .5 INJECTION, POWDER, LYOPHILIZED, FOR SOLUTION INTRAVENOUS; PARENTERAL at 10:01

## 2025-01-29 RX ADMIN — MUPIROCIN 1 G: 20 OINTMENT TOPICAL at 09:01

## 2025-01-29 RX ADMIN — LEVALBUTEROL HYDROCHLORIDE 1.25 MG: 1.25 SOLUTION RESPIRATORY (INHALATION) at 07:01

## 2025-01-29 RX ADMIN — INSULIN ASPART 1 UNITS: 100 INJECTION, SOLUTION INTRAVENOUS; SUBCUTANEOUS at 10:01

## 2025-01-29 RX ADMIN — BUDESONIDE INHALATION 0.5 MG: 0.5 SUSPENSION RESPIRATORY (INHALATION) at 07:01

## 2025-01-29 RX ADMIN — BUDESONIDE INHALATION 0.5 MG: 0.5 SUSPENSION RESPIRATORY (INHALATION) at 08:01

## 2025-01-29 RX ADMIN — PANTOPRAZOLE SODIUM 40 MG: 40 TABLET, DELAYED RELEASE ORAL at 10:01

## 2025-01-29 RX ADMIN — LEVALBUTEROL HYDROCHLORIDE 1.25 MG: 1.25 SOLUTION RESPIRATORY (INHALATION) at 02:01

## 2025-01-29 RX ADMIN — ATORVASTATIN CALCIUM 40 MG: 40 TABLET, FILM COATED ORAL at 09:01

## 2025-01-29 RX ADMIN — CLOPIDOGREL BISULFATE 75 MG: 75 TABLET, FILM COATED ORAL at 10:01

## 2025-01-29 RX ADMIN — MUPIROCIN 1 G: 20 OINTMENT TOPICAL at 10:01

## 2025-01-29 RX ADMIN — MAGNESIUM SULFATE HEPTAHYDRATE 2 G: 40 INJECTION, SOLUTION INTRAVENOUS at 11:01

## 2025-01-29 RX ADMIN — IPRATROPIUM BROMIDE 0.5 MG: 0.5 SOLUTION RESPIRATORY (INHALATION) at 07:01

## 2025-01-29 RX ADMIN — FUROSEMIDE 40 MG: 10 INJECTION, SOLUTION INTRAMUSCULAR; INTRAVENOUS at 05:01

## 2025-01-29 RX ADMIN — FUROSEMIDE 40 MG: 10 INJECTION, SOLUTION INTRAMUSCULAR; INTRAVENOUS at 09:01

## 2025-01-29 RX ADMIN — PIPERACILLIN AND TAZOBACTAM 3.38 G: 3; .375 INJECTION, POWDER, FOR SOLUTION INTRAVENOUS; PARENTERAL at 09:01

## 2025-01-29 RX ADMIN — ISOSORBIDE MONONITRATE 30 MG: 30 TABLET, EXTENDED RELEASE ORAL at 10:01

## 2025-01-29 RX ADMIN — ARFORMOTEROL TARTRATE 15 MCG: 15 SOLUTION RESPIRATORY (INHALATION) at 08:01

## 2025-01-29 RX ADMIN — TAMSULOSIN HYDROCHLORIDE 0.4 MG: 0.4 CAPSULE ORAL at 10:01

## 2025-01-29 RX ADMIN — ARFORMOTEROL TARTRATE 15 MCG: 15 SOLUTION RESPIRATORY (INHALATION) at 07:01

## 2025-01-29 NOTE — ASSESSMENT & PLAN NOTE
Patient has Combined Systolic and Diastolic heart failure that is Acute on chronic. On presentation their CHF was decompensated. Evidence of decompensated CHF on presentation includes: edema, crackles on lung auscultation, paroxysmal nocturnal dyspnea (PND), and shortness of breath. The etiology of their decompensation is likely acute infection . Most recent BNP and echo results are listed below.  Recent Labs     01/29/25  0517   *     Latest ECHO  Results for orders placed during the hospital encounter of 03/24/23    Echo    Interpretation Summary  · The left ventricle is normal in size with moderate concentric hypertrophy and low normal systolic function.  · Mild left atrial enlargement.  · The estimated ejection fraction is 50%.  · Indeterminate left ventricular diastolic function.  · RV is not well visualized. Mildly to moderately reduced right ventricular systolic function.  · The mean diastolic gradient across the mitral valve is 13 mmHg at a heart rate of 87 bpm.  · There is moderate to severe mitral stenosis.  · There is moderate-to-severe aortic valve stenosis.  · Aortic valve area is 1.06 cm2; peak velocity is 2.75 m/s; mean gradient is 17 mmHg. Indexed area is 0.45-0.5 cm/m2. Indexed stroke volume is 29 ml/m2.  · Normal central venous pressure (3 mmHg).    Current Heart Failure Medications  furosemide injection 40 mg, Every 12 hours, Intravenous    Plan  - Monitor strict I&Os and daily weights.    - Place on telemetry  - Low sodium diet  - Place on fluid restriction of 1.5 L.   - Cardiology has not been consulted  - The patient's volume status is improving but not at their baseline as indicated by edema, crackles on lung auscultation, and orthopnea  - Repeat Echo  - known to Dr. Kim - last seen about 6 months ago

## 2025-01-29 NOTE — ASSESSMENT & PLAN NOTE
This patient does have evidence of infective focus  My overall impression is sepsis.  Source: Respiratory and possibly urine - UA is still pending  Linezolid d/t CKD   Zosyn (cephalosporin allergy)  MRSA screen - if negative de-escalate abx as clinically indicated  Antibiotics given-   Antibiotics (72h ago, onward)      Start     Stop Route Frequency Ordered    01/29/25 1800  piperacillin-tazobactam (ZOSYN) 3.375 g in D5W 100 mL IVPB (MB+)         -- IV Every 8 hours (non-standard times) 01/29/25 0754    01/29/25 1200  linezolid 600 mg/300 mL IVPB 600 mg         -- IV Every 12 hours (non-standard times) 01/29/25 1051    01/29/25 0900  mupirocin 2 % ointment         02/03/25 0859 Nasl 2 times daily 01/29/25 0755          Latest lactate reviewed-  Recent Labs   Lab 01/29/25  0934   LACTATE 1.2     Organ dysfunction indicated by Acute respiratory failure    Fluid challenge Contraindicated- Fluid bolus is contraindicated in this patient due to Congestive Heart Failure     Post- resuscitation assessment No - Post resuscitation assessment not needed       Will Not start Pressors- Levophed for MAP of 65  Source control achieved by: abx

## 2025-01-29 NOTE — ASSESSMENT & PLAN NOTE
Patient's COPD is controlled currently.  Patient is currently off COPD Pathway. Continue scheduled inhalers Antibiotics and Supplemental oxygen and monitor respiratory status closely.     Does not appear to be wheezing at this time  Schedule nebs - became tachy on duonebs last hospitalization, will schedule levalbuterol  Hold of on steroids

## 2025-01-29 NOTE — ASSESSMENT & PLAN NOTE
Admit to step down   BiPAP for now, wean as tolerated  D/t HF exacerbation +/- pna    Patient with Hypercapnic Respiratory failure which is Acute.  he is not on home oxygen. Supplemental oxygen was provided and noted- Oxygen Concentration (%):  [35-60] 35    .   Signs/symptoms of respiratory failure include- tachypnea and increased work of breathing. Contributing diagnoses includes - CHF and Pneumonia Labs and images were reviewed. Patient Has recent ABG, which has been reviewed. Will treat underlying causes and adjust management of respiratory failure as follows-     Repeat ABG   Wean BiPAP as tolerated

## 2025-01-29 NOTE — PLAN OF CARE
[facility] - Emergency Dept  Initial Discharge Assessment       Primary Care Provider: Darci German MD    Admission Diagnosis: Acute on chronic respiratory failure with hypoxia and hypercapnia [J96.21, J96.22]    Admission Date: 1/29/2025  Expected Discharge Date: TBD     went to the bedside to complete the discharge assessment plan. The patient was on continuous bipap and lethargic, unable to complete the assessment. The patient's sister, nAisha Wisdom, was at the bedside (she is also listed in the chart) and was able to complete the assessment. She verified the patient's demographics and PCP. She stated the patient lives at [facility]. She stated he is independent there in all his ADLs. The facility provide his meals and does his laundry. He has the following DME: Rolling walker and cane- but sister state he does not use. She denies HHC, HD, and coumadin. Preferred Pharmacy: [facility] on [address] PCP: Darci German. He has an Advance Directive on file and both of his daughters are listed as HC POA. The anticipated disposition is to return back to [facility]. His family will transport him home. CM will continue to follow and assess DC needs.    Transition of Care Barriers: None    Payor: [employer] [facility] / Plan: [employer] CHOICES / Product Type: Medicare Advantage /     Extended Emergency Contact Information  Primary Emergency Contact: Jewell Whatley  Address: [address] [place] [address] [facility]  Home Phone: [phone]  Mobile Phone: [phone]  Relation: Daughter  Preferred language: English   needed? No  Secondary Emergency Contact: Umm Wyatt  Address: [address] [address]           [address]           [place] [address] [facility]  Home Phone: [phone]  Work Phone: [phone]  Mobile Phone: [phone]  Relation: Daughter  Preferred language: English   needed?  No    Discharge Plan A: Home Health  Discharge Plan B: Home with family      [facility] [place] [address]  [address]  [place] [address]  Phone: [phone] Fax: [phone]      Initial Assessment (most recent)       Adult Discharge Assessment - 01/29/25 1138          Discharge Assessment    Assessment Type Discharge Planning Assessment     Confirmed/corrected address, phone number and insurance Yes     Confirmed Demographics Correct on Facesheet     Source of Information family     If unable to respond/provide information was family/caregiver contacted? Yes     Contact Name/Number Anisha Wisdom  Sister  [phone]     When was your last doctors appointment? --   Patient's sister not sure of last appt    Communicated ALESSANDRA with patient/caregiver Date not available/Unable to determine     Reason For Admission Acute hypercapnic resp failure     People in Home facility resident     Facility Arrived From: [facility]     Do you expect to return to your current living situation? Yes     Do you have help at home or someone to help you manage your care at home? Yes     Who are your caregiver(s) and their phone number(s)? Assisted living facility staff     Prior to hospitilization cognitive status: Unable to Assess     Current cognitive status: Unable to Assess     Walking or Climbing Stairs Difficulty yes     Walking or Climbing Stairs ambulation difficulty, requires equipment     Mobility Management Uses cane and rolling walker when needed     Dressing/Bathing Difficulty no     Home Accessibility wheelchair accessible     Home Layout Able to live on 1st floor     Equipment Currently Used at Home walker, rolling;cane, straight     Readmission within 30 days? No     Do you currently have service(s) that help you manage your care at home? No     Do you take prescription medications? Yes     Do you have prescription coverage? Yes     Coverage [employer]     Do you have  any problems affording any of your prescribed medications? No     Who is going to help you get home at discharge? Family     How do you get to doctors appointments? family or friend will provide     Are you on dialysis? No     Do you take coumadin? No     Discharge Plan A Home Health     Discharge Plan B Home with family     DME Needed Upon Discharge  none     Discharge Plan discussed with: Sibling     Name(s) and Number(s) Anisha Wisdom Sister [phone]     Transition of Care Barriers None

## 2025-01-29 NOTE — PROGRESS NOTES
Automatic Inhaler to Nebulizer Interchange    fluticasone/vilanterol (Breo Ellipta) 100 mcg/25 mcg changed to budesonide 0.5 mg twice daily AND arformoterol 15 mcg twice daily per [facility] Automatic Therapeutic Substitutions Protocol.    Please contact pharmacy at extension [phone] with any questions.     Thank you,   Leila Barclay

## 2025-01-29 NOTE — ED PROVIDER NOTES
Encounter Date: 1/29/2025       History     Chief Complaint   Patient presents with    Shortness of Breath     Pt from [facility] with swelling in his ankles and increased SOB for the last 8 hours      Chief complaint is shortness of breath.  The patient came from an assisted living.  He was given a DuoNeb Solu-Medrol.  Pulse ox 87% on room air 94% after treatment patient recently diagnosed with UTI.  Per patient paperwork he is only wanting comfort measures at this point time in his life.  At the present time we put him on BiPAP.  The patient had an ABG with BiPAP 12/6.  PH 7.24 pCO2 66 PO2 136 bicarb 28.  Will decrease oxygen slightly.  Patient breathing pretty fast now breathing better.        Review of patient's allergies indicates:   Allergen Reactions    Bactrim [sulfamethoxazole-trimethoprim] Hives     itched    Keflex [cephalexin] Rash    Morphine Rash     Patient had morphine and keflex at the same time, developed a rash, not sure which one caused it, or if it was a combination of the two meds.     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 2004    CORONARY ARTERY BYPASS GRAFT  1996    5-bypass     CYSTOSCOPY N/A 8/15/2019    Procedure: CYSTOSCOPY;  Surgeon: Dipak Sanchez MD;  [place] [facility] [place]  Service: Urology;  Laterality: N/A;    CYSTOSCOPY N/A 10/31/2019    Procedure:  CYSTOSCOPY;  Surgeon: Dipak Sanchez MD;  Location: [facility];  Service: Urology;  Laterality: N/A;    CYSTOSCOPY W/ URETERAL STENT REMOVAL Left 10/31/2019    Procedure: CYSTOSCOPY, WITH URETERAL STENT REMOVAL  URETEROSCOPY;  Surgeon: Dipak Sanchez MD;  Location: [facility];  Service: Urology;  Laterality: Left;    EXTRACORPOREAL SHOCK WAVE LITHOTRIPSY Left 8/15/2019    Procedure: LITHOTRIPSY, ESWL;  Surgeon: Dipak Sanchez MD;  Location: [facility];  Service: Urology;  Laterality: Left;    EXTRACORPOREAL SHOCK WAVE LITHOTRIPSY Left 2019    Procedure: LITHOTRIPSY, ESWL;  Surgeon: Dipak Sanchez MD;  Location: [facility];  Service: Urology;  Laterality: Left;    EYE SURGERY Right     Cataract    EYE SURGERY Left     Cataract    HERNIA REPAIR      Dr. Bailon - had to have mesh removed    INGUINAL HERNIA REPAIR Right 2019        LITHOTRIPSY      nephrostomy tube      PERCUTANEOUS NEPHROSTOMY      ROTATOR CUFF REPAIR      right shoulder    URETEROSCOPY Left 10/31/2019    Procedure: URETEROSCOPY;  Surgeon: Dipak Sanchez MD;  Location: [facility];  Service: Urology;  Laterality: Left;     Family History   Problem Relation Name Age of Onset    Heart disease Mother      Hypertension Mother      Hypertension Sister      Heart disease Brother      Hypertension Brother      Cancer Daughter      Diabetes Brother      Heart disease Brother      Hypertension Brother      Hypertension Sister      Heart disease Sister       Social History     Tobacco Use    Smoking status: Former     Current packs/day: 0.00     Average packs/day: 1 pack/day for 25.0 years (25.0 ttl pk-yrs)     Types: Cigarettes     Start date: 1947     Quit date: 1972     Years since quittin.1    Smokeless tobacco: Former     Quit date: 8/15/1972   Substance Use Topics    Alcohol use: No    Drug use: No     Review of Systems   Constitutional:  Negative for chills and fever.   HENT:  Negative for ear  pain, rhinorrhea and sore throat.    Eyes:  Negative for pain and visual disturbance.   Respiratory:  Positive for shortness of breath. Negative for cough.    Cardiovascular:  Negative for chest pain and palpitations.   Gastrointestinal:  Negative for abdominal pain, constipation, diarrhea, nausea and vomiting.   Genitourinary:  Negative for dysuria, frequency, hematuria and urgency.   Musculoskeletal:  Negative for back pain, joint swelling and myalgias.   Skin:  Negative for rash.   Neurological:  Negative for dizziness, seizures, weakness and headaches.   Psychiatric/Behavioral:  Negative for dysphoric mood. The patient is not nervous/anxious.        Physical Exam     Initial Vitals [01/29/25 0506]   BP Pulse Resp Temp SpO2   (!) 179/81 (!) 114 (!) 33 98 °F (36.7 °C) 99 %      MAP       --         Physical Exam    Nursing note and vitals reviewed.  Constitutional: He appears well-developed and well-nourished.   HENT:   Head: Normocephalic and atraumatic.   Eyes: Conjunctivae, EOM and lids are normal. Pupils are equal, round, and reactive to light.   Neck: Trachea normal. Neck supple. No thyroid mass present.   Cardiovascular:  Normal rate, regular rhythm and normal heart sounds.           Pulmonary/Chest: Breath sounds normal. No respiratory distress.   Moderate inspiratory and expiratory wheezing with rales in the bases   Abdominal: Abdomen is soft. There is no abdominal tenderness.   Musculoskeletal:         General: Normal range of motion.      Cervical back: Neck supple.     Neurological: He is alert. He has normal strength and normal reflexes. No cranial nerve deficit or sensory deficit.   Skin: Skin is warm and dry.   Psychiatric: His speech is normal.         ED Course   Procedures  Labs Reviewed   CBC W/ AUTO DIFFERENTIAL - Abnormal       Result Value    WBC 20.25 (*)     RBC 5.53      Hemoglobin 16.3      Hematocrit 51.6      MCV 93      MCH 29.5      MCHC 31.6 (*)     RDW 15.7 (*)     Platelets 146 (*)      MPV 10.3      Immature Granulocytes 0.4      Gran # (ANC) 16.1 (*)     Immature Grans (Abs) 0.08 (*)     Lymph # 2.7      Mono # 1.0      Eos # 0.4      Baso # 0.04      nRBC 0      Gran % 79.3 (*)     Lymph % 13.4 (*)     Mono % 4.7      Eosinophil % 2.0      Basophil % 0.2      Differential Method Automated     COMPREHENSIVE METABOLIC PANEL - Abnormal    Sodium 136      Potassium 3.7      Chloride 100      CO2 28      Glucose 178 (*)     BUN 21      Creatinine 1.1      Calcium 9.5      Total Protein 7.8      Albumin 4.5      Total Bilirubin 1.5 (*)     Alkaline Phosphatase 103      AST 27      ALT 23      eGFR >60.0      Anion Gap 8     B-TYPE NATRIURETIC PEPTIDE - Abnormal     (*)    ISTAT PROCEDURE - Abnormal    POC PH 7.242 (*)     POC PCO2 66.7 (*)     POC PO2 136 (*)     POC HCO3 28.7 (*)     POC BE 1      POC SATURATED O2 98      POC Glucose 248 (*)     POC Sodium 138      POC Potassium 4.3      POC TCO2 31 (*)     POC Ionized Calcium 1.25      POC Hematocrit 53      Rate 12      Sample ARTERIAL      Site RR      Allens Test Pass      DelSys CPAP/BiPAP      Mode BiPAP      FiO2 60      Spont Rate 33      IP 12      EP 6     CULTURE, BLOOD   CULTURE, BLOOD   SARS-COV-2 RNA AMPLIFICATION, QUAL    SARS-CoV-2 RNA, Amplification, Qual Negative     TROPONIN I HIGH SENSITIVITY    Troponin I High Sensitivity 14.5     MAGNESIUM    Magnesium 1.8     INFLUENZA A AND B ANTIGEN    Influenza A, Molecular Negative      Influenza B, Molecular Negative      Flu A & B Source Nasal swab      Narrative:     Specimen Source->Nasopharyngeal Swab   LACTIC ACID, PLASMA   URINALYSIS, REFLEX TO URINE CULTURE   HEMOGLOBIN A1C   PROCALCITONIN   POCT GLUCOSE MONITORING CONTINUOUS     EKG Readings: (Independently Interpreted)   The patient has an EKG showing ventricular rate of 109.  IA interval 184 milliseconds.  No ST elevation.  Left bundle-branch block.     ECG Results              EKG 12-lead (In process)        Collection  Time Result Time QRS Duration OHS QTC Calculation    01/29/25 05:57:11 01/29/25 06:37:38 128 492                     In process by Interface, Lab In [facility] (01/29/25 06:37:47)                   Narrative:    Test Reason : R06.02,    Vent. Rate : 109 BPM     Atrial Rate : 109 BPM     P-R Int : 184 ms          QRS Dur : 128 ms      QT Int : 366 ms       P-R-T Axes :  35  37 164 degrees    QTcB Int : 492 ms    Sinus tachycardia  Possible Left atrial enlargement  Left bundle branch block  Abnormal ECG  No previous ECGs available    Referred By: AAAREFERRAL SELF           Confirmed By:                                   Imaging Results              X-Ray Chest 1 View (Final result)  Result time 01/29/25 06:52:30      Final result by Osei Zavala MD (01/29/25 06:52:30)                   Impression:      As above.      Electronically signed by: Osei Zavala  Date:    01/29/2025  Time:    06:52               Narrative:    EXAMINATION:  XR CHEST 1 VIEW    CLINICAL HISTORY:  shortness of breath;    TECHNIQUE:  Single frontal view of the chest was performed.    COMPARISON:  07/18/2024    FINDINGS:  Stable cardiomediastinal silhouette.  Aortic calcification.  Prior median sternotomy.  Pulmonary vascular congestion and diffuse bilateral interstitial opacities throughout the lungs, progressed compared to prior exam.  Findings are suggestive for pulmonary edema however cannot exclude infection.  Mild blunting of the bilateral costophrenic angles which could represent small pleural effusions and or atelectasis.  No pneumothorax.  Degenerative changes of the osseous structures.                                       Medications   piperacillin-tazobactam (ZOSYN) 4.5 g in D5W 100 mL IVPB (MB+) (has no administration in time range)   magnesium sulfate 2g in water 50mL IVPB (premix) (has no administration in time range)   atorvastatin tablet 40 mg (has no administration in time range)   clopidogreL tablet 75 mg (has no  administration in time range)   isosorbide mononitrate 24 hr tablet 30 mg (has no administration in time range)   levothyroxine tablet 25 mcg (has no administration in time range)   metoprolol tartrate (LOPRESSOR) tablet 50 mg (has no administration in time range)   pantoprazole EC tablet 40 mg (has no administration in time range)   tamsulosin 24 hr capsule 0.4 mg (has no administration in time range)   azelastine 137 mcg (0.1 %) nasal spray 137 mcg (has no administration in time range)   sodium chloride 0.9% flush 10 mL (has no administration in time range)   enoxaparin injection 40 mg (has no administration in time range)   ondansetron injection 4 mg (has no administration in time range)   polyethylene glycol packet 17 g (has no administration in time range)   acetaminophen tablet 650 mg (has no administration in time range)   HYDROcodone-acetaminophen 5-325 mg per tablet 1 tablet (has no administration in time range)   melatonin tablet 6 mg (has no administration in time range)   furosemide injection 40 mg (has no administration in time range)   potassium bicarbonate disintegrating tablet 50 mEq (has no administration in time range)   potassium bicarbonate disintegrating tablet 35 mEq (has no administration in time range)   potassium bicarbonate disintegrating tablet 60 mEq (has no administration in time range)   magnesium oxide tablet 800 mg (has no administration in time range)   magnesium oxide tablet 800 mg (has no administration in time range)   piperacillin-tazobactam (ZOSYN) 3.375 g in D5W 100 mL IVPB (MB+) (has no administration in time range)   glucose chewable tablet 16 g (has no administration in time range)   glucose chewable tablet 24 g (has no administration in time range)   dextrose 50% injection 12.5 g (has no administration in time range)   dextrose 50% injection 25 g (has no administration in time range)   glucagon (human recombinant) injection 1 mg (has no administration in time range)    insulin aspart U-100 pen 0-5 Units (has no administration in time range)   mupirocin 2 % ointment (has no administration in time range)   budesonide nebulizer solution 0.5 mg (has no administration in time range)     And   arformoteroL nebulizer solution 15 mcg (has no administration in time range)   furosemide injection 40 mg (40 mg Intravenous Given 1/29/25 0538)     Medical Decision Making  Patient unable to converse very well because of the BiPAP mask at the present time.      I received sign-out.  Patient with shortness of breath and requiring BiPAP.  Patient placed on BiPAP acute hypercapnic respiratory failure, patient given Lasix prior to me coming on shift.  Patient has markedly improved on my evaluation.  He seems be improved on BiPAP respiratory rate is normal now.  Patient chest x-ray concerning for pulmonary edema and possible superimposed pneumonia.  I will add on Zosyn blood cultures and lactic acid.  Patient likely has CHF as well.  He has already received IV Lasix.  Patient is DNR/DNI.  Patient will be admitted for respiratory care and IV antibiotics and IV diuretics.  Patient's daughter states that he was diagnosed with UTI yesterday and thinks that maybe he is getting septic from this.  He has not provided a urine yet here in the ER.  I discussed the case with Hospital Medicine with Caryl Guerrier who will admit the patient.    Amount and/or Complexity of Data Reviewed  External Data Reviewed: labs and notes.  Labs: ordered. Decision-making details documented in ED Course.  Radiology: ordered and independent interpretation performed. Decision-making details documented in ED Course.  ECG/medicine tests: ordered and independent interpretation performed. Decision-making details documented in ED Course.    Risk  OTC drugs.  Prescription drug management.  Decision regarding hospitalization.              Attending Attestation:             Attending ED Notes:   Attending Critical Care:   Critical Care  Times:   Direct Patient Care (initial evaluation, reassessments, and time considering the case)................................................................10 minutes.   Additional History from reviewing old medical records or taking additional history from the family, EMS, PCP, etc.......................10 minutes.   Ordering, Reviewing, and Interpreting Diagnostic Studies...............................................................................................................10 minutes.   Documentation..................................................................................................................................................................................5 minutes.   ==============================================================  · Total Critical Care Time - exclusive of procedural time: 35 minutes.  ==============================================================  Critical care was necessary to treat or prevent imminent or life-threatening deterioration of the following conditions:  CHF, pulmonary edema, pneumonia,.   Critical care was time spent personally by me on the following activities: obtaining history from patient or relative, examination of patient, review of x-rays / CT sent with the patient, ordering lab, x-rays, and/or EKG, development of treatment plan with patient or relative, ordering and performing treatments and interventions, interpretation of cardiac measurements and re-evaluation of patient's conition.   Critical Care Condition: potentially life-threatening         ED Course as of 01/29/25 0829   Wed Jan 29, 2025   0706 EKG 5:57 a.m. sinus tachycardia rate of 109, left bundle branch block, no STEMI.  EKG interpreted independently by me. [JR]   0741 I discussed the case with Dr. Ross from [facility] who will admit the patient [JR]      ED Course User Index  [JR] Nicolás Garcia DO                           Clinical Impression:  Final diagnoses:  [R06.02]  Shortness of breath (Primary)  [I50.9] Acute congestive heart failure, unspecified heart failure type  [J81.0] Acute pulmonary edema  [J96.21, J96.22] Acute on chronic respiratory failure with hypoxia and hypercapnia          ED Disposition Condition    Admit Stable                Nicolás Garcia DO  01/29/25 0829

## 2025-01-29 NOTE — SUBJECTIVE & OBJECTIVE
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 2004    CORONARY ARTERY BYPASS GRAFT  1996    5-bypass     CYSTOSCOPY N/A 8/15/2019    Procedure: CYSTOSCOPY;  Surgeon: Dipak Sanchez MD;  Location: [facility] OR;  Service: Urology;  Laterality: N/A;    CYSTOSCOPY N/A 10/31/2019    Procedure: CYSTOSCOPY;  Surgeon: Dipak Sanchez MD;  Location: [facility] OR;  Service: Urology;  Laterality: N/A;    CYSTOSCOPY W/ URETERAL STENT REMOVAL Left 10/31/2019    Procedure: CYSTOSCOPY, WITH URETERAL STENT REMOVAL  URETEROSCOPY;  Surgeon: Dipak Sanchez MD;  Location: [facility];  Service: Urology;  Laterality: Left;    EXTRACORPOREAL SHOCK WAVE LITHOTRIPSY Left 8/15/2019    Procedure: LITHOTRIPSY, ESWL;  Surgeon: Dipak Sanchez MD;  Location: [facility];  Service: Urology;  Laterality: Left;    EXTRACORPOREAL SHOCK WAVE LITHOTRIPSY Left 9/19/2019    Procedure: LITHOTRIPSY, ESWL;  Surgeon: Dipak Sanchez MD;  Location: [facility];  Service: Urology;  Laterality: Left;    EYE SURGERY Right 2014    Cataract    EYE SURGERY Left 2017    Cataract    HERNIA REPAIR  2014    Dr. Bailon - had to have mesh removed    INGUINAL HERNIA REPAIR Right 03/25/2019        LITHOTRIPSY      nephrostomy tube      PERCUTANEOUS NEPHROSTOMY      ROTATOR CUFF REPAIR  2005    right shoulder    URETEROSCOPY Left 10/31/2019     Procedure: URETEROSCOPY;  Surgeon: Dipak Sanchez MD;  Location: [facility];  Service: Urology;  Laterality: Left;       Review of patient's allergies indicates:   Allergen Reactions    Bactrim [sulfamethoxazole-trimethoprim] Hives     itched    Keflex [cephalexin] Rash    Morphine Rash     Patient had morphine and keflex at the same time, developed a rash, not sure which one caused it, or if it was a combination of the two meds.       No current facility-administered medications on file prior to encounter.     Current Outpatient Medications on File Prior to Encounter   Medication Sig    albuterol (PROVENTIL/VENTOLIN HFA) 90 mcg/actuation inhaler Inhale 2 puffs into the lungs every 4 (four) hours as needed for Wheezing. Rescue    atorvastatin (LIPITOR) 40 MG tablet Take 1 tablet (40 mg total) by mouth every evening.    azelastine (ASTELIN) 137 mcg (0.1 %) nasal spray 1 spray (137 mcg total) by Nasal route 2 (two) times daily as needed for Rhinitis.    furosemide (LASIX) 40 MG tablet Take 1 tablet (40 mg total) by mouth once daily.    glipiZIDE (GLUCOTROL) 5 MG tablet Take 5 mg by mouth daily with breakfast.    isosorbide mononitrate (IMDUR) 30 MG 24 hr tablet Take 1 tablet (30 mg total) by mouth once daily.    levothyroxine (SYNTHROID) 25 MCG tablet Take 25 mcg by mouth once daily.     metoprolol tartrate (LOPRESSOR) 50 MG tablet Take 1 tablet (50 mg total) by mouth 2 (two) times daily.    pantoprazole (PROTONIX) 40 MG tablet Take 1 tablet (40 mg total) by mouth once daily.    SYMBICORT 160-4.5 mcg/actuation HFAA Inhale 2 puffs into the lungs every 12 (twelve) hours.     tamsulosin (FLOMAX) 0.4 mg Cap Take 0.4 mg by mouth once daily.     clopidogreL (PLAVIX) 75 mg tablet Take 1 tablet (75 mg total) by mouth once daily. (Patient not taking: Reported on 1/29/2025)    [DISCONTINUED] benzocaine-menthoL 6-10 mg lozenge Take 1 lozenge by mouth every 2 (two) hours as needed (Sore Throat). (Patient not taking: Reported on  2025)    [DISCONTINUED] hydrocortisone 1 % CrPk Apply 1 Application topically daily as needed. (Patient not taking: Reported on 2025)     Family History       Problem Relation (Age of Onset)    Cancer Daughter    Diabetes Brother    Heart disease Mother, Brother, Brother, Sister    Hypertension Mother, Sister, Brother, Brother, Sister          Tobacco Use    Smoking status: Former     Current packs/day: 0.00     Average packs/day: 1 pack/day for 25.0 years (25.0 ttl pk-yrs)     Types: Cigarettes     Start date: 1947     Quit date: 1972     Years since quittin.1    Smokeless tobacco: Former     Quit date: 8/15/1972   Substance and Sexual Activity    Alcohol use: No    Drug use: No    Sexual activity: Not on file     Review of Systems   Constitutional:  Positive for chills and fatigue.   HENT:  Negative for congestion.    Eyes:  Negative for visual disturbance.   Respiratory:  Positive for shortness of breath.    Cardiovascular:  Positive for leg swelling. Negative for chest pain.   Gastrointestinal:  Positive for abdominal distention. Negative for diarrhea, nausea and vomiting.   Genitourinary:  Positive for decreased urine volume.   Musculoskeletal:  Negative for arthralgias.   Skin:  Negative for wound.   Neurological:  Positive for weakness.   Psychiatric/Behavioral:  Negative for confusion.      Objective:     Vital Signs (Most Recent):  Temp: 98 °F (36.7 °C) (25 0506)  Pulse: 92 (25 1000)  Resp: 20 (25 1000)  BP: (!) 117/56 (25 1000)  SpO2: 97 % (25 1000) Vital Signs (24h Range):  Temp:  [98 °F (36.7 °C)] 98 °F (36.7 °C)  Pulse:  [] 92  Resp:  [19-33] 20  SpO2:  [97 %-99 %] 97 %  BP: (117-183)/(56-84) 117/56     Weight: 88.5 kg (195 lb)  Body mass index is 30.54 kg/m².     Physical Exam  Vitals and nursing note reviewed.   Constitutional:       Appearance: He is obese.      Comments: Advanced age   HENT:      Head: Normocephalic and atraumatic.       Nose: Nose normal.      Mouth/Throat:      Mouth: Mucous membranes are moist.      Pharynx: Oropharynx is clear.   Eyes:      Conjunctiva/sclera: Conjunctivae normal.      Pupils: Pupils are equal, round, and reactive to light.   Cardiovascular:      Rate and Rhythm: Normal rate and regular rhythm.      Heart sounds: Murmur heard.   Pulmonary:      Breath sounds: Rales present.      Comments: On bipap, rales throughout, diminished  Abdominal:      Palpations: Abdomen is soft.      Comments: Abd is round, reducible right hernia    Musculoskeletal:         General: Normal range of motion.      Cervical back: Normal range of motion and neck supple.      Right lower leg: Edema present.      Left lower leg: Edema present.   Skin:     General: Skin is warm and dry.      Capillary Refill: Capillary refill takes less than 2 seconds.      Coloration: Skin is pale.   Neurological:      Mental Status: He is alert and oriented to person, place, and time. Mental status is at baseline.   Psychiatric:         Mood and Affect: Mood normal.         Behavior: Behavior normal.              CRANIAL NERVES     CN III, IV, VI   Pupils are equal, round, and reactive to light.       Significant Labs: All pertinent labs within the past 24 hours have been reviewed.  CBC:   Recent Labs   Lab 01/29/25 0517 01/29/25 0521   WBC 20.25*  --    HGB 16.3  --    HCT 51.6 53   *  --      CMP:   Recent Labs   Lab 01/29/25 0517      K 3.7      CO2 28   *   BUN 21   CREATININE 1.1   CALCIUM 9.5   PROT 7.8   ALBUMIN 4.5   BILITOT 1.5*   ALKPHOS 103   AST 27   ALT 23   ANIONGAP 8     Cardiac Markers:   Recent Labs   Lab 01/29/25 0517   *     Troponin:   Recent Labs   Lab 01/29/25 0517   TROPONINIHS 14.5       Significant Imaging: I have reviewed all pertinent imaging results/findings within the past 24 hours.    X-Ray Chest 1 View    Result Date: 1/29/2025  EXAMINATION: XR CHEST 1 VIEW CLINICAL HISTORY: shortness of  breath; TECHNIQUE: Single frontal view of the chest was performed. COMPARISON: 07/18/2024 FINDINGS: Stable cardiomediastinal silhouette.  Aortic calcification.  Prior median sternotomy.  Pulmonary vascular congestion and diffuse bilateral interstitial opacities throughout the lungs, progressed compared to prior exam.  Findings are suggestive for pulmonary edema however cannot exclude infection.  Mild blunting of the bilateral costophrenic angles which could represent small pleural effusions and or atelectasis.  No pneumothorax.  Degenerative changes of the osseous structures.     As above. Electronically signed by: Osei Zavala Date:    01/29/2025 Time:    06:52

## 2025-01-29 NOTE — CARE UPDATE
01/29/25 0722   Patient Assessment/Suction   Level of Consciousness (AVPU) alert   Respiratory Effort Normal;Unlabored   Expansion/Accessory Muscles/Retractions no use of accessory muscles   All Lung Fields Breath Sounds clear;diminished   Rhythm/Pattern, Respiratory assisted mechanically   Cough Frequency no cough   Skin Integrity   $ Wound Care Tech Time 15 min   Area Observed Bridge of nose   Skin Appearance without discoloration   Barrier used? Transparent Film   PRE-TX-O2   Device (Oxygen Therapy) BIPAP   Oxygen Concentration (%) 35  (decreased from 40%)   SpO2 97 %   Pulse Oximetry Type Continuous   $ Pulse Oximetry - Multiple Charge Pulse Oximetry - Multiple   Pulse 101   Resp 20   Preset CPAP/BiPAP Settings   Mode Of Delivery BiPAP S/T   CPAP/BIPAP charged w/in last 24 h YES   $ Initial CPAP/BiPAP Setup? No   $ Is patient using? Yes   Size of Mask Medium/Large   Sized Appropriately? Yes   Equipment Type V60   Ipap 12   EPAP (cm H2O) 6   Pressure Support (cm H2O) 6   Set Rate (Breaths/Min) 12   Flow Rate (L/Min) 1   ITime (sec) 3   Patient CPAP/BiPAP Settings   CPAP/BIPAP ID 17   RR Total (Breaths/Min) 20   Tidal Volume (mL) 573   VE Minute Ventilation (L/min) 11.5 L/min   Peak Inspiratory Pressure (cm H2O) 12   TiTOT (%) 31   Total Leak (L/Min) 14   Patient Trigger - ST Mode Only (%) 100   CPAP/BiPAP Backup Settings   Backup Rate 12 breaths per minute (bpm)   CPAP/BiPAP Alarms   High Pressure (cm H2O) 40   Low Pressure (cm H2O) 5   Minute Ventilation (L/Min) 3   High RR (breaths/min) 50   Low RR (breaths/min) 8   Apnea (Sec) 20   Education   $ Education BiPAP;15 min

## 2025-01-29 NOTE — H&P
[facility] Medicine  History & Physical    Patient Name: Frank Wyatt  MRN: [id number]  Patient Class: IP- Inpatient  Admission Date: 1/29/2025  Attending Physician: Handy Conroy MD   Primary Care Provider: Darci German MD         Patient information was obtained from relative(s), past medical records, and ER records.     Subjective:     Principal Problem:Acute hypercapnic respiratory failure    Chief Complaint:   Chief Complaint   Patient presents with    Shortness of Breath     Pt from [facility] with swelling in his ankles and increased SOB for the last 8 hours         HPI: Mr. 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 It is severe. It is associated with bilat LE edema, orthopnea, and rigors. He denies measured fever, chest pain, N/V/D, dizziness, or LOC. He lives at an assisted living facility - [facility]. Symptoms began this morning. Daughter reports this happened last year and he was septic from a UTI. At that time he required BiPAP and levophed. He was evaluated for home O2, but did not qualify. Urine grew P. Mirabalis with flouroquinolone and bactrim resistance. In the ED, he was placed on BiPAP pH 7.24, pCO2 66, pO2 136. He is afebrile HR initially in the 110s on arrival, but improved to 90s. EKG Sinus Tach 109 with LBBB as prior. CXR with increased bilat interstitial opacities c/w pulm edema. , Trop WNL. WBC 20K. ER MD ordered merino catheter for accurate I&O, but family declined at this time. UA is pending. Hospital medicine is consulted for admission.    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 2004    CORONARY ARTERY BYPASS GRAFT  1996    5-bypass     CYSTOSCOPY N/A 8/15/2019    Procedure: CYSTOSCOPY;  Surgeon: Dipak Sanchez MD;  Location: [facility] OR;  Service: Urology;  Laterality: N/A;    CYSTOSCOPY N/A 10/31/2019    Procedure: CYSTOSCOPY;  Surgeon: Dipak Sanchez MD;  Location: [facility] OR;  Service: Urology;  Laterality: N/A;    CYSTOSCOPY W/ URETERAL STENT REMOVAL Left 10/31/2019    Procedure: CYSTOSCOPY, WITH URETERAL STENT REMOVAL  URETEROSCOPY;  Surgeon: Dipak Sanchez MD;  Location: [facility] OR;  Service: Urology;  Laterality: Left;    EXTRACORPOREAL SHOCK WAVE LITHOTRIPSY Left 8/15/2019    Procedure: LITHOTRIPSY, ESWL;  Surgeon: Dipak Sanchez MD;  Location: [facility] OR;  Service: Urology;  Laterality: Left;    EXTRACORPOREAL SHOCK WAVE LITHOTRIPSY Left 9/19/2019    Procedure: LITHOTRIPSY, ESWL;  Surgeon: Dipak Sanchez MD;  Location: [facility] OR;  Service: Urology;  Laterality: Left;    EYE SURGERY Right 2014    Cataract    EYE SURGERY Left 2017    Cataract    HERNIA REPAIR  2014    Dr. Bailon - had to have mesh removed    INGUINAL HERNIA REPAIR Right 03/25/2019        LITHOTRIPSY      nephrostomy tube      PERCUTANEOUS NEPHROSTOMY      ROTATOR CUFF REPAIR  2005    right shoulder    URETEROSCOPY Left 10/31/2019    Procedure: URETEROSCOPY;  Surgeon: Dipak Sanchez MD;  Location: [facility];  Service: Urology;  Laterality: Left;       Review of patient's allergies indicates:   Allergen Reactions    Bactrim [sulfamethoxazole-trimethoprim] Hives     itched    Keflex [cephalexin] Rash     Morphine Rash     Patient had morphine and keflex at the same time, developed a rash, not sure which one caused it, or if it was a combination of the two meds.       No current facility-administered medications on file prior to encounter.     Current Outpatient Medications on File Prior to Encounter   Medication Sig    albuterol (PROVENTIL/VENTOLIN HFA) 90 mcg/actuation inhaler Inhale 2 puffs into the lungs every 4 (four) hours as needed for Wheezing. Rescue    atorvastatin (LIPITOR) 40 MG tablet Take 1 tablet (40 mg total) by mouth every evening.    azelastine (ASTELIN) 137 mcg (0.1 %) nasal spray 1 spray (137 mcg total) by Nasal route 2 (two) times daily as needed for Rhinitis.    furosemide (LASIX) 40 MG tablet Take 1 tablet (40 mg total) by mouth once daily.    glipiZIDE (GLUCOTROL) 5 MG tablet Take 5 mg by mouth daily with breakfast.    isosorbide mononitrate (IMDUR) 30 MG 24 hr tablet Take 1 tablet (30 mg total) by mouth once daily.    levothyroxine (SYNTHROID) 25 MCG tablet Take 25 mcg by mouth once daily.     metoprolol tartrate (LOPRESSOR) 50 MG tablet Take 1 tablet (50 mg total) by mouth 2 (two) times daily.    pantoprazole (PROTONIX) 40 MG tablet Take 1 tablet (40 mg total) by mouth once daily.    SYMBICORT 160-4.5 mcg/actuation HFAA Inhale 2 puffs into the lungs every 12 (twelve) hours.     tamsulosin (FLOMAX) 0.4 mg Cap Take 0.4 mg by mouth once daily.     clopidogreL (PLAVIX) 75 mg tablet Take 1 tablet (75 mg total) by mouth once daily. (Patient not taking: Reported on 1/29/2025)    [DISCONTINUED] benzocaine-menthoL 6-10 mg lozenge Take 1 lozenge by mouth every 2 (two) hours as needed (Sore Throat). (Patient not taking: Reported on 1/29/2025)    [DISCONTINUED] hydrocortisone 1 % CrPk Apply 1 Application topically daily as needed. (Patient not taking: Reported on 1/29/2025)     Family History       Problem Relation (Age of Onset)    Cancer Daughter    Diabetes Brother    Heart disease Mother, Brother,  Brother, Sister    Hypertension Mother, Sister, Brother, Brother, Sister          Tobacco Use    Smoking status: Former     Current packs/day: 0.00     Average packs/day: 1 pack/day for 25.0 years (25.0 ttl pk-yrs)     Types: Cigarettes     Start date: 1947     Quit date: 1972     Years since quittin.1    Smokeless tobacco: Former     Quit date: 8/15/1972   Substance and Sexual Activity    Alcohol use: No    Drug use: No    Sexual activity: Not on file     Review of Systems   Constitutional:  Positive for chills and fatigue.   HENT:  Negative for congestion.    Eyes:  Negative for visual disturbance.   Respiratory:  Positive for shortness of breath.    Cardiovascular:  Positive for leg swelling. Negative for chest pain.   Gastrointestinal:  Positive for abdominal distention. Negative for diarrhea, nausea and vomiting.   Genitourinary:  Positive for decreased urine volume.   Musculoskeletal:  Negative for arthralgias.   Skin:  Negative for wound.   Neurological:  Positive for weakness.   Psychiatric/Behavioral:  Negative for confusion.      Objective:     Vital Signs (Most Recent):  Temp: 98 °F (36.7 °C) (25 0506)  Pulse: 92 (25 1000)  Resp: 20 (25 1000)  BP: (!) 117/56 (25 1000)  SpO2: 97 % (25 1000) Vital Signs (24h Range):  Temp:  [98 °F (36.7 °C)] 98 °F (36.7 °C)  Pulse:  [] 92  Resp:  [19-33] 20  SpO2:  [97 %-99 %] 97 %  BP: (117-183)/(56-84) 117/56     Weight: 88.5 kg (195 lb)  Body mass index is 30.54 kg/m².     Physical Exam  Vitals and nursing note reviewed.   Constitutional:       Appearance: He is obese.      Comments: Advanced age   HENT:      Head: Normocephalic and atraumatic.      Nose: Nose normal.      Mouth/Throat:      Mouth: Mucous membranes are moist.      Pharynx: Oropharynx is clear.   Eyes:      Conjunctiva/sclera: Conjunctivae normal.      Pupils: Pupils are equal, round, and reactive to light.   Cardiovascular:      Rate and Rhythm: Normal  rate and regular rhythm.      Heart sounds: Murmur heard.   Pulmonary:      Breath sounds: Rales present.      Comments: On bipap, rales throughout, diminished  Abdominal:      Palpations: Abdomen is soft.      Comments: Abd is round, reducible right hernia    Musculoskeletal:         General: Normal range of motion.      Cervical back: Normal range of motion and neck supple.      Right lower leg: Edema present.      Left lower leg: Edema present.   Skin:     General: Skin is warm and dry.      Capillary Refill: Capillary refill takes less than 2 seconds.      Coloration: Skin is pale.   Neurological:      Mental Status: He is alert and oriented to person, place, and time. Mental status is at baseline.   Psychiatric:         Mood and Affect: Mood normal.         Behavior: Behavior normal.              CRANIAL NERVES     CN III, IV, VI   Pupils are equal, round, and reactive to light.       Significant Labs: All pertinent labs within the past 24 hours have been reviewed.  CBC:   Recent Labs   Lab 01/29/25 0517 01/29/25 0521   WBC 20.25*  --    HGB 16.3  --    HCT 51.6 53   *  --      CMP:   Recent Labs   Lab 01/29/25 0517      K 3.7      CO2 28   *   BUN 21   CREATININE 1.1   CALCIUM 9.5   PROT 7.8   ALBUMIN 4.5   BILITOT 1.5*   ALKPHOS 103   AST 27   ALT 23   ANIONGAP 8     Cardiac Markers:   Recent Labs   Lab 01/29/25 0517   *     Troponin:   Recent Labs   Lab 01/29/25 0517   TROPONINIHS 14.5       Significant Imaging: I have reviewed all pertinent imaging results/findings within the past 24 hours.    X-Ray Chest 1 View    Result Date: 1/29/2025  EXAMINATION: XR CHEST 1 VIEW CLINICAL HISTORY: shortness of breath; TECHNIQUE: Single frontal view of the chest was performed. COMPARISON: 07/18/2024 FINDINGS: Stable cardiomediastinal silhouette.  Aortic calcification.  Prior median sternotomy.  Pulmonary vascular congestion and diffuse bilateral interstitial opacities throughout the  lungs, progressed compared to prior exam.  Findings are suggestive for pulmonary edema however cannot exclude infection.  Mild blunting of the bilateral costophrenic angles which could represent small pleural effusions and or atelectasis.  No pneumothorax.  Degenerative changes of the osseous structures.     As above. Electronically signed by: Osei Zavala Date:    01/29/2025 Time:    06:52     Assessment/Plan:     * Acute hypercapnic respiratory failure  Admit to step down   BiPAP for now, wean as tolerated  D/t HF exacerbation +/- pna    Patient with Hypercapnic Respiratory failure which is Acute.  he is not on home oxygen. Supplemental oxygen was provided and noted- Oxygen Concentration (%):  [35-60] 35    .   Signs/symptoms of respiratory failure include- tachypnea and increased work of breathing. Contributing diagnoses includes - CHF and Pneumonia Labs and images were reviewed. Patient Has recent ABG, which has been reviewed. Will treat underlying causes and adjust management of respiratory failure as follows-     Repeat ABG   Wean BiPAP as tolerated    Sepsis  This patient does have evidence of infective focus  My overall impression is sepsis.  Source: Respiratory and possibly urine - UA is still pending  Linezolid d/t CKD   Zosyn (cephalosporin allergy)  MRSA screen - if negative de-escalate abx as clinically indicated  Antibiotics given-   Antibiotics (72h ago, onward)      Start     Stop Route Frequency Ordered    01/29/25 1800  piperacillin-tazobactam (ZOSYN) 3.375 g in D5W 100 mL IVPB (MB+)         -- IV Every 8 hours (non-standard times) 01/29/25 0754    01/29/25 1200  linezolid 600 mg/300 mL IVPB 600 mg         -- IV Every 12 hours (non-standard times) 01/29/25 1051    01/29/25 0900  mupirocin 2 % ointment         02/03/25 0859 Nasl 2 times daily 01/29/25 0755          Latest lactate reviewed-  Recent Labs   Lab 01/29/25  0934   LACTATE 1.2     Organ dysfunction indicated by Acute respiratory  failure    Fluid challenge Contraindicated- Fluid bolus is contraindicated in this patient due to Congestive Heart Failure     Post- resuscitation assessment No - Post resuscitation assessment not needed       Will Not start Pressors- Levophed for MAP of 65  Source control achieved by: abx    Acute on chronic diastolic heart failure  Patient has Combined Systolic and Diastolic heart failure that is Acute on chronic. On presentation their CHF was decompensated. Evidence of decompensated CHF on presentation includes: edema, crackles on lung auscultation, paroxysmal nocturnal dyspnea (PND), and shortness of breath. The etiology of their decompensation is likely acute infection . Most recent BNP and echo results are listed below.  Recent Labs     01/29/25  0517   *     Latest ECHO  Results for orders placed during the hospital encounter of 03/24/23    Echo    Interpretation Summary  · The left ventricle is normal in size with moderate concentric hypertrophy and low normal systolic function.  · Mild left atrial enlargement.  · The estimated ejection fraction is 50%.  · Indeterminate left ventricular diastolic function.  · RV is not well visualized. Mildly to moderately reduced right ventricular systolic function.  · The mean diastolic gradient across the mitral valve is 13 mmHg at a heart rate of 87 bpm.  · There is moderate to severe mitral stenosis.  · There is moderate-to-severe aortic valve stenosis.  · Aortic valve area is 1.06 cm2; peak velocity is 2.75 m/s; mean gradient is 17 mmHg. Indexed area is 0.45-0.5 cm/m2. Indexed stroke volume is 29 ml/m2.  · Normal central venous pressure (3 mmHg).    Current Heart Failure Medications  furosemide injection 40 mg, Every 12 hours, Intravenous    Plan  - Monitor strict I&Os and daily weights.    - Place on telemetry  - Low sodium diet  - Place on fluid restriction of 1.5 L.   - Cardiology has not been consulted  - The patient's volume status is improving but not at  their baseline as indicated by edema, crackles on lung auscultation, and orthopnea  - Repeat Echo  - known to Dr. Kim - last seen about 6 months ago        Hypothyroidism  Continue home levothyroxine      COPD (chronic obstructive pulmonary disease)  Patient's COPD is controlled currently.  Patient is currently off COPD Pathway. Continue scheduled inhalers Antibiotics and Supplemental oxygen and monitor respiratory status closely.     Does not appear to be wheezing at this time  Schedule nebs - became tachy on duonebs last hospitalization, will schedule levalbuterol  Hold of on steroids    CKD (chronic kidney disease), stage III  Creatine stable for now. BMP reviewed- noted Estimated Creatinine Clearance: 47.4 mL/min (based on SCr of 1.1 mg/dL). according to latest data. Based on current GFR, CKD stage is stage 3 - GFR 30-59.  Monitor UOP and serial BMP and adjust therapy as needed. Renally dose meds. Avoid nephrotoxic medications and procedures.      VTE Risk Mitigation (From admission, onward)           Ordered     enoxaparin injection 40 mg  Daily         01/29/25 0752     IP VTE HIGH RISK PATIENT  Once         01/29/25 0752     Place sequential compression device  Until discontinued         01/29/25 0752                               Automatic Inhaler to Nebulizer Interchange    fluticasone/vilanterol (Breo Ellipta) 100 mcg/25 mcg changed to budesonide 0.5 mg twice daily AND arformoterol 15 mcg twice daily per [facility] Automatic Therapeutic Substitutions Protocol.    Please contact pharmacy at extension [phone] with any questions.     Thank you,   Leila Guerrier NP  Department of Hospital Medicine  [facility]

## 2025-01-29 NOTE — HPI
Mr. 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 It is severe. It is associated with bilat LE edema, orthopnea, and rigors. He denies measured fever, chest pain, N/V/D, dizziness, or LOC. He lives at an assisted living facility - [facility]. Symptoms began this morning. Daughter reports this happened last year and he was septic from a UTI. At that time he required BiPAP and levophed. He was evaluated for home O2, but did not qualify. Urine grew P. Mirabalis with flouroquinolone and bactrim resistance. In the ED, he was placed on BiPAP pH 7.24, pCO2 66, pO2 136. He is afebrile HR initially in the 110s on arrival, but improved to 90s. EKG Sinus Tach 109 with LBBB as prior. CXR with increased bilat interstitial opacities c/w pulm edema. , Trop WNL. WBC 20K. ER MD ordered merino catheter for accurate I&O, but family declined at this time. UA is pending. Hospital medicine is consulted for admission.

## 2025-01-30 LAB
ALBUMIN SERPL BCP-MCNC: 3.6 G/DL (ref 3.5–5.2)
ALP SERPL-CCNC: 72 U/L (ref 55–135)
ALT SERPL W/O P-5'-P-CCNC: 20 U/L (ref 10–44)
ANION GAP SERPL CALC-SCNC: 5 MMOL/L (ref 8–16)
AST SERPL-CCNC: 26 U/L (ref 10–40)
BACTERIA UR CULT: NORMAL
BACTERIA UR CULT: NORMAL
BASOPHILS # BLD AUTO: 0.03 K/UL (ref 0–0.2)
BASOPHILS NFR BLD: 0.3 % (ref 0–1.9)
BILIRUB SERPL-MCNC: 1.2 MG/DL (ref 0.1–1)
BUN SERPL-MCNC: 36 MG/DL (ref 8–23)
CALCIUM SERPL-MCNC: 9.1 MG/DL (ref 8.7–10.5)
CHLORIDE SERPL-SCNC: 97 MMOL/L (ref 95–110)
CO2 SERPL-SCNC: 33 MMOL/L (ref 23–29)
CREAT SERPL-MCNC: 1.5 MG/DL (ref 0.5–1.4)
DIFFERENTIAL METHOD BLD: ABNORMAL
EOSINOPHIL # BLD AUTO: 0.3 K/UL (ref 0–0.5)
EOSINOPHIL NFR BLD: 2.7 % (ref 0–8)
ERYTHROCYTE [DISTWIDTH] IN BLOOD BY AUTOMATED COUNT: 15.3 % (ref 11.5–14.5)
EST. GFR  (NO RACE VARIABLE): 44 ML/MIN/1.73 M^2
GLUCOSE SERPL-MCNC: 138 MG/DL (ref 70–110)
HCT VFR BLD AUTO: 41.7 % (ref 40–54)
HGB BLD-MCNC: 13.6 G/DL (ref 14–18)
IMM GRANULOCYTES # BLD AUTO: 0.04 K/UL (ref 0–0.04)
IMM GRANULOCYTES NFR BLD AUTO: 0.3 % (ref 0–0.5)
LYMPHOCYTES # BLD AUTO: 1 K/UL (ref 1–4.8)
LYMPHOCYTES NFR BLD: 9 % (ref 18–48)
MAGNESIUM SERPL-MCNC: 2 MG/DL (ref 1.6–2.6)
MCH RBC QN AUTO: 29.6 PG (ref 27–31)
MCHC RBC AUTO-ENTMCNC: 32.6 G/DL (ref 32–36)
MCV RBC AUTO: 91 FL (ref 82–98)
MONOCYTES # BLD AUTO: 0.6 K/UL (ref 0.3–1)
MONOCYTES NFR BLD: 5.6 % (ref 4–15)
NEUTROPHILS # BLD AUTO: 9.4 K/UL (ref 1.8–7.7)
NEUTROPHILS NFR BLD: 82.1 % (ref 38–73)
NRBC BLD-RTO: 0 /100 WBC
PLATELET # BLD AUTO: 89 K/UL (ref 150–450)
PLATELET BLD QL SMEAR: ABNORMAL
PMV BLD AUTO: 10.2 FL (ref 9.2–12.9)
POCT GLUCOSE: 143 MG/DL (ref 70–110)
POCT GLUCOSE: 148 MG/DL (ref 70–110)
POCT GLUCOSE: 159 MG/DL (ref 70–110)
POCT GLUCOSE: 169 MG/DL (ref 70–110)
POTASSIUM SERPL-SCNC: 3.9 MMOL/L (ref 3.5–5.1)
PROT SERPL-MCNC: 6.4 G/DL (ref 6–8.4)
RBC # BLD AUTO: 4.59 M/UL (ref 4.6–6.2)
SODIUM SERPL-SCNC: 135 MMOL/L (ref 136–145)
WBC # BLD AUTO: 11.46 K/UL (ref 3.9–12.7)

## 2025-01-30 PROCEDURE — 25000242 PHARM REV CODE 250 ALT 637 W/ HCPCS: Performed by: NURSE PRACTITIONER

## 2025-01-30 PROCEDURE — 80053 COMPREHEN METABOLIC PANEL: CPT | Performed by: NURSE PRACTITIONER

## 2025-01-30 PROCEDURE — 94640 AIRWAY INHALATION TREATMENT: CPT

## 2025-01-30 PROCEDURE — 63600175 PHARM REV CODE 636 W HCPCS: Performed by: FAMILY MEDICINE

## 2025-01-30 PROCEDURE — 36415 COLL VENOUS BLD VENIPUNCTURE: CPT | Performed by: NURSE PRACTITIONER

## 2025-01-30 PROCEDURE — 25000003 PHARM REV CODE 250: Performed by: NURSE PRACTITIONER

## 2025-01-30 PROCEDURE — 99900031 HC PATIENT EDUCATION (STAT)

## 2025-01-30 PROCEDURE — 63600175 PHARM REV CODE 636 W HCPCS: Performed by: NURSE PRACTITIONER

## 2025-01-30 PROCEDURE — 83735 ASSAY OF MAGNESIUM: CPT | Performed by: NURSE PRACTITIONER

## 2025-01-30 PROCEDURE — 25000003 PHARM REV CODE 250: Performed by: INTERNAL MEDICINE

## 2025-01-30 PROCEDURE — 27000221 HC OXYGEN, UP TO 24 HOURS

## 2025-01-30 PROCEDURE — 94761 N-INVAS EAR/PLS OXIMETRY MLT: CPT

## 2025-01-30 PROCEDURE — 85025 COMPLETE CBC W/AUTO DIFF WBC: CPT | Performed by: NURSE PRACTITIONER

## 2025-01-30 PROCEDURE — 21400001 HC TELEMETRY ROOM

## 2025-01-30 PROCEDURE — 25000003 PHARM REV CODE 250: Performed by: FAMILY MEDICINE

## 2025-01-30 PROCEDURE — 25000242 PHARM REV CODE 250 ALT 637 W/ HCPCS: Performed by: INTERNAL MEDICINE

## 2025-01-30 RX ORDER — DIPHENHYDRAMINE HYDROCHLORIDE 50 MG/ML
25 INJECTION INTRAMUSCULAR; INTRAVENOUS EVERY 6 HOURS PRN
Status: DISCONTINUED | OUTPATIENT
Start: 2025-01-30 | End: 2025-02-02 | Stop reason: HOSPADM

## 2025-01-30 RX ORDER — CEFTRIAXONE 1 G/1
1 INJECTION, POWDER, FOR SOLUTION INTRAMUSCULAR; INTRAVENOUS
Status: DISCONTINUED | OUTPATIENT
Start: 2025-01-30 | End: 2025-01-30

## 2025-01-30 RX ADMIN — MUPIROCIN 1 G: 20 OINTMENT TOPICAL at 08:01

## 2025-01-30 RX ADMIN — IPRATROPIUM BROMIDE 0.5 MG: 0.5 SOLUTION RESPIRATORY (INHALATION) at 07:01

## 2025-01-30 RX ADMIN — FUROSEMIDE 40 MG: 10 INJECTION, SOLUTION INTRAMUSCULAR; INTRAVENOUS at 08:01

## 2025-01-30 RX ADMIN — ATORVASTATIN CALCIUM 40 MG: 40 TABLET, FILM COATED ORAL at 08:01

## 2025-01-30 RX ADMIN — ARFORMOTEROL TARTRATE 15 MCG: 15 SOLUTION RESPIRATORY (INHALATION) at 08:01

## 2025-01-30 RX ADMIN — LEVALBUTEROL HYDROCHLORIDE 1.25 MG: 1.25 SOLUTION RESPIRATORY (INHALATION) at 02:01

## 2025-01-30 RX ADMIN — LEVALBUTEROL HYDROCHLORIDE 1.25 MG: 1.25 SOLUTION RESPIRATORY (INHALATION) at 07:01

## 2025-01-30 RX ADMIN — ACETAMINOPHEN 650 MG: 325 TABLET ORAL at 06:01

## 2025-01-30 RX ADMIN — METOPROLOL TARTRATE 50 MG: 50 TABLET, FILM COATED ORAL at 08:01

## 2025-01-30 RX ADMIN — FUROSEMIDE 40 MG: 10 INJECTION, SOLUTION INTRAMUSCULAR; INTRAVENOUS at 09:01

## 2025-01-30 RX ADMIN — LINEZOLID 600 MG: 600 INJECTION, SOLUTION INTRAVENOUS at 03:01

## 2025-01-30 RX ADMIN — PANTOPRAZOLE SODIUM 40 MG: 40 TABLET, DELAYED RELEASE ORAL at 05:01

## 2025-01-30 RX ADMIN — BUDESONIDE INHALATION 0.5 MG: 0.5 SUSPENSION RESPIRATORY (INHALATION) at 07:01

## 2025-01-30 RX ADMIN — ISOSORBIDE MONONITRATE 30 MG: 30 TABLET, EXTENDED RELEASE ORAL at 08:01

## 2025-01-30 RX ADMIN — ENOXAPARIN SODIUM 40 MG: 40 INJECTION SUBCUTANEOUS at 05:01

## 2025-01-30 RX ADMIN — CEFTRIAXONE 1 G: 1 INJECTION, POWDER, FOR SOLUTION INTRAMUSCULAR; INTRAVENOUS at 12:01

## 2025-01-30 RX ADMIN — CLOPIDOGREL BISULFATE 75 MG: 75 TABLET, FILM COATED ORAL at 08:01

## 2025-01-30 RX ADMIN — IPRATROPIUM BROMIDE 0.5 MG: 0.5 SOLUTION RESPIRATORY (INHALATION) at 01:01

## 2025-01-30 RX ADMIN — ARFORMOTEROL TARTRATE 15 MCG: 15 SOLUTION RESPIRATORY (INHALATION) at 07:01

## 2025-01-30 RX ADMIN — AMPICILLIN 2 G: 2 INJECTION, POWDER, FOR SOLUTION INTRAMUSCULAR; INTRAVENOUS at 10:01

## 2025-01-30 RX ADMIN — AMPICILLIN 2 G: 2 INJECTION, POWDER, FOR SOLUTION INTRAMUSCULAR; INTRAVENOUS at 03:01

## 2025-01-30 RX ADMIN — LEVOTHYROXINE SODIUM 25 MCG: 0.03 TABLET ORAL at 05:01

## 2025-01-30 RX ADMIN — PIPERACILLIN AND TAZOBACTAM 3.38 G: 3; .375 INJECTION, POWDER, FOR SOLUTION INTRAVENOUS; PARENTERAL at 05:01

## 2025-01-30 RX ADMIN — TAMSULOSIN HYDROCHLORIDE 0.4 MG: 0.4 CAPSULE ORAL at 08:01

## 2025-01-30 NOTE — SUBJECTIVE & OBJECTIVE
Interval History:  Patient said he feels much better    Review of Systems   All other systems reviewed and are negative.    Objective:     Vital Signs (Most Recent):  Temp: 97.6 °F (36.4 °C) (01/30/25 0350)  Pulse: 87 (01/30/25 1400)  Resp: 18 (01/30/25 1400)  BP: 122/69 (01/30/25 0350)  SpO2: 95 % (01/30/25 1400) Vital Signs (24h Range):  Temp:  [97.4 °F (36.3 °C)-97.6 °F (36.4 °C)] 97.6 °F (36.4 °C)  Pulse:  [58-88] 87  Resp:  [15-18] 18  SpO2:  [95 %-100 %] 95 %  BP: (101-122)/(49-69) 122/69     Weight: 88.5 kg (195 lb 1.7 oz)  Body mass index is 30.56 kg/m².    Intake/Output Summary (Last 24 hours) at 1/30/2025 1540  Last data filed at 1/30/2025 1424  Gross per 24 hour   Intake 900 ml   Output 1900 ml   Net -1000 ml         Physical Exam  Cardiovascular:      Rate and Rhythm: Normal rate.      Pulses: Normal pulses.   Pulmonary:      Effort: Pulmonary effort is normal.   Neurological:      Mental Status: He is alert and oriented to person, place, and time.             Significant Labs: All pertinent labs within the past 24 hours have been reviewed.    Significant Imaging: I have reviewed all pertinent imaging results/findings within the past 24 hours.

## 2025-01-30 NOTE — HOSPITAL COURSE
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  Daughter said every time he gets UTI, he become septic which leads to shortness of breath which usually is resolved by IV antibiotics.  Patient was given IV ceftriaxone and he had an allergic reaction so it was switched to ampicillin based on previous urine analysis. Urine culture this admission grew mixed growth, blood culture negative.  Patient was switched to Augmentin on 1/31 and will monitor for 24 hrs per daughter request since patient was recurrent admissions in the past. PT/OT was consulted. Patient worked with PT, OT and recommended home health.     Next day on exam, patient is feeling well, he is sitting up in the chair and denies any complains of SOB, chest pain, no dysuria or any other urinary symptoms. Patient has been afebrile and leukocytosis is resolved. Patient's daughter is at bedside. Patient was started on PO Augmentin yesterday. Daughter states they want to observe until tomorrow because patient decompensate easily. I explained in detail that his urine culture is mixed growth. Patient's only urinary symptoms on presentation was mild blood in urine and foul smelling. Never had any dysuria or fever or AMS. Given blood and urine culture is finalized and leukocytosis is resolved and patient is afebrile and also as agreed with the previous provider, 24 hrs on PO antibiotics will be this evening. I explained that patient is medically stable for discharge and no medical reason to stay extra night in the hospital. Patient walked 125 feet with supervision level with rolling walker with PT yesterday and only recommended home health. But daughter is adamant and states that they refuse to leave. I explained that they can appeal for discharge if they wish, but medical factors and data is in favor or discharge.    Acute osteomyelitis Acute osteomyelitis Acute osteomyelitis

## 2025-01-30 NOTE — PT/OT/SLP PROGRESS
Occupational Therapy      Patient Name:  Frank Wyatt   MRN:  [id number]    Patient not seen today secondary to  (Occupational Therapy Unavailable.). Will follow-up tomorrow.    1/30/2025

## 2025-01-30 NOTE — ASSESSMENT & PLAN NOTE
dysuria and acute on chronic respiratory failure.    Daughter said every time he gets UTI, he become septic which leads to shortness of breath which usually is resolved by IV antibiotics.    Patient was given IV ceftriaxone and he had an allergic reaction so it was switched to ampicillin based on previous urine analysis.    Current urine analysis is pending.

## 2025-01-30 NOTE — PLAN OF CARE
Problem: Infection  Goal: Absence of Infection Signs and Symptoms  1/30/2025 0649 by Neyda Fierro RN  Outcome: Progressing  1/30/2025 0613 by Neyda Fierro RN  Outcome: Progressing     Problem: Adult Inpatient Plan of Care  Goal: Plan of Care Review  1/30/2025 0649 by Neyda Fierro RN  Outcome: Progressing  1/30/2025 0613 by Neyda Fierro RN  Outcome: Progressing  Goal: Patient-Specific Goal (Individualized)  1/30/2025 0649 by Neyda Fierro RN  Outcome: Progressing  1/30/2025 0613 by Neyda Fierro RN  Outcome: Progressing  Goal: Absence of Hospital-Acquired Illness or Injury  1/30/2025 0649 by Neyda Fierro RN  Outcome: Progressing  1/30/2025 0613 by Neyda Fierro RN  Outcome: Progressing  Goal: Optimal Comfort and Wellbeing  1/30/2025 0649 by Neyda Fierro RN  Outcome: Progressing  1/30/2025 0613 by Neyda Fierro RN  Outcome: Progressing  Goal: Readiness for Transition of Care  1/30/2025 0649 by Neyda Fierro RN  Outcome: Progressing  1/30/2025 0613 by Neyda Fierro RN  Outcome: Progressing     Problem: Sepsis/Septic Shock  Goal: Optimal Coping  1/30/2025 0649 by Neyda Fierro RN  Outcome: Progressing  1/30/2025 0613 by Neyda Fierro RN  Outcome: Progressing  Goal: Absence of Bleeding  1/30/2025 0649 by Neyda Fierro RN  Outcome: Progressing  1/30/2025 0613 by Neyda Fierro RN  Outcome: Progressing  Goal: Blood Glucose Level Within Targeted Range  1/30/2025 0649 by Neyda Fierro RN  Outcome: Progressing  1/30/2025 0613 by Neyda Fierro RN  Outcome: Progressing  Goal: Absence of Infection Signs and Symptoms  1/30/2025 0649 by Neyda Fierro RN  Outcome: Progressing  1/30/2025 0613 by Neyda Fierro RN  Outcome: Progressing  Goal: Optimal Nutrition Intake  1/30/2025 0649 by Fierro, Neyda, RN  Outcome: Progressing  1/30/2025 0613 by Neyda Fierro, RN  Outcome: Progressing     Problem: Skin Injury Risk Increased  Goal: Skin Health and Integrity  1/30/2025  0649 by Fierro, Neyda, RN  Outcome: Progressing  1/30/2025 0613 by Neyda Fierro RN  Outcome: Progressing     Problem: Fall Injury Risk  Goal: Absence of Fall and Fall-Related Injury  1/30/2025 0649 by Neyda Fierro RN  Outcome: Progressing  1/30/2025 0613 by Neyda Fierro RN  Outcome: Progressing

## 2025-01-30 NOTE — CONSULTS
[facility]  Adult Nutrition  Education Short Note      Nutrition Education    Previous education: yes    Diet at home: low sodium.    Written information provided and explained on  diabetic diet: 2000 calorie, fluid restriction: 1500 mL, and 2 gram sodium diet diet.     Discussed with: patient and daughter. Daughter to further explain to her father since he is [place]. Patient's daughter voiced understanding.    Educational Need? yes    Barriers: none identified    Interventions: Cholesterol modified diet, Fluid modified diet, and Sodium modified diet    Patient and/or family comprehend instructions: yes    Outcome: Verbalizes understanding     Thanks for the consult!    Patsy Vigil RD 01/30/2025 4:06 PM

## 2025-01-30 NOTE — NURSING
Pt arrive unit via stretcher. Transfer went well. Oriented pt to room , pt stated understood. Pt Ox4 NC 2Lnc in use. No sob noted. Redness to his buttocks, wound  care consulted applied traid to buttock. Bed in low position call light within reach.

## 2025-01-30 NOTE — PT/OT/SLP PROGRESS
Physical Therapy      Patient Name:  Frank Wyatt   MRN:  [id number]    Patient not seen today secondary to Other (Comment) (therapist unavailable). Will follow-up 1/31/25.

## 2025-01-30 NOTE — PROGRESS NOTES
[facility] Medicine  Progress Note    Patient Name: Frank Wyatt  MRN: [id number]  Patient Class: IP- Inpatient   Admission Date: 1/29/2025  Length of Stay: 1 days  Attending Physician: Mary Green DO  Primary Care Provider: Darci German MD        Subjective     Principal Problem:UTI (urinary tract infection)        HPI:  Mr. 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 It is severe. It is associated with bilat LE edema, orthopnea, and rigors. He denies measured fever, chest pain, N/V/D, dizziness, or LOC. He lives at an assisted living facility - [facility]. Symptoms began this morning. Daughter reports this happened last year and he was septic from a UTI. At that time he required BiPAP and levophed. He was evaluated for home O2, but did not qualify. Urine grew P. Mirabalis with flouroquinolone and bactrim resistance. In the ED, he was placed on BiPAP pH 7.24, pCO2 66, pO2 136. He is afebrile HR initially in the 110s on arrival, but improved to 90s. EKG Sinus Tach 109 with LBBB as prior. CXR with increased bilat interstitial opacities c/w pulm edema. , Trop WNL. WBC 20K. ER MD ordered merino catheter for accurate I&O, but family declined at this time. UA is pending. Hospital medicine is consulted for admission.    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  Daughter said every time he gets UTI, he become septic which leads to shortness of breath which usually is resolved by IV antibiotics.  Patient was given IV ceftriaxone and he had an allergic reaction so it was switched to ampicillin based on previous urine analysis.  Current urine analysis is pending.    Interval History:  Patient said he  feels much better    Review of Systems   All other systems reviewed and are negative.    Objective:     Vital Signs (Most Recent):  Temp: 97.6 °F (36.4 °C) (01/30/25 0350)  Pulse: 87 (01/30/25 1400)  Resp: 18 (01/30/25 1400)  BP: 122/69 (01/30/25 0350)  SpO2: 95 % (01/30/25 1400) Vital Signs (24h Range):  Temp:  [97.4 °F (36.3 °C)-97.6 °F (36.4 °C)] 97.6 °F (36.4 °C)  Pulse:  [58-88] 87  Resp:  [15-18] 18  SpO2:  [95 %-100 %] 95 %  BP: (101-122)/(49-69) 122/69     Weight: 88.5 kg (195 lb 1.7 oz)  Body mass index is 30.56 kg/m².    Intake/Output Summary (Last 24 hours) at 1/30/2025 1540  Last data filed at 1/30/2025 1424  Gross per 24 hour   Intake 900 ml   Output 1900 ml   Net -1000 ml         Physical Exam  Cardiovascular:      Rate and Rhythm: Normal rate.      Pulses: Normal pulses.   Pulmonary:      Effort: Pulmonary effort is normal.   Neurological:      Mental Status: He is alert and oriented to person, place, and time.             Significant Labs: All pertinent labs within the past 24 hours have been reviewed.    Significant Imaging: I have reviewed all pertinent imaging results/findings within the past 24 hours.    Assessment and Plan     * UTI (urinary tract infection)  dysuria and acute on chronic respiratory failure.    Daughter said every time he gets UTI, he become septic which leads to shortness of breath which usually is resolved by IV antibiotics.    Patient was given IV ceftriaxone and he had an allergic reaction so it was switched to ampicillin based on previous urine analysis.    Current urine analysis is pending.      Acute on chronic diastolic heart failure  Patient has Combined Systolic and Diastolic heart failure that is Acute on chronic. On presentation their CHF was decompensated. Evidence of decompensated CHF on presentation includes: edema, crackles on lung auscultation, paroxysmal nocturnal dyspnea (PND), and shortness of breath. The etiology of their decompensation is likely acute  infection . Most recent BNP and echo results are listed below.  Recent Labs     01/29/25  0517   *     Latest ECHO  Results for orders placed during the hospital encounter of 03/24/23    Echo    Interpretation Summary  · The left ventricle is normal in size with moderate concentric hypertrophy and low normal systolic function.  · Mild left atrial enlargement.  · The estimated ejection fraction is 50%.  · Indeterminate left ventricular diastolic function.  · RV is not well visualized. Mildly to moderately reduced right ventricular systolic function.  · The mean diastolic gradient across the mitral valve is 13 mmHg at a heart rate of 87 bpm.  · There is moderate to severe mitral stenosis.  · There is moderate-to-severe aortic valve stenosis.  · Aortic valve area is 1.06 cm2; peak velocity is 2.75 m/s; mean gradient is 17 mmHg. Indexed area is 0.45-0.5 cm/m2. Indexed stroke volume is 29 ml/m2.  · Normal central venous pressure (3 mmHg).    Current Heart Failure Medications  furosemide injection 40 mg, Every 12 hours, Intravenous    Plan  - Monitor strict I&Os and daily weights.    - Place on telemetry  - Low sodium diet  - Place on fluid restriction of 1.5 L.   - Cardiology has not been consulted  - The patient's volume status is improving but not at their baseline as indicated by edema, crackles on lung auscultation, and orthopnea  - Repeat Echo  - known to Dr. Kim - last seen about 6 months ago        Sepsis  This patient does have evidence of infective focus  My overall impression is sepsis.  Source: Respiratory and possibly urine - UA is still pending  Linezolid d/t CKD   Zosyn (cephalosporin allergy)  MRSA screen - if negative de-escalate abx as clinically indicated  Antibiotics given-   Antibiotics (72h ago, onward)      Start     Stop Route Frequency Ordered    01/29/25 1800  piperacillin-tazobactam (ZOSYN) 3.375 g in D5W 100 mL IVPB (MB+)         -- IV Every 8 hours (non-standard times) 01/29/25 2151     01/29/25 1200  linezolid 600 mg/300 mL IVPB 600 mg         -- IV Every 12 hours (non-standard times) 01/29/25 1051    01/29/25 0900  mupirocin 2 % ointment         02/03/25 0859 Nasl 2 times daily 01/29/25 0755          Latest lactate reviewed-  Recent Labs   Lab 01/29/25  0934   LACTATE 1.2     Organ dysfunction indicated by Acute respiratory failure    Fluid challenge Contraindicated- Fluid bolus is contraindicated in this patient due to Congestive Heart Failure     Post- resuscitation assessment No - Post resuscitation assessment not needed       Will Not start Pressors- Levophed for MAP of 65  Source control achieved by: abx    Acute hypercapnic respiratory failure  Admit to step down   BiPAP for now, wean as tolerated  D/t HF exacerbation +/- pna    Patient with Hypercapnic Respiratory failure which is Acute.  he is not on home oxygen. Supplemental oxygen was provided and noted- Oxygen Concentration (%):  [35-60] 35    .   Signs/symptoms of respiratory failure include- tachypnea and increased work of breathing. Contributing diagnoses includes - CHF and Pneumonia Labs and images were reviewed. Patient Has recent ABG, which has been reviewed. Will treat underlying causes and adjust management of respiratory failure as follows-     Repeat ABG   Wean BiPAP as tolerated    Hypothyroidism  Continue home levothyroxine      COPD (chronic obstructive pulmonary disease)  Patient's COPD is controlled currently.  Patient is currently off COPD Pathway. Continue scheduled inhalers Antibiotics and Supplemental oxygen and monitor respiratory status closely.     Does not appear to be wheezing at this time  Schedule nebs - became tachy on duonebs last hospitalization, will schedule levalbuterol  Hold of on steroids    CKD (chronic kidney disease), stage III  Creatine stable for now. BMP reviewed- noted Estimated Creatinine Clearance: 47.4 mL/min (based on SCr of 1.1 mg/dL). according to latest data. Based on current GFR, CKD  stage is stage 3 - GFR 30-59.  Monitor UOP and serial BMP and adjust therapy as needed. Renally dose meds. Avoid nephrotoxic medications and procedures.      VTE Risk Mitigation (From admission, onward)           Ordered     enoxaparin injection 40 mg  Daily         01/29/25 0752     IP VTE HIGH RISK PATIENT  Once         01/29/25 0752     Place sequential compression device  Until discontinued         01/29/25 0752                    Discharge Planning   ALESSANDRA:      Code Status: DNR   Medical Readiness for Discharge Date:   Discharge Plan A: Home Health                Please place Justification for DME        Mary Green DO  Department of Hospital Medicine   [facility]

## 2025-01-30 NOTE — CARE UPDATE
01/29/25 1933   Patient Assessment/Suction   Level of Consciousness (AVPU) alert   Respiratory Effort Unlabored   Expansion/Accessory Muscles/Retractions expansion symmetric;no retractions   All Lung Fields Breath Sounds Anterior:;Lateral:;diminished   Rhythm/Pattern, Respiratory no shortness of breath reported   Cough Frequency infrequent   PRE-TX-O2   Device (Oxygen Therapy) nasal cannula   Flow (L/min) (Oxygen Therapy) 5   SpO2 98 %   Pulse Oximetry Type Intermittent   $ Pulse Oximetry - Multiple Charge Pulse Oximetry - Multiple   Pulse 72   Resp 18   Aerosol Therapy   $ Aerosol Therapy Charges Aerosol Treatment   Daily Review of Necessity (SVN) completed   Respiratory Treatment Status (SVN) given   Treatment Route (SVN) oxygen;mask   Patient Position HOB elevated   Post Treatment Assessment (SVN) increased aeration   Signs of Intolerance (SVN) none   Breath Sounds Post-Respiratory Treatment   Throughout All Fields Post-Treatment All Fields   Throughout All Fields Post-Treatment aeration increased   Post-treatment Heart Rate (beats/min) 75   Post-treatment Resp Rate (breaths/min) 18   Education   $ Education Bronchodilator;Oxygen;15 min

## 2025-01-31 LAB
ALBUMIN SERPL BCP-MCNC: 3.4 G/DL (ref 3.5–5.2)
ALP SERPL-CCNC: 70 U/L (ref 55–135)
ALT SERPL W/O P-5'-P-CCNC: 26 U/L (ref 10–44)
ANION GAP SERPL CALC-SCNC: 8 MMOL/L (ref 8–16)
AST SERPL-CCNC: 31 U/L (ref 10–40)
BASOPHILS # BLD AUTO: 0.04 K/UL (ref 0–0.2)
BASOPHILS NFR BLD: 0.4 % (ref 0–1.9)
BILIRUB SERPL-MCNC: 0.7 MG/DL (ref 0.1–1)
BUN SERPL-MCNC: 42 MG/DL (ref 8–23)
CALCIUM SERPL-MCNC: 8.7 MG/DL (ref 8.7–10.5)
CHLORIDE SERPL-SCNC: 98 MMOL/L (ref 95–110)
CO2 SERPL-SCNC: 32 MMOL/L (ref 23–29)
CREAT SERPL-MCNC: 1.4 MG/DL (ref 0.5–1.4)
DIFFERENTIAL METHOD BLD: ABNORMAL
EOSINOPHIL # BLD AUTO: 0.6 K/UL (ref 0–0.5)
EOSINOPHIL NFR BLD: 6.3 % (ref 0–8)
ERYTHROCYTE [DISTWIDTH] IN BLOOD BY AUTOMATED COUNT: 15.7 % (ref 11.5–14.5)
EST. GFR  (NO RACE VARIABLE): 47.7 ML/MIN/1.73 M^2
GLUCOSE SERPL-MCNC: 128 MG/DL (ref 70–110)
HCT VFR BLD AUTO: 41 % (ref 40–54)
HGB BLD-MCNC: 13.3 G/DL (ref 14–18)
IMM GRANULOCYTES # BLD AUTO: 0.04 K/UL (ref 0–0.04)
IMM GRANULOCYTES NFR BLD AUTO: 0.4 % (ref 0–0.5)
LYMPHOCYTES # BLD AUTO: 1.2 K/UL (ref 1–4.8)
LYMPHOCYTES NFR BLD: 12.9 % (ref 18–48)
MAGNESIUM SERPL-MCNC: 1.8 MG/DL (ref 1.6–2.6)
MCH RBC QN AUTO: 29.5 PG (ref 27–31)
MCHC RBC AUTO-ENTMCNC: 32.4 G/DL (ref 32–36)
MCV RBC AUTO: 91 FL (ref 82–98)
MONOCYTES # BLD AUTO: 0.7 K/UL (ref 0.3–1)
MONOCYTES NFR BLD: 7.4 % (ref 4–15)
NEUTROPHILS # BLD AUTO: 6.6 K/UL (ref 1.8–7.7)
NEUTROPHILS NFR BLD: 72.6 % (ref 38–73)
NRBC BLD-RTO: 0 /100 WBC
OHS QRS DURATION: 128 MS
OHS QTC CALCULATION: 492 MS
PLATELET # BLD AUTO: 89 K/UL (ref 150–450)
PMV BLD AUTO: 10.8 FL (ref 9.2–12.9)
POCT GLUCOSE: 125 MG/DL (ref 70–110)
POCT GLUCOSE: 180 MG/DL (ref 70–110)
POCT GLUCOSE: 194 MG/DL (ref 70–110)
POTASSIUM SERPL-SCNC: 4 MMOL/L (ref 3.5–5.1)
PROT SERPL-MCNC: 6 G/DL (ref 6–8.4)
RBC # BLD AUTO: 4.51 M/UL (ref 4.6–6.2)
SODIUM SERPL-SCNC: 138 MMOL/L (ref 136–145)
WBC # BLD AUTO: 9.16 K/UL (ref 3.9–12.7)

## 2025-01-31 PROCEDURE — 99900031 HC PATIENT EDUCATION (STAT)

## 2025-01-31 PROCEDURE — 97165 OT EVAL LOW COMPLEX 30 MIN: CPT

## 2025-01-31 PROCEDURE — 94640 AIRWAY INHALATION TREATMENT: CPT

## 2025-01-31 PROCEDURE — 25000003 PHARM REV CODE 250: Performed by: INTERNAL MEDICINE

## 2025-01-31 PROCEDURE — 80053 COMPREHEN METABOLIC PANEL: CPT | Performed by: NURSE PRACTITIONER

## 2025-01-31 PROCEDURE — 25000242 PHARM REV CODE 250 ALT 637 W/ HCPCS: Performed by: NURSE PRACTITIONER

## 2025-01-31 PROCEDURE — 97161 PT EVAL LOW COMPLEX 20 MIN: CPT

## 2025-01-31 PROCEDURE — 94761 N-INVAS EAR/PLS OXIMETRY MLT: CPT

## 2025-01-31 PROCEDURE — 63600175 PHARM REV CODE 636 W HCPCS: Performed by: NURSE PRACTITIONER

## 2025-01-31 PROCEDURE — 63600175 PHARM REV CODE 636 W HCPCS: Performed by: FAMILY MEDICINE

## 2025-01-31 PROCEDURE — 36415 COLL VENOUS BLD VENIPUNCTURE: CPT | Performed by: NURSE PRACTITIONER

## 2025-01-31 PROCEDURE — 27000221 HC OXYGEN, UP TO 24 HOURS

## 2025-01-31 PROCEDURE — 25000242 PHARM REV CODE 250 ALT 637 W/ HCPCS: Performed by: INTERNAL MEDICINE

## 2025-01-31 PROCEDURE — 21400001 HC TELEMETRY ROOM

## 2025-01-31 PROCEDURE — 83735 ASSAY OF MAGNESIUM: CPT | Performed by: NURSE PRACTITIONER

## 2025-01-31 PROCEDURE — 25000003 PHARM REV CODE 250: Performed by: FAMILY MEDICINE

## 2025-01-31 PROCEDURE — 97535 SELF CARE MNGMENT TRAINING: CPT

## 2025-01-31 PROCEDURE — 25000003 PHARM REV CODE 250: Performed by: NURSE PRACTITIONER

## 2025-01-31 PROCEDURE — 85025 COMPLETE CBC W/AUTO DIFF WBC: CPT | Performed by: NURSE PRACTITIONER

## 2025-01-31 PROCEDURE — 97116 GAIT TRAINING THERAPY: CPT

## 2025-01-31 RX ORDER — AMOXICILLIN AND CLAVULANATE POTASSIUM 875; 125 MG/1; MG/1
1 TABLET, FILM COATED ORAL EVERY 12 HOURS
Status: DISCONTINUED | OUTPATIENT
Start: 2025-01-31 | End: 2025-02-02 | Stop reason: HOSPADM

## 2025-01-31 RX ADMIN — AMPICILLIN 2 G: 2 INJECTION, POWDER, FOR SOLUTION INTRAMUSCULAR; INTRAVENOUS at 06:01

## 2025-01-31 RX ADMIN — LEVALBUTEROL HYDROCHLORIDE 1.25 MG: 1.25 SOLUTION RESPIRATORY (INHALATION) at 07:01

## 2025-01-31 RX ADMIN — CLOPIDOGREL BISULFATE 75 MG: 75 TABLET, FILM COATED ORAL at 08:01

## 2025-01-31 RX ADMIN — METOPROLOL TARTRATE 50 MG: 50 TABLET, FILM COATED ORAL at 08:01

## 2025-01-31 RX ADMIN — BUDESONIDE INHALATION 0.5 MG: 0.5 SUSPENSION RESPIRATORY (INHALATION) at 07:01

## 2025-01-31 RX ADMIN — LEVOTHYROXINE SODIUM 25 MCG: 0.03 TABLET ORAL at 05:01

## 2025-01-31 RX ADMIN — ISOSORBIDE MONONITRATE 30 MG: 30 TABLET, EXTENDED RELEASE ORAL at 08:01

## 2025-01-31 RX ADMIN — AMOXICILLIN AND CLAVULANATE POTASSIUM 1 TABLET: 875; 125 TABLET, FILM COATED ORAL at 01:01

## 2025-01-31 RX ADMIN — IPRATROPIUM BROMIDE 0.5 MG: 0.5 SOLUTION RESPIRATORY (INHALATION) at 01:01

## 2025-01-31 RX ADMIN — ARFORMOTEROL TARTRATE 15 MCG: 15 SOLUTION RESPIRATORY (INHALATION) at 07:01

## 2025-01-31 RX ADMIN — IPRATROPIUM BROMIDE 0.5 MG: 0.5 SOLUTION RESPIRATORY (INHALATION) at 07:01

## 2025-01-31 RX ADMIN — FUROSEMIDE 40 MG: 10 INJECTION, SOLUTION INTRAMUSCULAR; INTRAVENOUS at 09:01

## 2025-01-31 RX ADMIN — TAMSULOSIN HYDROCHLORIDE 0.4 MG: 0.4 CAPSULE ORAL at 08:01

## 2025-01-31 RX ADMIN — MUPIROCIN 1 G: 20 OINTMENT TOPICAL at 08:01

## 2025-01-31 RX ADMIN — PANTOPRAZOLE SODIUM 40 MG: 40 TABLET, DELAYED RELEASE ORAL at 05:01

## 2025-01-31 RX ADMIN — ATORVASTATIN CALCIUM 40 MG: 40 TABLET, FILM COATED ORAL at 08:01

## 2025-01-31 RX ADMIN — LEVALBUTEROL HYDROCHLORIDE 1.25 MG: 1.25 SOLUTION RESPIRATORY (INHALATION) at 01:01

## 2025-01-31 RX ADMIN — AMOXICILLIN AND CLAVULANATE POTASSIUM 1 TABLET: 875; 125 TABLET, FILM COATED ORAL at 08:01

## 2025-01-31 RX ADMIN — FUROSEMIDE 40 MG: 10 INJECTION, SOLUTION INTRAMUSCULAR; INTRAVENOUS at 08:01

## 2025-01-31 RX ADMIN — ENOXAPARIN SODIUM 40 MG: 40 INJECTION SUBCUTANEOUS at 05:01

## 2025-01-31 NOTE — CARE UPDATE
01/30/25 1938   Patient Assessment/Suction   Level of Consciousness (AVPU) alert   Respiratory Effort Unlabored   Expansion/Accessory Muscles/Retractions expansion symmetric;no retractions   All Lung Fields Breath Sounds Anterior:;Lateral:;diminished   Rhythm/Pattern, Respiratory no shortness of breath reported   Cough Frequency infrequent   PRE-TX-O2   Device (Oxygen Therapy) nasal cannula   SpO2 96 %   Pulse Oximetry Type Intermittent   $ Pulse Oximetry - Multiple Charge Pulse Oximetry - Multiple   Pulse 71   Resp 18   Aerosol Therapy   $ Aerosol Therapy Charges Aerosol Treatment   Daily Review of Necessity (SVN) completed   Respiratory Treatment Status (SVN) given   Treatment Route (SVN) mask;oxygen   Patient Position HOB elevated   Post Treatment Assessment (SVN) breath sounds unchanged   Signs of Intolerance (SVN) none   Breath Sounds Post-Respiratory Treatment   Throughout All Fields Post-Treatment All Fields   Throughout All Fields Post-Treatment no change   Post-treatment Heart Rate (beats/min) 74   Post-treatment Resp Rate (breaths/min) 20   Education   $ Education Bronchodilator;Oxygen;15 min

## 2025-01-31 NOTE — PROGRESS NOTES
[facility] Medicine  Progress Note    Patient Name: Frank Wyatt  MRN: [id number]  Patient Class: IP- Inpatient   Admission Date: 1/29/2025  Length of Stay: 2 days  Attending Physician: Mary Green DO  Primary Care Provider: Darci German MD        Subjective     Principal Problem:UTI (urinary tract infection)        HPI:  Mr. 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 It is severe. It is associated with bilat LE edema, orthopnea, and rigors. He denies measured fever, chest pain, N/V/D, dizziness, or LOC. He lives at an assisted living facility - [facility]. Symptoms began this morning. Daughter reports this happened last year and he was septic from a UTI. At that time he required BiPAP and levophed. He was evaluated for home O2, but did not qualify. Urine grew P. Mirabalis with flouroquinolone and bactrim resistance. In the ED, he was placed on BiPAP pH 7.24, pCO2 66, pO2 136. He is afebrile HR initially in the 110s on arrival, but improved to 90s. EKG Sinus Tach 109 with LBBB as prior. CXR with increased bilat interstitial opacities c/w pulm edema. , Trop WNL. WBC 20K. ER MD ordered merino catheter for accurate I&O, but family declined at this time. UA is pending. Hospital medicine is consulted for admission.    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  Daughter said every time he gets UTI, he become septic which leads to shortness of breath which usually is resolved by IV antibiotics.  Patient was given IV ceftriaxone and he had an allergic reaction so it was switched to ampicillin based on previous urine analysis.  Current urine analysis is pending.  Patient was switched to Augmentin on  1/31 and will monitor for 24 hrs per daughter request since patient was recurrent admissions in the past. PT/OT was consulted.    Interval History:  Patient said he feels much better    Review of Systems   All other systems reviewed and are negative.    Objective:     Vital Signs (Most Recent):  Temp: 98.1 °F (36.7 °C) (01/31/25 1100)  Pulse: 83 (01/31/25 1449)  Resp: 18 (01/31/25 1353)  BP: 138/71 (01/31/25 1449)  SpO2: (!) 94 % (01/31/25 1449) Vital Signs (24h Range):  Temp:  [97.6 °F (36.4 °C)-98.1 °F (36.7 °C)] 98.1 °F (36.7 °C)  Pulse:  [62-83] 83  Resp:  [16-19] 18  SpO2:  [92 %-98 %] 94 %  BP: (112-161)/(64-73) 138/71     Weight: 88.5 kg (195 lb 1.7 oz)  Body mass index is 30.56 kg/m².    Intake/Output Summary (Last 24 hours) at 1/31/2025 1650  Last data filed at 1/31/2025 0800  Gross per 24 hour   Intake 100 ml   Output 1525 ml   Net -1425 ml         Physical Exam  Cardiovascular:      Rate and Rhythm: Normal rate.      Pulses: Normal pulses.   Pulmonary:      Effort: Pulmonary effort is normal.   Neurological:      Mental Status: He is alert and oriented to person, place, and time.             Significant Labs: All pertinent labs within the past 24 hours have been reviewed.    Significant Imaging: I have reviewed all pertinent imaging results/findings within the past 24 hours.    Assessment and Plan     * UTI (urinary tract infection)  dysuria and acute on chronic respiratory failure.    Daughter said every time he gets UTI, he become septic which leads to shortness of breath which usually is resolved by IV antibiotics.    Patient was given IV ceftriaxone and he had an allergic reaction so it was switched to ampicillin based on previous urine analysis.    Current urine analysis is pending.      Acute on chronic diastolic heart failure  Patient has Combined Systolic and Diastolic heart failure that is Acute on chronic. On presentation their CHF was decompensated. Evidence of decompensated CHF on presentation  includes: edema, crackles on lung auscultation, paroxysmal nocturnal dyspnea (PND), and shortness of breath. The etiology of their decompensation is likely acute infection . Most recent BNP and echo results are listed below.  Recent Labs     01/29/25  0517   *     Latest ECHO  Results for orders placed during the hospital encounter of 03/24/23    Echo    Interpretation Summary  · The left ventricle is normal in size with moderate concentric hypertrophy and low normal systolic function.  · Mild left atrial enlargement.  · The estimated ejection fraction is 50%.  · Indeterminate left ventricular diastolic function.  · RV is not well visualized. Mildly to moderately reduced right ventricular systolic function.  · The mean diastolic gradient across the mitral valve is 13 mmHg at a heart rate of 87 bpm.  · There is moderate to severe mitral stenosis.  · There is moderate-to-severe aortic valve stenosis.  · Aortic valve area is 1.06 cm2; peak velocity is 2.75 m/s; mean gradient is 17 mmHg. Indexed area is 0.45-0.5 cm/m2. Indexed stroke volume is 29 ml/m2.  · Normal central venous pressure (3 mmHg).    Current Heart Failure Medications  furosemide injection 40 mg, Every 12 hours, Intravenous    Plan  - Monitor strict I&Os and daily weights.    - Place on telemetry  - Low sodium diet  - Place on fluid restriction of 1.5 L.   - Cardiology has not been consulted  - The patient's volume status is improving but not at their baseline as indicated by edema, crackles on lung auscultation, and orthopnea  - Repeat Echo  - known to Dr. Kim - last seen about 6 months ago        Sepsis  This patient does have evidence of infective focus  My overall impression is sepsis.  Source: Respiratory and possibly urine - UA is still pending  Linezolid d/t CKD   Zosyn (cephalosporin allergy)  MRSA screen - if negative de-escalate abx as clinically indicated  Antibiotics given-   Antibiotics (72h ago, onward)      Start     Stop Route  Frequency Ordered    01/29/25 1800  piperacillin-tazobactam (ZOSYN) 3.375 g in D5W 100 mL IVPB (MB+)         -- IV Every 8 hours (non-standard times) 01/29/25 0754    01/29/25 1200  linezolid 600 mg/300 mL IVPB 600 mg         -- IV Every 12 hours (non-standard times) 01/29/25 1051    01/29/25 0900  mupirocin 2 % ointment         02/03/25 0859 Nasl 2 times daily 01/29/25 0755          Latest lactate reviewed-  Recent Labs   Lab 01/29/25  0934   LACTATE 1.2     Organ dysfunction indicated by Acute respiratory failure    Fluid challenge Contraindicated- Fluid bolus is contraindicated in this patient due to Congestive Heart Failure     Post- resuscitation assessment No - Post resuscitation assessment not needed       Will Not start Pressors- Levophed for MAP of 65  Source control achieved by: abx    Acute hypercapnic respiratory failure  Admit to step down   BiPAP for now, wean as tolerated  D/t HF exacerbation +/- pna    Patient with Hypercapnic Respiratory failure which is Acute.  he is not on home oxygen. Supplemental oxygen was provided and noted- Oxygen Concentration (%):  [35-60] 35    .   Signs/symptoms of respiratory failure include- tachypnea and increased work of breathing. Contributing diagnoses includes - CHF and Pneumonia Labs and images were reviewed. Patient Has recent ABG, which has been reviewed. Will treat underlying causes and adjust management of respiratory failure as follows-     Repeat ABG   Wean BiPAP as tolerated    Hypothyroidism  Continue home levothyroxine      COPD (chronic obstructive pulmonary disease)  Patient's COPD is controlled currently.  Patient is currently off COPD Pathway. Continue scheduled inhalers Antibiotics and Supplemental oxygen and monitor respiratory status closely.     Does not appear to be wheezing at this time  Schedule nebs - became tachy on duonebs last hospitalization, will schedule levalbuterol  Hold of on steroids    CKD (chronic kidney disease), stage  III  Creatine stable for now. BMP reviewed- noted Estimated Creatinine Clearance: 47.4 mL/min (based on SCr of 1.1 mg/dL). according to latest data. Based on current GFR, CKD stage is stage 3 - GFR 30-59.  Monitor UOP and serial BMP and adjust therapy as needed. Renally dose meds. Avoid nephrotoxic medications and procedures.      VTE Risk Mitigation (From admission, onward)           Ordered     enoxaparin injection 40 mg  Daily         01/29/25 0752     IP VTE HIGH RISK PATIENT  Once         01/29/25 0752     Place sequential compression device  Until discontinued         01/29/25 0752                    Discharge Planning   ALESSANDRA: 2/1/2025     Code Status: DNR   Medical Readiness for Discharge Date:   Discharge Plan A: Home Health                Please place Justification for DME        Mary Green DO  Department of Hospital Medicine   [facility]

## 2025-01-31 NOTE — PT/OT/SLP EVAL
Occupational Therapy   Evaluation, tx    Name: Frank Wyatt  MRN: [id number]  Admitting Diagnosis: UTI (urinary tract infection)  Recent Surgery: * No surgery found *    The primary encounter diagnosis was Shortness of breath. Diagnoses of Acute congestive heart failure, unspecified heart failure type, Acute pulmonary edema, Acute on chronic respiratory failure with hypoxia and hypercapnia, Acute on chronic heart failure, and Acute hypercapnic respiratory failure were also pertinent to this visit.    Recommendations:     Discharge Recommendations: Low Intensity Therapy  Discharge Equipment Recommendations:  none  Barriers to discharge:  None    Assessment:     Frank Wyatt is a 89 y.o. male with a medical diagnosis of UTI (urinary tract infection).  He presents with Performance deficits affecting function: impaired endurance, impaired self care skills, impaired functional mobility, gait instability, decreased ROM.    Pt ox 3, not situation, [place] w/ L hearting aide in place. He performed bed mobility SBA supine>sit, SBA sit<>stand w/ RW, ambulated 20ft in room w/ RW to bathroom, sink and back to BS chair w/ SBA. Pt performed g/hygiene SBA standing inside Rw at sink. No LOB. Pt left sitting UIC, nurse aware. Continue with OT POC.       Rehab Prognosis: Good; patient would benefit from acute skilled OT services to address these deficits and reach maximum level of function.       Plan:     Patient to be seen 3 x/week to address the above listed problems via self-care/home management, therapeutic activities, therapeutic exercises  Plan of Care Expires: 03/01/25  Plan of Care Reviewed with: patient    Subjective     Chief Complaint: none  Patient/Family Comments/goals: pt agreeable to OT after additional education due to not fully comprehending purpose.     Occupational Profile:  Living Environment: pt reportedly lives in [facility] in his own apartment, WIS w/ BIS and GB.   Previous level of function: Indep-Mod I self  care act, ambulated Mod I w/ RW.   Roles and Routines: [facility] resident  Equipment Used at Home: walker, rolling, cane, straight, shower chair, grab bar  Assistance upon Discharge: staff at [facility], daughter    Pain/Comfort:  Pain Rating 1: 0/10  Pain Rating Post-Intervention 1: 0/10    Patients cultural, spiritual, Zoroastrianism conflicts given the current situation: no    Objective:     Communicated with: nurse prior to session.  Patient found HOB elevated with telemetry, bed alarm, PureWick upon OT entry to room.    General Precautions: Standard, fall  Orthopedic Precautions: N/A  Braces: N/A  Respiratory Status: Room air    Occupational Performance:    Bed Mobility:    Patient completed Rolling/Turning to Right with stand by assistance and with side rail  Patient completed Scooting/Bridging with stand by assistance and with side rail  Patient completed Supine to Sit with stand by assistance and with side rail    Functional Mobility/Transfers:  Patient completed Sit <> Stand Transfer with stand by assistance  with  rolling walker and vc for HP   Patient completed Bed <> Chair Transfer using Step Transfer technique with stand by assistance with rolling walker  Functional Mobility: ambulated 20ft SBA w/ RW in room for ADLS. No LOB.    Activities of Daily Living:  Feeding:  independence .  Grooming: stand by assistance to wash hands standing inside RW at sink; pt wears dentures; daughter cleaned for pt prior per nurse report.   Upper Body Dressing: minimum assistance to don gown overhead like pullover shirt seated EOB; pt has chronic shld limited AROM  Lower Body Dressing: stand by assistance donned/doffed socks seated BS chair  Toileting: declined [place] to use toilet      Cognitive/Visual Perceptual:  Cognitive/Psychosocial Skills:     -       Oriented to: Person, Place, and Time   -       Follows Commands/attention:Follows one-step commands and 2/2 [place]  -       Communication: clear/fluent  -       Memory: Poor immediate  recall  -       Safety awareness/insight to disability: impaired   -       Mood/Affect/Coping skills/emotional control: Appropriate to situation  Visual/Perceptual:      -Intact glasses    Physical Exam:  Balance:    -       sitting: good  dynamic: good  standing: fair plus  dynamic: fair  Postural examination/scapula alignment:    -       Rounded shoulders  Dominant hand:    -       right  Upper Extremity Range of Motion:     -       Right Upper Extremity: WFL except limited shld AROM ~20 degrees available  -       Left Upper Extremity: WFL except same as above  Upper Extremity Strength:    -       Right Upper Extremity: WFL except shld 2-/5  -       Left Upper Extremity: WFL except shld 2-/5   Strength:    -       Right Upper Extremity: WFL  -       Left Upper Extremity: WFL    AMPAC 6 Click ADL:  AMPAC Total Score: 20    Treatment & Education:  Pt seen for safe self care  activities and fx mobility retraining as stated above.  All questions/concerns addressed within scope.  Call don't fall. CAll bell use explained. Pt  acknowledged.  Purpose of OT and POC, pt needs reinforcement.      Patient left up in chair with all lines intact, call button in reach, nurse notified, and nurse present    GOALS:   Multidisciplinary Problems       Occupational Therapy Goals          Problem: Occupational Therapy    Goal Priority Disciplines Outcome Interventions   Occupational Therapy Goal     OT, PT/OT Progressing    Description: Goals to be met by: 2/15/2025     Patient will increase functional independence with ADLs by performing:    UE Dressing with Modified [place].  LE Dressing with Modified [place].  Grooming while standing at sink with Modified [place].  Toileting from toilet with Modified [place] for hygiene and clothing management.   Step transfer with Modified [place]  Toilet transfer to toilet with Modified [place].                         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 2004    CORONARY ARTERY BYPASS GRAFT  1996    5-bypass     CYSTOSCOPY N/A 8/15/2019    Procedure: CYSTOSCOPY;  Surgeon: Dipak Sanchez MD;  Location: [facility] OR;  Service: Urology;  Laterality: N/A;    CYSTOSCOPY N/A 10/31/2019    Procedure: CYSTOSCOPY;  Surgeon: Dipak Sanchez MD;  Location: [facility] OR;  Service: Urology;  Laterality: N/A;    CYSTOSCOPY W/ URETERAL STENT REMOVAL Left 10/31/2019    Procedure: CYSTOSCOPY, WITH URETERAL STENT REMOVAL  URETEROSCOPY;  Surgeon: Dipak Sanchez MD;  Location: [facility] OR;  Service: Urology;  Laterality: Left;    EXTRACORPOREAL SHOCK WAVE LITHOTRIPSY Left 8/15/2019    Procedure: LITHOTRIPSY, ESWL;  Surgeon: Dipak Sanchez MD;  Location: [facility] OR;  Service: Urology;  Laterality: Left;    EXTRACORPOREAL SHOCK WAVE LITHOTRIPSY Left 9/19/2019    Procedure: LITHOTRIPSY, ESWL;  Surgeon: Dipak Sanchez MD;  Location: [facility] OR;  Service: Urology;  Laterality: Left;    EYE SURGERY Right 2014    Cataract    EYE SURGERY Left 2017    Cataract    HERNIA REPAIR  2014    Dr. Bailon - had to have mesh removed    INGUINAL HERNIA REPAIR Right 03/25/2019        LITHOTRIPSY      nephrostomy tube      PERCUTANEOUS NEPHROSTOMY      ROTATOR CUFF REPAIR  2005     right shoulder    URETEROSCOPY Left 10/31/2019    Procedure: URETEROSCOPY;  Surgeon: Dipak Sanchez MD;  Location: [facility];  Service: Urology;  Laterality: Left;       Time Tracking:     OT Date of Treatment: 01/31/25  OT Start Time: 1250  OT Stop Time: 1308  OT Total Time (min): 18 min    Billable Minutes:Evaluation 10  Self Care/Home Management 8  Total Time 18    1/31/2025

## 2025-01-31 NOTE — PLAN OF CARE
Problem: Infection  Goal: Absence of Infection Signs and Symptoms  Outcome: Progressing     Problem: Skin Injury Risk Increased  Goal: Skin Health and Integrity  Outcome: Progressing     Problem: Fall Injury Risk  Goal: Absence of Fall and Fall-Related Injury  Outcome: Progressing

## 2025-01-31 NOTE — PT/OT/SLP EVAL
"Physical Therapy Evaluation    Patient Name:  Frank Wytat   MRN:  [id number]    Recommendations:     Discharge Recommendations: Low Intensity Therapy   Discharge Equipment Recommendations: none   Barriers to discharge:  medical needs    Assessment:     Frank Wyatt is a 89 y.o. male admitted with a medical diagnosis of UTI (urinary tract infection).  He presents with the following impairments/functional limitations: weakness, impaired endurance, impaired functional mobility, gait instability, impaired balance, impaired cardiopulmonary response to activity.    Rehab Prognosis: Good and Fair; patient would benefit from acute skilled PT services to address these deficits and reach maximum level of function.    Recent Surgery: * No surgery found *      Plan:     During this hospitalization, patient to be seen 6 x/week to address the identified rehab impairments via gait training, therapeutic activities, therapeutic exercises and progress toward the following goals:    Plan of Care Expires:  02/28/25    Subjective     Chief Complaint: pt reported he a had a HA earlier, but "it's gone now."  Patient/Family Comments/goals: back to [facility] upon discharge.  Pain/Comfort:  Pain Rating 1: 0/10  Pain Rating Post-Intervention 1: 0/10    Patients cultural, spiritual, Oriental orthodox conflicts given the current situation:      Living Environment:  Pt is a resident at [facility].  Prior to admission, patients level of function was Independent to MI with rolling walker.  Equipment used at home: walker, rolling, cane, straight, shower chair, grab bar.  DME owned (not currently used): none.  Upon discharge, patient will have assistance from facility staff, if needed..    Objective:     Communicated with THI Zambrano prior to session.  Patient found up in chair with peripheral IV, telemetry  upon PT entry to room.    General Precautions: Standard, fall, hearing impaired  Orthopedic Precautions:N/A   Braces: N/A  Respiratory " Status: Room air (92% at rest)    Exams:  Cognitive Exam:  Patient is oriented to Person, Place, and Time  RLE ROM: WFL  RLE Strength: grossly 4/5  LLE ROM: WFL  LLE Strength: grossly 4/5    Functional Mobility:  Transfers:     Sit to Stand:  contact guard assistance with rolling walker  Gait: x 125' with rolling walker and CGA for safety on RA with SPO2 87% after gt trial, coming back up to 92% by end of session with vc for PLB.       AM-PAC 6 CLICK MOBILITY  Total Score:20       Treatment & Education:  Pt was educated on the following: call light use, importance of OOB activity and functional mobility to negate the negative effects of prolonged bed rest during this hospitalization, safe transfers/ambulation and discharge planning recommendations/options.      Patient left up in chair with all lines intact, call button in reach, and pt's daughter present.    GOALS:   Multidisciplinary Problems       Physical Therapy Goals          Problem: Physical Therapy    Goal Priority Disciplines Outcome Interventions   Physical Therapy Goal     PT, PT/OT     Description: Goals to be met by: 25     Patient will increase functional independence with mobility by performin. Supine to sit with Supervision  2. Sit to stand transfer with Supervision  3. Bed to chair transfer with Supervision using Rolling Walker  4. Gait  x 300 feet with Supervision using Rolling Walker.                              DME Justifications:  No DME recommended requiring DME justifications    History:     Past Medical History:   Diagnosis Date    Arthritis     COPD (chronic obstructive pulmonary disease)     WHEEZES    Coronary artery disease     Dermatographism 2019    Diverticulosis     Full dentures     [place] (hard of hearing)     left ear    Hypertension     Kidney stones     Meniere's disease     MRSA infection 2019    Skin writing     dermatiographism    Small bowel obstruction due to adhesions 10/2019    Thyroid disease      Wears glasses        Past Surgical History:   Procedure Laterality Date    APPENDECTOMY      CARDIAC SURGERY  1997    CABG 5 vessel    CHOLECYSTECTOMY  2013    COLON SURGERY      Colon resection with colostomy; colostomy reversal. 2004    CORONARY ARTERY BYPASS GRAFT  1996    5-bypass     CYSTOSCOPY N/A 8/15/2019    Procedure: CYSTOSCOPY;  Surgeon: Dipak Sanchez MD;  Location: [facility];  Service: Urology;  Laterality: N/A;    CYSTOSCOPY N/A 10/31/2019    Procedure: CYSTOSCOPY;  Surgeon: Dipak Sanchez MD;  Location: [facility];  Service: Urology;  Laterality: N/A;    CYSTOSCOPY W/ URETERAL STENT REMOVAL Left 10/31/2019    Procedure: CYSTOSCOPY, WITH URETERAL STENT REMOVAL  URETEROSCOPY;  Surgeon: Dipak Sanchez MD;  Location: [facility];  Service: Urology;  Laterality: Left;    EXTRACORPOREAL SHOCK WAVE LITHOTRIPSY Left 8/15/2019    Procedure: LITHOTRIPSY, ESWL;  Surgeon: Dipak Sanchez MD;  Location: [facility];  Service: Urology;  Laterality: Left;    EXTRACORPOREAL SHOCK WAVE LITHOTRIPSY Left 9/19/2019    Procedure: LITHOTRIPSY, ESWL;  Surgeon: Dipak Sanchez MD;  Location: [facility];  Service: Urology;  Laterality: Left;    EYE SURGERY Right 2014    Cataract    EYE SURGERY Left 2017    Cataract    HERNIA REPAIR  2014    Dr. Bailon - had to have mesh removed    INGUINAL HERNIA REPAIR Right 03/25/2019        LITHOTRIPSY      nephrostomy tube      PERCUTANEOUS NEPHROSTOMY      ROTATOR CUFF REPAIR  2005    right shoulder    URETEROSCOPY Left 10/31/2019    Procedure: URETEROSCOPY;  Surgeon: Dipak Sanchez MD;  Location: [facility];  Service: Urology;  Laterality: Left;       Time Tracking:     PT Received On: 01/31/25  PT Start Time: 1335     PT Stop Time: 1351  PT Total Time (min): 16 min     Billable Minutes: Evaluation 8 and Gait Training 8      01/31/2025

## 2025-01-31 NOTE — PLAN OF CARE
Goals to be met by: 25     Patient will increase functional independence with mobility by performin. Supine to sit with Supervision  2. Sit to stand transfer with Supervision  3. Bed to chair transfer with Supervision using Rolling Walker  4. Gait  x 300 feet with Supervision using Rolling Walker.

## 2025-01-31 NOTE — SUBJECTIVE & OBJECTIVE
Interval History:  Patient said he feels much better    Review of Systems   All other systems reviewed and are negative.    Objective:     Vital Signs (Most Recent):  Temp: 98.1 °F (36.7 °C) (01/31/25 1100)  Pulse: 83 (01/31/25 1449)  Resp: 18 (01/31/25 1353)  BP: 138/71 (01/31/25 1449)  SpO2: (!) 94 % (01/31/25 1449) Vital Signs (24h Range):  Temp:  [97.6 °F (36.4 °C)-98.1 °F (36.7 °C)] 98.1 °F (36.7 °C)  Pulse:  [62-83] 83  Resp:  [16-19] 18  SpO2:  [92 %-98 %] 94 %  BP: (112-161)/(64-73) 138/71     Weight: 88.5 kg (195 lb 1.7 oz)  Body mass index is 30.56 kg/m².    Intake/Output Summary (Last 24 hours) at 1/31/2025 1650  Last data filed at 1/31/2025 0800  Gross per 24 hour   Intake 100 ml   Output 1525 ml   Net -1425 ml         Physical Exam  Cardiovascular:      Rate and Rhythm: Normal rate.      Pulses: Normal pulses.   Pulmonary:      Effort: Pulmonary effort is normal.   Neurological:      Mental Status: He is alert and oriented to person, place, and time.             Significant Labs: All pertinent labs within the past 24 hours have been reviewed.    Significant Imaging: I have reviewed all pertinent imaging results/findings within the past 24 hours.

## 2025-01-31 NOTE — CONSULTS
Bilateral buttock with thick cream cleaned as much as possible without irritating skin.   Bilateral buuto pink,  fold pink, denuded patient incontinent.  CNA in room will get a waffle for patient. Elevate heels, bruising to hands, arms, small yellowing bruise on abdomen.  Complete skin assessment done

## 2025-01-31 NOTE — PLAN OF CARE
Problem: Occupational Therapy  Goal: Occupational Therapy Goal  Description: Goals to be met by: 2/15/2025     Patient will increase functional independence with ADLs by performing:    UE Dressing with Modified [place].  LE Dressing with Modified [place].  Grooming while standing at sink with Modified [place].  Toileting from toilet with Modified [place] for hygiene and clothing management.   Step transfer with Modified [place]  Toilet transfer to toilet with Modified [place].    Outcome: Progressing

## 2025-02-01 LAB
ALBUMIN SERPL BCP-MCNC: 3.5 G/DL (ref 3.5–5.2)
ALP SERPL-CCNC: 77 U/L (ref 55–135)
ALT SERPL W/O P-5'-P-CCNC: 28 U/L (ref 10–44)
ANION GAP SERPL CALC-SCNC: 8 MMOL/L (ref 8–16)
AST SERPL-CCNC: 26 U/L (ref 10–40)
BASOPHILS # BLD AUTO: 0.04 K/UL (ref 0–0.2)
BASOPHILS NFR BLD: 0.5 % (ref 0–1.9)
BILIRUB SERPL-MCNC: 0.6 MG/DL (ref 0.1–1)
BUN SERPL-MCNC: 42 MG/DL (ref 8–23)
CALCIUM SERPL-MCNC: 8.5 MG/DL (ref 8.7–10.5)
CHLORIDE SERPL-SCNC: 100 MMOL/L (ref 95–110)
CO2 SERPL-SCNC: 31 MMOL/L (ref 23–29)
CREAT SERPL-MCNC: 1.4 MG/DL (ref 0.5–1.4)
DIFFERENTIAL METHOD BLD: ABNORMAL
EOSINOPHIL # BLD AUTO: 0.4 K/UL (ref 0–0.5)
EOSINOPHIL NFR BLD: 5.9 % (ref 0–8)
ERYTHROCYTE [DISTWIDTH] IN BLOOD BY AUTOMATED COUNT: 15.6 % (ref 11.5–14.5)
EST. GFR  (NO RACE VARIABLE): 47.7 ML/MIN/1.73 M^2
GLUCOSE SERPL-MCNC: 140 MG/DL (ref 70–110)
HCT VFR BLD AUTO: 41.2 % (ref 40–54)
HGB BLD-MCNC: 13.3 G/DL (ref 14–18)
IMM GRANULOCYTES # BLD AUTO: 0.02 K/UL (ref 0–0.04)
IMM GRANULOCYTES NFR BLD AUTO: 0.3 % (ref 0–0.5)
LYMPHOCYTES # BLD AUTO: 1.4 K/UL (ref 1–4.8)
LYMPHOCYTES NFR BLD: 18.8 % (ref 18–48)
MAGNESIUM SERPL-MCNC: 1.9 MG/DL (ref 1.6–2.6)
MCH RBC QN AUTO: 29.4 PG (ref 27–31)
MCHC RBC AUTO-ENTMCNC: 32.3 G/DL (ref 32–36)
MCV RBC AUTO: 91 FL (ref 82–98)
MONOCYTES # BLD AUTO: 0.6 K/UL (ref 0.3–1)
MONOCYTES NFR BLD: 7.5 % (ref 4–15)
NEUTROPHILS # BLD AUTO: 5 K/UL (ref 1.8–7.7)
NEUTROPHILS NFR BLD: 67 % (ref 38–73)
NRBC BLD-RTO: 0 /100 WBC
PLATELET # BLD AUTO: 99 K/UL (ref 150–450)
PMV BLD AUTO: 10.5 FL (ref 9.2–12.9)
POCT GLUCOSE: 130 MG/DL (ref 70–110)
POCT GLUCOSE: 135 MG/DL (ref 70–110)
POCT GLUCOSE: 138 MG/DL (ref 70–110)
POCT GLUCOSE: 141 MG/DL (ref 70–110)
POCT GLUCOSE: 183 MG/DL (ref 70–110)
POTASSIUM SERPL-SCNC: 4 MMOL/L (ref 3.5–5.1)
PROT SERPL-MCNC: 6.2 G/DL (ref 6–8.4)
RBC # BLD AUTO: 4.52 M/UL (ref 4.6–6.2)
SODIUM SERPL-SCNC: 139 MMOL/L (ref 136–145)
WBC # BLD AUTO: 7.5 K/UL (ref 3.9–12.7)

## 2025-02-01 PROCEDURE — 25000242 PHARM REV CODE 250 ALT 637 W/ HCPCS: Performed by: INTERNAL MEDICINE

## 2025-02-01 PROCEDURE — 85025 COMPLETE CBC W/AUTO DIFF WBC: CPT | Performed by: NURSE PRACTITIONER

## 2025-02-01 PROCEDURE — 99900031 HC PATIENT EDUCATION (STAT)

## 2025-02-01 PROCEDURE — 25000242 PHARM REV CODE 250 ALT 637 W/ HCPCS: Performed by: NURSE PRACTITIONER

## 2025-02-01 PROCEDURE — 63600175 PHARM REV CODE 636 W HCPCS: Performed by: NURSE PRACTITIONER

## 2025-02-01 PROCEDURE — 97116 GAIT TRAINING THERAPY: CPT | Mod: CQ

## 2025-02-01 PROCEDURE — 21400001 HC TELEMETRY ROOM

## 2025-02-01 PROCEDURE — 25000003 PHARM REV CODE 250: Performed by: INTERNAL MEDICINE

## 2025-02-01 PROCEDURE — 36415 COLL VENOUS BLD VENIPUNCTURE: CPT | Performed by: NURSE PRACTITIONER

## 2025-02-01 PROCEDURE — 94761 N-INVAS EAR/PLS OXIMETRY MLT: CPT

## 2025-02-01 PROCEDURE — 94640 AIRWAY INHALATION TREATMENT: CPT

## 2025-02-01 PROCEDURE — 83735 ASSAY OF MAGNESIUM: CPT | Performed by: NURSE PRACTITIONER

## 2025-02-01 PROCEDURE — 25000003 PHARM REV CODE 250: Performed by: FAMILY MEDICINE

## 2025-02-01 PROCEDURE — 25000003 PHARM REV CODE 250: Performed by: NURSE PRACTITIONER

## 2025-02-01 PROCEDURE — 27000221 HC OXYGEN, UP TO 24 HOURS

## 2025-02-01 PROCEDURE — 80053 COMPREHEN METABOLIC PANEL: CPT | Performed by: NURSE PRACTITIONER

## 2025-02-01 RX ORDER — AMOXICILLIN AND CLAVULANATE POTASSIUM 875; 125 MG/1; MG/1
1 TABLET, FILM COATED ORAL EVERY 12 HOURS
Qty: 14 TABLET | Refills: 0 | Status: SHIPPED | OUTPATIENT
Start: 2025-02-01 | End: 2025-02-08

## 2025-02-01 RX ORDER — FUROSEMIDE 40 MG/1
40 TABLET ORAL 2 TIMES DAILY
Qty: 60 TABLET | Refills: 0 | Status: SHIPPED | OUTPATIENT
Start: 2025-02-01 | End: 2025-03-03

## 2025-02-01 RX ORDER — FUROSEMIDE 40 MG/1
40 TABLET ORAL 2 TIMES DAILY
Status: DISCONTINUED | OUTPATIENT
Start: 2025-02-01 | End: 2025-02-02 | Stop reason: HOSPADM

## 2025-02-01 RX ADMIN — IPRATROPIUM BROMIDE 0.5 MG: 0.5 SOLUTION RESPIRATORY (INHALATION) at 07:02

## 2025-02-01 RX ADMIN — LEVALBUTEROL HYDROCHLORIDE 1.25 MG: 1.25 SOLUTION RESPIRATORY (INHALATION) at 01:02

## 2025-02-01 RX ADMIN — AMOXICILLIN AND CLAVULANATE POTASSIUM 1 TABLET: 875; 125 TABLET, FILM COATED ORAL at 08:02

## 2025-02-01 RX ADMIN — BUDESONIDE INHALATION 0.5 MG: 0.5 SUSPENSION RESPIRATORY (INHALATION) at 08:02

## 2025-02-01 RX ADMIN — PANTOPRAZOLE SODIUM 40 MG: 40 TABLET, DELAYED RELEASE ORAL at 06:02

## 2025-02-01 RX ADMIN — FUROSEMIDE 40 MG: 10 INJECTION, SOLUTION INTRAMUSCULAR; INTRAVENOUS at 10:02

## 2025-02-01 RX ADMIN — ARFORMOTEROL TARTRATE 15 MCG: 15 SOLUTION RESPIRATORY (INHALATION) at 07:02

## 2025-02-01 RX ADMIN — IPRATROPIUM BROMIDE 0.5 MG: 0.5 SOLUTION RESPIRATORY (INHALATION) at 08:02

## 2025-02-01 RX ADMIN — METOPROLOL TARTRATE 50 MG: 50 TABLET, FILM COATED ORAL at 09:02

## 2025-02-01 RX ADMIN — ISOSORBIDE MONONITRATE 30 MG: 30 TABLET, EXTENDED RELEASE ORAL at 08:02

## 2025-02-01 RX ADMIN — LEVALBUTEROL HYDROCHLORIDE 1.25 MG: 1.25 SOLUTION RESPIRATORY (INHALATION) at 08:02

## 2025-02-01 RX ADMIN — BUDESONIDE INHALATION 0.5 MG: 0.5 SUSPENSION RESPIRATORY (INHALATION) at 07:02

## 2025-02-01 RX ADMIN — LEVALBUTEROL HYDROCHLORIDE 1.25 MG: 1.25 SOLUTION RESPIRATORY (INHALATION) at 07:02

## 2025-02-01 RX ADMIN — METOPROLOL TARTRATE 50 MG: 50 TABLET, FILM COATED ORAL at 08:02

## 2025-02-01 RX ADMIN — LEVOTHYROXINE SODIUM 25 MCG: 0.03 TABLET ORAL at 06:02

## 2025-02-01 RX ADMIN — TAMSULOSIN HYDROCHLORIDE 0.4 MG: 0.4 CAPSULE ORAL at 08:02

## 2025-02-01 RX ADMIN — FUROSEMIDE 40 MG: 40 TABLET ORAL at 05:02

## 2025-02-01 RX ADMIN — ENOXAPARIN SODIUM 40 MG: 40 INJECTION SUBCUTANEOUS at 05:02

## 2025-02-01 RX ADMIN — ARFORMOTEROL TARTRATE 15 MCG: 15 SOLUTION RESPIRATORY (INHALATION) at 08:02

## 2025-02-01 RX ADMIN — AMOXICILLIN AND CLAVULANATE POTASSIUM 1 TABLET: 875; 125 TABLET, FILM COATED ORAL at 09:02

## 2025-02-01 RX ADMIN — IPRATROPIUM BROMIDE 0.5 MG: 0.5 SOLUTION RESPIRATORY (INHALATION) at 01:02

## 2025-02-01 RX ADMIN — CLOPIDOGREL BISULFATE 75 MG: 75 TABLET, FILM COATED ORAL at 08:02

## 2025-02-01 RX ADMIN — ATORVASTATIN CALCIUM 40 MG: 40 TABLET, FILM COATED ORAL at 09:02

## 2025-02-01 RX ADMIN — MUPIROCIN 1 G: 20 OINTMENT TOPICAL at 09:02

## 2025-02-01 RX ADMIN — MUPIROCIN 1 G: 20 OINTMENT TOPICAL at 08:02

## 2025-02-01 NOTE — PT/OT/SLP PROGRESS
Physical Therapy Treatment    Patient Name:  Frank Wyatt   MRN:  [id number]    Recommendations:     Discharge Recommendations: Low Intensity Therapy  Discharge Equipment Recommendations: none  Barriers to discharge: None    Assessment:     Frank Wyatt is a 89 y.o. male admitted with a medical diagnosis of UTI (urinary tract infection).  He presents with the following impairments/functional limitations: impaired endurance, gait instability, impaired cardiopulmonary response to activity . Pt completed tx without any adverse reactions. O2 sats increased following tx. No LOB or c/o dyspnea.     Rehab Prognosis: Fair; patient would benefit from acute skilled PT services to address these deficits and reach maximum level of function.    Recent Surgery: * No surgery found *      Plan:     During this hospitalization, patient to be seen 6 x/week to address the identified rehab impairments via gait training, therapeutic activities, therapeutic exercises and progress toward the following goals:    Plan of Care Expires:  02/28/25    Subjective     Chief Complaint: None stated  Patient/Family Comments/goals: Pt agreeable to PT.   Pain/Comfort:  Pain Rating 1: 0/10  Pain Rating Post-Intervention 1: 0/10      Objective:     Communicated with RN prior to session.  Patient found HOB elevated with peripheral IV, PureWick, telemetry upon PT entry to room.     General Precautions: Standard, fall, hearing impaired  Orthopedic Precautions: N/A  Braces: N/A  Respiratory Status: Room air     Functional Mobility:  Bed Mobility:     Supine to Sit: stand by assistance  Transfers:     Sit to Stand:  stand by assistance with rolling walker  Gait: 200 feet SBA with rw      AM-PAC 6 CLICK MOBILITY          Treatment & Education:  Pt was educated on the following: call light use, importance of OOB activity and functional mobility to negate the negative effects of prolonged bed rest during this hospitalization, safe  transfers/ambulation and discharge planning recommendations/options.     Patient left up in chair with all lines intact and call button in reach..    GOALS:   Multidisciplinary Problems       Physical Therapy Goals          Problem: Physical Therapy    Goal Priority Disciplines Outcome Interventions   Physical Therapy Goal     PT, PT/OT     Description: Goals to be met by: 25     Patient will increase functional independence with mobility by performin. Supine to sit with Supervision  2. Sit to stand transfer with Supervision  3. Bed to chair transfer with Supervision using Rolling Walker  4. Gait  x 300 feet with Supervision using Rolling Walker.                                  Time Tracking:     PT Received On: 25  PT Start Time: 947     PT Stop Time: 1010  PT Total Time (min): 23 min     Billable Minutes: Gait Training 23    Treatment Type: Treatment  PT/PTA: PTA     Number of PTA visits since last PT visit: 2025

## 2025-02-01 NOTE — ASSESSMENT & PLAN NOTE
Likely secondary to HFpEF: acute on chronic.   Patient was diuresed with IV Lasix, He is on RA now   Resume PO Lasix on discharge. Dose increased to 40 mg BID    Nostril Rim Text: The closure involved the nostril rim.

## 2025-02-01 NOTE — DISCHARGE INSTRUCTIONS
Our goal at [facility] is to always give you outstanding care and exceptional service. You may receive a survey by mail, text or e-mail in the next 7-10 days from Jyothi Curtis and our leadership team asking about the care you received with us. The survey should only take 5-10 minutes to complete and is very important to us.     Your feedback provides us with a way to recognize our staff who work tirelessly to provide the best care! Also, your responses help us learn how to improve when your experience was below our aspiration of excellence. We WILL use your feedback to continue making improvements to help us provide the highest quality care. We keep your personal information and feedback confidential. We appreciate your time completing this survey and can't wait to hear from you!!!     We want you to leave us today feeling like you can DEFINITELY recommend us to others! We look forward to your continued care with us! Thanks so much for choosing [facility] for your healthcare needs!

## 2025-02-01 NOTE — RESPIRATORY THERAPY
01/31/25 1957   Patient Assessment/Suction   Level of Consciousness (AVPU) alert   Respiratory Effort Normal;Unlabored   Expansion/Accessory Muscles/Retractions no retractions;no use of accessory muscles   All Lung Fields Breath Sounds diminished   Rhythm/Pattern, Respiratory pattern regular;unlabored   Cough Frequency infrequent   Cough Type good   PRE-TX-O2   Device (Oxygen Therapy) room air   SpO2 (!) 94 %   Pulse Oximetry Type Intermittent   $ Pulse Oximetry - Multiple Charge Pulse Oximetry - Multiple   Pulse 82   Resp 16   Aerosol Therapy   $ Aerosol Therapy Charges Aerosol Treatment   Daily Review of Necessity (SVN) completed   Respiratory Treatment Status (SVN) given   Treatment Route (SVN) mask;oxygen   Patient Position HOB elevated   Post Treatment Assessment (SVN) breath sounds unchanged   Signs of Intolerance (SVN) none   Education   $ Education Bronchodilator;15 min

## 2025-02-01 NOTE — PLAN OF CARE
Pt and Pt family to appeal dc. Family requesting one more day of observation so Pt does not have to re-admit into hospital and plans to dc on Sunday. IMM signed and family to complete appeal.

## 2025-02-01 NOTE — CARE UPDATE
02/01/25 0702   Patient Assessment/Suction   Level of Consciousness (AVPU) alert   Respiratory Effort Normal;Unlabored   Expansion/Accessory Muscles/Retractions no use of accessory muscles;no retractions;expansion symmetric   All Lung Fields Breath Sounds diminished   Rhythm/Pattern, Respiratory unlabored;pattern regular;depth regular   Cough Frequency infrequent   Cough Type good   PRE-TX-O2   Device (Oxygen Therapy) room air   SpO2 (!) 94 %   Pulse Oximetry Type Intermittent   $ Pulse Oximetry - Multiple Charge Pulse Oximetry - Multiple   Pulse 68   Resp 16   Aerosol Therapy   $ Aerosol Therapy Charges Aerosol Treatment   Daily Review of Necessity (SVN) completed   Respiratory Treatment Status (SVN) given   Treatment Route (SVN) oxygen;mask   Patient Position HOB elevated   Post Treatment Assessment (SVN) breath sounds unchanged   Signs of Intolerance (SVN) none

## 2025-02-01 NOTE — ASSESSMENT & PLAN NOTE
Organ dysfunction: acute hypoxic respiratory failure   Source: Likely UTI     Urine culture Mixed growth   Blood culture negative     Received Zosyn >> changed to Augmentin 1/31, will continue for 7 days on discharge

## 2025-02-01 NOTE — PLAN OF CARE
Medicare Appeal request from [employer] received.  Case [id number] assigned.    [employer] request form, medical record and all meds and admins uploaded to website as requested, see confirmations below.  Will await response.

## 2025-02-01 NOTE — ASSESSMENT & PLAN NOTE
Patient has Combined Systolic and Diastolic heart failure that is Acute on chronic. On presentation their CHF was decompensated. Evidence of decompensated CHF on presentation includes: edema, crackles on lung auscultation, paroxysmal nocturnal dyspnea (PND), and shortness of breath. The etiology of their decompensation is likely acute infection . Most recent BNP and echo results are listed below.      Latest ECHO  Results for orders placed during the hospital encounter of 03/24/23    Echo    Interpretation Summary  · The left ventricle is normal in size with moderate concentric hypertrophy and low normal systolic function.  · Mild left atrial enlargement.  · The estimated ejection fraction is 50%.  · Indeterminate left ventricular diastolic function.  · RV is not well visualized. Mildly to moderately reduced right ventricular systolic function.  · The mean diastolic gradient across the mitral valve is 13 mmHg at a heart rate of 87 bpm.  · There is moderate to severe mitral stenosis.  · There is moderate-to-severe aortic valve stenosis.  · Aortic valve area is 1.06 cm2; peak velocity is 2.75 m/s; mean gradient is 17 mmHg. Indexed area is 0.45-0.5 cm/m2. Indexed stroke volume is 29 ml/m2.  · Normal central venous pressure (3 mmHg).    Plan  - increased PO Lasix on discharge to 40 mg BID

## 2025-02-01 NOTE — DISCHARGE SUMMARY
formerly [facility] Medicine  Discharge Summary      Patient Name: Frank Wyatt  MRN: [id number]  JIMMY: [id number]  Patient Class: IP- Inpatient  Admission Date: 1/29/2025  Hospital Length of Stay: 3 days  Discharge Date and Time:  02/01/2025 11:54 AM  Attending Physician: Azalia Pereyra MD   Discharging Provider: Azalia Pereyra MD  Primary Care Provider: Darci German MD    Primary Care Team: Networked reference to record PCT     HPI:   Mr. 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 It is severe. It is associated with bilat LE edema, orthopnea, and rigors. He denies measured fever, chest pain, N/V/D, dizziness, or LOC. He lives at an assisted living facility - [facility]. Symptoms began this morning. Daughter reports this happened last year and he was septic from a UTI. At that time he required BiPAP and levophed. He was evaluated for home O2, but did not qualify. Urine grew P. Mirabalis with flouroquinolone and bactrim resistance. In the ED, he was placed on BiPAP pH 7.24, pCO2 66, pO2 136. He is afebrile HR initially in the 110s on arrival, but improved to 90s. EKG Sinus Tach 109 with LBBB as prior. CXR with increased bilat interstitial opacities c/w pulm edema. , Trop WNL. WBC 20K. ER MD ordered merino catheter for accurate I&O, but family declined at this time. UA is pending. Hospital medicine is consulted for admission.    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  Daughter said every time he gets UTI, he become septic which leads to shortness of breath which usually is resolved by IV antibiotics.  Patient was given IV ceftriaxone and he had an allergic reaction so it  was switched to ampicillin based on previous urine analysis. Urine culture this admission grew mixed growth, blood culture negative.  Patient was switched to Augmentin on 1/31 and will monitor for 24 hrs per daughter request since patient was recurrent admissions in the past. PT/OT was consulted. Patient worked with PT, OT and recommended home health.     Next day on exam, patient is feeling well, he is sitting up in the chair and denies any complains of SOB, chest pain, no dysuria or any other urinary symptoms. Patient has been afebrile and leukocytosis is resolved. Patient's daughter is at bedside. Patient was started on PO Augmentin yesterday. Daughter states they want to observe until tomorrow because patient decompensate easily. I explained in detail that his urine culture is mixed growth. Patient's only urinary symptoms on presentation was mild blood in urine and foul smelling. Never had any dysuria or fever or AMS. Given blood and urine culture is finalized and leukocytosis is resolved and patient is afebrile and also as agreed with the previous provider, 24 hrs on PO antibiotics will be this evening. I explained that patient is medically stable for discharge and no medical reason to stay extra night in the hospital. Patient walked 125 feet with supervision level with rolling walker with PT yesterday and only recommended home health. But daughter is adamant and states that they refuse to leave. I explained that they can appeal for discharge if they wish, but medical factors and data is in favor or discharge.      Goals of Care Treatment Preferences:  Code Status: DNR    Living Will: Yes              SDOH Screening:  The patient declined to be screened for utility difficulties, food insecurity, transport difficulties, housing insecurity, and interpersonal safety, so no concerns could be identified this admission.     Consults:   Consults (From admission, onward)          Status Ordering Provider     Inpatient  consult to Social Work/Case Management  Once        Provider:  (Not yet assigned)    Completed JUAN SARMIENTO     Inpatient consult to Registered Dietitian/Nutritionist  Once        Provider:  (Not yet assigned)    Completed JUAN SARMIENTO            * UTI (urinary tract infection)  As per sepsis       Sepsis  Organ dysfunction: acute hypoxic respiratory failure   Source: Likely UTI     Urine culture Mixed growth   Blood culture negative     Received Zosyn >> changed to Augmentin 1/31, will continue for 7 days on discharge    Acute hypercapnic respiratory failure  Likely secondary to HFpEF: acute on chronic.   Patient was diuresed with IV Lasix, He is on RA now   Resume PO Lasix on discharge. Dose increased to 40 mg BID     Acute on chronic diastolic heart failure  Patient has Combined Systolic and Diastolic heart failure that is Acute on chronic. On presentation their CHF was decompensated. Evidence of decompensated CHF on presentation includes: edema, crackles on lung auscultation, paroxysmal nocturnal dyspnea (PND), and shortness of breath. The etiology of their decompensation is likely acute infection . Most recent BNP and echo results are listed below.      Latest ECHO  Results for orders placed during the hospital encounter of 03/24/23    Echo    Interpretation Summary  · The left ventricle is normal in size with moderate concentric hypertrophy and low normal systolic function.  · Mild left atrial enlargement.  · The estimated ejection fraction is 50%.  · Indeterminate left ventricular diastolic function.  · RV is not well visualized. Mildly to moderately reduced right ventricular systolic function.  · The mean diastolic gradient across the mitral valve is 13 mmHg at a heart rate of 87 bpm.  · There is moderate to severe mitral stenosis.  · There is moderate-to-severe aortic valve stenosis.  · Aortic valve area is 1.06 cm2; peak velocity is 2.75 m/s; mean gradient is 17 mmHg. Indexed area is 0.45-0.5  cm/m2. Indexed stroke volume is 29 ml/m2.  · Normal central venous pressure (3 mmHg).    Plan  - increased PO Lasix on discharge to 40 mg BID         Hypothyroidism  Continue home levothyroxine      COPD (chronic obstructive pulmonary disease)  Not in exacerbation   Resume home breathing Rx    CKD (chronic kidney disease), stage III  Creatine stable for now. BMP reviewed- noted Estimated Creatinine Clearance: 47.4 mL/min (based on SCr of 1.1 mg/dL). according to latest data. Based on current GFR, CKD stage is stage 3 - GFR 30-59.  Monitor UOP and serial BMP and adjust therapy as needed. Renally dose meds. Avoid nephrotoxic medications and procedures.      Final Active Diagnoses:    Diagnosis Date Noted POA    PRINCIPAL PROBLEM:  UTI (urinary tract infection) [N39.0] 10/09/2019 Yes    Acute hypercapnic respiratory failure [J96.02] 01/29/2025 Yes    Sepsis [A41.9] 01/29/2025 Yes    Acute on chronic diastolic heart failure [I50.9] 03/27/2023 Yes    COPD (chronic obstructive pulmonary disease) [J44.9] 03/25/2023 Yes     Chronic    Hypothyroidism [E03.8] 03/25/2023 Yes     Chronic    CKD (chronic kidney disease), stage III [N18.30] 03/25/2023 Yes     Chronic      Problems Resolved During this Admission:       Discharged Condition: good    Disposition: Home-Health Care [facility]    Follow Up:   Follow-up Information       Darci German MD Follow up in 1 week(s).    Specialty: Family Medicine  Contact information:  [address] [place] [address]  [place] [address] [phone]                           Patient Instructions:   No discharge procedures on file.    Significant Diagnostic Studies: Labs: CMP   Recent Labs   Lab 01/31/25  0504 02/01/25  0446    139   K 4.0 4.0   CL 98 100   CO2 32* 31*   * 140*   BUN 42* 42*   CREATININE 1.4 1.4   CALCIUM 8.7 8.5*   PROT 6.0 6.2   ALBUMIN 3.4* 3.5   BILITOT 0.7 0.6   ALKPHOS 70 77   AST 31 26   ALT 26 28   ANIONGAP 8 8    and CBC   Recent Labs   Lab 01/31/25  0504 02/01/25  0446    WBC 9.16 7.50   HGB 13.3* 13.3*   HCT 41.0 41.2   PLT 89* 99*       Pending Diagnostic Studies:       None           Medications:  Reconciled Home Medications:      Medication List        START taking these medications      amoxicillin-clavulanate 875-125mg 875-125 mg per tablet  Commonly known as: AUGMENTIN  Take 1 tablet by mouth every 12 (twelve) hours. for 7 days            CHANGE how you take these medications      furosemide 40 MG tablet  Commonly known as: LASIX  Take 1 tablet (40 mg total) by mouth 2 (two) times a day.  What changed: when to take this            CONTINUE taking these medications      albuterol 90 mcg/actuation inhaler  Commonly known as: PROVENTIL/VENTOLIN HFA  Inhale 2 puffs into the lungs every 4 (four) hours as needed for Wheezing. Rescue     atorvastatin 40 MG tablet  Commonly known as: LIPITOR  Take 1 tablet (40 mg total) by mouth every evening.     azelastine 137 mcg (0.1 %) nasal spray  Commonly known as: ASTELIN  1 spray (137 mcg total) by Nasal route 2 (two) times daily as needed for Rhinitis.     clopidogreL 75 mg tablet  Commonly known as: PLAVIX  Take 1 tablet (75 mg total) by mouth once daily.     glipiZIDE 5 MG tablet  Commonly known as: GLUCOTROL  Take 5 mg by mouth daily with breakfast.     isosorbide mononitrate 30 MG 24 hr tablet  Commonly known as: IMDUR  Take 1 tablet (30 mg total) by mouth once daily.     levothyroxine 25 MCG tablet  Commonly known as: SYNTHROID  Take 25 mcg by mouth once daily.     metoprolol tartrate 50 MG tablet  Commonly known as: LOPRESSOR  Take 1 tablet (50 mg total) by mouth 2 (two) times daily.     pantoprazole 40 MG tablet  Commonly known as: PROTONIX  Take 1 tablet (40 mg total) by mouth once daily.     SYMBICORT 160-4.5 mcg/actuation Hfaa  Generic drug: budesonide-formoterol 160-4.5 mcg  Inhale 2 puffs into the lungs every 12 (twelve) hours.     tamsulosin 0.4 mg Cap  Commonly known as: FLOMAX  Take 0.4 mg by mouth once daily.               Indwelling Lines/Drains at time of discharge:   Lines/Drains/Airways       None                   Time spent on the discharge of patient: 40 minutes         Azalia Pereyra MD  Department of Hospital Medicine  [facility]

## 2025-02-02 VITALS
DIASTOLIC BLOOD PRESSURE: 78 MMHG | WEIGHT: 195.13 LBS | SYSTOLIC BLOOD PRESSURE: 150 MMHG | BODY MASS INDEX: 30.63 KG/M2 | HEART RATE: 74 BPM | RESPIRATION RATE: 18 BRPM | TEMPERATURE: 98 F | OXYGEN SATURATION: 94 % | HEIGHT: 67 IN

## 2025-02-02 LAB — POCT GLUCOSE: 133 MG/DL (ref 70–110)

## 2025-02-02 PROCEDURE — 25000003 PHARM REV CODE 250: Performed by: INTERNAL MEDICINE

## 2025-02-02 PROCEDURE — 94761 N-INVAS EAR/PLS OXIMETRY MLT: CPT

## 2025-02-02 PROCEDURE — 94640 AIRWAY INHALATION TREATMENT: CPT

## 2025-02-02 PROCEDURE — 25000242 PHARM REV CODE 250 ALT 637 W/ HCPCS: Performed by: INTERNAL MEDICINE

## 2025-02-02 PROCEDURE — 25000242 PHARM REV CODE 250 ALT 637 W/ HCPCS: Performed by: NURSE PRACTITIONER

## 2025-02-02 PROCEDURE — 25000003 PHARM REV CODE 250: Performed by: NURSE PRACTITIONER

## 2025-02-02 PROCEDURE — 25000003 PHARM REV CODE 250: Performed by: FAMILY MEDICINE

## 2025-02-02 RX ADMIN — MUPIROCIN 1 G: 20 OINTMENT TOPICAL at 08:02

## 2025-02-02 RX ADMIN — ISOSORBIDE MONONITRATE 30 MG: 30 TABLET, EXTENDED RELEASE ORAL at 08:02

## 2025-02-02 RX ADMIN — PANTOPRAZOLE SODIUM 40 MG: 40 TABLET, DELAYED RELEASE ORAL at 05:02

## 2025-02-02 RX ADMIN — AMOXICILLIN AND CLAVULANATE POTASSIUM 1 TABLET: 875; 125 TABLET, FILM COATED ORAL at 08:02

## 2025-02-02 RX ADMIN — LEVOTHYROXINE SODIUM 25 MCG: 0.03 TABLET ORAL at 05:02

## 2025-02-02 RX ADMIN — LEVALBUTEROL HYDROCHLORIDE 1.25 MG: 1.25 SOLUTION RESPIRATORY (INHALATION) at 07:02

## 2025-02-02 RX ADMIN — LEVALBUTEROL HYDROCHLORIDE 1.25 MG: 1.25 SOLUTION RESPIRATORY (INHALATION) at 01:02

## 2025-02-02 RX ADMIN — FUROSEMIDE 40 MG: 40 TABLET ORAL at 08:02

## 2025-02-02 RX ADMIN — ARFORMOTEROL TARTRATE 15 MCG: 15 SOLUTION RESPIRATORY (INHALATION) at 07:02

## 2025-02-02 RX ADMIN — TAMSULOSIN HYDROCHLORIDE 0.4 MG: 0.4 CAPSULE ORAL at 08:02

## 2025-02-02 RX ADMIN — CLOPIDOGREL BISULFATE 75 MG: 75 TABLET, FILM COATED ORAL at 08:02

## 2025-02-02 RX ADMIN — IPRATROPIUM BROMIDE 0.5 MG: 0.5 SOLUTION RESPIRATORY (INHALATION) at 01:02

## 2025-02-02 RX ADMIN — BUDESONIDE INHALATION 0.5 MG: 0.5 SUSPENSION RESPIRATORY (INHALATION) at 07:02

## 2025-02-02 RX ADMIN — IPRATROPIUM BROMIDE 0.5 MG: 0.5 SOLUTION RESPIRATORY (INHALATION) at 07:02

## 2025-02-02 RX ADMIN — METOPROLOL TARTRATE 50 MG: 50 TABLET, FILM COATED ORAL at 08:02

## 2025-02-02 NOTE — PLAN OF CARE
"Patient was discharged yesterday. Patient did not have any medical reasons to stay in the hospital. However daughter decided to appeal for discharge to buy time so the patient can stay in the hospital one more night " for observation". No acute events since yesterday. Daughter wanted to appeal for discharge but did not want to stay until they even get the decision for appeal.     Nurses informed that daughter wanted to talk to me. She was questioning the "fever" this morning. Tmax was 99.1. I informed her that this is not considered a fever. People can have variation in the temperature during the day. Unless its > 100.4 it not even considered abnormal temperature and there is no need to do anything. Also repeat temperature had came down to 98.3 without any interventions.     Reiterated the decision for discharge yesterday was based on all medical data including vital signs, PT, OT recommendations, finalized cultures and normalized WBC. Since no acute events or nothing changed from the discharged and SW informed me yesterday that they were leaving this morning without even waiting the decision of the appeal, there was no reason for me to reassess him this morning.   "

## 2025-02-02 NOTE — PLAN OF CARE
Patient cleared for discharge from case management standpoint.    Pt to discharge back to [facility] with HH referral sent via [employer]. [facility]. Pt clear to dc if family decides not to wait until appeal comes back      Chart and discharge orders reviewed.  Patient discharged home with no further case management needs.       02/02/25 1216   Final Note   Assessment Type Final Discharge Note   Anticipated Discharge Disposition [facility] Resources/Appts/Education Provided Appointment suggestion unavailable   Post-Acute Status   Home Health Status Referrals Sent   Discharge Delays None known at this time

## 2025-02-02 NOTE — CARE UPDATE
02/01/25 2005   Aerosol Therapy   $ Aerosol Therapy Charges Aerosol Treatment  (pulmicort)   Daily Review of Necessity (SVN) completed   Respiratory Treatment Status (SVN) given   Treatment Route (SVN) mask;oxygen   Patient Position HOB elevated   Post Treatment Assessment (SVN) breath sounds improved;increased aeration   Signs of Intolerance (SVN) none   Breath Sounds Post-Respiratory Treatment   Throughout All Fields Post-Treatment All Fields   Throughout All Fields Post-Treatment aeration increased   Post-treatment Heart Rate (beats/min) 89   Post-treatment Resp Rate (breaths/min) 16

## 2025-02-02 NOTE — CARE UPDATE
02/02/25 0742   Patient Assessment/Suction   Level of Consciousness (AVPU) alert   Respiratory Effort Unlabored;Normal   Expansion/Accessory Muscles/Retractions no use of accessory muscles;no retractions;expansion symmetric   All Lung Fields Breath Sounds diminished   Rhythm/Pattern, Respiratory unlabored;pattern regular;depth regular   PRE-TX-O2   SpO2 96 %   Pulse Oximetry Type Intermittent   $ Pulse Oximetry - Multiple Charge Pulse Oximetry - Multiple   Pulse 81   Resp 16   Aerosol Therapy   $ Aerosol Therapy Charges Aerosol Treatment   Daily Review of Necessity (SVN) completed   Respiratory Treatment Status (SVN) given   Treatment Route (SVN) oxygen;mask   Patient Position HOB elevated   Post Treatment Assessment (SVN) breath sounds unchanged   Signs of Intolerance (SVN) none

## 2025-02-02 NOTE — PLAN OF CARE
Problem: Infection  Goal: Absence of Infection Signs and Symptoms  Outcome: Met     Problem: Adult Inpatient Plan of Care  Goal: Plan of Care Review  Outcome: Met  Goal: Patient-Specific Goal (Individualized)  Outcome: Met  Goal: Absence of Hospital-Acquired Illness or Injury  Outcome: Met  Goal: Optimal Comfort and Wellbeing  Outcome: Met  Goal: Readiness for Transition of Care  Outcome: Met     Problem: Sepsis/Septic Shock  Goal: Optimal Coping  Outcome: Met  Goal: Absence of Bleeding  Outcome: Met  Goal: Blood Glucose Level Within Targeted Range  Outcome: Met  Goal: Absence of Infection Signs and Symptoms  Outcome: Met  Goal: Optimal Nutrition Intake  Outcome: Met     Problem: Skin Injury Risk Increased  Goal: Skin Health and Integrity  Outcome: Met     Problem: Fall Injury Risk  Goal: Absence of Fall and Fall-Related Injury  Outcome: Met

## 2025-02-02 NOTE — NURSING
Discharge instructions reviewed with pt.  PIV removed without difficulty, catheter intact.  Pt left unit in stable condition via wheelchair.

## 2025-02-02 NOTE — PLAN OF CARE
Pt to dc back to [facility] with HH referral sent vis [facility]    02/02/25 1134   Post-Acute Status   Post-Acute Authorization Home Health   Home Health Status Referrals Sent   Discharge Plan   Discharge Plan A Home Health   Discharge Plan B Home Health

## 2025-02-02 NOTE — CARE UPDATE
02/01/25 2000   Patient Assessment/Suction   Level of Consciousness (AVPU) alert   Respiratory Effort Normal;Unlabored   Expansion/Accessory Muscles/Retractions no use of accessory muscles;no retractions;expansion symmetric   All Lung Fields Breath Sounds diminished;equal bilaterally   Rhythm/Pattern, Respiratory no shortness of breath reported;pattern regular;unlabored   Cough Frequency infrequent   Cough Type good;nonproductive   PRE-TX-O2   Device (Oxygen Therapy) high flow nasal cannula w/device;nasal cannula   $ Is the patient on Low Flow Oxygen? Yes   Flow (L/min) (Oxygen Therapy) 4   SpO2 95 %   Pulse Oximetry Type Intermittent   $ Pulse Oximetry - Multiple Charge Pulse Oximetry - Multiple   Pulse 78   Resp 16   Aerosol Therapy   $ Aerosol Therapy Charges Aerosol Treatment   Daily Review of Necessity (SVN) completed   Respiratory Treatment Status (SVN) given   Treatment Route (SVN) mask;oxygen   Patient Position HOB elevated   Post Treatment Assessment (SVN) breath sounds improved;increased aeration   Signs of Intolerance (SVN) none   Breath Sounds Post-Respiratory Treatment   Throughout All Fields Post-Treatment All Fields   Throughout All Fields Post-Treatment aeration increased   Post-treatment Heart Rate (beats/min) 90   Post-treatment Resp Rate (breaths/min) 18

## 2025-02-03 LAB
BACTERIA BLD CULT: NORMAL
BACTERIA BLD CULT: NORMAL

## 2025-03-26 ENCOUNTER — LAB VISIT (OUTPATIENT)
Dept: LAB | Facility: HOSPITAL | Age: OVER 89
End: 2025-03-26
Attending: SPECIALIST
Payer: MEDICARE

## 2025-03-26 DIAGNOSIS — Z12.5 SPECIAL SCREENING FOR MALIGNANT NEOPLASM OF PROSTATE: Primary | ICD-10-CM

## 2025-03-26 LAB — PSA SERPL-MCNC: 4.61 NG/ML (ref ?–4)

## 2025-03-26 PROCEDURE — 84153 ASSAY OF PSA TOTAL: CPT

## 2025-03-26 PROCEDURE — 36415 COLL VENOUS BLD VENIPUNCTURE: CPT | Mod: PO

## 2025-06-11 ENCOUNTER — HOSPITAL ENCOUNTER (INPATIENT)
Facility: HOSPITAL | Age: OVER 89
LOS: 2 days | Discharge: HOME HEALTH | DRG: 291 | End: 2025-06-13
Attending: EMERGENCY MEDICINE | Admitting: INTERNAL MEDICINE
Payer: MEDICARE

## 2025-06-11 DIAGNOSIS — I50.9 ACUTE ON CHRONIC HEART FAILURE: ICD-10-CM

## 2025-06-11 DIAGNOSIS — I50.33 ACUTE ON CHRONIC DIASTOLIC CONGESTIVE HEART FAILURE: ICD-10-CM

## 2025-06-11 DIAGNOSIS — R79.89 ELEVATED TROPONIN I LEVEL: ICD-10-CM

## 2025-06-11 DIAGNOSIS — I50.9 ACUTE ON CHRONIC HEART FAILURE, UNSPECIFIED HEART FAILURE TYPE: ICD-10-CM

## 2025-06-11 DIAGNOSIS — R07.9 CHEST PAIN: Primary | ICD-10-CM

## 2025-06-11 LAB
ABSOLUTE EOSINOPHIL (SMH): 0.3 K/UL
ABSOLUTE MONOCYTE (SMH): 0.65 K/UL (ref 0.3–1)
ABSOLUTE NEUTROPHIL COUNT (SMH): 7.1 K/UL (ref 1.8–7.7)
ALBUMIN SERPL-MCNC: 3.8 G/DL (ref 3.5–5.2)
ALP SERPL-CCNC: 79 UNIT/L (ref 55–135)
ALT SERPL-CCNC: 13 UNIT/L (ref 10–44)
ANION GAP (SMH): 6 MMOL/L (ref 8–16)
AST SERPL-CCNC: 16 UNIT/L (ref 10–40)
BASOPHILS # BLD AUTO: 0.05 K/UL
BASOPHILS NFR BLD AUTO: 0.5 %
BILIRUB SERPL-MCNC: 0.8 MG/DL (ref 0.1–1)
BNP SERPL-MCNC: 458 PG/ML
BUN SERPL-MCNC: 22 MG/DL (ref 8–23)
CALCIUM SERPL-MCNC: 9 MG/DL (ref 8.7–10.5)
CHLORIDE SERPL-SCNC: 102 MMOL/L (ref 95–110)
CO2 SERPL-SCNC: 30 MMOL/L (ref 23–29)
CREAT SERPL-MCNC: 1.3 MG/DL (ref 0.5–1.4)
ERYTHROCYTE [DISTWIDTH] IN BLOOD BY AUTOMATED COUNT: 15.4 % (ref 11.5–14.5)
GFR SERPLBLD CREATININE-BSD FMLA CKD-EPI: 52 ML/MIN/1.73/M2
GLUCOSE SERPL-MCNC: 163 MG/DL (ref 70–110)
HCT VFR BLD AUTO: 39.7 % (ref 40–54)
HGB BLD-MCNC: 12.9 GM/DL (ref 14–18)
IMM GRANULOCYTES # BLD AUTO: 0.06 K/UL (ref 0–0.04)
IMM GRANULOCYTES NFR BLD AUTO: 0.6 % (ref 0–0.5)
INR PPP: 1.1 (ref 0.8–1.2)
LYMPHOCYTES # BLD AUTO: 2.1 K/UL (ref 1–4.8)
MAGNESIUM SERPL-MCNC: 1.8 MG/DL (ref 1.6–2.6)
MCH RBC QN AUTO: 30.1 PG (ref 27–31)
MCHC RBC AUTO-ENTMCNC: 32.5 G/DL (ref 32–36)
MCV RBC AUTO: 93 FL (ref 82–98)
NUCLEATED RBC (/100WBC) (SMH): 0 /100 WBC
PLATELET # BLD AUTO: 102 K/UL (ref 150–450)
PMV BLD AUTO: 10.2 FL (ref 9.2–12.9)
POTASSIUM SERPL-SCNC: 3.9 MMOL/L (ref 3.5–5.1)
PROT SERPL-MCNC: 6.6 GM/DL (ref 6–8.4)
PROTHROMBIN TIME: 12.2 SECONDS (ref 9–12.5)
RBC # BLD AUTO: 4.28 M/UL (ref 4.6–6.2)
RELATIVE EOSINOPHIL (SMH): 2.9 % (ref 0–8)
RELATIVE LYMPHOCYTE (SMH): 20.5 % (ref 18–48)
RELATIVE MONOCYTE (SMH): 6.3 % (ref 4–15)
RELATIVE NEUTROPHIL (SMH): 69.2 % (ref 38–73)
SODIUM SERPL-SCNC: 138 MMOL/L (ref 136–145)
TROPONIN HIGH SENSITIVE (SMH): 116.5 PG/ML
TROPONIN HIGH SENSITIVE (SMH): 125.1 PG/ML
TROPONIN HIGH SENSITIVE (SMH): 72.1 PG/ML
TROPONIN HIGH SENSITIVE (SMH): 90.2 PG/ML
WBC # BLD AUTO: 10.25 K/UL (ref 3.9–12.7)

## 2025-06-11 PROCEDURE — 93005 ELECTROCARDIOGRAM TRACING: CPT | Performed by: INTERNAL MEDICINE

## 2025-06-11 PROCEDURE — 63600175 PHARM REV CODE 636 W HCPCS: Performed by: EMERGENCY MEDICINE

## 2025-06-11 PROCEDURE — 99285 EMERGENCY DEPT VISIT HI MDM: CPT | Mod: 25

## 2025-06-11 PROCEDURE — 93010 ELECTROCARDIOGRAM REPORT: CPT | Mod: ,,, | Performed by: INTERNAL MEDICINE

## 2025-06-11 PROCEDURE — 25000003 PHARM REV CODE 250: Performed by: EMERGENCY MEDICINE

## 2025-06-11 PROCEDURE — 85610 PROTHROMBIN TIME: CPT | Performed by: EMERGENCY MEDICINE

## 2025-06-11 PROCEDURE — 83880 ASSAY OF NATRIURETIC PEPTIDE: CPT | Performed by: EMERGENCY MEDICINE

## 2025-06-11 PROCEDURE — 99900031 HC PATIENT EDUCATION (STAT)

## 2025-06-11 PROCEDURE — 85025 COMPLETE CBC W/AUTO DIFF WBC: CPT | Performed by: EMERGENCY MEDICINE

## 2025-06-11 PROCEDURE — 25000242 PHARM REV CODE 250 ALT 637 W/ HCPCS: Performed by: INTERNAL MEDICINE

## 2025-06-11 PROCEDURE — 25000003 PHARM REV CODE 250: Performed by: INTERNAL MEDICINE

## 2025-06-11 PROCEDURE — 94761 N-INVAS EAR/PLS OXIMETRY MLT: CPT

## 2025-06-11 PROCEDURE — 84484 ASSAY OF TROPONIN QUANT: CPT | Performed by: INTERNAL MEDICINE

## 2025-06-11 PROCEDURE — 96374 THER/PROPH/DIAG INJ IV PUSH: CPT

## 2025-06-11 PROCEDURE — 94799 UNLISTED PULMONARY SVC/PX: CPT

## 2025-06-11 PROCEDURE — 94640 AIRWAY INHALATION TREATMENT: CPT

## 2025-06-11 PROCEDURE — 83735 ASSAY OF MAGNESIUM: CPT | Performed by: EMERGENCY MEDICINE

## 2025-06-11 PROCEDURE — 11000001 HC ACUTE MED/SURG PRIVATE ROOM

## 2025-06-11 PROCEDURE — 84460 ALANINE AMINO (ALT) (SGPT): CPT | Performed by: EMERGENCY MEDICINE

## 2025-06-11 PROCEDURE — 63600175 PHARM REV CODE 636 W HCPCS: Performed by: INTERNAL MEDICINE

## 2025-06-11 PROCEDURE — 84484 ASSAY OF TROPONIN QUANT: CPT | Mod: 91 | Performed by: EMERGENCY MEDICINE

## 2025-06-11 PROCEDURE — 99900035 HC TECH TIME PER 15 MIN (STAT)

## 2025-06-11 PROCEDURE — 36415 COLL VENOUS BLD VENIPUNCTURE: CPT | Performed by: INTERNAL MEDICINE

## 2025-06-11 RX ORDER — ENOXAPARIN SODIUM 100 MG/ML
40 INJECTION SUBCUTANEOUS EVERY 24 HOURS
Status: DISCONTINUED | OUTPATIENT
Start: 2025-06-11 | End: 2025-06-11

## 2025-06-11 RX ORDER — NALOXONE HCL 0.4 MG/ML
0.02 VIAL (ML) INJECTION
Status: DISCONTINUED | OUTPATIENT
Start: 2025-06-11 | End: 2025-06-13 | Stop reason: HOSPADM

## 2025-06-11 RX ORDER — SODIUM,POTASSIUM PHOSPHATES 280-250MG
2 POWDER IN PACKET (EA) ORAL
Status: DISCONTINUED | OUTPATIENT
Start: 2025-06-11 | End: 2025-06-13 | Stop reason: HOSPADM

## 2025-06-11 RX ORDER — BUDESONIDE 0.5 MG/2ML
0.5 INHALANT ORAL EVERY 12 HOURS
Status: DISCONTINUED | OUTPATIENT
Start: 2025-06-11 | End: 2025-06-13 | Stop reason: HOSPADM

## 2025-06-11 RX ORDER — ALUMINUM HYDROXIDE, MAGNESIUM HYDROXIDE, AND SIMETHICONE 1200; 120; 1200 MG/30ML; MG/30ML; MG/30ML
30 SUSPENSION ORAL 4 TIMES DAILY PRN
Status: DISCONTINUED | OUTPATIENT
Start: 2025-06-11 | End: 2025-06-13 | Stop reason: HOSPADM

## 2025-06-11 RX ORDER — PANTOPRAZOLE SODIUM 40 MG/1
40 TABLET, DELAYED RELEASE ORAL DAILY
Status: DISCONTINUED | OUTPATIENT
Start: 2025-06-12 | End: 2025-06-13 | Stop reason: HOSPADM

## 2025-06-11 RX ORDER — ONDANSETRON HYDROCHLORIDE 2 MG/ML
4 INJECTION, SOLUTION INTRAVENOUS EVERY 6 HOURS PRN
Status: DISCONTINUED | OUTPATIENT
Start: 2025-06-11 | End: 2025-06-13 | Stop reason: HOSPADM

## 2025-06-11 RX ORDER — CLOPIDOGREL BISULFATE 75 MG/1
75 TABLET ORAL DAILY
Status: DISCONTINUED | OUTPATIENT
Start: 2025-06-11 | End: 2025-06-13 | Stop reason: HOSPADM

## 2025-06-11 RX ORDER — NITROGLYCERIN 20 MG/G
1 OINTMENT TOPICAL
Status: COMPLETED | OUTPATIENT
Start: 2025-06-11 | End: 2025-06-11

## 2025-06-11 RX ORDER — FUROSEMIDE 10 MG/ML
40 INJECTION INTRAMUSCULAR; INTRAVENOUS EVERY 12 HOURS
Status: DISCONTINUED | OUTPATIENT
Start: 2025-06-11 | End: 2025-06-12

## 2025-06-11 RX ORDER — ENOXAPARIN SODIUM 100 MG/ML
1 INJECTION SUBCUTANEOUS
Status: DISCONTINUED | OUTPATIENT
Start: 2025-06-11 | End: 2025-06-13 | Stop reason: HOSPADM

## 2025-06-11 RX ORDER — CLOPIDOGREL BISULFATE 75 MG/1
75 TABLET ORAL DAILY
Status: DISCONTINUED | OUTPATIENT
Start: 2025-06-12 | End: 2025-06-11

## 2025-06-11 RX ORDER — FUROSEMIDE 40 MG/1
40 TABLET ORAL DAILY
COMMUNITY

## 2025-06-11 RX ORDER — LANOLIN ALCOHOL/MO/W.PET/CERES
800 CREAM (GRAM) TOPICAL
Status: DISCONTINUED | OUTPATIENT
Start: 2025-06-11 | End: 2025-06-13 | Stop reason: HOSPADM

## 2025-06-11 RX ORDER — ACETAMINOPHEN 325 MG/1
650 TABLET ORAL EVERY 8 HOURS PRN
Status: DISCONTINUED | OUTPATIENT
Start: 2025-06-11 | End: 2025-06-13 | Stop reason: HOSPADM

## 2025-06-11 RX ORDER — TALC
6 POWDER (GRAM) TOPICAL NIGHTLY PRN
Status: DISCONTINUED | OUTPATIENT
Start: 2025-06-11 | End: 2025-06-13 | Stop reason: HOSPADM

## 2025-06-11 RX ORDER — FUROSEMIDE 10 MG/ML
40 INJECTION INTRAMUSCULAR; INTRAVENOUS
Status: COMPLETED | OUTPATIENT
Start: 2025-06-11 | End: 2025-06-11

## 2025-06-11 RX ORDER — METOPROLOL TARTRATE 25 MG/1
25 TABLET, FILM COATED ORAL 2 TIMES DAILY
Status: DISCONTINUED | OUTPATIENT
Start: 2025-06-11 | End: 2025-06-13 | Stop reason: HOSPADM

## 2025-06-11 RX ORDER — FUROSEMIDE 10 MG/ML
40 INJECTION INTRAMUSCULAR; INTRAVENOUS EVERY 12 HOURS
Status: DISCONTINUED | OUTPATIENT
Start: 2025-06-11 | End: 2025-06-11

## 2025-06-11 RX ORDER — VIBEGRON 75 MG/1
1 TABLET, FILM COATED ORAL DAILY
COMMUNITY
Start: 2025-06-02

## 2025-06-11 RX ORDER — ATORVASTATIN CALCIUM 40 MG/1
40 TABLET, FILM COATED ORAL NIGHTLY
Status: DISCONTINUED | OUTPATIENT
Start: 2025-06-11 | End: 2025-06-13 | Stop reason: HOSPADM

## 2025-06-11 RX ORDER — METOPROLOL TARTRATE 25 MG/1
25 TABLET, FILM COATED ORAL 2 TIMES DAILY
COMMUNITY
Start: 2025-05-26

## 2025-06-11 RX ORDER — IPRATROPIUM BROMIDE 0.5 MG/2.5ML
0.5 SOLUTION RESPIRATORY (INHALATION) EVERY 8 HOURS
Status: DISCONTINUED | OUTPATIENT
Start: 2025-06-11 | End: 2025-06-13 | Stop reason: HOSPADM

## 2025-06-11 RX ORDER — ISOSORBIDE MONONITRATE 30 MG/1
30 TABLET, EXTENDED RELEASE ORAL DAILY
Status: DISCONTINUED | OUTPATIENT
Start: 2025-06-12 | End: 2025-06-13 | Stop reason: HOSPADM

## 2025-06-11 RX ORDER — HYDROCODONE BITARTRATE AND ACETAMINOPHEN 5; 325 MG/1; MG/1
1 TABLET ORAL EVERY 6 HOURS PRN
Refills: 0 | Status: DISCONTINUED | OUTPATIENT
Start: 2025-06-11 | End: 2025-06-13 | Stop reason: HOSPADM

## 2025-06-11 RX ORDER — LEVALBUTEROL INHALATION SOLUTION 1.25 MG/3ML
1.25 SOLUTION RESPIRATORY (INHALATION) EVERY 8 HOURS
Status: DISCONTINUED | OUTPATIENT
Start: 2025-06-11 | End: 2025-06-13 | Stop reason: HOSPADM

## 2025-06-11 RX ORDER — LEVALBUTEROL INHALATION SOLUTION 1.25 MG/3ML
1.25 SOLUTION RESPIRATORY (INHALATION)
Status: DISCONTINUED | OUTPATIENT
Start: 2025-06-11 | End: 2025-06-11

## 2025-06-11 RX ADMIN — IPRATROPIUM BROMIDE 0.5 MG: 0.5 SOLUTION RESPIRATORY (INHALATION) at 03:06

## 2025-06-11 RX ADMIN — ENOXAPARIN SODIUM 40 MG: 40 INJECTION SUBCUTANEOUS at 04:06

## 2025-06-11 RX ADMIN — CLOPIDOGREL 75 MG: 75 TABLET ORAL at 02:06

## 2025-06-11 RX ADMIN — BUDESONIDE INHALATION 0.5 MG: 0.5 SUSPENSION RESPIRATORY (INHALATION) at 07:06

## 2025-06-11 RX ADMIN — FUROSEMIDE 40 MG: 10 INJECTION, SOLUTION INTRAMUSCULAR; INTRAVENOUS at 08:06

## 2025-06-11 RX ADMIN — NITROGLYCERIN 1 INCH: 20 OINTMENT TOPICAL at 02:06

## 2025-06-11 RX ADMIN — LEVALBUTEROL HYDROCHLORIDE 1.25 MG: 1.25 SOLUTION RESPIRATORY (INHALATION) at 03:06

## 2025-06-11 RX ADMIN — ENOXAPARIN SODIUM 80 MG: 80 INJECTION SUBCUTANEOUS at 07:06

## 2025-06-11 RX ADMIN — ATORVASTATIN CALCIUM 40 MG: 40 TABLET, FILM COATED ORAL at 08:06

## 2025-06-11 RX ADMIN — METOPROLOL TARTRATE 25 MG: 25 TABLET, FILM COATED ORAL at 08:06

## 2025-06-11 RX ADMIN — FUROSEMIDE 40 MG: 10 INJECTION, SOLUTION INTRAMUSCULAR; INTRAVENOUS at 02:06

## 2025-06-11 NOTE — ASSESSMENT & PLAN NOTE
As mentioned above      Latest Reference Range & Units 03/25/23 14:56 03/25/23 17:55 07/17/24 19:43 01/29/25 05:17 06/11/25 10:50 06/11/25 12:52 06/11/25 15:27   Troponin I High Sensitivity <=14.9 pg/mL 869.8 (HH) 750.5 (HH) 20.6 (H) 14.5 72.1 (HH) 90.2 (HH) 125.1 (HH)   (HH): Data is critically high  (H): Data is abnormally high

## 2025-06-11 NOTE — SUBJECTIVE & OBJECTIVE
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 2004    CORONARY ARTERY BYPASS GRAFT  1996    5-bypass     CYSTOSCOPY N/A 8/15/2019    Procedure: CYSTOSCOPY;  Surgeon: Dipak Sanchez MD;  Location: [facility];  Service: Urology;  Laterality: N/A;    CYSTOSCOPY N/A 10/31/2019    Procedure: CYSTOSCOPY;  Surgeon: Dipak Sanchez MD;  Location: [facility] OR;  Service: Urology;  Laterality: N/A;    CYSTOSCOPY W/ URETERAL STENT REMOVAL Left 10/31/2019    Procedure: CYSTOSCOPY, WITH URETERAL STENT REMOVAL  URETEROSCOPY;  Surgeon: Dipak Sanchez MD;  Location: [facility];  Service: Urology;  Laterality: Left;    EXTRACORPOREAL SHOCK WAVE LITHOTRIPSY Left 8/15/2019    Procedure: LITHOTRIPSY, ESWL;  Surgeon: Dipak Sanchez MD;  Location: [facility];  Service: Urology;  Laterality: Left;    EXTRACORPOREAL SHOCK WAVE LITHOTRIPSY Left 9/19/2019    Procedure: LITHOTRIPSY, ESWL;  Surgeon: Dipak Sanchez MD;  Location: [facility];  Service: Urology;  Laterality: Left;    EYE SURGERY Right 2014    Cataract    EYE SURGERY Left 2017    Cataract    HERNIA REPAIR  2014    Dr. Bailon - had to have mesh removed    INGUINAL HERNIA REPAIR Right 03/25/2019        LITHOTRIPSY      nephrostomy tube      PERCUTANEOUS NEPHROSTOMY      ROTATOR CUFF REPAIR  2005    right shoulder    URETEROSCOPY Left 10/31/2019     Procedure: URETEROSCOPY;  Surgeon: Dipak Sanchez MD;  Location: [facility];  Service: Urology;  Laterality: Left;       Review of patient's allergies indicates:   Allergen Reactions    Ceftriaxone Rash     Experienced rash with Ceftriaxone 1/30/2025    Bactrim [sulfamethoxazole-trimethoprim] Hives     itched    Methylprednisolone     Keflex [cephalexin] Rash     Tolerated piperacillin-tazobactam 1/30/2025    Morphine Rash     Patient had morphine and keflex at the same time, developed a rash, not sure which one caused it, or if it was a combination of the two meds.       No current facility-administered medications on file prior to encounter.     Current Outpatient Medications on File Prior to Encounter   Medication Sig    atorvastatin (LIPITOR) 40 MG tablet Take 1 tablet (40 mg total) by mouth every evening.    clopidogreL (PLAVIX) 75 mg tablet Take 1 tablet (75 mg total) by mouth once daily.    furosemide (LASIX) 40 MG tablet Take 40 mg by mouth once daily.    GEMTESA 75 mg Tab Take 1 tablet by mouth once daily.    isosorbide mononitrate (IMDUR) 30 MG 24 hr tablet Take 1 tablet (30 mg total) by mouth once daily.    levothyroxine (SYNTHROID) 25 MCG tablet Take 25 mcg by mouth once daily.     metoprolol tartrate (LOPRESSOR) 25 MG tablet Take 25 mg by mouth 2 (two) times daily.    pantoprazole (PROTONIX) 40 MG tablet Take 1 tablet (40 mg total) by mouth once daily.    SYMBICORT 160-4.5 mcg/actuation HFAA Inhale 2 puffs into the lungs every 12 (twelve) hours.     tamsulosin (FLOMAX) 0.4 mg Cap Take 0.4 mg by mouth once daily.     albuterol (PROVENTIL/VENTOLIN HFA) 90 mcg/actuation inhaler Inhale 2 puffs into the lungs every 4 (four) hours as needed for Wheezing. Rescue    [DISCONTINUED] azelastine (ASTELIN) 137 mcg (0.1 %) nasal spray 1 spray (137 mcg total) by Nasal route 2 (two) times daily as needed for Rhinitis.    [DISCONTINUED] furosemide (LASIX) 40 MG tablet Take 1 tablet (40 mg total) by mouth 2 (two) times  a day.    [DISCONTINUED] glipiZIDE (GLUCOTROL) 5 MG tablet Take 5 mg by mouth daily with breakfast.    [DISCONTINUED] metoprolol tartrate (LOPRESSOR) 50 MG tablet Take 1 tablet (50 mg total) by mouth 2 (two) times daily.     Family History       Problem Relation (Age of Onset)    Cancer Daughter    Diabetes Brother    Heart disease Mother, Brother, Brother, Sister    Hypertension Mother, Sister, Brother, Brother, Sister          Tobacco Use    Smoking status: Former     Current packs/day: 0.00     Average packs/day: 1 pack/day for 25.0 years (25.0 ttl pk-yrs)     Types: Cigarettes     Start date: 1947     Quit date: 1972     Years since quittin.4    Smokeless tobacco: Former     Quit date: 8/15/1972   Vaping Use    Vaping status: Never Used   Substance and Sexual Activity    Alcohol use: No    Drug use: No    Sexual activity: Not on file     Review of Systems   Constitutional:  Negative for activity change and appetite change.   HENT:  Negative for congestion and dental problem.    Eyes:  Negative for discharge and itching.   Respiratory:  Positive for shortness of breath.    Cardiovascular:  Positive for chest pain.   Gastrointestinal:  Negative for abdominal distention and abdominal pain.   Endocrine: Negative for cold intolerance.   Genitourinary:  Negative for difficulty urinating and dysuria.   Musculoskeletal:  Negative for arthralgias and back pain.   Skin:  Negative for color change.   Neurological:  Negative for dizziness and facial asymmetry.   Hematological:  Negative for adenopathy.   Psychiatric/Behavioral:  Negative for agitation and behavioral problems.      Objective:     Vital Signs (Most Recent):  Temp: 97.3 °F (36.3 °C) (25 1518)  Pulse: 80 (25 1644)  Resp: 16 (25 1518)  BP: (!) 177/86 (25 1518)  SpO2: 95 % (25 1518) Vital Signs (24h Range):  Temp:  [97.3 °F (36.3 °C)-98.1 °F (36.7 °C)] 97.3 °F (36.3 °C)  Pulse:  [67-92] 80  Resp:  [16-20] 16  SpO2:  [95  %-96 %] 95 %  BP: (152-195)/() 177/86     Weight: 80.8 kg (178 lb 3.2 oz)  Body mass index is 29.65 kg/m².     Physical Exam  Vitals and nursing note reviewed.   Constitutional:       Appearance: He is well-developed.   HENT:      Head: Atraumatic.      Right Ear: External ear normal.      Left Ear: External ear normal.      Nose: Nose normal.      Mouth/Throat:      Mouth: Mucous membranes are moist.   Eyes:      Extraocular Movements: Extraocular movements intact.   Cardiovascular:      Rate and Rhythm: Normal rate.   Pulmonary:      Effort: Pulmonary effort is normal.      Breath sounds: Rales present.   Abdominal:      Palpations: Abdomen is soft.   Musculoskeletal:         General: Normal range of motion.      Cervical back: Full passive range of motion without pain and normal range of motion.      Right lower leg: Edema present.      Left lower leg: Edema present.   Skin:     General: Skin is warm.   Neurological:      Mental Status: He is alert and oriented to person, place, and time.   Psychiatric:         Behavior: Behavior normal.                Significant Labs: All pertinent labs within the past 24 hours have been reviewed.  CBC:   Recent Labs   Lab 06/11/25  1050   WBC 10.25   HGB 12.9*   HCT 39.7*   *     CMP:   Recent Labs   Lab 06/11/25  1050      K 3.9      CO2 30*   *   BUN 22   CREATININE 1.3   CALCIUM 9.0   PROT 6.6   ALBUMIN 3.8   BILITOT 0.8   ALKPHOS 79   AST 16   ALT 13   ANIONGAP 6*       Significant Imaging: I have reviewed all pertinent imaging results/findings within the past 24 hours.

## 2025-06-11 NOTE — PHARMACY MED REC
"          Admission Medication History     The home medication history was taken by Brianna Thompson.    You may go to "Admission" then "Reconcile Home Medications" tabs to review and/or act upon these items.     The home medication list has been updated by the Pharmacy department.   Please read ALL comments highlighted in yellow.   Please address this information as you see fit.    Feel free to contact us if you have any questions or require assistance.      The medications listed below were removed from the home medication list. Please reorder if appropriate:  Patient reports no longer taking the following medication(s):  GLIPIZIDE          Medications listed below were obtained from: Patient/family and Analytic software- [employer]  Medications Ordered Prior to Encounter[1]      Brianna Thompson  [id number]        .               [1]   No current facility-administered medications on file prior to encounter.     Current Outpatient Medications on File Prior to Encounter   Medication Sig Dispense Refill    atorvastatin (LIPITOR) 40 MG tablet Take 1 tablet (40 mg total) by mouth every evening. 90 tablet 0    clopidogreL (PLAVIX) 75 mg tablet Take 1 tablet (75 mg total) by mouth once daily. 30 tablet 0    furosemide (LASIX) 40 MG tablet Take 40 mg by mouth once daily.      GEMTESA 75 mg Tab Take 1 tablet by mouth once daily.      isosorbide mononitrate (IMDUR) 30 MG 24 hr tablet Take 1 tablet (30 mg total) by mouth once daily. 30 tablet 0    levothyroxine (SYNTHROID) 25 MCG tablet Take 25 mcg by mouth once daily.   1    metoprolol tartrate (LOPRESSOR) 25 MG tablet Take 25 mg by mouth 2 (two) times daily.      pantoprazole (PROTONIX) 40 MG tablet Take 1 tablet (40 mg total) by mouth once daily. 30 tablet 0    SYMBICORT 160-4.5 mcg/actuation HFAA Inhale 2 puffs into the lungs every 12 (twelve) hours.       tamsulosin (FLOMAX) 0.4 mg Cap Take 0.4 mg by mouth once daily.       albuterol (PROVENTIL/VENTOLIN HFA) 90 mcg/actuation " inhaler Inhale 2 puffs into the lungs every 4 (four) hours as needed for Wheezing. Rescue 1 Inhaler 0    [DISCONTINUED] azelastine (ASTELIN) 137 mcg (0.1 %) nasal spray 1 spray (137 mcg total) by Nasal route 2 (two) times daily as needed for Rhinitis. 30 mL 0    [DISCONTINUED] furosemide (LASIX) 40 MG tablet Take 1 tablet (40 mg total) by mouth 2 (two) times a day. 60 tablet 0    [DISCONTINUED] glipiZIDE (GLUCOTROL) 5 MG tablet Take 5 mg by mouth daily with breakfast.      [DISCONTINUED] metoprolol tartrate (LOPRESSOR) 50 MG tablet Take 1 tablet (50 mg total) by mouth 2 (two) times daily. 60 tablet 0

## 2025-06-11 NOTE — Clinical Note
Diagnosis: Chest pain [792172]   Future Attending Provider: MAGO LEE [[id number]]   Place in Observation: [facility] [[id number]]   Special Needs:: No Special Needs [1]

## 2025-06-11 NOTE — PLAN OF CARE
[facility]  Initial Discharge Assessment       Primary Care Provider: Darci German MD    Admission Diagnosis: Chest pain [R07.9]    Admission Date: 6/11/2025  Expected Discharge Date:     SW met with patient bedside. Ayse, sister, was present in the room assisting with assessment. SW verified demographics, insurance, supports, and PCP.  Patient reported he lives alone at [facility] and family brings him to appointments. SW assessed patient's needs. Patient is able to complete ADLs independently with equipment. Patient verified at home DME: rolling walker.  Patient verified No HH, No Dialysis, and No Oxygen. Patient verified he takes Plavix for blood thinner.    Patient verified pharmacy of choice: [facility] on [address]  Patient confirmed his sister, Ayse, will be source of transportation at the time of discharge.     Transition of Care Barriers: None    Payor: [employer] [facility] / Plan: [employer] CHOICES / Product Type: Medicare Advantage /     Extended Emergency Contact Information  Primary Emergency Contact: Jewell Whatley  Address: [address] [place] [address]  Home Phone: [phone]  Mobile Phone: [phone]  Relation: Daughter  Preferred language: English   needed? No  Secondary Emergency Contact: Umm Wyatt  Address: [id number] [place] [address]           [address]           [place], [place] [address] [place] [address]  Home Phone: [phone]  Work Phone: [phone]  Mobile Phone: [phone]  Relation: Daughter  Preferred language: English   needed? No    Discharge Plan A: Home, Assisted Living  Discharge Plan B: Home, Assisted Living      [facility] [address] [facility] [place] [address] [facility]  [address] [place] [id number]  Phone: [phone] Fax: [phone]      Initial Assessment (most recent)       Adult Discharge Assessment - 06/11/25 1940          Discharge Assessment    Assessment Type  Discharge Planning Assessment     Confirmed/corrected address, phone number and insurance Yes     Confirmed Demographics Correct on Facesheet     Source of Information patient     Communicated ALESSANDRA with patient/caregiver Date not available/Unable to determine     People in Home alone     Do you expect to return to your current living situation? Yes     Do you have help at home or someone to help you manage your care at home? No     Prior to hospitilization cognitive status: Unable to Assess     Current cognitive status: Alert/Oriented     Walking or Climbing Stairs Difficulty yes     Walking or Climbing Stairs ambulation difficulty, requires equipment     Mobility Management rolling walker     Dressing/Bathing Difficulty no     Home Accessibility wheelchair accessible     Home Layout Able to live on 1st floor     Equipment Currently Used at Home walker, rolling     Readmission within 30 days? No     Patient currently being followed by outpatient case management? No     Do you currently have service(s) that help you manage your care at home? No     Do you take prescription medications? Yes     Do you have prescription coverage? Yes     Do you have any problems affording any of your prescribed medications? No     Is the patient taking medications as prescribed? yes     Who is going to help you get home at discharge? Ayse Artis, sister [phone]     How do you get to doctors appointments? family or friend will provide     Are you on dialysis? No     Do you take coumadin? No   Plavix    Discharge Plan A Home;Assisted Living     Discharge Plan B Home;Assisted Living     DME Needed Upon Discharge  none     Discharge Plan discussed with: Patient     Transition of Care Barriers None

## 2025-06-11 NOTE — HPI
89 year old pt getting admitted with CHF flare and chest pain  Pt has been suffering from chest pain /discomfort for past several days  Described as chest tightness and worse on exertion  Pain goes away with rest  His Cardiologist is Dr Kim and its been a while pt had stress test  His Urologist cutdown his Lasix dose and pt was not taking lasix as prescribed by his Cardio MD

## 2025-06-11 NOTE — CARE UPDATE
06/11/25 1504   Patient Assessment/Suction   Level of Consciousness (AVPU) alert   Respiratory Effort Normal;Unlabored   Expansion/Accessory Muscles/Retractions no use of accessory muscles;no retractions   All Lung Fields Breath Sounds wheezes, expiratory   Rhythm/Pattern, Respiratory unlabored   Cough Frequency no cough   PRE-TX-O2   Device (Oxygen Therapy) room air   SpO2 96 %   Pulse Oximetry Type Continuous   $ Pulse Oximetry - Multiple Charge Pulse Oximetry - Multiple   Pulse 67   Aerosol Therapy   $ Aerosol Therapy Charges Aerosol Treatment   Daily Review of Necessity (SVN) completed   Respiratory Treatment Status (SVN) given   Treatment Route (SVN) mask;oxygen   Patient Position sitting on edge of bed   Post Treatment Assessment (SVN) breath sounds unchanged   Signs of Intolerance (SVN) none   Breath Sounds Post-Respiratory Treatment   Throughout All Fields Post-Treatment All Fields   Throughout All Fields Post-Treatment aeration increased   Post-treatment Heart Rate (beats/min) 68   Post-treatment Resp Rate (breaths/min) 18   Education   $ Education Bronchodilator;15 min   Tobacco Cessation Intervention   Do you use any type of tobacco product? No   Respiratory Evaluation   $ Care Plan Tech Time 15 min   $ Respiratory Evaluation Complete   Evaluation For New Orders   Admitting Diagnosis chest pain   Cardiac Diagnosis na   Home Oxygen   Has Home Oxygen? No   Home Aerosol, MDI, DPI, and Other Treatments/Therapies   Home Respiratory Therapy Per Patient/Review of Chart Yes   MDI Home Meds/Freq albuterol   Oxygen Care Plan   Oxygen Care Plan Per Protocol   SPO2 Goal (%) 92% non-cardiac   Rationale No rational found   Bronchodilator Care Plan   Bronchodilator Care Plan Aerosol   Aerosol Meds w/ frequency Atrovent(Iprotropium Bromide) 500mcg Q 8Hr;Xopenex(Levalbuterol HCL) 0.63mg Q 8Hr   Atelectasis Care Plan   Rationale No Rational Found   Airway Clearance Care Plan   Rationale No rationale found

## 2025-06-11 NOTE — ASSESSMENT & PLAN NOTE
Creatine stable for now. BMP reviewed- noted Estimated Creatinine Clearance: 37 mL/min (based on SCr of 1.3 mg/dL). according to latest data. Based on current GFR, CKD stage is .  Monitor UOP and serial BMP and adjust therapy as needed. Renally dose meds. Avoid nephrotoxic medications and procedures.

## 2025-06-11 NOTE — NURSING
Patient arrived to floor admission assessment complete, VSS, Patient oriented to room, floor, call light. Patient in bed, bed alarm activated, call light in reach.

## 2025-06-11 NOTE — ED PROVIDER NOTES
Encounter Date: 6/11/2025       History     Chief Complaint   Patient presents with    Chest Pain     From [facility]. CP about 1hr 20 min 324 asa and 1 spray nitro given en route     This is a 89-year-old male with past medical history of coronary artery disease status post bypass 30 years ago, COPD, hypertension, small-bowel obstruction, meniers disease, who presents emergency department with chest pain.  He says it has been happening over the past couple days.  It seems to be worse with exertion.  Better at rest.  He says that he has been taking anything for his symptoms.  He called EMS today who gave him aspirin 324 mg have 1 spray of nitro which seemed to help with his symptoms.  Currently he does not have any pain.  He is feeling better.  He has not had any recent stress testing/ angiogram      Review of patient's allergies indicates:   Allergen Reactions    Ceftriaxone Rash     Experienced rash with Ceftriaxone 1/30/2025    Bactrim [sulfamethoxazole-trimethoprim] Hives     itched    Methylprednisolone     Keflex [cephalexin] Rash     Tolerated piperacillin-tazobactam 1/30/2025    Morphine Rash     Patient had morphine and keflex at the same time, developed a rash, not sure which one caused it, or if it was a combination of the two meds.     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  2004    CORONARY ARTERY BYPASS GRAFT  1996    5-bypass     CYSTOSCOPY N/A 8/15/2019    Procedure: CYSTOSCOPY;  Surgeon: Dipak Sanchez MD;  Location: [facility] OR;  Service: Urology;  Laterality: N/A;    CYSTOSCOPY N/A 10/31/2019    Procedure: CYSTOSCOPY;  Surgeon: Dipak Sanchez MD;  Location: [facility] OR;  Service: Urology;  Laterality: N/A;    CYSTOSCOPY W/ URETERAL STENT REMOVAL Left 10/31/2019    Procedure: CYSTOSCOPY, WITH URETERAL STENT REMOVAL  URETEROSCOPY;  Surgeon: Dipak Sanchez MD;  Location: [facility] OR;  Service: Urology;  Laterality: Left;    EXTRACORPOREAL SHOCK WAVE LITHOTRIPSY Left 8/15/2019    Procedure: LITHOTRIPSY, ESWL;  Surgeon: Dipak Sanchez MD;  Location: [facility] OR;  Service: Urology;  Laterality: Left;    EXTRACORPOREAL SHOCK WAVE LITHOTRIPSY Left 9/19/2019    Procedure: LITHOTRIPSY, ESWL;  Surgeon: Dipak Sanchez MD;  Location: [facility];  Service: Urology;  Laterality: Left;    EYE SURGERY Right 2014    Cataract    EYE SURGERY Left 2017    Cataract    HERNIA REPAIR  2014    Dr. Bailon - had to have mesh removed    INGUINAL HERNIA REPAIR Right 03/25/2019        LITHOTRIPSY      nephrostomy tube      PERCUTANEOUS NEPHROSTOMY      ROTATOR CUFF REPAIR  2005    right shoulder    URETEROSCOPY Left 10/31/2019    Procedure: URETEROSCOPY;  Surgeon: Dipak Sanchez MD;  Location: [facility];  Service: Urology;  Laterality: Left;     Family History   Problem Relation Name Age of Onset    Heart disease Mother      Hypertension Mother      Hypertension Sister      Heart disease Brother      Hypertension Brother      Cancer Daughter      Diabetes Brother      Heart disease Brother      Hypertension Brother      Hypertension Sister      Heart disease Sister       Social History[1]  Review of Systems   Constitutional:  Negative for chills and fever.   HENT:  Negative for sore throat.    Respiratory:  Positive for cough (at times, dry nonproductive) and shortness of breath.     Cardiovascular:  Positive for chest pain. Negative for palpitations.   Gastrointestinal:  Negative for abdominal pain, nausea and vomiting.   Genitourinary:  Negative for dysuria.   Musculoskeletal:  Negative for back pain.   Skin:  Negative for rash.   Neurological:  Negative for weakness.   Hematological:  Does not bruise/bleed easily.   All other systems reviewed and are negative.      Physical Exam     Initial Vitals [06/11/25 1024]   BP Pulse Resp Temp SpO2   (!) 195/101 92 20 98.1 °F (36.7 °C) 96 %      MAP       --         Physical Exam    Nursing note and vitals reviewed.  Constitutional: He appears well-developed and well-nourished. He is not diaphoretic. He is Obese . No distress.   HENT:   Head: Normocephalic and atraumatic. Mouth/Throat: Oropharynx is clear and moist. No oropharyngeal exudate.   Eyes: Conjunctivae and EOM are normal. Pupils are equal, round, and reactive to light.   Neck: Neck supple. No tracheal deviation present.   Normal range of motion.  Cardiovascular:  Normal rate, regular rhythm, normal heart sounds and intact distal pulses.           No murmur heard.  Pulmonary/Chest: No stridor. No respiratory distress. He has wheezes (faint expiratory bilaterally). He has no rhonchi. He has no rales.   Abdominal: Abdomen is soft. Bowel sounds are normal. He exhibits no distension. There is no abdominal tenderness. There is no rebound and no guarding.   Musculoskeletal:         General: No tenderness or edema. Normal range of motion.      Cervical back: Normal range of motion and neck supple.     Neurological: He is alert and oriented to person, place, and time. He has normal strength and normal reflexes. No cranial nerve deficit or sensory deficit.   Skin: Skin is warm and dry. Capillary refill takes less than 2 seconds. No rash noted. No erythema. No pallor.   Psychiatric: He has a normal mood and affect. His behavior is normal. Thought content normal.         ED Course   Procedures  Labs  Reviewed   COMPREHENSIVE METABOLIC PANEL - Abnormal       Result Value    Sodium 138      Potassium 3.9      Chloride 102      CO2 30 (*)     Glucose 163 (*)     BUN 22      Creatinine 1.3      Calcium 9.0      Protein Total 6.6      Albumin 3.8      Bilirubin Total 0.8      ALP 79      AST 16      ALT 13      Anion Gap 6 (*)     eGFR 52 (*)    B-TYPE NATRIURETIC PEPTIDE - Abnormal     (*)    TROPONIN I HIGH SENSITIVITY - Abnormal    Troponin High Sensitive 72.1 (*)    TROPONIN I HIGH SENSITIVITY - Abnormal    Troponin High Sensitive 90.2 (*)    CBC WITH DIFFERENTIAL - Abnormal    WBC 10.25      RBC 4.28 (*)     Hgb 12.9 (*)     Hct 39.7 (*)     MCV 93      MCH 30.1      MCHC 32.5      RDW 15.4 (*)     Platelet Count 102 (*)     MPV 10.2      Nucleated RBC 0      Neut % 69.2      Lymph % 20.5      Mono % 6.3      Eos % 2.9      Basophil % 0.5      Imm Grans % 0.6 (*)     Neut # 7.1      Lymph # 2.10      Mono # 0.65      Eos # 0.30      Baso # 0.05      Imm Grans # 0.06 (*)    MAGNESIUM - Normal    Magnesium 1.8     PROTIME-INR - Normal    PT 12.2      INR 1.1     CBC W/ AUTO DIFFERENTIAL    Narrative:     The following orders were created for panel order CBC auto differential.  Procedure                               Abnormality         Status                     ---------                               -----------         ------                     CBC with Differential[[id number]]       Abnormal            Final result                 Please view results for these tests on the individual orders.   TROPONIN I HIGH SENSITIVITY        ECG Results              EKG 12-lead (In process)        Collection Time Result Time QRS Duration OHS QTC Calculation    06/11/25 10:24:30 06/11/25 10:40:34 140 492                     In process by Interface, Lab In [facility] (06/11/25 10:40:42)                   Narrative:    Test Reason : R07.9,    Vent. Rate :  92 BPM     Atrial Rate :  92 BPM     P-R Int : 216 ms          QRS  Dur : 140 ms      QT Int : 398 ms       P-R-T Axes :  28  15 181 degrees    QTcB Int : 492 ms    Sinus rhythm with 1st degree A-V block  Left bundle branch block  Abnormal ECG  When compared with ECG of 29-Jan-2025 05:57,  NC interval has increased    Referred By: NERISSAREFERRAL SELF           Confirmed By:                                   Imaging Results              X-Ray Chest AP Portable (Final result)  Result time 06/11/25 10:58:03      Final result by Alex Benavides DO (06/11/25 10:58:03)                   Impression:      1. Prominent central hilar vascular structures with mild perihilar interstitial thickening noted, possibly reflecting pulmonary edema.  2. Blunting of the bilateral costophrenic angles is felt to reflect small bilateral pleural effusions.  3. Constellation of findings could reflect CHF.      Electronically signed by: Alex Benavides  Date:    06/11/2025  Time:    10:58               Narrative:    EXAMINATION:  XR CHEST AP PORTABLE    CLINICAL HISTORY:  chest pain;    FINDINGS:  Portable chest with comparison chest x-ray 01/29/2025.  Normal cardiomediastinal silhouette.Prominent central hilar vascular structures with mild perihilar interstitial thickening noted.  There is blunting of the bilateral costophrenic angles.  Pulmonary vasculature is normal. No acute osseous abnormality.                                       Medications   atorvastatin tablet 40 mg (has no administration in time range)   isosorbide mononitrate 24 hr tablet 30 mg (has no administration in time range)   metoprolol tartrate (LOPRESSOR) tablet 25 mg (has no administration in time range)   pantoprazole EC tablet 40 mg (has no administration in time range)   furosemide injection 40 mg (has no administration in time range)   ipratropium 0.02 % nebulizer solution 0.5 mg (has no administration in time range)   budesonide nebulizer solution 0.5 mg (has no administration in time range)   melatonin tablet 6 mg (has no  administration in time range)   ondansetron injection 4 mg (has no administration in time range)   acetaminophen tablet 650 mg (has no administration in time range)   aluminum-magnesium hydroxide-simethicone 200-200-20 mg/5 mL suspension 30 mL (has no administration in time range)   naloxone 0.4 mg/mL injection 0.02 mg (has no administration in time range)   potassium bicarbonate disintegrating tablet 50 mEq (has no administration in time range)   potassium bicarbonate disintegrating tablet 35 mEq (has no administration in time range)   potassium bicarbonate disintegrating tablet 60 mEq (has no administration in time range)   magnesium oxide tablet 800 mg (has no administration in time range)   magnesium oxide tablet 800 mg (has no administration in time range)   potassium, sodium phosphates 280-160-250 mg packet 2 packet (has no administration in time range)   potassium, sodium phosphates 280-160-250 mg packet 2 packet (has no administration in time range)   potassium, sodium phosphates 280-160-250 mg packet 2 packet (has no administration in time range)   enoxaparin injection 40 mg (has no administration in time range)   HYDROcodone-acetaminophen 5-325 mg per tablet 1 tablet (has no administration in time range)   clopidogreL tablet 75 mg (75 mg Oral Given 6/11/25 1430)   levalbuterol nebulizer solution 1.25 mg (has no administration in time range)   nitroGLYCERIN 2% TD oint ointment 1 inch (1 inch Transdermal Given 6/11/25 1430)   furosemide injection 40 mg (40 mg Intravenous Given 6/11/25 1429)     Medical Decision Making  Differential includes but not limited to chest pain, ACS, pericarditis, pneumonia, COPD exacerbation, electrolyte abnormality, anemia,    Emergent evaluation of a 89-year-old male presents emergency department above-mentioned complaints.  Patient has chest pain and elevated troponin.  Overall impression would be rule out ACS, CHF would be a possibility as well given pulmonary edema on chest  x-ray but it actually appears improved compared to prior chest x-ray/stable.  BNP minimally elevated at 428.  Regardless patient will be diuresed with IV Lasix.  He has been given aspirin pre-hospital and he has nitro paste on his chest.  I have discussed with Cardiology and Hospital Medicine who will consult on and admit the patient.    A dictation software program was used for this note.  Please expect some simple typographical  errors in this note.     Amount and/or Complexity of Data Reviewed  External Data Reviewed: labs and notes.  Labs: ordered. Decision-making details documented in ED Course.  Radiology: ordered and independent interpretation performed. Decision-making details documented in ED Course.  ECG/medicine tests: ordered and independent interpretation performed. Decision-making details documented in ED Course.    Risk  OTC drugs.  Prescription drug management.  Decision regarding hospitalization.               ED Course as of 25 1445      1028 EKG 10:24 a.m. normal sinus rhythm rate of 92, left bundle branch block, first-degree AV block.  No ST elevation or depression.  No STEMI.  EKG interpreted independently by me. [JR]   0549 I discussed the case with Dr. Batista from Hospital Medicine who will admit the patient. [JR]   8522 I discussed the case with Dr. Espinoza who will see the patient in consultation [JR]      ED Course User Index  [JR] Nicolás Garcia DO                           Clinical Impression:  Final diagnoses:  [R07.9] Chest pain (Primary)  [R79.89] Elevated troponin I level          ED Disposition Condition    Observation                       [1]   Social History  Tobacco Use    Smoking status: Former     Current packs/day: 0.00     Average packs/day: 1 pack/day for 25.0 years (25.0 ttl pk-yrs)     Types: Cigarettes     Start date: 1947     Quit date: 1972     Years since quittin.4    Smokeless tobacco: Former     Quit date: 8/15/1972   Vaping Use     Vaping status: Never Used   Substance Use Topics    Alcohol use: No    Drug use: No        Nicolás Garcia DO  06/11/25 1440

## 2025-06-11 NOTE — ASSESSMENT & PLAN NOTE
Mostly from CHF flare  But he will benefit from stress test before discharge once CHF flare is better  Will start ion Therapeutic lovenox BID for possible ACS  Cardiology consulted by ER team   LBBB seen on todays EKG is an old issue and not new      Latest Reference Range & Units 03/25/23 14:56 03/25/23 17:55 07/17/24 19:43 01/29/25 05:17 06/11/25 10:50 06/11/25 12:52 06/11/25 15:27   Troponin I High Sensitivity <=14.9 pg/mL 869.8 (HH) 750.5 (HH) 20.6 (H) 14.5 72.1 (HH) 90.2 (HH) 125.1 (HH)   (HH): Data is critically high  (H): Data is abnormally high

## 2025-06-11 NOTE — H&P
[facility] - Emergency Dept  Hospital Medicine  History & Physical    Patient Name: Frank Wyatt  MRN: [id number]  Patient Class: IP- Inpatient  Admission Date: 6/11/2025  Attending Physician: Troy Batista MD   Primary Care Provider: Darci eGrman MD         Patient information was obtained from patient, past medical records, ER records, and ER MD .     Subjective:     Principal Problem:Chest pain    Chief Complaint:   Chief Complaint   Patient presents with    Chest Pain     From [place]. CP about 1hr 20 min 324 asa and 1 spray nitro given en route        HPI: 89 year old pt getting admitted with CHF flare and chest pain  Pt has been suffering from chest pain /discomfort for past several days  Described as chest tightness and worse on exertion  Pain goes away with rest  His Cardiologist is Dr Kim and its been a while pt had stress test  His Urologist cutdown his Lasix dose and pt was not taking lasix as prescribed by his Cardio MD       Past Medical History:   Diagnosis Date    Arthritis     COPD (chronic obstructive pulmonary disease)     WHEEZES    Coronary artery disease     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 2004    CORONARY ARTERY BYPASS GRAFT  1996    5-bypass     CYSTOSCOPY N/A 8/15/2019    Procedure: CYSTOSCOPY;  Surgeon: Dipak Sanchez MD;  Location: [facility];  Service: Urology;  Laterality: N/A;    CYSTOSCOPY N/A 10/31/2019    Procedure: CYSTOSCOPY;  Surgeon: Dipak  MD Laura;  Location: [facility];  Service: Urology;  Laterality: N/A;    CYSTOSCOPY W/ URETERAL STENT REMOVAL Left 10/31/2019    Procedure: CYSTOSCOPY, WITH URETERAL STENT REMOVAL  URETEROSCOPY;  Surgeon: Dipak Sanchez MD;  Location: [facility] OR;  Service: Urology;  Laterality: Left;    EXTRACORPOREAL SHOCK WAVE LITHOTRIPSY Left 8/15/2019    Procedure: LITHOTRIPSY, ESWL;  Surgeon: Dipak Sanchez MD;  Location: [facility] OR;  Service: Urology;  Laterality: Left;    EXTRACORPOREAL SHOCK WAVE LITHOTRIPSY Left 9/19/2019    Procedure: LITHOTRIPSY, ESWL;  Surgeon: Dipak Sanchez MD;  Location: [facility] OR;  Service: Urology;  Laterality: Left;    EYE SURGERY Right 2014    Cataract    EYE SURGERY Left 2017    Cataract    HERNIA REPAIR  2014    Dr. Bailon - had to have mesh removed    INGUINAL HERNIA REPAIR Right 03/25/2019        LITHOTRIPSY      nephrostomy tube      PERCUTANEOUS NEPHROSTOMY      ROTATOR CUFF REPAIR  2005    right shoulder    URETEROSCOPY Left 10/31/2019    Procedure: URETEROSCOPY;  Surgeon: Dipak Sanchez MD;  Location: [facility];  Service: Urology;  Laterality: Left;       Review of patient's allergies indicates:   Allergen Reactions    Ceftriaxone Rash     Experienced rash with Ceftriaxone 1/30/2025    Bactrim [sulfamethoxazole-trimethoprim] Hives     itched    Methylprednisolone     Keflex [cephalexin] Rash     Tolerated piperacillin-tazobactam 1/30/2025    Morphine Rash     Patient had morphine and keflex at the same time, developed a rash, not sure which one caused it, or if it was a combination of the two meds.       No current facility-administered medications on file prior to encounter.     Current Outpatient Medications on File Prior to Encounter   Medication Sig    atorvastatin (LIPITOR) 40 MG tablet Take 1 tablet (40 mg total) by mouth every evening.    clopidogreL (PLAVIX) 75 mg tablet Take 1 tablet (75 mg total) by mouth once daily.    furosemide (LASIX) 40 MG tablet Take  40 mg by mouth once daily.    GEMTESA 75 mg Tab Take 1 tablet by mouth once daily.    isosorbide mononitrate (IMDUR) 30 MG 24 hr tablet Take 1 tablet (30 mg total) by mouth once daily.    levothyroxine (SYNTHROID) 25 MCG tablet Take 25 mcg by mouth once daily.     metoprolol tartrate (LOPRESSOR) 25 MG tablet Take 25 mg by mouth 2 (two) times daily.    pantoprazole (PROTONIX) 40 MG tablet Take 1 tablet (40 mg total) by mouth once daily.    SYMBICORT 160-4.5 mcg/actuation HFAA Inhale 2 puffs into the lungs every 12 (twelve) hours.     tamsulosin (FLOMAX) 0.4 mg Cap Take 0.4 mg by mouth once daily.     albuterol (PROVENTIL/VENTOLIN HFA) 90 mcg/actuation inhaler Inhale 2 puffs into the lungs every 4 (four) hours as needed for Wheezing. Rescue    [DISCONTINUED] azelastine (ASTELIN) 137 mcg (0.1 %) nasal spray 1 spray (137 mcg total) by Nasal route 2 (two) times daily as needed for Rhinitis.    [DISCONTINUED] furosemide (LASIX) 40 MG tablet Take 1 tablet (40 mg total) by mouth 2 (two) times a day.    [DISCONTINUED] glipiZIDE (GLUCOTROL) 5 MG tablet Take 5 mg by mouth daily with breakfast.    [DISCONTINUED] metoprolol tartrate (LOPRESSOR) 50 MG tablet Take 1 tablet (50 mg total) by mouth 2 (two) times daily.     Family History       Problem Relation (Age of Onset)    Cancer Daughter    Diabetes Brother    Heart disease Mother, Brother, Brother, Sister    Hypertension Mother, Sister, Brother, Brother, Sister          Tobacco Use    Smoking status: Former     Current packs/day: 0.00     Average packs/day: 1 pack/day for 25.0 years (25.0 ttl pk-yrs)     Types: Cigarettes     Start date: 1947     Quit date: 1972     Years since quittin.4    Smokeless tobacco: Former     Quit date: 8/15/1972   Vaping Use    Vaping status: Never Used   Substance and Sexual Activity    Alcohol use: No    Drug use: No    Sexual activity: Not on file     Review of Systems   Constitutional:  Negative for activity change and appetite  change.   HENT:  Negative for congestion and dental problem.    Eyes:  Negative for discharge and itching.   Respiratory:  Positive for shortness of breath.    Cardiovascular:  Positive for chest pain.   Gastrointestinal:  Negative for abdominal distention and abdominal pain.   Endocrine: Negative for cold intolerance.   Genitourinary:  Negative for difficulty urinating and dysuria.   Musculoskeletal:  Negative for arthralgias and back pain.   Skin:  Negative for color change.   Neurological:  Negative for dizziness and facial asymmetry.   Hematological:  Negative for adenopathy.   Psychiatric/Behavioral:  Negative for agitation and behavioral problems.      Objective:     Vital Signs (Most Recent):  Temp: 97.3 °F (36.3 °C) (06/11/25 1518)  Pulse: 80 (06/11/25 1644)  Resp: 16 (06/11/25 1518)  BP: (!) 177/86 (06/11/25 1518)  SpO2: 95 % (06/11/25 1518) Vital Signs (24h Range):  Temp:  [97.3 °F (36.3 °C)-98.1 °F (36.7 °C)] 97.3 °F (36.3 °C)  Pulse:  [67-92] 80  Resp:  [16-20] 16  SpO2:  [95 %-96 %] 95 %  BP: (152-195)/() 177/86     Weight: 80.8 kg (178 lb 3.2 oz)  Body mass index is 29.65 kg/m².     Physical Exam  Vitals and nursing note reviewed.   Constitutional:       Appearance: He is well-developed.   HENT:      Head: Atraumatic.      Right Ear: External ear normal.      Left Ear: External ear normal.      Nose: Nose normal.      Mouth/Throat:      Mouth: Mucous membranes are moist.   Eyes:      Extraocular Movements: Extraocular movements intact.   Cardiovascular:      Rate and Rhythm: Normal rate.   Pulmonary:      Effort: Pulmonary effort is normal.      Breath sounds: Rales present.   Abdominal:      Palpations: Abdomen is soft.   Musculoskeletal:         General: Normal range of motion.      Cervical back: Full passive range of motion without pain and normal range of motion.      Right lower leg: Edema present.      Left lower leg: Edema present.   Skin:     General: Skin is warm.   Neurological:       Mental Status: He is alert and oriented to person, place, and time.   Psychiatric:         Behavior: Behavior normal.                Significant Labs: All pertinent labs within the past 24 hours have been reviewed.  CBC:   Recent Labs   Lab 06/11/25  1050   WBC 10.25   HGB 12.9*   HCT 39.7*   *     CMP:   Recent Labs   Lab 06/11/25  1050      K 3.9      CO2 30*   *   BUN 22   CREATININE 1.3   CALCIUM 9.0   PROT 6.6   ALBUMIN 3.8   BILITOT 0.8   ALKPHOS 79   AST 16   ALT 13   ANIONGAP 6*       Significant Imaging: I have reviewed all pertinent imaging results/findings within the past 24 hours.    Assessment/Plan:     Assessment & Plan  Chest pain  Mostly from CHF flare  But he will benefit from stress test before discharge once CHF flare is better  Will start ion Therapeutic lovenox BID for possible ACS  Cardiology consulted by ER team   LBBB seen on todays EKG is an old issue and not new      Latest Reference Range & Units 03/25/23 14:56 03/25/23 17:55 07/17/24 19:43 01/29/25 05:17 06/11/25 10:50 06/11/25 12:52 06/11/25 15:27   Troponin I High Sensitivity <=14.9 pg/mL 869.8 (HH) 750.5 (HH) 20.6 (H) 14.5 72.1 (HH) 90.2 (HH) 125.1 (HH)   (HH): Data is critically high  (H): Data is abnormally high    Acute on chronic heart failure  Maintain iv lasix  Monitor renal panels    Valvular heart disease, moderate to severe MS/AS  Per records     CKD (chronic kidney disease), stage III  Creatine stable for now. BMP reviewed- noted Estimated Creatinine Clearance: 37 mL/min (based on SCr of 1.3 mg/dL). according to latest data. Based on current GFR, CKD stage is .  Monitor UOP and serial BMP and adjust therapy as needed. Renally dose meds. Avoid nephrotoxic medications and procedures.  S/P CABG x 5  Aware     COPD (chronic obstructive pulmonary disease)  Stable   Elevated troponin  As mentioned above      Latest Reference Range & Units 03/25/23 14:56 03/25/23 17:55 07/17/24 19:43 01/29/25 05:17 06/11/25  10:50 06/11/25 12:52 06/11/25 15:27   Troponin I High Sensitivity <=14.9 pg/mL 869.8 (HH) 750.5 (HH) 20.6 (H) 14.5 72.1 (HH) 90.2 (HH) 125.1 (HH)   (HH): Data is critically high  (H): Data is abnormally high    VTE Risk Mitigation (From admission, onward)           Ordered     enoxaparin injection 80 mg  Every 12 hours (non-standard times)         06/11/25 1826     IP VTE HIGH RISK PATIENT  Once         06/11/25 1348     Place sequential compression device  Until discontinued         06/11/25 1348                                    Troy Batista MD  Department of Hospital Medicine  [facility] - Emergency Dept

## 2025-06-12 ENCOUNTER — CLINICAL SUPPORT (OUTPATIENT)
Dept: CARDIOLOGY | Facility: HOSPITAL | Age: OVER 89
End: 2025-06-12
Attending: EMERGENCY MEDICINE
Payer: MEDICARE

## 2025-06-12 LAB
ABSOLUTE EOSINOPHIL (SMH): 0.3 K/UL
ABSOLUTE MONOCYTE (SMH): 0.68 K/UL (ref 0.3–1)
ABSOLUTE NEUTROPHIL COUNT (SMH): 6.4 K/UL (ref 1.8–7.7)
ALBUMIN SERPL-MCNC: 4 G/DL (ref 3.5–5.2)
ALP SERPL-CCNC: 79 UNIT/L (ref 55–135)
ALT SERPL-CCNC: 13 UNIT/L (ref 10–44)
ANION GAP (SMH): 7 MMOL/L (ref 8–16)
APICAL FOUR CHAMBER EJECTION FRACTION: 54 %
APICAL TWO CHAMBER EJECTION FRACTION: 62 %
AST SERPL-CCNC: 17 UNIT/L (ref 10–40)
AV INDEX (PROSTH): 0.39
AV MEAN GRADIENT: 13 MMHG
AV PEAK GRADIENT: 23 MMHG
AV VALVE AREA BY VELOCITY RATIO: 1.2 CM²
AV VALVE AREA: 1.2 CM²
AV VELOCITY RATIO: 0.38
BASOPHILS # BLD AUTO: 0.06 K/UL
BASOPHILS NFR BLD AUTO: 0.6 %
BILIRUB SERPL-MCNC: 1 MG/DL (ref 0.1–1)
BSA FOR ECHO PROCEDURE: 1.93 M2
BUN SERPL-MCNC: 22 MG/DL (ref 8–23)
CALCIUM SERPL-MCNC: 9.6 MG/DL (ref 8.7–10.5)
CHLORIDE SERPL-SCNC: 99 MMOL/L (ref 95–110)
CO2 SERPL-SCNC: 34 MMOL/L (ref 23–29)
CREAT SERPL-MCNC: 1.2 MG/DL (ref 0.5–1.4)
DOP CALC AO PEAK VEL: 2.4 M/S
DOP CALC AO VTI: 39.8 CM
DOP CALC LVOT AREA: 3.1 CM2
DOP CALC LVOT DIAMETER: 2 CM
DOP CALC LVOT PEAK VEL: 0.9 M/S
DOP CALC LVOT STROKE VOLUME: 49 CM3
DOP CALC MV VTI: 40.5 CM
DOP CALCLVOT PEAK VEL VTI: 15.6 CM
E WAVE DECELERATION TIME: 322 MSEC
E/A RATIO: 0.56
E/E' RATIO: 21 M/S
ERYTHROCYTE [DISTWIDTH] IN BLOOD BY AUTOMATED COUNT: 15.4 % (ref 11.5–14.5)
GFR SERPLBLD CREATININE-BSD FMLA CKD-EPI: 57 ML/MIN/1.73/M2
GLUCOSE SERPL-MCNC: 129 MG/DL (ref 70–110)
HCT VFR BLD AUTO: 44.1 % (ref 40–54)
HGB BLD-MCNC: 14.4 GM/DL (ref 14–18)
IMM GRANULOCYTES # BLD AUTO: 0.05 K/UL (ref 0–0.04)
IMM GRANULOCYTES NFR BLD AUTO: 0.5 % (ref 0–0.5)
IVC DIAMETER: 1.7 CM
LEFT ATRIUM AREA SYSTOLIC (APICAL 2 CHAMBER): 12.6 CM2
LEFT ATRIUM AREA SYSTOLIC (APICAL 4 CHAMBER): 15.2 CM2
LEFT ATRIUM SIZE: 4.9 CM
LEFT ATRIUM VOLUME INDEX MOD: 20 ML/M2
LEFT ATRIUM VOLUME MOD: 37 ML
LEFT VENTRICLE DIASTOLIC VOLUME INDEX: 39.36 ML/M2
LEFT VENTRICLE DIASTOLIC VOLUME: 74 ML
LEFT VENTRICLE END DIASTOLIC VOLUME APICAL 2 CHAMBER: 71.3 ML
LEFT VENTRICLE END DIASTOLIC VOLUME APICAL 4 CHAMBER: 67.1 ML
LEFT VENTRICLE END SYSTOLIC VOLUME APICAL 2 CHAMBER: 32 ML
LEFT VENTRICLE END SYSTOLIC VOLUME APICAL 4 CHAMBER: 38.4 ML
LEFT VENTRICLE SYSTOLIC VOLUME INDEX: 16 ML/M2
LEFT VENTRICLE SYSTOLIC VOLUME: 30 ML
LV LATERAL E/E' RATIO: 17.8 M/S
LV SEPTAL E/E' RATIO: 26.8 M/S
LVOT MG: 2 MMHG
LVOT MV: 0.57 CM/S
LYMPHOCYTES # BLD AUTO: 2.86 K/UL (ref 1–4.8)
MAGNESIUM SERPL-MCNC: 1.9 MG/DL (ref 1.6–2.6)
MCH RBC QN AUTO: 30.1 PG (ref 27–31)
MCHC RBC AUTO-ENTMCNC: 32.7 G/DL (ref 32–36)
MCV RBC AUTO: 92 FL (ref 82–98)
MV MEAN GRADIENT: 8 MMHG
MV PEAK A VEL: 1.92 M/S
MV PEAK E VEL: 1.07 M/S
MV PEAK GRADIENT: 16 MMHG
MV STENOSIS PRESSURE HALF TIME: 94 MS
MV VALVE AREA BY CONTINUITY EQUATION: 1.21 CM2
MV VALVE AREA P 1/2 METHOD: 2.34 CM2
NUCLEATED RBC (/100WBC) (SMH): 0 /100 WBC
OHS LV EJECTION FRACTION SIMPSONS BIPLANE MOD: 59 %
PLATELET # BLD AUTO: 119 K/UL (ref 150–450)
PMV BLD AUTO: 10.1 FL (ref 9.2–12.9)
POTASSIUM SERPL-SCNC: 4.2 MMOL/L (ref 3.5–5.1)
PROT SERPL-MCNC: 6.9 GM/DL (ref 6–8.4)
PV MV: 1.1 M/S
PV PEAK GRADIENT: 11 MMHG
PV PEAK VELOCITY: 1.64 M/S
RA PRESSURE ESTIMATED: 3 MMHG
RBC # BLD AUTO: 4.79 M/UL (ref 4.6–6.2)
RELATIVE EOSINOPHIL (SMH): 2.9 % (ref 0–8)
RELATIVE LYMPHOCYTE (SMH): 27.7 % (ref 18–48)
RELATIVE MONOCYTE (SMH): 6.6 % (ref 4–15)
RELATIVE NEUTROPHIL (SMH): 61.7 % (ref 38–73)
RIGHT ATRIUM END SYSTOLIC VOLUME APICAL 4 CHAMBER INDEX BSA: 12.45 ML/M2
RIGHT ATRIUM VOLUME AREA LENGTH APICAL 4 CHAMBER: 23.4 ML
RV TISSUE DOPPLER FREE WALL SYSTOLIC VELOCITY 1 (APICAL 4 CHAMBER VIEW): 7.4 CM/S
SODIUM SERPL-SCNC: 140 MMOL/L (ref 136–145)
TDI LATERAL: 0.06 M/S
TDI SEPTAL: 0.04 M/S
TDI: 0.05 M/S
TRICUSPID ANNULAR PLANE SYSTOLIC EXCURSION: 1.5 CM
WBC # BLD AUTO: 10.32 K/UL (ref 3.9–12.7)

## 2025-06-12 PROCEDURE — 25000003 PHARM REV CODE 250: Performed by: STUDENT IN AN ORGANIZED HEALTH CARE EDUCATION/TRAINING PROGRAM

## 2025-06-12 PROCEDURE — 94640 AIRWAY INHALATION TREATMENT: CPT

## 2025-06-12 PROCEDURE — 85025 COMPLETE CBC W/AUTO DIFF WBC: CPT | Performed by: INTERNAL MEDICINE

## 2025-06-12 PROCEDURE — 80053 COMPREHEN METABOLIC PANEL: CPT | Performed by: INTERNAL MEDICINE

## 2025-06-12 PROCEDURE — 36415 COLL VENOUS BLD VENIPUNCTURE: CPT | Performed by: INTERNAL MEDICINE

## 2025-06-12 PROCEDURE — 94761 N-INVAS EAR/PLS OXIMETRY MLT: CPT

## 2025-06-12 PROCEDURE — 83735 ASSAY OF MAGNESIUM: CPT | Performed by: INTERNAL MEDICINE

## 2025-06-12 PROCEDURE — 99900035 HC TECH TIME PER 15 MIN (STAT)

## 2025-06-12 PROCEDURE — 11000001 HC ACUTE MED/SURG PRIVATE ROOM

## 2025-06-12 PROCEDURE — 93306 TTE W/DOPPLER COMPLETE: CPT

## 2025-06-12 PROCEDURE — 93306 TTE W/DOPPLER COMPLETE: CPT | Mod: 26,,, | Performed by: INTERNAL MEDICINE

## 2025-06-12 PROCEDURE — 99223 1ST HOSP IP/OBS HIGH 75: CPT | Mod: ,,, | Performed by: INTERNAL MEDICINE

## 2025-06-12 PROCEDURE — 99900031 HC PATIENT EDUCATION (STAT)

## 2025-06-12 PROCEDURE — 25000003 PHARM REV CODE 250: Performed by: INTERNAL MEDICINE

## 2025-06-12 PROCEDURE — 25000242 PHARM REV CODE 250 ALT 637 W/ HCPCS: Performed by: INTERNAL MEDICINE

## 2025-06-12 PROCEDURE — 63600175 PHARM REV CODE 636 W HCPCS: Performed by: INTERNAL MEDICINE

## 2025-06-12 RX ORDER — LANOLIN ALCOHOL/MO/W.PET/CERES
400 CREAM (GRAM) TOPICAL ONCE
Status: COMPLETED | OUTPATIENT
Start: 2025-06-12 | End: 2025-06-12

## 2025-06-12 RX ORDER — FUROSEMIDE 10 MG/ML
40 INJECTION INTRAMUSCULAR; INTRAVENOUS DAILY
Status: DISCONTINUED | OUTPATIENT
Start: 2025-06-13 | End: 2025-06-13 | Stop reason: HOSPADM

## 2025-06-12 RX ORDER — POTASSIUM CHLORIDE 750 MG/1
30 CAPSULE, EXTENDED RELEASE ORAL ONCE
Status: COMPLETED | OUTPATIENT
Start: 2025-06-12 | End: 2025-06-12

## 2025-06-12 RX ADMIN — CLOPIDOGREL 75 MG: 75 TABLET ORAL at 08:06

## 2025-06-12 RX ADMIN — IPRATROPIUM BROMIDE 0.5 MG: 0.5 SOLUTION RESPIRATORY (INHALATION) at 12:06

## 2025-06-12 RX ADMIN — Medication 400 MG: at 01:06

## 2025-06-12 RX ADMIN — ENOXAPARIN SODIUM 80 MG: 80 INJECTION SUBCUTANEOUS at 08:06

## 2025-06-12 RX ADMIN — LEVALBUTEROL HYDROCHLORIDE 1.25 MG: 1.25 SOLUTION RESPIRATORY (INHALATION) at 06:06

## 2025-06-12 RX ADMIN — POTASSIUM CHLORIDE 30 MEQ: 750 CAPSULE, EXTENDED RELEASE ORAL at 01:06

## 2025-06-12 RX ADMIN — LEVALBUTEROL HYDROCHLORIDE 1.25 MG: 1.25 SOLUTION RESPIRATORY (INHALATION) at 03:06

## 2025-06-12 RX ADMIN — IPRATROPIUM BROMIDE 0.5 MG: 0.5 SOLUTION RESPIRATORY (INHALATION) at 11:06

## 2025-06-12 RX ADMIN — METOPROLOL TARTRATE 25 MG: 25 TABLET, FILM COATED ORAL at 08:06

## 2025-06-12 RX ADMIN — BUDESONIDE INHALATION 0.5 MG: 0.5 SUSPENSION RESPIRATORY (INHALATION) at 08:06

## 2025-06-12 RX ADMIN — BUDESONIDE INHALATION 0.5 MG: 0.5 SUSPENSION RESPIRATORY (INHALATION) at 06:06

## 2025-06-12 RX ADMIN — FUROSEMIDE 40 MG: 10 INJECTION, SOLUTION INTRAMUSCULAR; INTRAVENOUS at 08:06

## 2025-06-12 RX ADMIN — LEVALBUTEROL HYDROCHLORIDE 1.25 MG: 1.25 SOLUTION RESPIRATORY (INHALATION) at 12:06

## 2025-06-12 RX ADMIN — ATORVASTATIN CALCIUM 40 MG: 40 TABLET, FILM COATED ORAL at 08:06

## 2025-06-12 RX ADMIN — ISOSORBIDE MONONITRATE 30 MG: 30 TABLET, EXTENDED RELEASE ORAL at 08:06

## 2025-06-12 RX ADMIN — IPRATROPIUM BROMIDE 0.5 MG: 0.5 SOLUTION RESPIRATORY (INHALATION) at 06:06

## 2025-06-12 RX ADMIN — IPRATROPIUM BROMIDE 0.5 MG: 0.5 SOLUTION RESPIRATORY (INHALATION) at 03:06

## 2025-06-12 RX ADMIN — LEVALBUTEROL HYDROCHLORIDE 1.25 MG: 1.25 SOLUTION RESPIRATORY (INHALATION) at 11:06

## 2025-06-12 NOTE — ASSESSMENT & PLAN NOTE
Creatine stable for now. BMP reviewed- noted Estimated Creatinine Clearance: 40 mL/min (based on SCr of 1.2 mg/dL). according to latest data. Based on current GFR, CKD stage is .  Monitor UOP and serial BMP and adjust therapy as needed. Renally dose meds. Avoid nephrotoxic medications and procedures.

## 2025-06-12 NOTE — ASSESSMENT & PLAN NOTE
Possible non ST-elevation MI. Mostly from CHF flare  But he will benefit from stress test before discharge once CHF flare is better  On Therapeutic lovenox BID for possible ACS  Awaiting cardiologist evaluation.  LBBB seen on todays EKG is an old issue and not new      Latest Reference Range & Units 03/25/23 14:56 03/25/23 17:55 07/17/24 19:43 01/29/25 05:17 06/11/25 10:50 06/11/25 12:52 06/11/25 15:27   Troponin I High Sensitivity <=14.9 pg/mL 869.8 (HH) 750.5 (HH) 20.6 (H) 14.5 72.1 (HH) 90.2 (HH) 125.1 (HH)   (HH): Data is critically high  (H): Data is abnormally high

## 2025-06-12 NOTE — PROGRESS NOTES
[facility]  Progress Note    Patient Name: Frank Wyatt  MRN: [id number]  Patient Class: IP- Inpatient   Admission Date: 6/11/2025  Length of Stay: 1 days  Attending Physician: Fletcher Gates MD  Primary Care Provider: Darci German MD        Subjective     Principal Problem:Chest pain        HPI:  89 year old pt getting admitted with CHF flare and chest pain  Pt has been suffering from chest pain /discomfort for past several days  Described as chest tightness and worse on exertion  Pain goes away with rest  His Cardiologist is Dr Kim and its been a while pt had stress test  His Urologist cutdown his Lasix dose and pt was not taking lasix as prescribed by his Cardio MD       Overview/Hospital Course:  No notes on file    Interval History:  Patient is seen and examined during multidisciplinary round.  Patient's family member present at bedside.  Patient denies any ongoing chest pain.  Reports improving shortness of breath with ongoing diuresis with use of diuretics.  Awaiting cardiologist evaluation.  Serum troponin peaked and trended down.  Patient denies any dizziness, near-syncope or syncopal symptoms at home.  Currently NPO.    Review of Systems   Constitutional:  Negative for activity change and appetite change.   HENT:  Negative for congestion and dental problem.    Eyes:  Negative for discharge and itching.   Respiratory:  Positive for shortness of breath.    Cardiovascular:  Positive for chest pain.   Gastrointestinal:  Negative for abdominal distention and abdominal pain.   Endocrine: Negative for cold intolerance.   Genitourinary:  Negative for difficulty urinating and dysuria.   Musculoskeletal:  Negative for arthralgias and back pain.   Skin:  Negative for color change.   Neurological:  Negative for dizziness and facial asymmetry.   Hematological:  Negative for adenopathy.   Psychiatric/Behavioral:  Negative for agitation and behavioral problems.       Objective:     Vital Signs (Most Recent):  Temp: 98.1 °F (36.7 °C) (06/12/25 0738)  Pulse: 69 (06/12/25 0738)  Resp: 17 (06/12/25 0738)  BP: (!) 168/77 (06/12/25 0738)  SpO2: 96 % (06/12/25 0738) Vital Signs (24h Range):  Temp:  [97.3 °F (36.3 °C)-98.1 °F (36.7 °C)] 98.1 °F (36.7 °C)  Pulse:  [64-92] 69  Resp:  [16-20] 17  SpO2:  [94 %-96 %] 96 %  BP: (146-195)/() 168/77     Weight: 80.8 kg (178 lb 3.2 oz)  Body mass index is 29.65 kg/m².    Intake/Output Summary (Last 24 hours) at 6/12/2025 0748  Last data filed at 6/12/2025 0533  Gross per 24 hour   Intake 120 ml   Output 1450 ml   Net -1330 ml         Physical Exam  Vitals and nursing note reviewed.   Constitutional:       Appearance: He is well-developed.   HENT:      Head: Atraumatic.      Right Ear: External ear normal.      Left Ear: External ear normal.      Nose: Nose normal.      Mouth/Throat:      Mouth: Mucous membranes are moist.   Eyes:      Extraocular Movements: Extraocular movements intact.   Cardiovascular:      Rate and Rhythm: Normal rate.   Pulmonary:      Effort: Pulmonary effort is normal.      Breath sounds: Rales present.   Abdominal:      Palpations: Abdomen is soft.   Musculoskeletal:         General: Normal range of motion.      Cervical back: Full passive range of motion without pain and normal range of motion.      Right lower leg: Edema present.      Left lower leg: Edema present.   Skin:     General: Skin is warm.   Neurological:      Mental Status: He is alert and oriented to person, place, and time.   Psychiatric:         Behavior: Behavior normal.               Significant Labs: All pertinent labs within the past 24 hours have been reviewed.  CBC:   Recent Labs   Lab 06/11/25  1050 06/12/25  0404   WBC 10.25 10.32   HGB 12.9* 14.4   HCT 39.7* 44.1   * 119*     CMP:   Recent Labs   Lab 06/11/25  1050 06/12/25  0404    140   K 3.9 4.2    99   CO2 30* 34*   * 129*   BUN 22 22   CREATININE 1.3 1.2  "  CALCIUM 9.0 9.6   PROT 6.6 6.9   ALBUMIN 3.8 4.0   BILITOT 0.8 1.0   ALKPHOS 79 79   AST 16 17   ALT 13 13   ANIONGAP 6* 7*     Coagulation:   Recent Labs   Lab 06/11/25  1050   INR 1.1     Lipid Panel: No results for input(s): "CHOL", "HDL", "LDLCALC", "TRIG", "CHOLHDL" in the last 48 hours.  Troponin: No results for input(s): "TROPONINI", "TROPONINIHS" in the last 48 hours.  TSH: No results for input(s): "TSH" in the last 4320 hours.  Urine Studies: No results for input(s): "COLORU", "APPEARANCEUA", "PHUR", "SPECGRAV", "PROTEINUA", "GLUCUA", "KETONESU", "BILIRUBINUA", "OCCULTUA", "NITRITE", "UROBILINOGEN", "LEUKOCYTESUR", "RBCUA", "WBCUA", "BACTERIA", "SQUAMEPITHEL", "HYALINECASTS" in the last 48 hours.    Invalid input(s): "WRIGHTSUR"    Significant Imaging:   ECHO (01/29/25):    Left Ventricle: The left ventricle is normal in size. Mildly increased wall thickness. Unable to assess wall motion. There is normal systolic function with a visually estimated ejection fraction of 60 - 65%.    Right Ventricle: Right ventricle was not well visualized due to poor acoustic window.    Left Atrium: Left atrium is mildly dilated.    Aortic Valve: There is moderate to severe stenosis. Aortic valve area by VTI is 1.1 cm². Aortic valve peak velocity is 2.7 m/s. Mean gradient is 17 mmHg. The dimensionless index is 0.38.    Mitral Valve: There is severe stenosis. The mean pressure gradient across the mitral valve is 8 mmHg at a heart rate of 72 bpm.    IVC/SVC: Normal venous pressure at 3 mmHg.    CXR:  1. Prominent central hilar vascular structures with mild perihilar interstitial thickening noted, possibly reflecting pulmonary edema.  2. Blunting of the bilateral costophrenic angles is felt to reflect small bilateral pleural effusions.  3. Constellation of findings could reflect CHF.      Assessment & Plan  Chest pain  Possible non ST-elevation MI. Mostly from CHF flare  But he will benefit from stress test before discharge " once CHF flare is better  On Therapeutic lovenox BID for possible ACS  Awaiting cardiologist evaluation.  LBBB seen on todays EKG is an old issue and not new      Latest Reference Range & Units 03/25/23 14:56 03/25/23 17:55 07/17/24 19:43 01/29/25 05:17 06/11/25 10:50 06/11/25 12:52 06/11/25 15:27   Troponin I High Sensitivity <=14.9 pg/mL 869.8 (HH) 750.5 (HH) 20.6 (H) 14.5 72.1 (HH) 90.2 (HH) 125.1 (HH)   (HH): Data is critically high  (H): Data is abnormally high    Acute on chronic heart failure  Maintain iv lasix  Monitor renal panels    Valvular heart disease, moderate to severe MS/AS  Per records     CKD (chronic kidney disease), stage III  Creatine stable for now. BMP reviewed- noted Estimated Creatinine Clearance: 40 mL/min (based on SCr of 1.2 mg/dL). according to latest data. Based on current GFR, CKD stage is .  Monitor UOP and serial BMP and adjust therapy as needed. Renally dose meds. Avoid nephrotoxic medications and procedures.  S/P CABG x 5  Aware     COPD (chronic obstructive pulmonary disease)  Stable   Elevated troponin  NSTEMI  As mentioned above      Latest Reference Range & Units 03/25/23 14:56 03/25/23 17:55 07/17/24 19:43 01/29/25 05:17 06/11/25 10:50 06/11/25 12:52 06/11/25 15:27   Troponin I High Sensitivity <=14.9 pg/mL 869.8 (HH) 750.5 (HH) 20.6 (H) 14.5 72.1 (HH) 90.2 (HH) 125.1 (HH)   (HH): Data is critically high  (H): Data is abnormally high    Discussed with patient and family members, bedside nurse and .  Answered all questions.    VTE Risk Mitigation (From admission, onward)           Ordered     enoxaparin injection 80 mg  Every 12 hours (non-standard times)         06/11/25 1826     IP VTE HIGH RISK PATIENT  Once         06/11/25 1348     Place sequential compression device  Until discontinued         06/11/25 1348                    Discharge Planning   ALESSANDRA: 6/13/2025     Code Status: Full Code   Medical Readiness for Discharge Date:   Discharge Plan A: Home,  Assisted Living                        Fletcher Gates MD  Department of Hospital Medicine   [facility]

## 2025-06-12 NOTE — CONSULTS
[facility]  Department of Cardiology  Consult Note      PATIENT NAME: Frank Wyatt  MRN: [id number]  TODAY'S DATE: 06/12/2025  ADMIT DATE: 6/11/2025                          CONSULT REQUESTED BY: Fletcher Gates MD    SUBJECTIVE     PRINCIPAL PROBLEM: Chest pain      REASON FOR CONSULT:  Chest pain      HPI:  Patient is 89-year-old male with past medical history:  CAD, aortic stenosis, CABG x 5 1996, hypertension, hyperlipidemia, COPD, ex-smoker, hard of hearing, who reported to the emergency room yesterday with complaints of shortness of breath and chest pain.  Patient's sister reports that he had complained of excess urination to his urologist who then cut back his Lasix dosage.  He is followed outpatient by Dr. Kim every 6 months    Patient received IV Lasix, he has diuresed quite a bit a urine per nurse and is reportedly feeling better    Review of patient's allergies indicates:   Allergen Reactions    Ceftriaxone Rash     Experienced rash with Ceftriaxone 1/30/2025    Bactrim [sulfamethoxazole-trimethoprim] Hives     itched    Methylprednisolone     Keflex [cephalexin] Rash     Tolerated piperacillin-tazobactam 1/30/2025    Morphine Rash     Patient had morphine and keflex at the same time, developed a rash, not sure which one caused it, or if it was a combination of the two meds.       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 2004    CORONARY ARTERY BYPASS GRAFT  1996    5-bypass     CYSTOSCOPY N/A 8/15/2019    Procedure: CYSTOSCOPY;  Surgeon: Dipak Sanchez MD;  Location: [facility] OR;  Service: Urology;  Laterality: N/A;    CYSTOSCOPY N/A 10/31/2019    Procedure: CYSTOSCOPY;  Surgeon: Dipak Sanchez MD;  Location: [facility] OR;  Service: Urology;  Laterality: N/A;    CYSTOSCOPY W/ URETERAL STENT REMOVAL Left 10/31/2019    Procedure: CYSTOSCOPY, WITH URETERAL STENT REMOVAL  URETEROSCOPY;  Surgeon: Dipak Sanchez MD;  Location: [facility] OR;  Service: Urology;  Laterality: Left;    EXTRACORPOREAL SHOCK WAVE LITHOTRIPSY Left 8/15/2019    Procedure: LITHOTRIPSY, ESWL;  Surgeon: Dipak Sanchez MD;  Location: [facility] OR;  Service: Urology;  Laterality: Left;    EXTRACORPOREAL SHOCK WAVE LITHOTRIPSY Left 9/19/2019    Procedure: LITHOTRIPSY, ESWL;  Surgeon: Dipak Sanchez MD;  Location: [facility] OR;  Service: Urology;  Laterality: Left;    EYE SURGERY Right 2014    Cataract    EYE SURGERY Left 2017    Cataract    HERNIA REPAIR  2014    Dr. Bailon - had to have mesh removed    INGUINAL HERNIA REPAIR Right 03/25/2019        LITHOTRIPSY      nephrostomy tube      PERCUTANEOUS NEPHROSTOMY      ROTATOR CUFF REPAIR  2005    right shoulder    URETEROSCOPY Left 10/31/2019    Procedure: URETEROSCOPY;  Surgeon: Dipak Sanchez MD;  Location: [facility];  Service: Urology;  Laterality: Left;     Social History[1]     REVIEW OF SYSTEMS  Negative except as mentioned in HPI    OBJECTIVE     VITAL SIGNS (Most Recent)  Temp: 98.1 °F (36.7 °C) (06/12/25 0738)  Pulse: 69 (06/12/25 0738)  Resp: 17 (06/12/25 0738)  BP: (!) 168/77 (06/12/25 0738)  SpO2: 96 % (06/12/25 0738)    VENTILATION STATUS  Resp: 17 (06/12/25 0738)  SpO2: 96 % (06/12/25 0738)           I & O (Last 24H):  Intake/Output Summary (Last 24 hours) at 6/12/2025 0859  Last data filed at 6/12/2025 0832  Gross per 24 hour    Intake 120 ml   Output 1450 ml   Net -1330 ml       WEIGHTS  Wt Readings from Last 3 Encounters:   06/11/25 1518 80.8 kg (178 lb 3.2 oz)   06/11/25 1024 88.5 kg (195 lb)   01/29/25 2118 88.5 kg (195 lb 1.7 oz)   01/29/25 0506 88.5 kg (195 lb)   01/29/25 1320 88.5 kg (195 lb)       PHYSICAL EXAM    GENERAL:  Elderly male in good physical condition breathing comfortably in no apparent distress.  HEENT: Normocephalic.    NECK: No JVD.   CARDIAC: Regular rate and rhythm.  Normal S1 diminished S2 grade 2 systolic murmurs noted at this time.  CHEST ANATOMY: normal.   LUNGS: Clear to auscultation. No wheezing or rhonchi..   ABDOMEN: Soft .  Normal bowel sounds.    EXTREMITIES: No edema  CENTRAL NERVOUS SYSTEM: AAO x 3, very hard of hearing  SKIN: No rash     HOME MEDICATIONS:Medications Ordered Prior to Encounter[2]    SCHEDULED MEDS:   atorvastatin  40 mg Oral QHS    budesonide  0.5 mg Nebulization Q12H    clopidogreL  75 mg Oral Daily    enoxparin  1 mg/kg Subcutaneous Q12H    furosemide (LASIX) injection  40 mg Intravenous Q12H    ipratropium  0.5 mg Nebulization Q8H    isosorbide mononitrate  30 mg Oral Daily    levalbuterol  1.25 mg Nebulization Q8H    metoprolol tartrate  25 mg Oral BID    pantoprazole  40 mg Oral Daily       CONTINUOUS INFUSIONS:    PRN MEDS:  Current Facility-Administered Medications:     acetaminophen, 650 mg, Oral, Q8H PRN    aluminum-magnesium hydroxide-simethicone, 30 mL, Oral, QID PRN    HYDROcodone-acetaminophen, 1 tablet, Oral, Q6H PRN    magnesium oxide, 800 mg, Oral, PRN    magnesium oxide, 800 mg, Oral, PRN    melatonin, 6 mg, Oral, Nightly PRN    naloxone, 0.02 mg, Intravenous, PRN    ondansetron, 4 mg, Intravenous, Q6H PRN    potassium bicarbonate, 35 mEq, Oral, PRN    potassium bicarbonate, 50 mEq, Oral, PRN    potassium bicarbonate, 60 mEq, Oral, PRN    potassium, sodium phosphates, 2 packet, Oral, PRN    potassium, sodium phosphates, 2 packet, Oral, PRN    potassium, sodium  "phosphates, 2 packet, Oral, PRN    LABS AND DIAGNOSTICS     CBC LAST 3 DAYS  Recent Labs   Lab 06/11/25  1050 06/12/25  0404   WBC 10.25 10.32   RBC 4.28* 4.79   HGB 12.9* 14.4   HCT 39.7* 44.1   MCV 93 92   MCH 30.1 30.1   MCHC 32.5 32.7   RDW 15.4* 15.4*   * 119*   MPV 10.2 10.1   NRBC 0 0       COAGULATION LAST 3 DAYS  Recent Labs   Lab 06/11/25  1050   INR 1.1       CHEMISTRY LAST 3 DAYS  Recent Labs   Lab 06/11/25  1050 06/12/25  0404    140   K 3.9 4.2    99   CO2 30* 34*   ANIONGAP 6* 7*   BUN 22 22   CREATININE 1.3 1.2   * 129*   CALCIUM 9.0 9.6   MG 1.8 1.9   ALBUMIN 3.8 4.0   PROT 6.6 6.9   ALKPHOS 79 79   ALT 13 13   AST 16 17   BILITOT 0.8 1.0       CARDIAC PROFILE LAST 3 DAYS  Recent Labs   Lab 06/11/25  1050   *       ENDOCRINE LAST 3 DAYS  No results for input(s): "TSH", "PROCAL" in the last 168 hours.    LAST ARTERIAL BLOOD GAS  ABG  No results for input(s): "PH", "PO2", "PCO2", "HCO3", "BE" in the last 168 hours.    LAST 7 DAYS MICROBIOLOGY   Microbiology Results (last 7 days)       ** No results found for the last 168 hours. **            MOST RECENT IMAGING  X-Ray Chest AP Portable  Narrative: EXAMINATION:  XR CHEST AP PORTABLE    CLINICAL HISTORY:  chest pain;    FINDINGS:  Portable chest with comparison chest x-ray 01/29/2025.  Normal cardiomediastinal silhouette.Prominent central hilar vascular structures with mild perihilar interstitial thickening noted.  There is blunting of the bilateral costophrenic angles.  Pulmonary vasculature is normal. No acute osseous abnormality.  Impression: 1. Prominent central hilar vascular structures with mild perihilar interstitial thickening noted, possibly reflecting pulmonary edema.  2. Blunting of the bilateral costophrenic angles is felt to reflect small bilateral pleural effusions.  3. Constellation of findings could reflect CHF.    Electronically signed by: Alex" Makayla  Date:    06/11/2025  Time:    10:58      ECHOCARDIOGRAM RESULTS (last 5)  Results for orders placed during the hospital encounter of 01/29/25    Echo    Interpretation Summary    Left Ventricle: The left ventricle is normal in size. Mildly increased wall thickness. Unable to assess wall motion. There is normal systolic function with a visually estimated ejection fraction of 60 - 65%.    Right Ventricle: Right ventricle was not well visualized due to poor acoustic window.    Left Atrium: Left atrium is mildly dilated.    Aortic Valve: There is moderate to severe stenosis. Aortic valve area by VTI is 1.1 cm². Aortic valve peak velocity is 2.7 m/s. Mean gradient is 17 mmHg. The dimensionless index is 0.38.    Mitral Valve: There is severe stenosis. The mean pressure gradient across the mitral valve is 8 mmHg at a heart rate of 72 bpm.    IVC/SVC: Normal venous pressure at 3 mmHg.      Results for orders placed during the hospital encounter of 03/24/23    Echo    Interpretation Summary  · The left ventricle is normal in size with moderate concentric hypertrophy and low normal systolic function.  · Mild left atrial enlargement.  · The estimated ejection fraction is 50%.  · Indeterminate left ventricular diastolic function.  · RV is not well visualized. Mildly to moderately reduced right ventricular systolic function.  · The mean diastolic gradient across the mitral valve is 13 mmHg at a heart rate of 87 bpm.  · There is moderate to severe mitral stenosis.  · There is moderate-to-severe aortic valve stenosis.  · Aortic valve area is 1.06 cm2; peak velocity is 2.75 m/s; mean gradient is 17 mmHg. Indexed area is 0.45-0.5 cm/m2. Indexed stroke volume is 29 ml/m2.  · Normal central venous pressure (3 mmHg).      Results for orders placed during the hospital encounter of 08/11/20    Echo Color Flow Doppler? Yes; Bubble Contrast? No    Interpretation Summary  · The estimated PA systolic pressure is 52 mmHg.  · Concentric  left ventricular remodeling.  · Normal left ventricular systolic function. The estimated ejection fraction is 64%.  · Grade II (moderate) left ventricular diastolic dysfunction consistent with pseudonormalization.  · Mild left atrial enlargement.  · Mild-to-moderate aortic valve stenosis.  · Aortic valve area is 1.46 cm2; peak velocity is 2.54 m/s; mean gradient is 15 mmHg.  · Mild-to-moderate mitral regurgitation.  · Mild tricuspid regurgitation.  · Elevated central venous pressure (15 mmHg).  · Pulmonary hypertension present.      CURRENT/PREVIOUS VISIT EKG  Results for orders placed or performed during the hospital encounter of 06/11/25   EKG 12-lead    Collection Time: 06/11/25 10:24 AM   Result Value Ref Range    QRS Duration 140 ms    OHS QTC Calculation 492 ms    Narrative    Test Reason : R07.9,    Vent. Rate :  92 BPM     Atrial Rate :  92 BPM     P-R Int : 216 ms          QRS Dur : 140 ms      QT Int : 398 ms       P-R-T Axes :  28  15 181 degrees    QTcB Int : 492 ms    Sinus rhythm with 1st degree A-V block  Left bundle branch block  Abnormal ECG  When compared with ECG of 29-Jan-2025 05:57,  CO interval has increased    Referred By: AAAREFERRAL SELF           Confirmed By:            ASSESSMENT/PLAN:     Active Hospital Problems    Diagnosis    *Chest pain    Acute on chronic heart failure    Elevated troponin    CKD (chronic kidney disease), stage III    COPD (chronic obstructive pulmonary disease)    S/P CABG x 5    Valvular heart disease, moderate to severe MS/AS       ASSESSMENT & PLAN:   Acute on chronic diastolic heart failure  CAD  CABG X 5  Aortic stenosis-moderate to severe  COPD    RECOMMENDATIONS:  Admission for acute diastolic heart failure, possibly brought on by reduction of daily Lasix due to c/o frequent urination  Troponin elevation likely secondary to heart failure (peaked 125)    Patient has had good response to IV diuresis.  -1300 cc net balance today   Lasix 40 mg IV b.i.d. was  switched to daily today.  Creatinine 1.2 today  Obtain strict I&O and daily weight.  2 g sodium diet, 1.5 L fluid restriction  2D echo obtained today, pending read, previous EF 60-65%.+ Mod- severe AS  Continue Plavix, statin, beta-blocker, Imdur  Further recommendations per Dr. Nati Majano NP  [facility]  Department of Cardiology  Date of Service: 06/12/2025      89-year-old with history of complex medical problems including history of senile aortic valve stenosis, previous coronary artery bypass surgery COPD resident of local [facility] living center is transferred here because of some atypical chest pains and shortness of breath.  No significant tightness noted with exertion reportedly he has been urinating frequently and his urologist cut back on his Lasix and patient developed increasing shortness of breath and congestive heart failure with retention of fluid  He has responded very well to intravenous diuretic therapy initiated in the ER currently sitting comfortably in bed denies having chest pain.  He is surrounded by 2 daughters report that he has been doing well otherwise  I have personally interviewed and examined the patient, I have reviewed the Nurse Practitioner's history and physical, assessment, and plan.  I have also reviewed and antiplatelet all the pertinent lab and imaging data. I have personally evaluated the patient at bedside and agree with the findings and made appropriate changes as necessary in recommendations.  Because of his age and comorbid conditions recommend conservative treatment at this time.  Troponin changes appear to be nonspecific in his related to hypoxemia and congestive failure.  Optimize therapy and discharge him home and encouraged her to follow-up with his primary cardiology cardiologist upon discharge      Mohan Damian MD FACC,FACP, FSCAI  Section Head   Department of Cardiology  [facility]  [place],  [facility]  Date of Service: 2025  8:59 AM         [1]   Social History  Tobacco Use    Smoking status: Former     Current packs/day: 0.00     Average packs/day: 1 pack/day for 25.0 years (25.0 ttl pk-yrs)     Types: Cigarettes     Start date: 1947     Quit date: 1972     Years since quittin.4    Smokeless tobacco: Former     Quit date: 8/15/1972   Vaping Use    Vaping status: Never Used   Substance Use Topics    Alcohol use: No    Drug use: No   [2]   No current facility-administered medications on file prior to encounter.     Current Outpatient Medications on File Prior to Encounter   Medication Sig Dispense Refill    atorvastatin (LIPITOR) 40 MG tablet Take 1 tablet (40 mg total) by mouth every evening. 90 tablet 0    clopidogreL (PLAVIX) 75 mg tablet Take 1 tablet (75 mg total) by mouth once daily. 30 tablet 0    furosemide (LASIX) 40 MG tablet Take 40 mg by mouth once daily.      GEMTESA 75 mg Tab Take 1 tablet by mouth once daily.      isosorbide mononitrate (IMDUR) 30 MG 24 hr tablet Take 1 tablet (30 mg total) by mouth once daily. 30 tablet 0    levothyroxine (SYNTHROID) 25 MCG tablet Take 25 mcg by mouth once daily.   1    metoprolol tartrate (LOPRESSOR) 25 MG tablet Take 25 mg by mouth 2 (two) times daily.      pantoprazole (PROTONIX) 40 MG tablet Take 1 tablet (40 mg total) by mouth once daily. 30 tablet 0    SYMBICORT 160-4.5 mcg/actuation HFAA Inhale 2 puffs into the lungs every 12 (twelve) hours.       tamsulosin (FLOMAX) 0.4 mg Cap Take 0.4 mg by mouth once daily.       albuterol (PROVENTIL/VENTOLIN HFA) 90 mcg/actuation inhaler Inhale 2 puffs into the lungs every 4 (four) hours as needed for Wheezing. Rescue 1 Inhaler 0

## 2025-06-12 NOTE — SUBJECTIVE & OBJECTIVE
Interval History:  Patient is seen and examined during multidisciplinary round.  Patient's family member present at bedside.  Patient denies any ongoing chest pain.  Reports improving shortness of breath with ongoing diuresis with use of diuretics.  Awaiting cardiologist evaluation.  Serum troponin peaked and trended down.  Patient denies any dizziness, near-syncope or syncopal symptoms at home.  Currently NPO.    Review of Systems   Constitutional:  Negative for activity change and appetite change.   HENT:  Negative for congestion and dental problem.    Eyes:  Negative for discharge and itching.   Respiratory:  Positive for shortness of breath.    Cardiovascular:  Positive for chest pain.   Gastrointestinal:  Negative for abdominal distention and abdominal pain.   Endocrine: Negative for cold intolerance.   Genitourinary:  Negative for difficulty urinating and dysuria.   Musculoskeletal:  Negative for arthralgias and back pain.   Skin:  Negative for color change.   Neurological:  Negative for dizziness and facial asymmetry.   Hematological:  Negative for adenopathy.   Psychiatric/Behavioral:  Negative for agitation and behavioral problems.      Objective:     Vital Signs (Most Recent):  Temp: 98.1 °F (36.7 °C) (06/12/25 0738)  Pulse: 69 (06/12/25 0738)  Resp: 17 (06/12/25 0738)  BP: (!) 168/77 (06/12/25 0738)  SpO2: 96 % (06/12/25 0738) Vital Signs (24h Range):  Temp:  [97.3 °F (36.3 °C)-98.1 °F (36.7 °C)] 98.1 °F (36.7 °C)  Pulse:  [64-92] 69  Resp:  [16-20] 17  SpO2:  [94 %-96 %] 96 %  BP: (146-195)/() 168/77     Weight: 80.8 kg (178 lb 3.2 oz)  Body mass index is 29.65 kg/m².    Intake/Output Summary (Last 24 hours) at 6/12/2025 0721  Last data filed at 6/12/2025 0517  Gross per 24 hour   Intake 120 ml   Output 1450 ml   Net -1330 ml         Physical Exam  Vitals and nursing note reviewed.   Constitutional:       Appearance: He is well-developed.   HENT:      Head: Atraumatic.      Right Ear: External ear  "normal.      Left Ear: External ear normal.      Nose: Nose normal.      Mouth/Throat:      Mouth: Mucous membranes are moist.   Eyes:      Extraocular Movements: Extraocular movements intact.   Cardiovascular:      Rate and Rhythm: Normal rate.   Pulmonary:      Effort: Pulmonary effort is normal.      Breath sounds: Rales present.   Abdominal:      Palpations: Abdomen is soft.   Musculoskeletal:         General: Normal range of motion.      Cervical back: Full passive range of motion without pain and normal range of motion.      Right lower leg: Edema present.      Left lower leg: Edema present.   Skin:     General: Skin is warm.   Neurological:      Mental Status: He is alert and oriented to person, place, and time.   Psychiatric:         Behavior: Behavior normal.               Significant Labs: All pertinent labs within the past 24 hours have been reviewed.  CBC:   Recent Labs   Lab 06/11/25  1050 06/12/25  0404   WBC 10.25 10.32   HGB 12.9* 14.4   HCT 39.7* 44.1   * 119*     CMP:   Recent Labs   Lab 06/11/25  1050 06/12/25  0404    140   K 3.9 4.2    99   CO2 30* 34*   * 129*   BUN 22 22   CREATININE 1.3 1.2   CALCIUM 9.0 9.6   PROT 6.6 6.9   ALBUMIN 3.8 4.0   BILITOT 0.8 1.0   ALKPHOS 79 79   AST 16 17   ALT 13 13   ANIONGAP 6* 7*     Coagulation:   Recent Labs   Lab 06/11/25  1050   INR 1.1     Lipid Panel: No results for input(s): "CHOL", "HDL", "LDLCALC", "TRIG", "CHOLHDL" in the last 48 hours.  Troponin: No results for input(s): "TROPONINI", "TROPONINIHS" in the last 48 hours.  TSH: No results for input(s): "TSH" in the last 4320 hours.  Urine Studies: No results for input(s): "COLORU", "APPEARANCEUA", "PHUR", "SPECGRAV", "PROTEINUA", "GLUCUA", "KETONESU", "BILIRUBINUA", "OCCULTUA", "NITRITE", "UROBILINOGEN", "LEUKOCYTESUR", "RBCUA", "WBCUA", "BACTERIA", "SQUAMEPITHEL", "HYALINECASTS" in the last 48 hours.    Invalid input(s): "WRIGHTSUR"    Significant Imaging:   ECHO " (01/29/25):    Left Ventricle: The left ventricle is normal in size. Mildly increased wall thickness. Unable to assess wall motion. There is normal systolic function with a visually estimated ejection fraction of 60 - 65%.    Right Ventricle: Right ventricle was not well visualized due to poor acoustic window.    Left Atrium: Left atrium is mildly dilated.    Aortic Valve: There is moderate to severe stenosis. Aortic valve area by VTI is 1.1 cm². Aortic valve peak velocity is 2.7 m/s. Mean gradient is 17 mmHg. The dimensionless index is 0.38.    Mitral Valve: There is severe stenosis. The mean pressure gradient across the mitral valve is 8 mmHg at a heart rate of 72 bpm.    IVC/SVC: Normal venous pressure at 3 mmHg.    CXR:  1. Prominent central hilar vascular structures with mild perihilar interstitial thickening noted, possibly reflecting pulmonary edema.  2. Blunting of the bilateral costophrenic angles is felt to reflect small bilateral pleural effusions.  3. Constellation of findings could reflect CHF.

## 2025-06-12 NOTE — CARE UPDATE
06/11/25 1920   Patient Assessment/Suction   Level of Consciousness (AVPU) alert   Respiratory Effort Normal;Unlabored   Expansion/Accessory Muscles/Retractions no use of accessory muscles   All Lung Fields Breath Sounds wheezes, expiratory   Rhythm/Pattern, Respiratory unlabored;pattern regular   Cough Frequency no cough   PRE-TX-O2   Device (Oxygen Therapy) room air   SpO2 95 %   Pulse Oximetry Type Intermittent   $ Pulse Oximetry - Multiple Charge Pulse Oximetry - Multiple   Pulse 79   Resp 17   Aerosol Therapy   $ Aerosol Therapy Charges Aerosol Treatment   Daily Review of Necessity (SVN) completed   Respiratory Treatment Status (SVN) given   Treatment Route (SVN) mask;oxygen   Patient Position HOB elevated   Post Treatment Assessment (SVN) breath sounds unchanged   Signs of Intolerance (SVN) none   Breath Sounds Post-Respiratory Treatment   Throughout All Fields Post-Treatment All Fields   Throughout All Fields Post-Treatment no change   Post-treatment Heart Rate (beats/min) 80   Post-treatment Resp Rate (breaths/min) 18   Education   $ Education Bronchodilator;15 min   Respiratory Evaluation   $ Care Plan Tech Time 15 min

## 2025-06-12 NOTE — CARE UPDATE
06/12/25 0652   Patient Assessment/Suction   Level of Consciousness (AVPU) alert   Respiratory Effort Normal;Unlabored   Expansion/Accessory Muscles/Retractions no use of accessory muscles;no retractions;expansion symmetric   All Lung Fields Breath Sounds wheezes, expiratory   Rhythm/Pattern, Respiratory unlabored;pattern regular;depth regular   Cough Frequency no cough   PRE-TX-O2   Device (Oxygen Therapy) room air   SpO2 95 %   Pulse Oximetry Type Intermittent   $ Pulse Oximetry - Multiple Charge Pulse Oximetry - Multiple   Pulse 64   Resp 16   Aerosol Therapy   $ Aerosol Therapy Charges Aerosol Treatment   Daily Review of Necessity (SVN) completed   Respiratory Treatment Status (SVN) given   Treatment Route (SVN) oxygen;mask   Patient Position HOB elevated   Post Treatment Assessment (SVN) breath sounds unchanged   Signs of Intolerance (SVN) none

## 2025-06-12 NOTE — NURSING
"Patient updated on plan.  Patient states "I thought I was going home today".   Patient informed that no orders for discharge were placed today and that possibly tomorrow.  "

## 2025-06-12 NOTE — HOSPITAL COURSE
Patient admitted to medicine telemetry floor where serial cardiac enzymes closely monitored.  Patient received medical management for non ST-elevation MI.  Patient underwent 2D echocardiogram and cardiologist evaluation.  During hospital stay patient received intravenous diuretics.  Patient's symptoms improved with diuresis.

## 2025-06-13 VITALS
WEIGHT: 178.19 LBS | HEART RATE: 91 BPM | OXYGEN SATURATION: 92 % | SYSTOLIC BLOOD PRESSURE: 130 MMHG | TEMPERATURE: 98 F | HEIGHT: 65 IN | DIASTOLIC BLOOD PRESSURE: 73 MMHG | BODY MASS INDEX: 29.69 KG/M2 | RESPIRATION RATE: 18 BRPM

## 2025-06-13 LAB
ABSOLUTE EOSINOPHIL (SMH): 0.38 K/UL
ABSOLUTE MONOCYTE (SMH): 0.75 K/UL (ref 0.3–1)
ABSOLUTE NEUTROPHIL COUNT (SMH): 6.7 K/UL (ref 1.8–7.7)
ALBUMIN SERPL-MCNC: 3.8 G/DL (ref 3.5–5.2)
ALP SERPL-CCNC: 82 UNIT/L (ref 55–135)
ALT SERPL-CCNC: 13 UNIT/L (ref 10–44)
ANION GAP (SMH): 9 MMOL/L (ref 8–16)
AST SERPL-CCNC: 24 UNIT/L (ref 10–40)
BASOPHILS # BLD AUTO: 0.06 K/UL
BASOPHILS NFR BLD AUTO: 0.6 %
BILIRUB SERPL-MCNC: 1.1 MG/DL (ref 0.1–1)
BUN SERPL-MCNC: 29 MG/DL (ref 8–23)
CALCIUM SERPL-MCNC: 9.3 MG/DL (ref 8.7–10.5)
CHLORIDE SERPL-SCNC: 98 MMOL/L (ref 95–110)
CO2 SERPL-SCNC: 30 MMOL/L (ref 23–29)
CREAT SERPL-MCNC: 1.2 MG/DL (ref 0.5–1.4)
ERYTHROCYTE [DISTWIDTH] IN BLOOD BY AUTOMATED COUNT: 15.3 % (ref 11.5–14.5)
GFR SERPLBLD CREATININE-BSD FMLA CKD-EPI: 57 ML/MIN/1.73/M2
GLUCOSE SERPL-MCNC: 138 MG/DL (ref 70–110)
HCT VFR BLD AUTO: 43.3 % (ref 40–54)
HGB BLD-MCNC: 13.9 GM/DL (ref 14–18)
IMM GRANULOCYTES # BLD AUTO: 0.04 K/UL (ref 0–0.04)
IMM GRANULOCYTES NFR BLD AUTO: 0.4 % (ref 0–0.5)
LYMPHOCYTES # BLD AUTO: 2.78 K/UL (ref 1–4.8)
MAGNESIUM SERPL-MCNC: 2 MG/DL (ref 1.6–2.6)
MCH RBC QN AUTO: 29.6 PG (ref 27–31)
MCHC RBC AUTO-ENTMCNC: 32.1 G/DL (ref 32–36)
MCV RBC AUTO: 92 FL (ref 82–98)
NUCLEATED RBC (/100WBC) (SMH): 0 /100 WBC
OHS QRS DURATION: 140 MS
OHS QTC CALCULATION: 492 MS
PLATELET # BLD AUTO: 105 K/UL (ref 150–450)
PMV BLD AUTO: 10.1 FL (ref 9.2–12.9)
POTASSIUM SERPL-SCNC: 3.9 MMOL/L (ref 3.5–5.1)
PROT SERPL-MCNC: 7.1 GM/DL (ref 6–8.4)
RBC # BLD AUTO: 4.7 M/UL (ref 4.6–6.2)
RELATIVE EOSINOPHIL (SMH): 3.6 % (ref 0–8)
RELATIVE LYMPHOCYTE (SMH): 26 % (ref 18–48)
RELATIVE MONOCYTE (SMH): 7 % (ref 4–15)
RELATIVE NEUTROPHIL (SMH): 62.4 % (ref 38–73)
SODIUM SERPL-SCNC: 137 MMOL/L (ref 136–145)
WBC # BLD AUTO: 10.7 K/UL (ref 3.9–12.7)

## 2025-06-13 PROCEDURE — 63600175 PHARM REV CODE 636 W HCPCS: Performed by: INTERNAL MEDICINE

## 2025-06-13 PROCEDURE — 99900031 HC PATIENT EDUCATION (STAT)

## 2025-06-13 PROCEDURE — 85025 COMPLETE CBC W/AUTO DIFF WBC: CPT | Performed by: INTERNAL MEDICINE

## 2025-06-13 PROCEDURE — 94761 N-INVAS EAR/PLS OXIMETRY MLT: CPT

## 2025-06-13 PROCEDURE — 94640 AIRWAY INHALATION TREATMENT: CPT

## 2025-06-13 PROCEDURE — 99233 SBSQ HOSP IP/OBS HIGH 50: CPT | Mod: ,,, | Performed by: INTERNAL MEDICINE

## 2025-06-13 PROCEDURE — 25000242 PHARM REV CODE 250 ALT 637 W/ HCPCS: Performed by: INTERNAL MEDICINE

## 2025-06-13 PROCEDURE — 99900035 HC TECH TIME PER 15 MIN (STAT)

## 2025-06-13 PROCEDURE — 36415 COLL VENOUS BLD VENIPUNCTURE: CPT | Performed by: INTERNAL MEDICINE

## 2025-06-13 PROCEDURE — 83735 ASSAY OF MAGNESIUM: CPT | Performed by: INTERNAL MEDICINE

## 2025-06-13 PROCEDURE — 82040 ASSAY OF SERUM ALBUMIN: CPT | Performed by: INTERNAL MEDICINE

## 2025-06-13 PROCEDURE — 25000003 PHARM REV CODE 250: Performed by: INTERNAL MEDICINE

## 2025-06-13 RX ADMIN — CLOPIDOGREL 75 MG: 75 TABLET ORAL at 08:06

## 2025-06-13 RX ADMIN — PANTOPRAZOLE SODIUM 40 MG: 40 TABLET, DELAYED RELEASE ORAL at 05:06

## 2025-06-13 RX ADMIN — ISOSORBIDE MONONITRATE 30 MG: 30 TABLET, EXTENDED RELEASE ORAL at 08:06

## 2025-06-13 RX ADMIN — LEVALBUTEROL HYDROCHLORIDE 1.25 MG: 1.25 SOLUTION RESPIRATORY (INHALATION) at 06:06

## 2025-06-13 RX ADMIN — FUROSEMIDE 40 MG: 10 INJECTION, SOLUTION INTRAMUSCULAR; INTRAVENOUS at 08:06

## 2025-06-13 RX ADMIN — METOPROLOL TARTRATE 25 MG: 25 TABLET, FILM COATED ORAL at 08:06

## 2025-06-13 RX ADMIN — IPRATROPIUM BROMIDE 0.5 MG: 0.5 SOLUTION RESPIRATORY (INHALATION) at 06:06

## 2025-06-13 RX ADMIN — ENOXAPARIN SODIUM 80 MG: 80 INJECTION SUBCUTANEOUS at 08:06

## 2025-06-13 RX ADMIN — BUDESONIDE INHALATION 0.5 MG: 0.5 SUSPENSION RESPIRATORY (INHALATION) at 06:06

## 2025-06-13 NOTE — DISCHARGE SUMMARY
[facility] Medicine  Discharge Summary      Patient Name: Frank Wyatt  MRN: [id number]  JIMMY: [id number]  Patient Class: IP- Inpatient  Admission Date: 6/11/2025  Hospital Length of Stay: 2 days  Discharge Date and Time: 06/13/2025 8:29 AM  Attending Physician: Fletcher Gates MD   Discharging Provider: Fletcher Gates MD  Primary Care Provider: Darci German MD    Primary Care Team: Networked reference to record PCT     HPI:   89 year old pt getting admitted with CHF flare and chest pain  Pt has been suffering from chest pain /discomfort for past several days  Described as chest tightness and worse on exertion  Pain goes away with rest  His Cardiologist is Dr Kim and its been a while pt had stress test  His Urologist cutdown his Lasix dose and pt was not taking lasix as prescribed by his Cardio MD       * No surgery found *      Hospital Course:   Patient admitted to medicine telemetry floor where serial cardiac enzymes closely monitored.  Patient received medical management for non ST-elevation MI.  Patient underwent 2D echocardiogram and cardiologist evaluation.  During hospital stay patient received intravenous diuretics.  Patient's symptoms improved with diuresis. Patient to monitor daily weight, follow low salt diet and in case if gain more than 3 pounds in 24 hours, please call your doctor for further advice.  Medication compliance and low-salt diet compliance discussed with patient and family member.  Home health services are being arranged for closer monitoring.  Discharge plan of care reviewed with patient's family member who voiced understanding.    Goals of Care Treatment Preferences:  Code Status: Full Code    Living Will: Yes          SDOH Screening:  The patient was screened for utility difficulties, food insecurity, transport difficulties, housing insecurity, and interpersonal safety and there were no concerns identified this admission.     Consults:   Consults  (From admission, onward)          Status Ordering Provider     Inpatient consult to Cardiology  Once        Provider:  Enma Espinoza MD    Completed SUSAN BRIZUELA          Microbiology Results (last 7 days)       ** No results found for the last 168 hours. **          Assessment & Plan  Chest pain  Possible non ST-elevation MI. Mostly from CHF flare  But he will benefit from stress test before discharge once CHF flare is better  On Therapeutic lovenox BID for possible ACS  Awaiting cardiologist evaluation.  LBBB seen on todays EKG is an old issue and not new      Latest Reference Range & Units 03/25/23 14:56 03/25/23 17:55 07/17/24 19:43 01/29/25 05:17 06/11/25 10:50 06/11/25 12:52 06/11/25 15:27   Troponin I High Sensitivity <=14.9 pg/mL 869.8 (HH) 750.5 (HH) 20.6 (H) 14.5 72.1 (HH) 90.2 (HH) 125.1 (HH)   (HH): Data is critically high  (H): Data is abnormally high    Acute on chronic heart failure  Maintain iv lasix  Monitor renal panels    Valvular heart disease, moderate to severe MS/AS  Per records     CKD (chronic kidney disease), stage III  Creatine stable for now. BMP reviewed- noted Estimated Creatinine Clearance: 40 mL/min (based on SCr of 1.2 mg/dL). according to latest data. Based on current GFR, CKD stage is .  Monitor UOP and serial BMP and adjust therapy as needed. Renally dose meds. Avoid nephrotoxic medications and procedures.  S/P CABG x 5  Aware     COPD (chronic obstructive pulmonary disease)  Stable   Elevated troponin  NSTEMI  As mentioned above      Latest Reference Range & Units 03/25/23 14:56 03/25/23 17:55 07/17/24 19:43 01/29/25 05:17 06/11/25 10:50 06/11/25 12:52 06/11/25 15:27   Troponin I High Sensitivity <=14.9 pg/mL 869.8 (HH) 750.5 (HH) 20.6 (H) 14.5 72.1 (HH) 90.2 (HH) 125.1 (HH)   (HH): Data is critically high  (H): Data is abnormally high    Discussed with patient and family members, bedside nurse and .  Answered all questions.  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 Go on 6/17/2025.    Specialty: Family Medicine  Why: @1030am.   The Care Manager from the office will call- may be able to change to a virtual visit.  Contact information:  [address]  [facility] [address]  [phone]               Mohan Damian MD Follow up in 2 week(s).    Specialties: Cardiology, Interventional Cardiology  Contact information:  [address]  [facility] [address]  [place] [facility] [address]  [phone]                           Patient Instructions:      Ambulatory referral/consult to Home Health   Standing Status: Future   Referral Priority: Routine Referral Type: Home Health Care   Referral Reason: Specialty Services Required   Requested Specialty: Home Health Services   Number of Visits Requested: 1     Diet Cardiac   Order Comments: Patient to monitor daily weight, follow low salt diet and in case if gain more than 3 pounds in 24 hours, please call your doctor for further advice.     Notify your health care provider if you experience any of the following:  temperature >100.4     Notify your health care provider if you experience any of the following:  persistent nausea and vomiting or diarrhea     Notify your health care provider if you experience any of the following:  severe uncontrolled pain     Notify your health care  "provider if you experience any of the following:  redness, tenderness, or signs of infection (pain, swelling, redness, odor or green/yellow discharge around incision site)     Notify your health care provider if you experience any of the following:  difficulty breathing or increased cough     Notify your health care provider if you experience any of the following:  severe persistent headache     Notify your health care provider if you experience any of the following:  worsening rash     Notify your health care provider if you experience any of the following:  persistent dizziness, light-headedness, or visual disturbances     Notify your health care provider if you experience any of the following:  increased confusion or weakness     Activity as tolerated   Order Comments: Fall precautions       Significant Diagnostic Studies: Labs: CMP   Recent Labs   Lab 06/11/25  1050 06/12/25  0404 06/13/25  0414    140 137   K 3.9 4.2 3.9    99 98   CO2 30* 34* 30*   * 129* 138*   BUN 22 22 29*   CREATININE 1.3 1.2 1.2   CALCIUM 9.0 9.6 9.3   PROT 6.6 6.9 7.1   ALBUMIN 3.8 4.0 3.8   BILITOT 0.8 1.0 1.1*   ALKPHOS 79 79 82   AST 16 17 24   ALT 13 13 13   ANIONGAP 6* 7* 9   , CBC   Recent Labs   Lab 06/11/25  1050 06/12/25  0404 06/13/25  0414   WBC 10.25 10.32 10.70   HGB 12.9* 14.4 13.9*   HCT 39.7* 44.1 43.3   * 119* 105*   , INR   Lab Results   Component Value Date    INR 1.1 06/11/2025    INR 1.1 03/24/2023    INR 1.1 08/11/2020    PROTIME 12.2 06/11/2025   , Lipid Panel   Lab Results   Component Value Date    CHOL 102 (L) 04/17/2024    HDL 28 (L) 04/17/2024    LDLCALC 51.8 (L) 04/17/2024    TRIG 111 04/17/2024    CHOLHDL 27.5 04/17/2024   , Troponin No results for input(s): "TROPONINI" in the last 168 hours., and A1C:   Recent Labs   Lab 01/29/25  0934   HGBA1C 6.3*       Pending Diagnostic Studies:       None           Medications:  Reconciled Home Medications:      Medication List        CONTINUE " taking these medications      albuterol 90 mcg/actuation inhaler  Commonly known as: PROVENTIL/VENTOLIN HFA  Inhale 2 puffs into the lungs every 4 (four) hours as needed for Wheezing. Rescue     atorvastatin 40 MG tablet  Commonly known as: LIPITOR  Take 1 tablet (40 mg total) by mouth every evening.     clopidogreL 75 mg tablet  Commonly known as: PLAVIX  Take 1 tablet (75 mg total) by mouth once daily.     furosemide 40 MG tablet  Commonly known as: LASIX  Take 40 mg by mouth once daily.     GEMTESA 75 mg Tab  Generic drug: vibegron  Take 1 tablet by mouth once daily.     isosorbide mononitrate 30 MG 24 hr tablet  Commonly known as: IMDUR  Take 1 tablet (30 mg total) by mouth once daily.     levothyroxine 25 MCG tablet  Commonly known as: SYNTHROID  Take 25 mcg by mouth once daily.     metoprolol tartrate 25 MG tablet  Commonly known as: LOPRESSOR  Take 25 mg by mouth 2 (two) times daily.     pantoprazole 40 MG tablet  Commonly known as: PROTONIX  Take 1 tablet (40 mg total) by mouth once daily.     SYMBICORT 160-4.5 mcg/actuation Hfaa  Generic drug: budesonide-formoterol 160-4.5 mcg  Inhale 2 puffs into the lungs every 12 (twelve) hours.     tamsulosin 0.4 mg Cap  Commonly known as: FLOMAX  Take 0.4 mg by mouth once daily.              Indwelling Lines/Drains at time of discharge:   Lines/Drains/Airways       None                   Time spent on the discharge of patient: 33 minutes         Fletcher Gates MD  Department of Hospital Medicine  [facility] hospital

## 2025-06-13 NOTE — PROGRESS NOTES
[facility]  Department of Cardiology  Progress Note      PATIENT NAME: Frank Wyatt  MRN: [id number]  TODAY'S DATE: 06/13/2025  ADMIT DATE: 6/11/2025                          CONSULT REQUESTED BY: No att. providers found    SUBJECTIVE     PRINCIPAL PROBLEM: Chest pain      REASON FOR CONSULT:  Chest pain    INTERVAL HISTORY:  6/13/25:  Feels good to go home  -1 liter balance;   Breathing comfortably on room air  VSS overnight       HPI:  Patient is 89-year-old male with past medical history:  CAD, aortic stenosis, CABG x 5 1996, hypertension, hyperlipidemia, COPD, ex-smoker, hard of hearing, who reported to the emergency room yesterday with complaints of shortness of breath and chest pain.  Patient's sister reports that he had complained of excess urination to his urologist who then cut back his Lasix dosage.  He is followed outpatient by Dr. Kim every 6 months    Patient received IV Lasix, he has diuresed quite a bit a urine per nurse and is reportedly feeling better    Review of patient's allergies indicates:   Allergen Reactions    Ceftriaxone Rash     Experienced rash with Ceftriaxone 1/30/2025    Bactrim [sulfamethoxazole-trimethoprim] Hives     itched    Methylprednisolone     Keflex [cephalexin] Rash     Tolerated piperacillin-tazobactam 1/30/2025    Morphine Rash     Patient had morphine and keflex at the same time, developed a rash, not sure which one caused it, or if it was a combination of the two meds.       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 2004    CORONARY ARTERY BYPASS GRAFT  1996    5-bypass     CYSTOSCOPY N/A 8/15/2019    Procedure: CYSTOSCOPY;  Surgeon: Dipak Sanchez MD;  Location: [facility] OR;  Service: Urology;  Laterality: N/A;    CYSTOSCOPY N/A 10/31/2019    Procedure: CYSTOSCOPY;  Surgeon: Dipak Sanchez MD;  Location: [facility] OR;  Service: Urology;  Laterality: N/A;    CYSTOSCOPY W/ URETERAL STENT REMOVAL Left 10/31/2019    Procedure: CYSTOSCOPY, WITH URETERAL STENT REMOVAL  URETEROSCOPY;  Surgeon: Dipak Sanchez MD;  Location: [facility] OR;  Service: Urology;  Laterality: Left;    EXTRACORPOREAL SHOCK WAVE LITHOTRIPSY Left 8/15/2019    Procedure: LITHOTRIPSY, ESWL;  Surgeon: Dipak Sanchez MD;  Location: [facility] OR;  Service: Urology;  Laterality: Left;    EXTRACORPOREAL SHOCK WAVE LITHOTRIPSY Left 9/19/2019    Procedure: LITHOTRIPSY, ESWL;  Surgeon: Dipak Sanchez MD;  Location: [facility] OR;  Service: Urology;  Laterality: Left;    EYE SURGERY Right 2014    Cataract    EYE SURGERY Left 2017    Cataract    HERNIA REPAIR  2014    Dr. Bailon - had to have mesh removed    INGUINAL HERNIA REPAIR Right 03/25/2019        LITHOTRIPSY      nephrostomy tube      PERCUTANEOUS NEPHROSTOMY      ROTATOR CUFF REPAIR  2005    right shoulder    URETEROSCOPY Left 10/31/2019    Procedure: URETEROSCOPY;  Surgeon: Dipak Sanchez MD;  Location: [facility];  Service: Urology;  Laterality: Left;     Social History[1]     REVIEW OF SYSTEMS  Negative except as mentioned in HPI    OBJECTIVE     VITAL SIGNS (Most Recent)  Temp: 98.1 °F (36.7 °C) (06/13/25 1114)  Pulse: 91 (06/13/25 1114)  Resp: 18 (06/13/25 0715)  BP: 130/73 (06/13/25 1114)  SpO2: (!) 92 % (06/13/25 1114)    VENTILATION STATUS  Resp: 18 (06/13/25 0715)  SpO2: (!) 92 % (06/13/25 1114)            I & O (Last 24H):  Intake/Output Summary (Last 24 hours) at 6/13/2025 2108  Last data filed at 6/13/2025 0850  Gross per 24 hour   Intake 237 ml   Output --   Net 237 ml       WEIGHTS  Wt Readings from Last 3 Encounters:   06/11/25 1518 80.8 kg (178 lb 3.2 oz)   06/11/25 1024 88.5 kg (195 lb)   01/29/25 2118 88.5 kg (195 lb 1.7 oz)   01/29/25 0506 88.5 kg (195 lb)   01/29/25 1320 88.5 kg (195 lb)       PHYSICAL EXAM    GENERAL:  Elderly male in good physical condition breathing comfortably in no apparent distress.  HEENT: Normocephalic.    NECK: No JVD.   CARDIAC: Regular rate and rhythm.  Normal S1 diminished S2 grade 2 systolic murmurs noted at this time.  CHEST ANATOMY: normal.   LUNGS: Clear to auscultation. No wheezing or rhonchi..   ABDOMEN: Soft .  Normal bowel sounds.    EXTREMITIES: No edema  CENTRAL NERVOUS SYSTEM: AAO x 3, very hard of hearing  SKIN: No rash     HOME MEDICATIONS:Medications Ordered Prior to Encounter[2]    SCHEDULED MEDS:        CONTINUOUS INFUSIONS:    PRN MEDS:    LABS AND DIAGNOSTICS     CBC LAST 3 DAYS  Recent Labs   Lab 06/11/25  1050 06/12/25  0404 06/13/25  0414   WBC 10.25 10.32 10.70   RBC 4.28* 4.79 4.70   HGB 12.9* 14.4 13.9*   HCT 39.7* 44.1 43.3   MCV 93 92 92   MCH 30.1 30.1 29.6   MCHC 32.5 32.7 32.1   RDW 15.4* 15.4* 15.3*   * 119* 105*   MPV 10.2 10.1 10.1   NRBC 0 0 0       COAGULATION LAST 3 DAYS  Recent Labs   Lab 06/11/25  1050   INR 1.1       CHEMISTRY LAST 3 DAYS  Recent Labs   Lab 06/11/25  1050 06/12/25  0404 06/13/25  0414    140 137   K 3.9 4.2 3.9    99 98   CO2 30* 34* 30*   ANIONGAP 6* 7* 9   BUN 22 22 29*   CREATININE 1.3 1.2 1.2   * 129* 138*   CALCIUM 9.0 9.6 9.3   MG 1.8 1.9 2.0   ALBUMIN 3.8 4.0 3.8   PROT 6.6 6.9 7.1   ALKPHOS 79 79 82   ALT 13 13 13   AST 16 17 24   BILITOT 0.8 1.0 1.1*       CARDIAC PROFILE LAST 3 DAYS  Recent Labs   Lab 06/11/25  1050   *       ENDOCRINE LAST 3 DAYS  No results for input(s):  ""TSH", "PROCAL" in the last 168 hours.    LAST ARTERIAL BLOOD GAS  ABG  No results for input(s): "PH", "PO2", "PCO2", "HCO3", "BE" in the last 168 hours.    LAST 7 DAYS MICROBIOLOGY   Microbiology Results (last 7 days)       ** No results found for the last 168 hours. **            MOST RECENT IMAGING  Echo    Left Ventricle: The left ventricle is normal in size. Normal wall   thickness. Normal wall motion. There is low normal systolic function with   a visually estimated ejection fraction of 50 - 55%. Grade II diastolic   dysfunction.    Right Ventricle: The right ventricle is normal in size Wall thickness   is normal. Systolic function is normal.    Left Atrium: The left atrium is moderately dilated    Aortic Valve: There is moderate aortic valve sclerosis. Severely   restricted motion. There is moderate to severe stenosis. Aortic valve area   by VTI is 1.2 cm². Aortic valve peak velocity is 2.4 m/s. Mean gradient is   13 mmHg. The dimensionless index is 0.39. There is mild to moderate aortic   regurgitation with a centrally directed jet.    Mitral Valve: There is bileaflet sclerosis. There is severe anterior   mitral annular calcification. Moderately restricted motion. There is   moderate stenosis. The mean pressure gradient across the mitral valve is 8   mmHg at a heart rate of  bpm.    IVC/SVC: Normal venous pressure at 3 mmHg.      ECHOCARDIOGRAM RESULTS (last 5)  Results for orders placed during the hospital encounter of 01/29/25    Echo    Interpretation Summary    Left Ventricle: The left ventricle is normal in size. Mildly increased wall thickness. Unable to assess wall motion. There is normal systolic function with a visually estimated ejection fraction of 60 - 65%.    Right Ventricle: Right ventricle was not well visualized due to poor acoustic window.    Left Atrium: Left atrium is mildly dilated.    Aortic Valve: There is moderate to severe stenosis. Aortic valve area by VTI is 1.1 cm². Aortic valve " peak velocity is 2.7 m/s. Mean gradient is 17 mmHg. The dimensionless index is 0.38.    Mitral Valve: There is severe stenosis. The mean pressure gradient across the mitral valve is 8 mmHg at a heart rate of 72 bpm.    IVC/SVC: Normal venous pressure at 3 mmHg.      Results for orders placed during the hospital encounter of 03/24/23    Echo    Interpretation Summary  · The left ventricle is normal in size with moderate concentric hypertrophy and low normal systolic function.  · Mild left atrial enlargement.  · The estimated ejection fraction is 50%.  · Indeterminate left ventricular diastolic function.  · RV is not well visualized. Mildly to moderately reduced right ventricular systolic function.  · The mean diastolic gradient across the mitral valve is 13 mmHg at a heart rate of 87 bpm.  · There is moderate to severe mitral stenosis.  · There is moderate-to-severe aortic valve stenosis.  · Aortic valve area is 1.06 cm2; peak velocity is 2.75 m/s; mean gradient is 17 mmHg. Indexed area is 0.45-0.5 cm/m2. Indexed stroke volume is 29 ml/m2.  · Normal central venous pressure (3 mmHg).      Results for orders placed during the hospital encounter of 08/11/20    Echo Color Flow Doppler? Yes; Bubble Contrast? No    Interpretation Summary  · The estimated PA systolic pressure is 52 mmHg.  · Concentric left ventricular remodeling.  · Normal left ventricular systolic function. The estimated ejection fraction is 64%.  · Grade II (moderate) left ventricular diastolic dysfunction consistent with pseudonormalization.  · Mild left atrial enlargement.  · Mild-to-moderate aortic valve stenosis.  · Aortic valve area is 1.46 cm2; peak velocity is 2.54 m/s; mean gradient is 15 mmHg.  · Mild-to-moderate mitral regurgitation.  · Mild tricuspid regurgitation.  · Elevated central venous pressure (15 mmHg).  · Pulmonary hypertension present.      CURRENT/PREVIOUS VISIT EKG  Results for orders placed or performed during the hospital  encounter of 06/11/25   EKG 12-lead    Collection Time: 06/11/25 10:24 AM   Result Value Ref Range    QRS Duration 140 ms    [facility] QTC Calculation 492 ms    Narrative    Test Reason : R07.9,    Vent. Rate :  92 BPM     Atrial Rate :  92 BPM     P-R Int : 216 ms          QRS Dur : 140 ms      QT Int : 398 ms       P-R-T Axes :  28  15 181 degrees    QTcB Int : 492 ms    Sinus rhythm with 1st degree A-V block  Left bundle branch block  Abnormal ECG  When compared with ECG of 29-Jan-2025 05:57,  RI interval has increased    Referred By: AAAREFERRAL SELF           Confirmed By:            ASSESSMENT/PLAN:     Active Hospital Problems    Diagnosis    *Chest pain    Acute on chronic heart failure    Elevated troponin    CKD (chronic kidney disease), stage III    COPD (chronic obstructive pulmonary disease)    S/P CABG x 5    Valvular heart disease, 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  Appears euvolemic today  Creatinine 1.2 today.   OK to discharge home. Needs to be on daily diuretic therapy  Recommend Lasix 40 mg daily  2 g sodium diet, 1.5 L fluid restriction at home  Continue Plavix, statin, beta-blocker, Imdur  Needs outpatient follow up with Dr Kim regarding severe aortic stenosis. This was discussed with patient and his sister at UMANG Damian MD  [facility]  Department of Cardiology  Date of Service: 06/13/2025      89-year-old with history of complex medical problems including history of senile aortic valve stenosis, previous coronary artery bypass surgery COPD resident of local assisted living center is transferred here because of  some atypical chest pains and shortness of breath.  No significant tightness noted with exertion reportedly he has been urinating frequently and his urologist cut back on his Lasix and patient developed increasing shortness of breath and congestive heart failure with retention of fluid  He has responded very well to intravenous diuretic therapy initiated in the ER currently sitting comfortably in bed denies having chest pain.  He is surrounded by 2 daughters report that he has been doing well otherwise    Because of his age and comorbid conditions recommend conservative treatment at this time.  Troponin changes appear to be nonspecific in his related to hypoxemia and congestive failure.  Optimize therapy and discharge him home and encouraged her to follow-up with his primary cardiology cardiologist upon discharge      06/13/2025:   Patient appears to be in much better spirits no shortness of breath is noted denies having chest discomfort no arm neck or jaw pain noted.  This has a care with the patient's daughter at bedside hemodynamics appears stable  Echo shows critical aortic valve stenosis with senile degenerative changes associated with age.  Encouraged her to follow-up with primary cardiologist further workup and management to be discussed with the patient is in better physical condition.  I have personally interviewed and examined the patient, I have reviewed the Nurse Practitioner's history and physical, assessment, and plan.  I have also reviewed and antiplatelet all the pertinent lab and imaging data. I have personally evaluated the patient at bedside and agree with the findings and made appropriate changes as necessary in recommendations.  Mohan Damian MD FACC,FACP, [facility]  Section Head   Department of Cardiology  [facility]  [facility]  Date of Service: 06/13/2025  8:59 AM           [1]   Social History  Tobacco Use    Smoking status: Former      Current packs/day: 0.00     Average packs/day: 1 pack/day for 25.0 years (25.0 ttl pk-yrs)     Types: Cigarettes     Start date: 1947     Quit date: 1972     Years since quittin.4    Smokeless tobacco: Former     Quit date: 8/15/1972   Vaping Use    Vaping status: Never Used   Substance Use Topics    Alcohol use: No    Drug use: No   [2]   No current facility-administered medications on file prior to encounter.     Current Outpatient Medications on File Prior to Encounter   Medication Sig Dispense Refill    atorvastatin (LIPITOR) 40 MG tablet Take 1 tablet (40 mg total) by mouth every evening. 90 tablet 0    clopidogreL (PLAVIX) 75 mg tablet Take 1 tablet (75 mg total) by mouth once daily. 30 tablet 0    furosemide (LASIX) 40 MG tablet Take 40 mg by mouth once daily.      GEMTESA 75 mg Tab Take 1 tablet by mouth once daily.      isosorbide mononitrate (IMDUR) 30 MG 24 hr tablet Take 1 tablet (30 mg total) by mouth once daily. 30 tablet 0    levothyroxine (SYNTHROID) 25 MCG tablet Take 25 mcg by mouth once daily.   1    metoprolol tartrate (LOPRESSOR) 25 MG tablet Take 25 mg by mouth 2 (two) times daily.      pantoprazole (PROTONIX) 40 MG tablet Take 1 tablet (40 mg total) by mouth once daily. 30 tablet 0    SYMBICORT 160-4.5 mcg/actuation HFAA Inhale 2 puffs into the lungs every 12 (twelve) hours.       tamsulosin (FLOMAX) 0.4 mg Cap Take 0.4 mg by mouth once daily.       albuterol (PROVENTIL/VENTOLIN HFA) 90 mcg/actuation inhaler Inhale 2 puffs into the lungs every 4 (four) hours as needed for Wheezing. Rescue 1 Inhaler 0

## 2025-06-13 NOTE — PLAN OF CARE
Met with Mr Frank and daughter Jewell at bedside  to review discharge recommendation of home health and they are agreeable to plan.    Patient/family provided with a list of agencies / facilities in-network with patient's payor plan. Providers that are owned, operated, or affiliated with [facility] are included on the list.     Notified that referrals will be sent to the below listed agencies / facilities from in-network list based on proximity to home / family support:   [facility]   2.  [facility]   3.  4.  5. (can send more than 5)    Patient / family instructed to identify preference.    Preferred Agency / Facility: (if more than 1, listed in order of descending preference)  No Preference    If an additional preferred agency / facility not listed above is identified, additional referral to be sent. If above agencies / facilities unable to accept, will send additional referrals to in-network providers.

## 2025-06-13 NOTE — CARE UPDATE
06/12/25 2000   Patient Assessment/Suction   Level of Consciousness (AVPU) alert   Respiratory Effort Normal;Unlabored   Expansion/Accessory Muscles/Retractions no use of accessory muscles   All Lung Fields Breath Sounds equal bilaterally;diminished;clear   Rhythm/Pattern, Respiratory unlabored   Cough Frequency no cough   PRE-TX-O2   Device (Oxygen Therapy) room air   SpO2 95 %   Pulse Oximetry Type Intermittent   $ Pulse Oximetry - Multiple Charge Pulse Oximetry - Multiple   Pulse 91   Resp 16   Aerosol Therapy   $ Aerosol Therapy Charges Aerosol Treatment  (Pulmicort)   Daily Review of Necessity (SVN) completed   Respiratory Treatment Status (SVN) given   Treatment Route (SVN) mask   Patient Position HOB elevated   Post Treatment Assessment (SVN) breath sounds unchanged   Signs of Intolerance (SVN) none   Breath Sounds Post-Respiratory Treatment   Throughout All Fields Post-Treatment All Fields   Post-treatment Heart Rate (beats/min) 88   Post-treatment Resp Rate (breaths/min) 18   Education   $ Education Bronchodilator;15 min   Respiratory Evaluation   $ Care Plan Tech Time 15 min

## 2025-06-13 NOTE — NURSING
Discharge instructions re viewed with patient and patients sister.  Copy provided to patient.    patient appointment follow up reviewed along with daily weigh (and log), and next time medications are due.  Patient verbalizes understanding to call his doctor if he gains 3 pounds or more in a 24 hour period or if he becomes SOB, notices leg swelling etc.

## 2025-06-13 NOTE — PLAN OF CARE
[facility] sent patient information to [facility] services via [email or web address].

## 2025-06-13 NOTE — SUBJECTIVE & OBJECTIVE
Interval History:  Patient is seen and examined during multidisciplinary round.  Patient's family member present at bedside.  Patient denies any ongoing chest pain.  Reports improving shortness of breath with ongoing diuresis with use of diuretics.  Awaiting cardiologist evaluation.  Serum troponin peaked and trended down.  Patient denies any dizziness, near-syncope or syncopal symptoms at home.  Currently NPO.    Review of Systems   Constitutional:  Negative for activity change and appetite change.   HENT:  Negative for congestion and dental problem.    Eyes:  Negative for discharge and itching.   Respiratory:  Positive for shortness of breath.    Cardiovascular:  Positive for chest pain.   Gastrointestinal:  Negative for abdominal distention and abdominal pain.   Endocrine: Negative for cold intolerance.   Genitourinary:  Negative for difficulty urinating and dysuria.   Musculoskeletal:  Negative for arthralgias and back pain.   Skin:  Negative for color change.   Neurological:  Negative for dizziness and facial asymmetry.   Hematological:  Negative for adenopathy.   Psychiatric/Behavioral:  Negative for agitation and behavioral problems.      Objective:     Vital Signs (Most Recent):  Temp: 98 °F (36.7 °C) (06/13/25 0715)  Pulse: 78 (06/13/25 0715)  Resp: 18 (06/13/25 0715)  BP: (!) 166/74 (06/13/25 0715)  SpO2: (!) 93 % (06/13/25 0715) Vital Signs (24h Range):  Temp:  [97.5 °F (36.4 °C)-98.2 °F (36.8 °C)] 98 °F (36.7 °C)  Pulse:  [69-93] 78  Resp:  [15-18] 18  SpO2:  [91 %-100 %] 93 %  BP: (132-166)/(74-82) 166/74     Weight: 80.8 kg (178 lb 3.2 oz)  Body mass index is 29.65 kg/m².    Intake/Output Summary (Last 24 hours) at 6/13/2025 0825  Last data filed at 6/12/2025 1808  Gross per 24 hour   Intake 237 ml   Output --   Net 237 ml         Physical Exam  Vitals and nursing note reviewed.   Constitutional:       Appearance: He is well-developed.   HENT:      Head: Atraumatic.      Right Ear: External ear normal.  "     Left Ear: External ear normal.      Nose: Nose normal.      Mouth/Throat:      Mouth: Mucous membranes are moist.   Eyes:      Extraocular Movements: Extraocular movements intact.   Cardiovascular:      Rate and Rhythm: Normal rate.   Pulmonary:      Effort: Pulmonary effort is normal.      Breath sounds: Rales present.   Abdominal:      Palpations: Abdomen is soft.   Musculoskeletal:         General: Normal range of motion.      Cervical back: Full passive range of motion without pain and normal range of motion.      Right lower leg: Edema present.      Left lower leg: Edema present.   Skin:     General: Skin is warm.   Neurological:      Mental Status: He is alert and oriented to person, place, and time.   Psychiatric:         Behavior: Behavior normal.               Significant Labs: All pertinent labs within the past 24 hours have been reviewed.  CBC:   Recent Labs   Lab 06/11/25  1050 06/12/25  0404 06/13/25  0414   WBC 10.25 10.32 10.70   HGB 12.9* 14.4 13.9*   HCT 39.7* 44.1 43.3   * 119* 105*     CMP:   Recent Labs   Lab 06/11/25  1050 06/12/25  0404 06/13/25  0414    140 137   K 3.9 4.2 3.9    99 98   CO2 30* 34* 30*   * 129* 138*   BUN 22 22 29*   CREATININE 1.3 1.2 1.2   CALCIUM 9.0 9.6 9.3   PROT 6.6 6.9 7.1   ALBUMIN 3.8 4.0 3.8   BILITOT 0.8 1.0 1.1*   ALKPHOS 79 79 82   AST 16 17 24   ALT 13 13 13   ANIONGAP 6* 7* 9     Coagulation:   Recent Labs   Lab 06/11/25  1050   INR 1.1     Lipid Panel: No results for input(s): "CHOL", "HDL", "LDLCALC", "TRIG", "CHOLHDL" in the last 48 hours.  Troponin: No results for input(s): "TROPONINI", "TROPONINIHS" in the last 48 hours.  TSH: No results for input(s): "TSH" in the last 4320 hours.  Urine Studies: No results for input(s): "COLORU", "APPEARANCEUA", "PHUR", "SPECGRAV", "PROTEINUA", "GLUCUA", "KETONESU", "BILIRUBINUA", "OCCULTUA", "NITRITE", "UROBILINOGEN", "LEUKOCYTESUR", "RBCUA", "WBCUA", "BACTERIA", "SQUAMEPITHEL", " ""HYALINECASTS" in the last 48 hours.    Invalid input(s): "WRIGHTSUR"    Significant Imaging:   ECHO (01/29/25):    Left Ventricle: The left ventricle is normal in size. Mildly increased wall thickness. Unable to assess wall motion. There is normal systolic function with a visually estimated ejection fraction of 60 - 65%.    Right Ventricle: Right ventricle was not well visualized due to poor acoustic window.    Left Atrium: Left atrium is mildly dilated.    Aortic Valve: There is moderate to severe stenosis. Aortic valve area by VTI is 1.1 cm². Aortic valve peak velocity is 2.7 m/s. Mean gradient is 17 mmHg. The dimensionless index is 0.38.    Mitral Valve: There is severe stenosis. The mean pressure gradient across the mitral valve is 8 mmHg at a heart rate of 72 bpm.    IVC/SVC: Normal venous pressure at 3 mmHg.    CXR:  1. Prominent central hilar vascular structures with mild perihilar interstitial thickening noted, possibly reflecting pulmonary edema.  2. Blunting of the bilateral costophrenic angles is felt to reflect small bilateral pleural effusions.  3. Constellation of findings could reflect CHF.    ECHO:    Left Ventricle: The left ventricle is normal in size. Normal wall thickness. Normal wall motion. There is low normal systolic function with a visually estimated ejection fraction of 50 - 55%. Grade II diastolic dysfunction.    Right Ventricle: The right ventricle is normal in size Wall thickness is normal. Systolic function is normal.    Left Atrium: The left atrium is moderately dilated    Aortic Valve: There is moderate aortic valve sclerosis. Severely restricted motion. There is moderate to severe stenosis. Aortic valve area by VTI is 1.2 cm². Aortic valve peak velocity is 2.4 m/s. Mean gradient is 13 mmHg. The dimensionless index is 0.39. There is mild to moderate aortic regurgitation with a centrally directed jet.    Mitral Valve: There is bileaflet sclerosis. There is severe anterior mitral " annular calcification. Moderately restricted motion. There is moderate stenosis. The mean pressure gradient across the mitral valve is 8 mmHg at a heart rate of  bpm.    IVC/SVC: Normal venous pressure at 3 mmHg.

## 2025-06-13 NOTE — PLAN OF CARE
Received call from Mili with [facility]- they are unable to accept due to the nurse being at max capacity.   Referral sent to second choice [facility].

## 2025-06-13 NOTE — CARE UPDATE
06/13/25 0651   Patient Assessment/Suction   Level of Consciousness (AVPU) alert   Respiratory Effort Unlabored   Expansion/Accessory Muscles/Retractions no use of accessory muscles   All Lung Fields Breath Sounds clear;diminished   Rhythm/Pattern, Respiratory depth regular;pattern regular;unlabored   Cough Frequency no cough   PRE-TX-O2   Device (Oxygen Therapy) room air   SpO2 (!) 94 %   Pulse Oximetry Type Intermittent   $ Pulse Oximetry - Multiple Charge Pulse Oximetry - Multiple   Pulse 72   Resp 15   Aerosol Therapy   $ Aerosol Therapy Charges Aerosol Treatment  (xop/uda)   Daily Review of Necessity (SVN) completed   Respiratory Treatment Status (SVN) given   Treatment Route (SVN) mask   Patient Position semi-Rivera's   Post Treatment Assessment (SVN) increased aeration   Signs of Intolerance (SVN) none   Breath Sounds Post-Respiratory Treatment   Throughout All Fields Post-Treatment aeration increased   Post-treatment Heart Rate (beats/min) 70   Post-treatment Resp Rate (breaths/min) 15   Education   $ Education Bronchodilator;15 min   Respiratory Evaluation   $ Care Plan Tech Time 15 min        06/13/25 0651   Patient Assessment/Suction   Level of Consciousness (AVPU) alert   Respiratory Effort Unlabored   Expansion/Accessory Muscles/Retractions no use of accessory muscles   All Lung Fields Breath Sounds clear;diminished   Rhythm/Pattern, Respiratory depth regular;pattern regular;unlabored   Cough Frequency no cough   PRE-TX-O2   Device (Oxygen Therapy) room air   SpO2 (!) 94 %   Pulse Oximetry Type Intermittent   $ Pulse Oximetry - Multiple Charge Pulse Oximetry - Multiple   Pulse 72   Resp 15   Aerosol Therapy   $ Aerosol Therapy Charges Aerosol Treatment  (xop/uda)   Daily Review of Necessity (SVN) completed   Respiratory Treatment Status (SVN) given   Treatment Route (SVN) mask   Patient Position semi-Rivera's   Post Treatment Assessment (SVN) increased aeration   Signs of Intolerance (SVN) none    Breath Sounds Post-Respiratory Treatment   Throughout All Fields Post-Treatment aeration increased   Post-treatment Heart Rate (beats/min) 70   Post-treatment Resp Rate (breaths/min) 15   Education   $ Education Bronchodilator;15 min   Respiratory Evaluation   $ Care Plan Tech Time 15 min

## 2025-06-13 NOTE — ASSESSMENT & PLAN NOTE
NSTEMI  As mentioned above      Latest Reference Range & Units 03/25/23 14:56 03/25/23 17:55 07/17/24 19:43 01/29/25 05:17 06/11/25 10:50 06/11/25 12:52 06/11/25 15:27   Troponin I High Sensitivity <=14.9 pg/mL 869.8 (HH) 750.5 (HH) 20.6 (H) 14.5 72.1 (HH) 90.2 (HH) 125.1 (HH)   (HH): Data is critically high  (H): Data is abnormally high    Discussed with patient and family members, bedside nurse and .  Answered all questions.

## 2025-06-13 NOTE — PLAN OF CARE
Once seen by HM and cleared by cardiology.    Pt clear for DC from case management standpoint. Discharging to home with daughters to [facility].        06/13/25 0959   Final Note   Assessment Type Final Discharge Note   Anticipated Discharge Disposition Home   Hospital Resources/Appts/Education Provided Appointments scheduled and added to AVS

## 2025-06-13 NOTE — PLAN OF CARE
[facility]  has accepted with [facility] 6/13.          06/13/25 0477   Post-Acute Status   Post-Acute Authorization Home Health   Home Health Status Set-up Complete/Auth obtained

## 2025-06-20 NOTE — PHYSICIAN QUERY
Question: Please clarify the conflicting documentation regarding the Cardiac diagnosis.    Provider Query Response:  Non-ischemic acute myocardial injury due to acute on chronic diastolic heart failure

## 2025-07-15 ENCOUNTER — CLINICAL SUPPORT (OUTPATIENT)
Dept: CARDIOLOGY | Facility: HOSPITAL | Age: OVER 89
End: 2025-07-15
Payer: MEDICARE

## 2025-07-15 ENCOUNTER — HOSPITAL ENCOUNTER (INPATIENT)
Facility: HOSPITAL | Age: OVER 89
LOS: 3 days | Discharge: HOSPICE | DRG: 280 | End: 2025-07-18
Attending: EMERGENCY MEDICINE
Payer: OTHER GOVERNMENT

## 2025-07-15 DIAGNOSIS — R79.89 ELEVATED TROPONIN: ICD-10-CM

## 2025-07-15 DIAGNOSIS — I50.33 ACUTE ON CHRONIC DIASTOLIC CONGESTIVE HEART FAILURE: Primary | ICD-10-CM

## 2025-07-15 DIAGNOSIS — R06.02 SHORTNESS OF BREATH: ICD-10-CM

## 2025-07-15 DIAGNOSIS — I50.9 CHF (CONGESTIVE HEART FAILURE): ICD-10-CM

## 2025-07-15 DIAGNOSIS — R07.9 CHEST PAIN: ICD-10-CM

## 2025-07-15 PROBLEM — Z71.89 GOALS OF CARE, COUNSELING/DISCUSSION: Status: ACTIVE | Noted: 2025-07-15

## 2025-07-15 PROBLEM — J44.1 COPD EXACERBATION: Status: ACTIVE | Noted: 2023-03-25

## 2025-07-15 PROBLEM — J44.9 COPD (CHRONIC OBSTRUCTIVE PULMONARY DISEASE): Status: ACTIVE | Noted: 2023-03-25

## 2025-07-15 PROBLEM — Z71.89 ACP (ADVANCE CARE PLANNING): Status: ACTIVE | Noted: 2025-07-15

## 2025-07-15 PROBLEM — J81.1 PULMONARY EDEMA: Status: ACTIVE | Noted: 2025-07-15

## 2025-07-15 PROBLEM — I35.0 NONRHEUMATIC AORTIC VALVE STENOSIS: Status: ACTIVE | Noted: 2025-07-15

## 2025-07-15 LAB
ABSOLUTE EOSINOPHIL (SMH): 0.29 K/UL
ABSOLUTE MONOCYTE (SMH): 0.72 K/UL (ref 0.3–1)
ABSOLUTE NEUTROPHIL COUNT (SMH): 11.8 K/UL (ref 1.8–7.7)
ALBUMIN SERPL-MCNC: 3.6 G/DL (ref 3.5–5.2)
ALLENS TEST: ABNORMAL
ALP SERPL-CCNC: 93 UNIT/L (ref 55–135)
ALT SERPL-CCNC: 18 UNIT/L (ref 10–44)
ANION GAP (SMH): 9 MMOL/L (ref 8–16)
APICAL FOUR CHAMBER EJECTION FRACTION: 34 %
APICAL TWO CHAMBER EJECTION FRACTION: 42 %
AST SERPL-CCNC: 28 UNIT/L (ref 10–40)
AV INDEX (PROSTH): 0.28
AV MEAN GRADIENT: 15 MMHG
AV PEAK GRADIENT: 27 MMHG
AV VELOCITY RATIO: 0.35
BACTERIA #/AREA URNS AUTO: ABNORMAL /HPF
BASOPHILS # BLD AUTO: 0.1 K/UL
BASOPHILS NFR BLD AUTO: 0.6 %
BILIRUB SERPL-MCNC: 0.9 MG/DL (ref 0.1–1)
BILIRUB UR QL STRIP.AUTO: NEGATIVE
BNP SERPL-MCNC: 497 PG/ML
BSA FOR ECHO PROCEDURE: 1.89 M2
BUN SERPL-MCNC: 33 MG/DL (ref 8–23)
CALCIUM SERPL-MCNC: 8.1 MG/DL (ref 8.7–10.5)
CHLORIDE SERPL-SCNC: 103 MMOL/L (ref 95–110)
CLARITY UR: ABNORMAL
CO2 SERPL-SCNC: 24 MMOL/L (ref 23–29)
COLOR UR AUTO: YELLOW
CREAT SERPL-MCNC: 1.4 MG/DL (ref 0.5–1.4)
CV ECHO LV RWT: 0.53 CM
DELSYS: ABNORMAL
DOP CALC AO PEAK VEL: 2.6 M/S
DOP CALC AO VTI: 54.9 CM
DOP CALC LVOT PEAK VEL: 0.9 M/S
DOP CALCLVOT PEAK VEL VTI: 15.6 CM
EAG (SMH): 143 MG/DL (ref 68–131)
ECHO LV POSTERIOR WALL: 1.4 CM (ref 0.6–1.1)
ERYTHROCYTE [DISTWIDTH] IN BLOOD BY AUTOMATED COUNT: 15.1 % (ref 11.5–14.5)
FIO2: 28
FLOW: 2
GFR SERPLBLD CREATININE-BSD FMLA CKD-EPI: 48 ML/MIN/1.73/M2
GLUCOSE SERPL-MCNC: 327 MG/DL (ref 70–110)
GLUCOSE UR QL STRIP: NEGATIVE
HBA1C MFR BLD: 6.6 % (ref 4.5–6.2)
HCO3 UR-SCNC: 30.2 MMOL/L (ref 24–28)
HCT VFR BLD AUTO: 44.3 % (ref 40–54)
HGB BLD-MCNC: 13.7 GM/DL (ref 14–18)
HGB UR QL STRIP: ABNORMAL
IMM GRANULOCYTES # BLD AUTO: 0.15 K/UL (ref 0–0.04)
IMM GRANULOCYTES NFR BLD AUTO: 0.9 % (ref 0–0.5)
INTERVENTRICULAR SEPTUM: 1.2 CM (ref 0.6–1.1)
KETONES UR QL STRIP: NEGATIVE
LEFT VENTRICLE DIASTOLIC VOLUME INDEX: 72.58 ML/M2
LEFT VENTRICLE DIASTOLIC VOLUME: 135 ML
LEFT VENTRICLE END DIASTOLIC VOLUME APICAL 2 CHAMBER: 124 ML
LEFT VENTRICLE END DIASTOLIC VOLUME APICAL 4 CHAMBER: 77.4 ML
LEFT VENTRICLE MASS INDEX: 154.3 G/M2
LEFT VENTRICLE SYSTOLIC VOLUME INDEX: 32.3 ML/M2
LEFT VENTRICLE SYSTOLIC VOLUME: 60 ML
LEFT VENTRICULAR INTERNAL DIMENSION IN DIASTOLE: 5.3 CM (ref 3.5–6)
LEFT VENTRICULAR MASS: 286.9 G
LEUKOCYTE ESTERASE UR QL STRIP: ABNORMAL
LVED V (TEICH): 135 ML
LVOT MG: 2 MMHG
LVOT MV: 0.59 CM/S
LYMPHOCYTES # BLD AUTO: 3.09 K/UL (ref 1–4.8)
MAGNESIUM SERPL-MCNC: 2.8 MG/DL (ref 1.6–2.6)
MAGNESIUM SERPL-MCNC: 5.3 MG/DL (ref 1.6–2.6)
MCH RBC QN AUTO: 29.8 PG (ref 27–31)
MCHC RBC AUTO-ENTMCNC: 30.9 G/DL (ref 32–36)
MCV RBC AUTO: 97 FL (ref 82–98)
MICROSCOPIC COMMENT: ABNORMAL
MODE: ABNORMAL
MRSA PCR SCRN (SMH): NOT DETECTED
NITRITE UR QL STRIP: NEGATIVE
NUCLEATED RBC (/100WBC) (SMH): 0 /100 WBC
OHS CV CPX PATIENT HEIGHT IN: 65
OHS LV EJECTION FRACTION SIMPSONS BIPLANE MOD: 40 %
OHS QRS DURATION: 152 MS
OHS QTC CALCULATION: 511 MS
PCO2 BLDA: 57.5 MMHG (ref 35–45)
PH SMN: 7.33 [PH] (ref 7.35–7.45)
PH UR STRIP: 6 [PH]
PLATELET # BLD AUTO: 167 K/UL (ref 150–450)
PMV BLD AUTO: 10.3 FL (ref 9.2–12.9)
PO2 BLDA: 73 MMHG (ref 80–100)
POC BE: 4 MMOL/L (ref -2–2)
POC SATURATED O2: 93 % (ref 95–100)
POC TCO2: 32 MMOL/L (ref 23–27)
POCT GLUCOSE: 115 MG/DL (ref 70–110)
POCT GLUCOSE: 169 MG/DL (ref 70–110)
POCT GLUCOSE: 209 MG/DL (ref 70–110)
POTASSIUM SERPL-SCNC: 4.3 MMOL/L (ref 3.5–5.1)
PROCALCITONIN SERPL-MCNC: 0.07 NG/ML
PROT SERPL-MCNC: 6.6 GM/DL (ref 6–8.4)
PROT UR QL STRIP: ABNORMAL
RBC # BLD AUTO: 4.59 M/UL (ref 4.6–6.2)
RBC #/AREA URNS AUTO: 40 /HPF
RELATIVE EOSINOPHIL (SMH): 1.8 % (ref 0–8)
RELATIVE LYMPHOCYTE (SMH): 19.1 % (ref 18–48)
RELATIVE MONOCYTE (SMH): 4.4 % (ref 4–15)
RELATIVE NEUTROPHIL (SMH): 73.2 % (ref 38–73)
RV TISSUE DOPPLER FREE WALL SYSTOLIC VELOCITY 1 (APICAL 4 CHAMBER VIEW): 7.18 CM/S
SAMPLE: ABNORMAL
SITE: ABNORMAL
SODIUM SERPL-SCNC: 136 MMOL/L (ref 136–145)
SP GR UR STRIP: 1.01
TDI LATERAL: 0.09 M/S
TROPONIN HIGH SENSITIVE (SMH): 133.8 PG/ML
TROPONIN HIGH SENSITIVE (SMH): 6340.6 PG/ML
TROPONIN HIGH SENSITIVE (SMH): ABNORMAL PG/ML
TROPONIN HIGH SENSITIVE (SMH): ABNORMAL PG/ML
TSH SERPL-ACNC: 2.47 UIU/ML (ref 0.34–5.6)
UROBILINOGEN UR STRIP-ACNC: NEGATIVE EU/DL
WBC # BLD AUTO: 16.18 K/UL (ref 3.9–12.7)
WBC #/AREA URNS AUTO: >100 /HPF
WBC CLUMPS UR QL AUTO: ABNORMAL
Z-SCORE OF LEFT VENTRICULAR DIMENSION IN END DIASTOLE: 0.24

## 2025-07-15 PROCEDURE — 99900031 HC PATIENT EDUCATION (STAT)

## 2025-07-15 PROCEDURE — 99291 CRITICAL CARE FIRST HOUR: CPT | Mod: ,,, | Performed by: INTERNAL MEDICINE

## 2025-07-15 PROCEDURE — 94799 UNLISTED PULMONARY SVC/PX: CPT

## 2025-07-15 PROCEDURE — 83036 HEMOGLOBIN GLYCOSYLATED A1C: CPT

## 2025-07-15 PROCEDURE — 36600 WITHDRAWAL OF ARTERIAL BLOOD: CPT

## 2025-07-15 PROCEDURE — 20000000 HC ICU ROOM

## 2025-07-15 PROCEDURE — 84443 ASSAY THYROID STIM HORMONE: CPT

## 2025-07-15 PROCEDURE — 84484 ASSAY OF TROPONIN QUANT: CPT

## 2025-07-15 PROCEDURE — 84484 ASSAY OF TROPONIN QUANT: CPT | Performed by: EMERGENCY MEDICINE

## 2025-07-15 PROCEDURE — 87086 URINE CULTURE/COLONY COUNT: CPT | Performed by: EMERGENCY MEDICINE

## 2025-07-15 PROCEDURE — 87641 MR-STAPH DNA AMP PROBE: CPT

## 2025-07-15 PROCEDURE — 93306 TTE W/DOPPLER COMPLETE: CPT

## 2025-07-15 PROCEDURE — 99223 1ST HOSP IP/OBS HIGH 75: CPT | Mod: 25,,, | Performed by: INTERNAL MEDICINE

## 2025-07-15 PROCEDURE — 99291 CRITICAL CARE FIRST HOUR: CPT

## 2025-07-15 PROCEDURE — 84484 ASSAY OF TROPONIN QUANT: CPT | Performed by: INTERNAL MEDICINE

## 2025-07-15 PROCEDURE — 27100171 HC OXYGEN HIGH FLOW UP TO 24 HOURS

## 2025-07-15 PROCEDURE — 99900035 HC TECH TIME PER 15 MIN (STAT)

## 2025-07-15 PROCEDURE — 93306 TTE W/DOPPLER COMPLETE: CPT | Mod: 26,,, | Performed by: INTERNAL MEDICINE

## 2025-07-15 PROCEDURE — 82803 BLOOD GASES ANY COMBINATION: CPT

## 2025-07-15 PROCEDURE — 83880 ASSAY OF NATRIURETIC PEPTIDE: CPT | Performed by: EMERGENCY MEDICINE

## 2025-07-15 PROCEDURE — 84145 PROCALCITONIN (PCT): CPT

## 2025-07-15 PROCEDURE — 83735 ASSAY OF MAGNESIUM: CPT | Performed by: EMERGENCY MEDICINE

## 2025-07-15 PROCEDURE — 63600175 PHARM REV CODE 636 W HCPCS

## 2025-07-15 PROCEDURE — 94660 CPAP INITIATION&MGMT: CPT

## 2025-07-15 PROCEDURE — 94640 AIRWAY INHALATION TREATMENT: CPT

## 2025-07-15 PROCEDURE — 93010 ELECTROCARDIOGRAM REPORT: CPT | Mod: ,,, | Performed by: INTERNAL MEDICINE

## 2025-07-15 PROCEDURE — 25000003 PHARM REV CODE 250: Performed by: NURSE PRACTITIONER

## 2025-07-15 PROCEDURE — 25000003 PHARM REV CODE 250

## 2025-07-15 PROCEDURE — 85025 COMPLETE CBC W/AUTO DIFF WBC: CPT | Performed by: EMERGENCY MEDICINE

## 2025-07-15 PROCEDURE — 82962 GLUCOSE BLOOD TEST: CPT

## 2025-07-15 PROCEDURE — 96375 TX/PRO/DX INJ NEW DRUG ADDON: CPT

## 2025-07-15 PROCEDURE — 25000242 PHARM REV CODE 250 ALT 637 W/ HCPCS

## 2025-07-15 PROCEDURE — 80053 COMPREHEN METABOLIC PANEL: CPT | Performed by: EMERGENCY MEDICINE

## 2025-07-15 PROCEDURE — 25000242 PHARM REV CODE 250 ALT 637 W/ HCPCS: Performed by: EMERGENCY MEDICINE

## 2025-07-15 PROCEDURE — 99497 ADVNCD CARE PLAN 30 MIN: CPT | Mod: 25,,, | Performed by: NURSE PRACTITIONER

## 2025-07-15 PROCEDURE — 36415 COLL VENOUS BLD VENIPUNCTURE: CPT

## 2025-07-15 PROCEDURE — 36415 COLL VENOUS BLD VENIPUNCTURE: CPT | Performed by: INTERNAL MEDICINE

## 2025-07-15 PROCEDURE — 81001 URINALYSIS AUTO W/SCOPE: CPT | Performed by: EMERGENCY MEDICINE

## 2025-07-15 PROCEDURE — 99223 1ST HOSP IP/OBS HIGH 75: CPT | Mod: 25,,, | Performed by: NURSE PRACTITIONER

## 2025-07-15 PROCEDURE — 96365 THER/PROPH/DIAG IV INF INIT: CPT

## 2025-07-15 PROCEDURE — 93005 ELECTROCARDIOGRAM TRACING: CPT | Performed by: INTERNAL MEDICINE

## 2025-07-15 PROCEDURE — 5A09457 ASSISTANCE WITH RESPIRATORY VENTILATION, 24-96 CONSECUTIVE HOURS, CONTINUOUS POSITIVE AIRWAY PRESSURE: ICD-10-PCS | Performed by: EMERGENCY MEDICINE

## 2025-07-15 PROCEDURE — 25000242 PHARM REV CODE 250 ALT 637 W/ HCPCS: Performed by: INTERNAL MEDICINE

## 2025-07-15 PROCEDURE — 94761 N-INVAS EAR/PLS OXIMETRY MLT: CPT

## 2025-07-15 RX ORDER — ALBUTEROL SULFATE 90 UG/1
2 INHALANT RESPIRATORY (INHALATION) EVERY 4 HOURS PRN
Status: DISCONTINUED | OUTPATIENT
Start: 2025-07-15 | End: 2025-07-15

## 2025-07-15 RX ORDER — FUROSEMIDE 10 MG/ML
40 INJECTION INTRAMUSCULAR; INTRAVENOUS EVERY 12 HOURS
Status: DISCONTINUED | OUTPATIENT
Start: 2025-07-15 | End: 2025-07-15

## 2025-07-15 RX ORDER — ATORVASTATIN CALCIUM 40 MG/1
40 TABLET, FILM COATED ORAL NIGHTLY
Status: DISCONTINUED | OUTPATIENT
Start: 2025-07-15 | End: 2025-07-15

## 2025-07-15 RX ORDER — ASPIRIN 81 MG/1
81 TABLET ORAL DAILY
Status: DISCONTINUED | OUTPATIENT
Start: 2025-07-16 | End: 2025-07-18 | Stop reason: HOSPADM

## 2025-07-15 RX ORDER — MAGNESIUM SULFATE HEPTAHYDRATE 40 MG/ML
2 INJECTION, SOLUTION INTRAVENOUS ONCE
Status: COMPLETED | OUTPATIENT
Start: 2025-07-15 | End: 2025-07-15

## 2025-07-15 RX ORDER — ATORVASTATIN CALCIUM 20 MG/1
20 TABLET, FILM COATED ORAL NIGHTLY
Status: DISCONTINUED | OUTPATIENT
Start: 2025-07-15 | End: 2025-07-18 | Stop reason: HOSPADM

## 2025-07-15 RX ORDER — FUROSEMIDE 10 MG/ML
INJECTION INTRAMUSCULAR; INTRAVENOUS
Status: COMPLETED
Start: 2025-07-15 | End: 2025-07-15

## 2025-07-15 RX ORDER — ARFORMOTEROL TARTRATE 15 UG/2ML
15 SOLUTION RESPIRATORY (INHALATION) 2 TIMES DAILY
Status: DISCONTINUED | OUTPATIENT
Start: 2025-07-15 | End: 2025-07-18 | Stop reason: HOSPADM

## 2025-07-15 RX ORDER — SODIUM,POTASSIUM PHOSPHATES 280-250MG
2 POWDER IN PACKET (EA) ORAL
Status: DISCONTINUED | OUTPATIENT
Start: 2025-07-15 | End: 2025-07-18 | Stop reason: HOSPADM

## 2025-07-15 RX ORDER — IPRATROPIUM BROMIDE AND ALBUTEROL SULFATE 2.5; .5 MG/3ML; MG/3ML
9 SOLUTION RESPIRATORY (INHALATION)
Status: COMPLETED | OUTPATIENT
Start: 2025-07-15 | End: 2025-07-15

## 2025-07-15 RX ORDER — FLUTICASONE FUROATE AND VILANTEROL 100; 25 UG/1; UG/1
1 POWDER RESPIRATORY (INHALATION) DAILY
Status: DISCONTINUED | OUTPATIENT
Start: 2025-07-15 | End: 2025-07-15

## 2025-07-15 RX ORDER — HEPARIN SODIUM 5000 [USP'U]/ML
5000 INJECTION, SOLUTION INTRAVENOUS; SUBCUTANEOUS EVERY 8 HOURS
Status: DISCONTINUED | OUTPATIENT
Start: 2025-07-15 | End: 2025-07-15

## 2025-07-15 RX ORDER — NAPROXEN SODIUM 220 MG/1
324 TABLET, FILM COATED ORAL ONCE
Status: COMPLETED | OUTPATIENT
Start: 2025-07-15 | End: 2025-07-15

## 2025-07-15 RX ORDER — HEPARIN SODIUM,PORCINE/D5W 25000/250
0-40 INTRAVENOUS SOLUTION INTRAVENOUS CONTINUOUS
Status: DISCONTINUED | OUTPATIENT
Start: 2025-07-15 | End: 2025-07-15

## 2025-07-15 RX ORDER — MEROPENEM 1 G/1
1 INJECTION, POWDER, FOR SOLUTION INTRAVENOUS
Status: DISCONTINUED | OUTPATIENT
Start: 2025-07-15 | End: 2025-07-17

## 2025-07-15 RX ORDER — SODIUM CHLORIDE 0.9 % (FLUSH) 0.9 %
10 SYRINGE (ML) INJECTION EVERY 12 HOURS PRN
Status: DISCONTINUED | OUTPATIENT
Start: 2025-07-15 | End: 2025-07-18 | Stop reason: HOSPADM

## 2025-07-15 RX ORDER — LANOLIN ALCOHOL/MO/W.PET/CERES
800 CREAM (GRAM) TOPICAL
Status: DISCONTINUED | OUTPATIENT
Start: 2025-07-15 | End: 2025-07-18 | Stop reason: HOSPADM

## 2025-07-15 RX ORDER — ALBUTEROL SULFATE 0.83 MG/ML
2.5 SOLUTION RESPIRATORY (INHALATION) EVERY 4 HOURS PRN
Status: DISCONTINUED | OUTPATIENT
Start: 2025-07-15 | End: 2025-07-18 | Stop reason: HOSPADM

## 2025-07-15 RX ORDER — ONDANSETRON HYDROCHLORIDE 2 MG/ML
4 INJECTION, SOLUTION INTRAVENOUS EVERY 6 HOURS PRN
Status: DISCONTINUED | OUTPATIENT
Start: 2025-07-15 | End: 2025-07-18 | Stop reason: HOSPADM

## 2025-07-15 RX ORDER — ALUMINUM HYDROXIDE, MAGNESIUM HYDROXIDE, AND SIMETHICONE 1200; 120; 1200 MG/30ML; MG/30ML; MG/30ML
30 SUSPENSION ORAL 4 TIMES DAILY PRN
Status: DISCONTINUED | OUTPATIENT
Start: 2025-07-15 | End: 2025-07-18 | Stop reason: HOSPADM

## 2025-07-15 RX ORDER — SODIUM CHLORIDE 0.9 % (FLUSH) 0.9 %
10 SYRINGE (ML) INJECTION
Status: DISCONTINUED | OUTPATIENT
Start: 2025-07-15 | End: 2025-07-18 | Stop reason: HOSPADM

## 2025-07-15 RX ORDER — IPRATROPIUM BROMIDE AND ALBUTEROL SULFATE 2.5; .5 MG/3ML; MG/3ML
3 SOLUTION RESPIRATORY (INHALATION)
Status: DISCONTINUED | OUTPATIENT
Start: 2025-07-15 | End: 2025-07-18 | Stop reason: HOSPADM

## 2025-07-15 RX ORDER — CLOPIDOGREL BISULFATE 75 MG/1
75 TABLET ORAL DAILY
Status: DISCONTINUED | OUTPATIENT
Start: 2025-07-15 | End: 2025-07-18 | Stop reason: HOSPADM

## 2025-07-15 RX ORDER — LEVOTHYROXINE SODIUM 25 UG/1
25 TABLET ORAL DAILY
Status: DISCONTINUED | OUTPATIENT
Start: 2025-07-15 | End: 2025-07-18 | Stop reason: HOSPADM

## 2025-07-15 RX ORDER — TAMSULOSIN HYDROCHLORIDE 0.4 MG/1
0.4 CAPSULE ORAL DAILY
Status: DISCONTINUED | OUTPATIENT
Start: 2025-07-15 | End: 2025-07-18 | Stop reason: HOSPADM

## 2025-07-15 RX ORDER — HYDROCODONE BITARTRATE AND ACETAMINOPHEN 5; 325 MG/1; MG/1
1 TABLET ORAL EVERY 6 HOURS PRN
Refills: 0 | Status: DISCONTINUED | OUTPATIENT
Start: 2025-07-15 | End: 2025-07-18 | Stop reason: HOSPADM

## 2025-07-15 RX ORDER — IBUPROFEN 200 MG
24 TABLET ORAL
Status: DISCONTINUED | OUTPATIENT
Start: 2025-07-15 | End: 2025-07-18 | Stop reason: HOSPADM

## 2025-07-15 RX ORDER — IBUPROFEN 200 MG
16 TABLET ORAL
Status: DISCONTINUED | OUTPATIENT
Start: 2025-07-15 | End: 2025-07-18 | Stop reason: HOSPADM

## 2025-07-15 RX ORDER — BUDESONIDE 0.5 MG/2ML
0.5 INHALANT ORAL EVERY 12 HOURS
Status: DISCONTINUED | OUTPATIENT
Start: 2025-07-15 | End: 2025-07-18 | Stop reason: HOSPADM

## 2025-07-15 RX ORDER — PANTOPRAZOLE SODIUM 40 MG/1
40 TABLET, DELAYED RELEASE ORAL DAILY
Status: DISCONTINUED | OUTPATIENT
Start: 2025-07-15 | End: 2025-07-18 | Stop reason: HOSPADM

## 2025-07-15 RX ORDER — FUROSEMIDE 10 MG/ML
40 INJECTION INTRAMUSCULAR; INTRAVENOUS
Status: COMPLETED | OUTPATIENT
Start: 2025-07-15 | End: 2025-07-15

## 2025-07-15 RX ORDER — ACETAMINOPHEN 325 MG/1
650 TABLET ORAL EVERY 8 HOURS PRN
Status: DISCONTINUED | OUTPATIENT
Start: 2025-07-15 | End: 2025-07-15

## 2025-07-15 RX ORDER — AMOXICILLIN 250 MG
1 CAPSULE ORAL DAILY PRN
Status: DISCONTINUED | OUTPATIENT
Start: 2025-07-15 | End: 2025-07-18 | Stop reason: HOSPADM

## 2025-07-15 RX ORDER — TALC
6 POWDER (GRAM) TOPICAL NIGHTLY PRN
Status: DISCONTINUED | OUTPATIENT
Start: 2025-07-15 | End: 2025-07-18 | Stop reason: HOSPADM

## 2025-07-15 RX ORDER — NAPROXEN SODIUM 220 MG/1
324 TABLET, FILM COATED ORAL DAILY
Status: DISCONTINUED | OUTPATIENT
Start: 2025-07-15 | End: 2025-07-15

## 2025-07-15 RX ORDER — METHYLPREDNISOLONE SOD SUCC 125 MG
VIAL (EA) INJECTION
Status: COMPLETED
Start: 2025-07-15 | End: 2025-07-15

## 2025-07-15 RX ORDER — FUROSEMIDE 10 MG/ML
40 INJECTION INTRAMUSCULAR; INTRAVENOUS EVERY 12 HOURS
Status: DISCONTINUED | OUTPATIENT
Start: 2025-07-15 | End: 2025-07-18

## 2025-07-15 RX ORDER — GLUCAGON 1 MG
1 KIT INJECTION
Status: DISCONTINUED | OUTPATIENT
Start: 2025-07-15 | End: 2025-07-18 | Stop reason: HOSPADM

## 2025-07-15 RX ORDER — MEROPENEM 1 G/1
1 INJECTION, POWDER, FOR SOLUTION INTRAVENOUS
Status: DISCONTINUED | OUTPATIENT
Start: 2025-07-15 | End: 2025-07-15

## 2025-07-15 RX ORDER — MAGNESIUM SULFATE HEPTAHYDRATE 40 MG/ML
INJECTION, SOLUTION INTRAVENOUS
Status: COMPLETED
Start: 2025-07-15 | End: 2025-07-15

## 2025-07-15 RX ORDER — ENOXAPARIN SODIUM 100 MG/ML
1 INJECTION SUBCUTANEOUS EVERY 12 HOURS
Status: DISCONTINUED | OUTPATIENT
Start: 2025-07-15 | End: 2025-07-17

## 2025-07-15 RX ORDER — METOPROLOL SUCCINATE 25 MG/1
25 TABLET, EXTENDED RELEASE ORAL DAILY
Status: DISCONTINUED | OUTPATIENT
Start: 2025-07-15 | End: 2025-07-16

## 2025-07-15 RX ORDER — MUPIROCIN 20 MG/G
OINTMENT TOPICAL 2 TIMES DAILY
Status: DISCONTINUED | OUTPATIENT
Start: 2025-07-15 | End: 2025-07-18 | Stop reason: HOSPADM

## 2025-07-15 RX ORDER — ACETAMINOPHEN 325 MG/1
650 TABLET ORAL EVERY 4 HOURS PRN
Status: DISCONTINUED | OUTPATIENT
Start: 2025-07-15 | End: 2025-07-18 | Stop reason: HOSPADM

## 2025-07-15 RX ORDER — NALOXONE HCL 0.4 MG/ML
0.02 VIAL (ML) INJECTION
Status: DISCONTINUED | OUTPATIENT
Start: 2025-07-15 | End: 2025-07-18 | Stop reason: HOSPADM

## 2025-07-15 RX ADMIN — IPRATROPIUM BROMIDE AND ALBUTEROL SULFATE 3 ML: 2.5; .5 SOLUTION RESPIRATORY (INHALATION) at 02:07

## 2025-07-15 RX ADMIN — FUROSEMIDE 40 MG: 10 INJECTION, SOLUTION INTRAMUSCULAR; INTRAVENOUS at 08:07

## 2025-07-15 RX ADMIN — BUDESONIDE INHALATION 0.5 MG: 0.5 SUSPENSION RESPIRATORY (INHALATION) at 09:07

## 2025-07-15 RX ADMIN — ARFORMOTEROL TARTRATE 15 MCG: 15 SOLUTION RESPIRATORY (INHALATION) at 07:07

## 2025-07-15 RX ADMIN — ENOXAPARIN SODIUM 80 MG: 80 INJECTION SUBCUTANEOUS at 12:07

## 2025-07-15 RX ADMIN — PANTOPRAZOLE SODIUM 40 MG: 40 TABLET, DELAYED RELEASE ORAL at 10:07

## 2025-07-15 RX ADMIN — IPRATROPIUM BROMIDE AND ALBUTEROL SULFATE 9 ML: 2.5; .5 SOLUTION RESPIRATORY (INHALATION) at 04:07

## 2025-07-15 RX ADMIN — MAGNESIUM SULFATE HEPTAHYDRATE 2 G: 40 INJECTION, SOLUTION INTRAVENOUS at 04:07

## 2025-07-15 RX ADMIN — TAMSULOSIN HYDROCHLORIDE 0.4 MG: 0.4 CAPSULE ORAL at 10:07

## 2025-07-15 RX ADMIN — VANCOMYCIN HYDROCHLORIDE 1500 MG: 1.5 INJECTION, POWDER, LYOPHILIZED, FOR SOLUTION INTRAVENOUS at 12:07

## 2025-07-15 RX ADMIN — LEVOTHYROXINE SODIUM 25 MCG: 0.03 TABLET ORAL at 10:07

## 2025-07-15 RX ADMIN — ATORVASTATIN CALCIUM 20 MG: 20 TABLET, FILM COATED ORAL at 08:07

## 2025-07-15 RX ADMIN — MEROPENEM 1 G: 1 INJECTION INTRAVENOUS at 10:07

## 2025-07-15 RX ADMIN — ASPIRIN 81 MG CHEWABLE TABLET 324 MG: 81 TABLET CHEWABLE at 10:07

## 2025-07-15 RX ADMIN — MUPIROCIN 1 G: 20 OINTMENT TOPICAL at 11:07

## 2025-07-15 RX ADMIN — FUROSEMIDE 40 MG: 10 INJECTION, SOLUTION INTRAMUSCULAR; INTRAVENOUS at 10:07

## 2025-07-15 RX ADMIN — BUDESONIDE INHALATION 0.5 MG: 0.5 SUSPENSION RESPIRATORY (INHALATION) at 07:07

## 2025-07-15 RX ADMIN — FUROSEMIDE 40 MG: 10 INJECTION, SOLUTION INTRAMUSCULAR; INTRAVENOUS at 04:07

## 2025-07-15 RX ADMIN — MAGNESIUM SULFATE IN WATER FOR 2 G: 40 INJECTION INTRAVENOUS at 04:07

## 2025-07-15 RX ADMIN — ARFORMOTEROL TARTRATE 15 MCG: 15 SOLUTION RESPIRATORY (INHALATION) at 09:07

## 2025-07-15 RX ADMIN — CLOPIDOGREL BISULFATE 75 MG: 75 TABLET, FILM COATED ORAL at 10:07

## 2025-07-15 RX ADMIN — IPRATROPIUM BROMIDE AND ALBUTEROL SULFATE 3 ML: 2.5; .5 SOLUTION RESPIRATORY (INHALATION) at 07:07

## 2025-07-15 RX ADMIN — FUROSEMIDE 40 MG: 10 INJECTION INTRAMUSCULAR; INTRAVENOUS at 04:07

## 2025-07-15 RX ADMIN — MEROPENEM 1 G: 1 INJECTION INTRAVENOUS at 09:07

## 2025-07-15 RX ADMIN — METOPROLOL SUCCINATE 25 MG: 25 TABLET, EXTENDED RELEASE ORAL at 11:07

## 2025-07-15 RX ADMIN — MUPIROCIN 1 G: 20 OINTMENT TOPICAL at 08:07

## 2025-07-15 RX ADMIN — ALBUTEROL SULFATE 2.5 MG: 2.5 SOLUTION RESPIRATORY (INHALATION) at 10:07

## 2025-07-16 LAB
ABSOLUTE EOSINOPHIL (SMH): 0.2 K/UL
ABSOLUTE MONOCYTE (SMH): 0.94 K/UL (ref 0.3–1)
ABSOLUTE NEUTROPHIL COUNT (SMH): 8.3 K/UL (ref 1.8–7.7)
ALBUMIN SERPL-MCNC: 3.5 G/DL (ref 3.5–5.2)
ALLENS TEST: ABNORMAL
ALP SERPL-CCNC: 80 UNIT/L (ref 55–135)
ALT SERPL-CCNC: 33 UNIT/L (ref 10–44)
ANION GAP (SMH): 5 MMOL/L (ref 8–16)
AST SERPL-CCNC: 117 UNIT/L (ref 10–40)
BACTERIA #/AREA URNS AUTO: ABNORMAL /HPF
BACTERIA UR CULT: NORMAL
BASOPHILS # BLD AUTO: 0.06 K/UL
BASOPHILS NFR BLD AUTO: 0.5 %
BILIRUB SERPL-MCNC: 0.9 MG/DL (ref 0.1–1)
BILIRUB UR QL STRIP.AUTO: NEGATIVE
BUN SERPL-MCNC: 38 MG/DL (ref 8–23)
CALCIUM SERPL-MCNC: 8.8 MG/DL (ref 8.7–10.5)
CHLORIDE SERPL-SCNC: 99 MMOL/L (ref 95–110)
CLARITY UR: ABNORMAL
CO2 SERPL-SCNC: 34 MMOL/L (ref 23–29)
COLOR UR AUTO: ABNORMAL
CREAT SERPL-MCNC: 1.5 MG/DL (ref 0.5–1.4)
DELSYS: ABNORMAL
ERYTHROCYTE [DISTWIDTH] IN BLOOD BY AUTOMATED COUNT: 15.1 % (ref 11.5–14.5)
FLOW: 2
GFR SERPLBLD CREATININE-BSD FMLA CKD-EPI: 44 ML/MIN/1.73/M2
GLUCOSE SERPL-MCNC: 125 MG/DL (ref 70–110)
GLUCOSE UR QL STRIP: NEGATIVE
HCO3 UR-SCNC: 29.5 MMOL/L (ref 24–28)
HCT VFR BLD AUTO: 39 % (ref 40–54)
HGB BLD-MCNC: 12.3 GM/DL (ref 14–18)
HGB UR QL STRIP: ABNORMAL
IMM GRANULOCYTES # BLD AUTO: 0.05 K/UL (ref 0–0.04)
IMM GRANULOCYTES NFR BLD AUTO: 0.4 % (ref 0–0.5)
KETONES UR QL STRIP: NEGATIVE
LEUKOCYTE ESTERASE UR QL STRIP: ABNORMAL
LYMPHOCYTES # BLD AUTO: 2 K/UL (ref 1–4.8)
MAGNESIUM SERPL-MCNC: 2.2 MG/DL (ref 1.6–2.6)
MCH RBC QN AUTO: 29.9 PG (ref 27–31)
MCHC RBC AUTO-ENTMCNC: 31.5 G/DL (ref 32–36)
MCV RBC AUTO: 95 FL (ref 82–98)
MICROSCOPIC COMMENT: ABNORMAL
MODE: ABNORMAL
NITRITE UR QL STRIP: NEGATIVE
NUCLEATED RBC (/100WBC) (SMH): 0 /100 WBC
PCO2 BLDA: 47.5 MMHG (ref 35–45)
PH SMN: 7.4 [PH] (ref 7.35–7.45)
PH UR STRIP: 6 [PH]
PLATELET # BLD AUTO: 139 K/UL (ref 150–450)
PLATELET BLD QL SMEAR: NORMAL
PMV BLD AUTO: 10.6 FL (ref 9.2–12.9)
PO2 BLDA: 80 MMHG (ref 80–100)
POC BE: 5 MMOL/L (ref -2–2)
POC SATURATED O2: 96 % (ref 95–100)
POC TCO2: 31 MMOL/L (ref 23–27)
POCT GLUCOSE: 127 MG/DL (ref 70–110)
POCT GLUCOSE: 160 MG/DL (ref 70–110)
POCT GLUCOSE: 191 MG/DL (ref 70–110)
POCT GLUCOSE: 196 MG/DL (ref 70–110)
POTASSIUM SERPL-SCNC: 4 MMOL/L (ref 3.5–5.1)
PROT SERPL-MCNC: 6.3 GM/DL (ref 6–8.4)
PROT UR QL STRIP: ABNORMAL
RBC # BLD AUTO: 4.12 M/UL (ref 4.6–6.2)
RBC #/AREA URNS AUTO: 15 /HPF
RELATIVE EOSINOPHIL (SMH): 1.7 % (ref 0–8)
RELATIVE LYMPHOCYTE (SMH): 17.4 % (ref 18–48)
RELATIVE MONOCYTE (SMH): 8.2 % (ref 4–15)
RELATIVE NEUTROPHIL (SMH): 71.8 % (ref 38–73)
SAMPLE: ABNORMAL
SITE: ABNORMAL
SODIUM SERPL-SCNC: 138 MMOL/L (ref 136–145)
SP GR UR STRIP: 1.01
SQUAMOUS #/AREA URNS AUTO: 1 /HPF
TROPONIN HIGH SENSITIVE (SMH): ABNORMAL PG/ML
TROPONIN HIGH SENSITIVE (SMH): ABNORMAL PG/ML
UROBILINOGEN UR STRIP-ACNC: NEGATIVE EU/DL
WBC # BLD AUTO: 11.5 K/UL (ref 3.9–12.7)
WBC #/AREA URNS AUTO: 70 /HPF
WBC CLUMPS UR QL AUTO: ABNORMAL

## 2025-07-16 PROCEDURE — 97116 GAIT TRAINING THERAPY: CPT

## 2025-07-16 PROCEDURE — 25000003 PHARM REV CODE 250

## 2025-07-16 PROCEDURE — 83735 ASSAY OF MAGNESIUM: CPT

## 2025-07-16 PROCEDURE — 99900035 HC TECH TIME PER 15 MIN (STAT)

## 2025-07-16 PROCEDURE — 87086 URINE CULTURE/COLONY COUNT: CPT

## 2025-07-16 PROCEDURE — 25000242 PHARM REV CODE 250 ALT 637 W/ HCPCS

## 2025-07-16 PROCEDURE — 11000001 HC ACUTE MED/SURG PRIVATE ROOM

## 2025-07-16 PROCEDURE — 87070 CULTURE OTHR SPECIMN AEROBIC: CPT

## 2025-07-16 PROCEDURE — 36600 WITHDRAWAL OF ARTERIAL BLOOD: CPT

## 2025-07-16 PROCEDURE — 94618 PULMONARY STRESS TESTING: CPT

## 2025-07-16 PROCEDURE — 80053 COMPREHEN METABOLIC PANEL: CPT

## 2025-07-16 PROCEDURE — 94761 N-INVAS EAR/PLS OXIMETRY MLT: CPT

## 2025-07-16 PROCEDURE — 97162 PT EVAL MOD COMPLEX 30 MIN: CPT

## 2025-07-16 PROCEDURE — 21400001 HC TELEMETRY ROOM

## 2025-07-16 PROCEDURE — 99900031 HC PATIENT EDUCATION (STAT)

## 2025-07-16 PROCEDURE — 82803 BLOOD GASES ANY COMBINATION: CPT

## 2025-07-16 PROCEDURE — 25000242 PHARM REV CODE 250 ALT 637 W/ HCPCS: Performed by: INTERNAL MEDICINE

## 2025-07-16 PROCEDURE — 99233 SBSQ HOSP IP/OBS HIGH 50: CPT | Mod: ,,, | Performed by: INTERNAL MEDICINE

## 2025-07-16 PROCEDURE — 25000003 PHARM REV CODE 250: Performed by: NURSE PRACTITIONER

## 2025-07-16 PROCEDURE — 97166 OT EVAL MOD COMPLEX 45 MIN: CPT

## 2025-07-16 PROCEDURE — 85025 COMPLETE CBC W/AUTO DIFF WBC: CPT

## 2025-07-16 PROCEDURE — 63600175 PHARM REV CODE 636 W HCPCS

## 2025-07-16 PROCEDURE — 36415 COLL VENOUS BLD VENIPUNCTURE: CPT

## 2025-07-16 PROCEDURE — 81003 URINALYSIS AUTO W/O SCOPE: CPT

## 2025-07-16 PROCEDURE — 99233 SBSQ HOSP IP/OBS HIGH 50: CPT | Mod: 25,,, | Performed by: NURSE PRACTITIONER

## 2025-07-16 PROCEDURE — 94640 AIRWAY INHALATION TREATMENT: CPT

## 2025-07-16 PROCEDURE — 82962 GLUCOSE BLOOD TEST: CPT

## 2025-07-16 PROCEDURE — 97535 SELF CARE MNGMENT TRAINING: CPT

## 2025-07-16 PROCEDURE — 84484 ASSAY OF TROPONIN QUANT: CPT | Performed by: INTERNAL MEDICINE

## 2025-07-16 PROCEDURE — 25000003 PHARM REV CODE 250: Performed by: INTERNAL MEDICINE

## 2025-07-16 PROCEDURE — 27000221 HC OXYGEN, UP TO 24 HOURS

## 2025-07-16 PROCEDURE — 94799 UNLISTED PULMONARY SVC/PX: CPT

## 2025-07-16 RX ORDER — METOPROLOL TARTRATE 25 MG/1
12.5 TABLET ORAL 2 TIMES DAILY
Status: DISCONTINUED | OUTPATIENT
Start: 2025-07-16 | End: 2025-07-16

## 2025-07-16 RX ORDER — METOPROLOL SUCCINATE 25 MG/1
25 TABLET, EXTENDED RELEASE ORAL DAILY
Status: DISCONTINUED | OUTPATIENT
Start: 2025-07-16 | End: 2025-07-17

## 2025-07-16 RX ORDER — METOPROLOL SUCCINATE 25 MG/1
25 TABLET, EXTENDED RELEASE ORAL DAILY
Status: DISCONTINUED | OUTPATIENT
Start: 2025-07-16 | End: 2025-07-16

## 2025-07-16 RX ADMIN — ACETAMINOPHEN 650 MG: 325 TABLET ORAL at 11:07

## 2025-07-16 RX ADMIN — MUPIROCIN 1 G: 20 OINTMENT TOPICAL at 09:07

## 2025-07-16 RX ADMIN — MEROPENEM 1 G: 1 INJECTION INTRAVENOUS at 09:07

## 2025-07-16 RX ADMIN — ARFORMOTEROL TARTRATE 15 MCG: 15 SOLUTION RESPIRATORY (INHALATION) at 07:07

## 2025-07-16 RX ADMIN — ATORVASTATIN CALCIUM 20 MG: 20 TABLET, FILM COATED ORAL at 09:07

## 2025-07-16 RX ADMIN — ENOXAPARIN SODIUM 80 MG: 80 INJECTION SUBCUTANEOUS at 01:07

## 2025-07-16 RX ADMIN — FUROSEMIDE 40 MG: 10 INJECTION, SOLUTION INTRAMUSCULAR; INTRAVENOUS at 09:07

## 2025-07-16 RX ADMIN — METOPROLOL SUCCINATE 25 MG: 25 TABLET, EXTENDED RELEASE ORAL at 04:07

## 2025-07-16 RX ADMIN — IPRATROPIUM BROMIDE AND ALBUTEROL SULFATE 3 ML: 2.5; .5 SOLUTION RESPIRATORY (INHALATION) at 01:07

## 2025-07-16 RX ADMIN — TAMSULOSIN HYDROCHLORIDE 0.4 MG: 0.4 CAPSULE ORAL at 09:07

## 2025-07-16 RX ADMIN — CLOPIDOGREL BISULFATE 75 MG: 75 TABLET, FILM COATED ORAL at 09:07

## 2025-07-16 RX ADMIN — Medication 6 MG: at 09:07

## 2025-07-16 RX ADMIN — METOPROLOL TARTRATE 12.5 MG: 25 TABLET, FILM COATED ORAL at 11:07

## 2025-07-16 RX ADMIN — IPRATROPIUM BROMIDE AND ALBUTEROL SULFATE 3 ML: 2.5; .5 SOLUTION RESPIRATORY (INHALATION) at 07:07

## 2025-07-16 RX ADMIN — ASPIRIN 81 MG: 81 TABLET ORAL at 09:07

## 2025-07-16 RX ADMIN — BUDESONIDE INHALATION 0.5 MG: 0.5 SUSPENSION RESPIRATORY (INHALATION) at 07:07

## 2025-07-16 RX ADMIN — PANTOPRAZOLE SODIUM 40 MG: 40 TABLET, DELAYED RELEASE ORAL at 05:07

## 2025-07-16 RX ADMIN — HYDROCODONE BITARTRATE AND ACETAMINOPHEN 1 TABLET: 5; 325 TABLET ORAL at 09:07

## 2025-07-16 RX ADMIN — LEVOTHYROXINE SODIUM 25 MCG: 0.03 TABLET ORAL at 05:07

## 2025-07-16 RX ADMIN — ACETAMINOPHEN 650 MG: 325 TABLET ORAL at 05:07

## 2025-07-17 PROBLEM — D64.9 ANEMIA: Status: ACTIVE | Noted: 2025-07-17

## 2025-07-17 PROBLEM — J81.1 PULMONARY EDEMA: Status: RESOLVED | Noted: 2025-07-15 | Resolved: 2025-07-17

## 2025-07-17 LAB
ABSOLUTE EOSINOPHIL (SMH): 0.34 K/UL
ABSOLUTE MONOCYTE (SMH): 0.73 K/UL (ref 0.3–1)
ABSOLUTE NEUTROPHIL COUNT (SMH): 7.2 K/UL (ref 1.8–7.7)
ALBUMIN SERPL-MCNC: 3.6 G/DL (ref 3.5–5.2)
ALP SERPL-CCNC: 82 UNIT/L (ref 55–135)
ALT SERPL-CCNC: 28 UNIT/L (ref 10–44)
ANION GAP (SMH): 4 MMOL/L (ref 8–16)
AST SERPL-CCNC: 62 UNIT/L (ref 10–40)
BASOPHILS # BLD AUTO: 0.04 K/UL
BASOPHILS NFR BLD AUTO: 0.4 %
BILIRUB SERPL-MCNC: 0.9 MG/DL (ref 0.1–1)
BUN SERPL-MCNC: 37 MG/DL (ref 8–23)
CALCIUM SERPL-MCNC: 9.2 MG/DL (ref 8.7–10.5)
CHLORIDE SERPL-SCNC: 97 MMOL/L (ref 95–110)
CO2 SERPL-SCNC: 37 MMOL/L (ref 23–29)
CREAT SERPL-MCNC: 1.3 MG/DL (ref 0.5–1.4)
ERYTHROCYTE [DISTWIDTH] IN BLOOD BY AUTOMATED COUNT: 14.9 % (ref 11.5–14.5)
GFR SERPLBLD CREATININE-BSD FMLA CKD-EPI: 52 ML/MIN/1.73/M2
GLUCOSE SERPL-MCNC: 151 MG/DL (ref 70–110)
HCT VFR BLD AUTO: 41.2 % (ref 40–54)
HGB BLD-MCNC: 12.8 GM/DL (ref 14–18)
IMM GRANULOCYTES # BLD AUTO: 0.04 K/UL (ref 0–0.04)
IMM GRANULOCYTES NFR BLD AUTO: 0.4 % (ref 0–0.5)
LYMPHOCYTES # BLD AUTO: 1.15 K/UL (ref 1–4.8)
MAGNESIUM SERPL-MCNC: 2.1 MG/DL (ref 1.6–2.6)
MCH RBC QN AUTO: 29.3 PG (ref 27–31)
MCHC RBC AUTO-ENTMCNC: 31.1 G/DL (ref 32–36)
MCV RBC AUTO: 94 FL (ref 82–98)
NUCLEATED RBC (/100WBC) (SMH): 0 /100 WBC
OHS QRS DURATION: 140 MS
OHS QTC CALCULATION: 519 MS
PLATELET # BLD AUTO: 93 K/UL (ref 150–450)
PLATELET BLD QL SMEAR: ABNORMAL
PMV BLD AUTO: 10.4 FL (ref 9.2–12.9)
POCT GLUCOSE: 146 MG/DL (ref 70–110)
POCT GLUCOSE: 171 MG/DL (ref 70–110)
POCT GLUCOSE: 201 MG/DL (ref 70–110)
POCT GLUCOSE: 247 MG/DL (ref 70–110)
POTASSIUM SERPL-SCNC: 4.4 MMOL/L (ref 3.5–5.1)
PROT SERPL-MCNC: 6.5 GM/DL (ref 6–8.4)
RBC # BLD AUTO: 4.37 M/UL (ref 4.6–6.2)
RELATIVE EOSINOPHIL (SMH): 3.6 % (ref 0–8)
RELATIVE LYMPHOCYTE (SMH): 12.1 % (ref 18–48)
RELATIVE MONOCYTE (SMH): 7.7 % (ref 4–15)
RELATIVE NEUTROPHIL (SMH): 75.8 % (ref 38–73)
SODIUM SERPL-SCNC: 138 MMOL/L (ref 136–145)
WBC # BLD AUTO: 9.49 K/UL (ref 3.9–12.7)

## 2025-07-17 PROCEDURE — 83735 ASSAY OF MAGNESIUM: CPT

## 2025-07-17 PROCEDURE — 27000221 HC OXYGEN, UP TO 24 HOURS

## 2025-07-17 PROCEDURE — 99900031 HC PATIENT EDUCATION (STAT)

## 2025-07-17 PROCEDURE — 36415 COLL VENOUS BLD VENIPUNCTURE: CPT

## 2025-07-17 PROCEDURE — 25000003 PHARM REV CODE 250

## 2025-07-17 PROCEDURE — 25000003 PHARM REV CODE 250: Performed by: HOSPITALIST

## 2025-07-17 PROCEDURE — 97116 GAIT TRAINING THERAPY: CPT | Mod: CQ

## 2025-07-17 PROCEDURE — 94761 N-INVAS EAR/PLS OXIMETRY MLT: CPT

## 2025-07-17 PROCEDURE — 21400001 HC TELEMETRY ROOM

## 2025-07-17 PROCEDURE — 99900035 HC TECH TIME PER 15 MIN (STAT)

## 2025-07-17 PROCEDURE — 25000242 PHARM REV CODE 250 ALT 637 W/ HCPCS: Performed by: INTERNAL MEDICINE

## 2025-07-17 PROCEDURE — 97535 SELF CARE MNGMENT TRAINING: CPT

## 2025-07-17 PROCEDURE — 63600175 PHARM REV CODE 636 W HCPCS

## 2025-07-17 PROCEDURE — 97530 THERAPEUTIC ACTIVITIES: CPT

## 2025-07-17 PROCEDURE — 25000242 PHARM REV CODE 250 ALT 637 W/ HCPCS

## 2025-07-17 PROCEDURE — 25000003 PHARM REV CODE 250: Performed by: NURSE PRACTITIONER

## 2025-07-17 PROCEDURE — 94640 AIRWAY INHALATION TREATMENT: CPT

## 2025-07-17 PROCEDURE — 99233 SBSQ HOSP IP/OBS HIGH 50: CPT | Mod: ,,, | Performed by: INTERNAL MEDICINE

## 2025-07-17 PROCEDURE — 25000003 PHARM REV CODE 250: Performed by: INTERNAL MEDICINE

## 2025-07-17 PROCEDURE — 85025 COMPLETE CBC W/AUTO DIFF WBC: CPT

## 2025-07-17 PROCEDURE — 80053 COMPREHEN METABOLIC PANEL: CPT

## 2025-07-17 RX ORDER — METOPROLOL SUCCINATE 50 MG/1
50 TABLET, EXTENDED RELEASE ORAL DAILY
Status: DISCONTINUED | OUTPATIENT
Start: 2025-07-18 | End: 2025-07-18 | Stop reason: HOSPADM

## 2025-07-17 RX ORDER — AMOXICILLIN AND CLAVULANATE POTASSIUM 875; 125 MG/1; MG/1
1 TABLET, FILM COATED ORAL EVERY 12 HOURS
Status: DISCONTINUED | OUTPATIENT
Start: 2025-07-17 | End: 2025-07-18 | Stop reason: HOSPADM

## 2025-07-17 RX ADMIN — METOPROLOL SUCCINATE 25 MG: 25 TABLET, EXTENDED RELEASE ORAL at 08:07

## 2025-07-17 RX ADMIN — IPRATROPIUM BROMIDE AND ALBUTEROL SULFATE 3 ML: 2.5; .5 SOLUTION RESPIRATORY (INHALATION) at 07:07

## 2025-07-17 RX ADMIN — ARFORMOTEROL TARTRATE 15 MCG: 15 SOLUTION RESPIRATORY (INHALATION) at 07:07

## 2025-07-17 RX ADMIN — AMOXICILLIN AND CLAVULANATE POTASSIUM 1 TABLET: 875; 125 TABLET, FILM COATED ORAL at 12:07

## 2025-07-17 RX ADMIN — MUPIROCIN 1 G: 20 OINTMENT TOPICAL at 08:07

## 2025-07-17 RX ADMIN — BUDESONIDE INHALATION 0.5 MG: 0.5 SUSPENSION RESPIRATORY (INHALATION) at 07:07

## 2025-07-17 RX ADMIN — ATORVASTATIN CALCIUM 20 MG: 20 TABLET, FILM COATED ORAL at 08:07

## 2025-07-17 RX ADMIN — HYDROCODONE BITARTRATE AND ACETAMINOPHEN 1 TABLET: 5; 325 TABLET ORAL at 08:07

## 2025-07-17 RX ADMIN — LEVOTHYROXINE SODIUM 25 MCG: 0.03 TABLET ORAL at 05:07

## 2025-07-17 RX ADMIN — CLOPIDOGREL BISULFATE 75 MG: 75 TABLET, FILM COATED ORAL at 08:07

## 2025-07-17 RX ADMIN — Medication 6 MG: at 08:07

## 2025-07-17 RX ADMIN — TAMSULOSIN HYDROCHLORIDE 0.4 MG: 0.4 CAPSULE ORAL at 08:07

## 2025-07-17 RX ADMIN — ASPIRIN 81 MG: 81 TABLET ORAL at 08:07

## 2025-07-17 RX ADMIN — AMOXICILLIN AND CLAVULANATE POTASSIUM 1 TABLET: 875; 125 TABLET, FILM COATED ORAL at 08:07

## 2025-07-17 RX ADMIN — FUROSEMIDE 40 MG: 10 INJECTION, SOLUTION INTRAMUSCULAR; INTRAVENOUS at 08:07

## 2025-07-17 RX ADMIN — IPRATROPIUM BROMIDE AND ALBUTEROL SULFATE 3 ML: 2.5; .5 SOLUTION RESPIRATORY (INHALATION) at 01:07

## 2025-07-17 RX ADMIN — PANTOPRAZOLE SODIUM 40 MG: 40 TABLET, DELAYED RELEASE ORAL at 05:07

## 2025-07-17 RX ADMIN — ENOXAPARIN SODIUM 80 MG: 80 INJECTION SUBCUTANEOUS at 02:07

## 2025-07-18 VITALS
RESPIRATION RATE: 17 BRPM | HEIGHT: 65 IN | BODY MASS INDEX: 30.56 KG/M2 | WEIGHT: 183.44 LBS | HEART RATE: 95 BPM | OXYGEN SATURATION: 96 % | SYSTOLIC BLOOD PRESSURE: 106 MMHG | TEMPERATURE: 98 F | DIASTOLIC BLOOD PRESSURE: 69 MMHG

## 2025-07-18 LAB
ABSOLUTE EOSINOPHIL (SMH): 0.37 K/UL
ABSOLUTE MONOCYTE (SMH): 0.68 K/UL (ref 0.3–1)
ABSOLUTE NEUTROPHIL COUNT (SMH): 5.6 K/UL (ref 1.8–7.7)
ALBUMIN SERPL-MCNC: 3.5 G/DL (ref 3.5–5.2)
ALP SERPL-CCNC: 83 UNIT/L (ref 55–135)
ALT SERPL-CCNC: 21 UNIT/L (ref 10–44)
ANION GAP (SMH): 5 MMOL/L (ref 8–16)
AST SERPL-CCNC: 37 UNIT/L (ref 10–40)
BASOPHILS # BLD AUTO: 0.05 K/UL
BASOPHILS NFR BLD AUTO: 0.6 %
BILIRUB SERPL-MCNC: 0.7 MG/DL (ref 0.1–1)
BUN SERPL-MCNC: 42 MG/DL (ref 8–23)
CALCIUM SERPL-MCNC: 9.1 MG/DL (ref 8.7–10.5)
CHLORIDE SERPL-SCNC: 96 MMOL/L (ref 95–110)
CO2 SERPL-SCNC: 34 MMOL/L (ref 23–29)
CREAT SERPL-MCNC: 1.4 MG/DL (ref 0.5–1.4)
ERYTHROCYTE [DISTWIDTH] IN BLOOD BY AUTOMATED COUNT: 15.2 % (ref 11.5–14.5)
GFR SERPLBLD CREATININE-BSD FMLA CKD-EPI: 48 ML/MIN/1.73/M2
GLUCOSE SERPL-MCNC: 148 MG/DL (ref 70–110)
HCT VFR BLD AUTO: 39.5 % (ref 40–54)
HGB BLD-MCNC: 12.4 GM/DL (ref 14–18)
IMM GRANULOCYTES # BLD AUTO: 0.04 K/UL (ref 0–0.04)
IMM GRANULOCYTES NFR BLD AUTO: 0.5 % (ref 0–0.5)
LYMPHOCYTES # BLD AUTO: 1.34 K/UL (ref 1–4.8)
MAGNESIUM SERPL-MCNC: 2.1 MG/DL (ref 1.6–2.6)
MCH RBC QN AUTO: 29.6 PG (ref 27–31)
MCHC RBC AUTO-ENTMCNC: 31.4 G/DL (ref 32–36)
MCV RBC AUTO: 94 FL (ref 82–98)
NUCLEATED RBC (/100WBC) (SMH): 0 /100 WBC
PLATELET # BLD AUTO: 92 K/UL (ref 150–450)
PMV BLD AUTO: 10.5 FL (ref 9.2–12.9)
POCT GLUCOSE: 140 MG/DL (ref 70–110)
POCT GLUCOSE: 182 MG/DL (ref 70–110)
POTASSIUM SERPL-SCNC: 4.4 MMOL/L (ref 3.5–5.1)
PROT SERPL-MCNC: 6.2 GM/DL (ref 6–8.4)
RBC # BLD AUTO: 4.19 M/UL (ref 4.6–6.2)
RELATIVE EOSINOPHIL (SMH): 4.6 % (ref 0–8)
RELATIVE LYMPHOCYTE (SMH): 16.7 % (ref 18–48)
RELATIVE MONOCYTE (SMH): 8.5 % (ref 4–15)
RELATIVE NEUTROPHIL (SMH): 69.1 % (ref 38–73)
SODIUM SERPL-SCNC: 135 MMOL/L (ref 136–145)
WBC # BLD AUTO: 8.04 K/UL (ref 3.9–12.7)

## 2025-07-18 PROCEDURE — 25000242 PHARM REV CODE 250 ALT 637 W/ HCPCS

## 2025-07-18 PROCEDURE — 97535 SELF CARE MNGMENT TRAINING: CPT

## 2025-07-18 PROCEDURE — 99900035 HC TECH TIME PER 15 MIN (STAT)

## 2025-07-18 PROCEDURE — 85025 COMPLETE CBC W/AUTO DIFF WBC: CPT

## 2025-07-18 PROCEDURE — 25000003 PHARM REV CODE 250

## 2025-07-18 PROCEDURE — 25000003 PHARM REV CODE 250: Performed by: HOSPITALIST

## 2025-07-18 PROCEDURE — 99900031 HC PATIENT EDUCATION (STAT)

## 2025-07-18 PROCEDURE — 97116 GAIT TRAINING THERAPY: CPT | Mod: CQ

## 2025-07-18 PROCEDURE — 97530 THERAPEUTIC ACTIVITIES: CPT

## 2025-07-18 PROCEDURE — 25000242 PHARM REV CODE 250 ALT 637 W/ HCPCS: Performed by: INTERNAL MEDICINE

## 2025-07-18 PROCEDURE — 36415 COLL VENOUS BLD VENIPUNCTURE: CPT

## 2025-07-18 PROCEDURE — 25000003 PHARM REV CODE 250: Performed by: NURSE PRACTITIONER

## 2025-07-18 PROCEDURE — 94761 N-INVAS EAR/PLS OXIMETRY MLT: CPT

## 2025-07-18 PROCEDURE — 80053 COMPREHEN METABOLIC PANEL: CPT

## 2025-07-18 PROCEDURE — 94640 AIRWAY INHALATION TREATMENT: CPT

## 2025-07-18 PROCEDURE — 27000221 HC OXYGEN, UP TO 24 HOURS

## 2025-07-18 PROCEDURE — 83735 ASSAY OF MAGNESIUM: CPT

## 2025-07-18 RX ORDER — AMOXICILLIN AND CLAVULANATE POTASSIUM 875; 125 MG/1; MG/1
1 TABLET, FILM COATED ORAL EVERY 12 HOURS
Qty: 3 TABLET | Refills: 0 | Status: SHIPPED | OUTPATIENT
Start: 2025-07-18

## 2025-07-18 RX ORDER — METOPROLOL SUCCINATE 50 MG/1
50 TABLET, EXTENDED RELEASE ORAL DAILY
Qty: 90 TABLET | Refills: 3 | Status: SHIPPED | OUTPATIENT
Start: 2025-07-19 | End: 2026-07-19

## 2025-07-18 RX ADMIN — BUDESONIDE INHALATION 0.5 MG: 0.5 SUSPENSION RESPIRATORY (INHALATION) at 07:07

## 2025-07-18 RX ADMIN — ARFORMOTEROL TARTRATE 15 MCG: 15 SOLUTION RESPIRATORY (INHALATION) at 07:07

## 2025-07-18 RX ADMIN — PANTOPRAZOLE SODIUM 40 MG: 40 TABLET, DELAYED RELEASE ORAL at 05:07

## 2025-07-18 RX ADMIN — CLOPIDOGREL BISULFATE 75 MG: 75 TABLET, FILM COATED ORAL at 08:07

## 2025-07-18 RX ADMIN — TAMSULOSIN HYDROCHLORIDE 0.4 MG: 0.4 CAPSULE ORAL at 08:07

## 2025-07-18 RX ADMIN — LEVOTHYROXINE SODIUM 25 MCG: 0.03 TABLET ORAL at 05:07

## 2025-07-18 RX ADMIN — MUPIROCIN 1 G: 20 OINTMENT TOPICAL at 08:07

## 2025-07-18 RX ADMIN — AMOXICILLIN AND CLAVULANATE POTASSIUM 1 TABLET: 875; 125 TABLET, FILM COATED ORAL at 08:07

## 2025-07-18 RX ADMIN — ASPIRIN 81 MG: 81 TABLET ORAL at 08:07

## 2025-07-18 RX ADMIN — IPRATROPIUM BROMIDE AND ALBUTEROL SULFATE 3 ML: 2.5; .5 SOLUTION RESPIRATORY (INHALATION) at 07:07

## 2025-07-18 RX ADMIN — METOPROLOL SUCCINATE 50 MG: 50 TABLET, EXTENDED RELEASE ORAL at 08:07

## 2025-07-19 LAB — BACTERIA UR CULT: NO GROWTH

## 2025-07-21 LAB
OHS QRS DURATION: 146 MS
OHS QTC CALCULATION: 500 MS

## (undated) DEVICE — GUIDEWIRE URO STRGHT 3CM TIP.035X150CM

## (undated) DEVICE — SYRINGE 30ML 302832

## (undated) DEVICE — STRAP TABLE 5X72 M5173

## (undated) DEVICE — SOLUTION H2O IRRIGATION 3000ML R8006

## (undated) DEVICE — PACK CYSTOSCOPY III

## (undated) DEVICE — SCRUB BACTOSHIELD 134439

## (undated) DEVICE — TUBING CYSTO DOUBLE 654301

## (undated) DEVICE — GLOVE #7.5 BIOGEL 30475

## (undated) DEVICE — BASKET HELICAL GEMINI 3 WIRE 2.4 FR

## (undated) DEVICE — SOLUTION IRRI NS BOTTLE 1000ML R5200-01

## (undated) DEVICE — SOLUTION IRRI H2O BOTTLE 1000ML

## (undated) DEVICE — Device

## (undated) DEVICE — UNDERGLOVE BIOGEL PI MICRO BLUE SZ 6.5

## (undated) DEVICE — GLOVE BIOGEL PI  GOLD SZ 7

## (undated) DEVICE — TUBING SUCTION 12' ST2003

## (undated) DEVICE — CATHETER URETERAL CONE TIP 5F X 70CM